# Patient Record
Sex: MALE | Race: WHITE | Employment: OTHER | URBAN - METROPOLITAN AREA
[De-identification: names, ages, dates, MRNs, and addresses within clinical notes are randomized per-mention and may not be internally consistent; named-entity substitution may affect disease eponyms.]

---

## 2018-10-15 ENCOUNTER — APPOINTMENT (OUTPATIENT)
Dept: CT IMAGING | Age: 53
End: 2018-10-15
Payer: MEDICARE

## 2018-10-15 ENCOUNTER — HOSPITAL ENCOUNTER (EMERGENCY)
Age: 53
Discharge: OTHER INSTITUTION | End: 2018-10-16
Attending: EMERGENCY MEDICINE
Payer: MEDICARE

## 2018-10-15 DIAGNOSIS — I74.5 ILIAC ARTERY OCCLUSION, LEFT (HCC): ICD-10-CM

## 2018-10-15 DIAGNOSIS — I73.9 PERIPHERAL VASCULAR DISEASE (HCC): ICD-10-CM

## 2018-10-15 DIAGNOSIS — M79.605 LEFT LEG PAIN: Primary | ICD-10-CM

## 2018-10-15 LAB
A/G RATIO: 1.4 (ref 1.1–2.2)
ALBUMIN SERPL-MCNC: 4.3 G/DL (ref 3.4–5)
ALP BLD-CCNC: 107 U/L (ref 40–129)
ALT SERPL-CCNC: 12 U/L (ref 10–40)
ANION GAP SERPL CALCULATED.3IONS-SCNC: 13 MMOL/L (ref 3–16)
APTT: 30.3 SEC (ref 26–36)
AST SERPL-CCNC: 13 U/L (ref 15–37)
BASOPHILS ABSOLUTE: 0.1 K/UL (ref 0–0.2)
BASOPHILS RELATIVE PERCENT: 0.7 %
BILIRUB SERPL-MCNC: 0.3 MG/DL (ref 0–1)
BILIRUBIN URINE: NEGATIVE
BLOOD, URINE: NEGATIVE
BUN BLDV-MCNC: 12 MG/DL (ref 7–20)
CALCIUM SERPL-MCNC: 9.6 MG/DL (ref 8.3–10.6)
CHLORIDE BLD-SCNC: 104 MMOL/L (ref 99–110)
CLARITY: CLEAR
CO2: 26 MMOL/L (ref 21–32)
COLOR: NORMAL
CREAT SERPL-MCNC: 0.8 MG/DL (ref 0.9–1.3)
EOSINOPHILS ABSOLUTE: 0.2 K/UL (ref 0–0.6)
EOSINOPHILS RELATIVE PERCENT: 1.7 %
GFR AFRICAN AMERICAN: >60
GFR NON-AFRICAN AMERICAN: >60
GLOBULIN: 3.1 G/DL
GLUCOSE BLD-MCNC: 85 MG/DL (ref 70–99)
GLUCOSE URINE: NEGATIVE MG/DL
HCT VFR BLD CALC: 42.7 % (ref 40.5–52.5)
HEMOGLOBIN: 14.7 G/DL (ref 13.5–17.5)
INR BLD: 1.05 (ref 0.86–1.14)
KETONES, URINE: NEGATIVE MG/DL
LACTIC ACID: 1.1 MMOL/L (ref 0.4–2)
LEUKOCYTE ESTERASE, URINE: NEGATIVE
LYMPHOCYTES ABSOLUTE: 2.4 K/UL (ref 1–5.1)
LYMPHOCYTES RELATIVE PERCENT: 23.5 %
MCH RBC QN AUTO: 33.1 PG (ref 26–34)
MCHC RBC AUTO-ENTMCNC: 34.4 G/DL (ref 31–36)
MCV RBC AUTO: 96.3 FL (ref 80–100)
MICROSCOPIC EXAMINATION: NORMAL
MONOCYTES ABSOLUTE: 0.6 K/UL (ref 0–1.3)
MONOCYTES RELATIVE PERCENT: 5.8 %
NEUTROPHILS ABSOLUTE: 7.1 K/UL (ref 1.7–7.7)
NEUTROPHILS RELATIVE PERCENT: 68.3 %
NITRITE, URINE: NEGATIVE
PDW BLD-RTO: 13.7 % (ref 12.4–15.4)
PH UA: 6
PLATELET # BLD: 276 K/UL (ref 135–450)
PMV BLD AUTO: 6.4 FL (ref 5–10.5)
POTASSIUM SERPL-SCNC: 3.8 MMOL/L (ref 3.5–5.1)
PROTEIN UA: NEGATIVE MG/DL
PROTHROMBIN TIME: 12 SEC (ref 9.8–13)
RBC # BLD: 4.43 M/UL (ref 4.2–5.9)
SODIUM BLD-SCNC: 143 MMOL/L (ref 136–145)
SPECIFIC GRAVITY UA: 1.01
SPECIMEN STATUS: NORMAL
TOTAL CK: 82 U/L (ref 39–308)
TOTAL PROTEIN: 7.4 G/DL (ref 6.4–8.2)
URINE TYPE: NORMAL
UROBILINOGEN, URINE: 0.2 E.U./DL
WBC # BLD: 10.4 K/UL (ref 4–11)

## 2018-10-15 PROCEDURE — 36415 COLL VENOUS BLD VENIPUNCTURE: CPT

## 2018-10-15 PROCEDURE — 82550 ASSAY OF CK (CPK): CPT

## 2018-10-15 PROCEDURE — 6360000004 HC RX CONTRAST MEDICATION: Performed by: PHYSICIAN ASSISTANT

## 2018-10-15 PROCEDURE — 83605 ASSAY OF LACTIC ACID: CPT

## 2018-10-15 PROCEDURE — 75635 CT ANGIO ABDOMINAL ARTERIES: CPT

## 2018-10-15 PROCEDURE — 85730 THROMBOPLASTIN TIME PARTIAL: CPT

## 2018-10-15 PROCEDURE — 85610 PROTHROMBIN TIME: CPT

## 2018-10-15 PROCEDURE — 6370000000 HC RX 637 (ALT 250 FOR IP): Performed by: PHYSICIAN ASSISTANT

## 2018-10-15 PROCEDURE — 6360000002 HC RX W HCPCS: Performed by: PHYSICIAN ASSISTANT

## 2018-10-15 PROCEDURE — 99284 EMERGENCY DEPT VISIT MOD MDM: CPT

## 2018-10-15 PROCEDURE — 80053 COMPREHEN METABOLIC PANEL: CPT

## 2018-10-15 PROCEDURE — 81003 URINALYSIS AUTO W/O SCOPE: CPT

## 2018-10-15 PROCEDURE — 85025 COMPLETE CBC W/AUTO DIFF WBC: CPT

## 2018-10-15 RX ORDER — ONDANSETRON 4 MG/1
4 TABLET, FILM COATED ORAL EVERY 8 HOURS PRN
Qty: 20 TABLET | Refills: 0 | Status: SHIPPED | OUTPATIENT
Start: 2018-10-15 | End: 2020-06-22

## 2018-10-15 RX ORDER — CLOPIDOGREL BISULFATE 75 MG/1
75 TABLET ORAL DAILY
COMMUNITY
Start: 2018-07-16 | End: 2021-09-23 | Stop reason: SDUPTHER

## 2018-10-15 RX ORDER — GABAPENTIN 300 MG/1
300 CAPSULE ORAL 2 TIMES DAILY
COMMUNITY
Start: 2018-07-16 | End: 2021-06-23

## 2018-10-15 RX ORDER — ONDANSETRON 4 MG/1
4 TABLET, ORALLY DISINTEGRATING ORAL ONCE
Status: COMPLETED | OUTPATIENT
Start: 2018-10-15 | End: 2018-10-15

## 2018-10-15 RX ORDER — ASPIRIN 325 MG
325 TABLET ORAL DAILY
Status: ON HOLD | COMMUNITY
Start: 2018-09-11 | End: 2021-03-13 | Stop reason: HOSPADM

## 2018-10-15 RX ORDER — ATORVASTATIN CALCIUM 80 MG/1
80 TABLET, FILM COATED ORAL DAILY
Status: ON HOLD | COMMUNITY
Start: 2018-07-16 | End: 2021-03-13 | Stop reason: SDUPTHER

## 2018-10-15 RX ORDER — LANOLIN ALCOHOL/MO/W.PET/CERES
3 CREAM (GRAM) TOPICAL NIGHTLY PRN
COMMUNITY
Start: 2018-07-16 | End: 2020-06-22 | Stop reason: ALTCHOICE

## 2018-10-15 RX ORDER — OXYCODONE HYDROCHLORIDE AND ACETAMINOPHEN 5; 325 MG/1; MG/1
1 TABLET ORAL ONCE
Status: COMPLETED | OUTPATIENT
Start: 2018-10-15 | End: 2018-10-15

## 2018-10-15 RX ORDER — LISINOPRIL 10 MG/1
10 TABLET ORAL DAILY
Status: ON HOLD | COMMUNITY
Start: 2018-05-29 | End: 2021-03-13 | Stop reason: HOSPADM

## 2018-10-15 RX ORDER — OXYCODONE HYDROCHLORIDE AND ACETAMINOPHEN 5; 325 MG/1; MG/1
1-2 TABLET ORAL EVERY 6 HOURS PRN
Qty: 10 TABLET | Refills: 0 | Status: SHIPPED | OUTPATIENT
Start: 2018-10-15 | End: 2018-10-22

## 2018-10-15 RX ORDER — TAMSULOSIN HYDROCHLORIDE 0.4 MG/1
0.4 CAPSULE ORAL DAILY
COMMUNITY
Start: 2018-07-16 | End: 2020-06-22 | Stop reason: ALTCHOICE

## 2018-10-15 RX ADMIN — IOPAMIDOL 110 ML: 755 INJECTION, SOLUTION INTRAVENOUS at 21:31

## 2018-10-15 RX ADMIN — OXYCODONE AND ACETAMINOPHEN 1 TABLET: 5; 325 TABLET ORAL at 20:27

## 2018-10-15 RX ADMIN — ONDANSETRON 4 MG: 4 TABLET, ORALLY DISINTEGRATING ORAL at 20:27

## 2018-10-15 ASSESSMENT — PAIN SCALES - GENERAL
PAINLEVEL_OUTOF10: 6
PAINLEVEL_OUTOF10: 5

## 2018-10-15 ASSESSMENT — PAIN DESCRIPTION - PAIN TYPE: TYPE: ACUTE PAIN

## 2018-10-15 NOTE — ED PROVIDER NOTES
[address],  [place], [place]   Phone [phone]   SAMPLE POSSIBLE BLOOD BANK TESTING    Narrative:     Performed at:  [facility]) - [facility]  [address],  [place], [place] [employer]   Phone [phone]   URINALYSIS    Narrative:     Performed at:  [facility]) - [facility]  [address],  [place], [place]   Phone [phone]       All other labs were within normal range or not returned as of this dictation. EKG: All EKG's are interpreted by the Emergency Department Physician who either signs orCo-signs this chart in the absence of a cardiologist.  Please see their note for interpretation of EKG. RADIOLOGY:   Non-plain film images such as CT, Ultrasound and MRI are read by the radiologist. Plain radiographic images are visualized andpreliminarily interpreted by the  ED Provider with the below findings:        Interpretation perthe Radiologist below, if available at the time of this note:    CTA ABDOMINAL AORTA W BILAT RUNOFF W CONTRAST   Final Result   1. Patent right common iliac artery stent. 2. Patent fem-fem bypass graft. 3. Patent stent versus graft in the right lower extremity corresponding to   the superficial femoral artery. 4. Diminutive caliber of right anterior tibial and peroneal arteries   indicating peripheral vascular disease. 5. Occlusion of the left common iliac artery and of the bilateral internal   iliac arteries proximally. Internal iliac arteries are reconstituted from   collateral flow. 6. Ventral hernia containing peritoneal fat only. VL Extremity Venous Left    (Results Pending)     No results found.       PROCEDURES   Unless otherwise noted below, none     Procedures    CRITICAL CARE TIME   N/A    CONSULTS:  None      EMERGENCY DEPARTMENT COURSE and DIFFERENTIALDIAGNOSIS/MDM:   Vitals:    Vitals:    10/15/18 2033 10/15/18 2103 10/15/18 2136 10/16/18 0030   BP: (!) 166/82

## 2018-10-15 NOTE — ED PROVIDER NOTES
I independently performed a history and physical on Renetta Hurley. All diagnostic, treatment, and disposition decisions were made by myself in conjunction with the advanced practice provider. For further details of Caty Galeas's emergency department encounter, please see advanced practice provider's documentation    57-year-old male presenting with 2 day history of left leg pain    Physical examination: Left posterior calf tenderness to palpation. Cta Abdominal Aorta W Bilat Runoff W Contrast    Addendum Date: 10/16/2018    ADDENDUM: 3D reconstructed images were performed on a separate workstation and provided for review. These were provided after original dictation. Review of imaging shows no change to the regional report. Result Date: 10/16/2018  EXAMINATION: CTA OF THE AORTA WITH LOWER EXTREMITY RUNOFF 10/15/2018 9:34 pm TECHNIQUE: CTA of the pelvis and bilateral lower extremities was performed after the administration of intravenous contrast.   Multiplanar reformatted images are provided for review. MIP images are provided for review. Dose modulation, iterative reconstruction, and/or weight based adjustment of the mA/kV was utilized to reduce the radiation dose to as low as reasonably achievable. COMPARISON: None. HISTORY: ORDERING SYSTEM PROVIDED HISTORY: Vasculopath and chronic smoker now with abdominal pain and left lower extremity pain TECHNOLOGIST PROVIDED HISTORY: Ordering Physician Provided Reason for Exam: Lower leg pain with abdominal pain; Abdominal distension; History of vasular repair in right leg per patient Acuity: Acute Type of Exam: Initial FINDINGS: Nonvascular Lower Chest:  Mild dependent atelectasis in the lung bases. The base of the heart is normal. Organs: The liver, gallbladder, biliary ducts, pancreas and spleen are normal. Kidneys and adrenal glands are normal. GI/Bowel: The stomach, duodenum and small bowel are normal.  The patient is status post right hemicolectomy. reconstituted from collateral flow. 6. Ventral hernia containing peritoneal fat only. Due to the immediate potential for life-threatening deterioration due to left leg pain requiring emergent workup including a CTA of abdomen with runoff to extremities and expedited workup with vascular surgery consultation amd transfer to [facility], I spent 40 minutes providing critical care. This time is excluding time spent performing procedures.        Anuja Mirza, DO  10/18/18 4431 [facility] [address] [facility], DO  10/18/18 3979

## 2018-10-16 VITALS
HEIGHT: 68 IN | WEIGHT: 208 LBS | BODY MASS INDEX: 31.52 KG/M2 | OXYGEN SATURATION: 95 % | SYSTOLIC BLOOD PRESSURE: 156 MMHG | TEMPERATURE: 97.7 F | DIASTOLIC BLOOD PRESSURE: 94 MMHG | HEART RATE: 57 BPM | RESPIRATION RATE: 17 BRPM

## 2018-10-16 PROCEDURE — 6360000002 HC RX W HCPCS: Performed by: PHYSICIAN ASSISTANT

## 2018-10-16 PROCEDURE — 96372 THER/PROPH/DIAG INJ SC/IM: CPT

## 2018-10-16 PROCEDURE — 6370000000 HC RX 637 (ALT 250 FOR IP): Performed by: PHYSICIAN ASSISTANT

## 2018-10-16 RX ORDER — OXYCODONE HYDROCHLORIDE AND ACETAMINOPHEN 5; 325 MG/1; MG/1
2 TABLET ORAL ONCE
Status: COMPLETED | OUTPATIENT
Start: 2018-10-16 | End: 2018-10-16

## 2018-10-16 RX ORDER — ONDANSETRON 4 MG/1
4 TABLET, ORALLY DISINTEGRATING ORAL ONCE
Status: COMPLETED | OUTPATIENT
Start: 2018-10-16 | End: 2018-10-16

## 2018-10-16 RX ADMIN — ONDANSETRON 4 MG: 4 TABLET, ORALLY DISINTEGRATING ORAL at 01:09

## 2018-10-16 RX ADMIN — ENOXAPARIN SODIUM 90 MG: 100 INJECTION SUBCUTANEOUS at 01:09

## 2018-10-16 RX ADMIN — OXYCODONE AND ACETAMINOPHEN 2 TABLET: 5; 325 TABLET ORAL at 01:09

## 2018-10-16 ASSESSMENT — PAIN SCALES - GENERAL: PAINLEVEL_OUTOF10: 9

## 2020-05-04 ENCOUNTER — APPOINTMENT (OUTPATIENT)
Dept: GENERAL RADIOLOGY | Age: 55
End: 2020-05-04
Payer: MEDICARE

## 2020-05-04 ENCOUNTER — HOSPITAL ENCOUNTER (EMERGENCY)
Age: 55
Discharge: HOME | End: 2020-05-05
Attending: EMERGENCY MEDICINE
Payer: MEDICARE

## 2020-05-04 ENCOUNTER — APPOINTMENT (OUTPATIENT)
Dept: CT IMAGING | Age: 55
End: 2020-05-04
Payer: MEDICARE

## 2020-05-04 LAB
A/G RATIO: 1.4 (ref 1.1–2.2)
ALBUMIN SERPL-MCNC: 4.6 G/DL (ref 3.4–5)
ALP BLD-CCNC: 126 U/L (ref 40–129)
ALT SERPL-CCNC: 17 U/L (ref 10–40)
ANION GAP SERPL CALCULATED.3IONS-SCNC: 13 MMOL/L (ref 3–16)
AST SERPL-CCNC: 18 U/L (ref 15–37)
BASOPHILS ABSOLUTE: 0.1 K/UL (ref 0–0.2)
BASOPHILS RELATIVE PERCENT: 1.3 %
BILIRUB SERPL-MCNC: <0.2 MG/DL (ref 0–1)
BUN BLDV-MCNC: 10 MG/DL (ref 7–20)
CALCIUM SERPL-MCNC: 10.1 MG/DL (ref 8.3–10.6)
CHLORIDE BLD-SCNC: 101 MMOL/L (ref 99–110)
CO2: 27 MMOL/L (ref 21–32)
CREAT SERPL-MCNC: 0.8 MG/DL (ref 0.9–1.3)
EOSINOPHILS ABSOLUTE: 0.2 K/UL (ref 0–0.6)
EOSINOPHILS RELATIVE PERCENT: 1.5 %
GFR AFRICAN AMERICAN: >60
GFR NON-AFRICAN AMERICAN: >60
GLOBULIN: 3.2 G/DL
GLUCOSE BLD-MCNC: 80 MG/DL (ref 70–99)
HCT VFR BLD CALC: 42.5 % (ref 40.5–52.5)
HEMOGLOBIN: 14.5 G/DL (ref 13.5–17.5)
LYMPHOCYTES ABSOLUTE: 3.1 K/UL (ref 1–5.1)
LYMPHOCYTES RELATIVE PERCENT: 27.2 %
MCH RBC QN AUTO: 33 PG (ref 26–34)
MCHC RBC AUTO-ENTMCNC: 34.1 G/DL (ref 31–36)
MCV RBC AUTO: 96.8 FL (ref 80–100)
MONOCYTES ABSOLUTE: 0.7 K/UL (ref 0–1.3)
MONOCYTES RELATIVE PERCENT: 5.8 %
NEUTROPHILS ABSOLUTE: 7.2 K/UL (ref 1.7–7.7)
NEUTROPHILS RELATIVE PERCENT: 64.2 %
PDW BLD-RTO: 13.8 % (ref 12.4–15.4)
PLATELET # BLD: 241 K/UL (ref 135–450)
PMV BLD AUTO: 6.4 FL (ref 5–10.5)
POTASSIUM REFLEX MAGNESIUM: 3.6 MMOL/L (ref 3.5–5.1)
RBC # BLD: 4.39 M/UL (ref 4.2–5.9)
SODIUM BLD-SCNC: 141 MMOL/L (ref 136–145)
TOTAL PROTEIN: 7.8 G/DL (ref 6.4–8.2)
WBC # BLD: 11.3 K/UL (ref 4–11)

## 2020-05-04 PROCEDURE — 6360000002 HC RX W HCPCS: Performed by: PHYSICIAN ASSISTANT

## 2020-05-04 PROCEDURE — 85025 COMPLETE CBC W/AUTO DIFF WBC: CPT

## 2020-05-04 PROCEDURE — 80053 COMPREHEN METABOLIC PANEL: CPT

## 2020-05-04 PROCEDURE — 6360000004 HC RX CONTRAST MEDICATION: Performed by: PHYSICIAN ASSISTANT

## 2020-05-04 PROCEDURE — 73630 X-RAY EXAM OF FOOT: CPT

## 2020-05-04 PROCEDURE — 75635 CT ANGIO ABDOMINAL ARTERIES: CPT

## 2020-05-04 PROCEDURE — 99284 EMERGENCY DEPT VISIT MOD MDM: CPT

## 2020-05-04 PROCEDURE — 96374 THER/PROPH/DIAG INJ IV PUSH: CPT

## 2020-05-04 RX ORDER — MORPHINE SULFATE 2 MG/ML
2 INJECTION, SOLUTION INTRAMUSCULAR; INTRAVENOUS ONCE
Status: COMPLETED | OUTPATIENT
Start: 2020-05-04 | End: 2020-05-04

## 2020-05-04 RX ORDER — HYDROCODONE BITARTRATE AND ACETAMINOPHEN 5; 325 MG/1; MG/1
1 TABLET ORAL EVERY 8 HOURS PRN
Qty: 9 TABLET | Refills: 0 | Status: SHIPPED | OUTPATIENT
Start: 2020-05-04 | End: 2020-05-07

## 2020-05-04 RX ADMIN — MORPHINE SULFATE 2 MG: 2 INJECTION, SOLUTION INTRAMUSCULAR; INTRAVENOUS at 22:35

## 2020-05-04 RX ADMIN — IOPAMIDOL 100 ML: 755 INJECTION, SOLUTION INTRAVENOUS at 22:20

## 2020-05-04 ASSESSMENT — PAIN SCALES - GENERAL
PAINLEVEL_OUTOF10: 5
PAINLEVEL_OUTOF10: 5

## 2020-05-05 VITALS
TEMPERATURE: 98.1 F | RESPIRATION RATE: 16 BRPM | HEART RATE: 78 BPM | DIASTOLIC BLOOD PRESSURE: 69 MMHG | SYSTOLIC BLOOD PRESSURE: 141 MMHG | OXYGEN SATURATION: 95 %

## 2020-05-05 NOTE — ED NOTES
Bed: 09  Expected date:   Expected time:   Means of arrival:   Comments:  EMS     Nirmal Momin RN  05/04/20 2013

## 2020-05-05 NOTE — ED PROVIDER NOTES
[address] ED  EMERGENCY DEPARTMENT ENCOUNTER        Pt Name: Soraida Cruz  MRN: [id number]  [place] 1965  Date of evaluation: 5/4/2020  Provider: LV Farmer  PCP: Bryant Gonzalez    This patient was seen and evaluated by the attending physician Kelin Michele MD.      [address] [place] [address]       Chief Complaint   Patient presents with    Toe Pain     atraumatic left # 1 toe pain, pt states has been hurting for last several days, has not taken medications for pain. HISTORY OF PRESENT ILLNESS   (Location/Symptom, Timing/Onset, Context/Setting, Quality, Duration, Modifying Factors, Severity)  Note limiting factors. Sroaida Cruz is a 62 y.o. male who presents via private vehicle for evaluation of atraumatic right toe pain. Patient has significant past medical history of peripheral arterial disease, he is also tobacco abuser. He notes that approximately 4 weeks ago he developed pain to his right foot along the pinky toes. Over the last several weeks the pain has progressed and is now to all 5 toes. He notes that the foot looks slightly more red than it typically does although at baseline it is red appearing. He notes nothing makes the pain better or worse, it is not worsened by weightbearing or position changes. He denies any numbness tingling or weakness. He denies any pain to the calf shin knee or thigh. He denies any chest pain cough shortness of breath nausea vomiting or abdominal pain. Nursing Notes were all reviewed and agreed with or any disagreements were addressed  in the HPI. REVIEW OF SYSTEMS    (2-9 systems for level 4, 10 or more for level 5)     Review of Systems    Positives and Pertinent negatives as per HPI. Except as noted abovein the ROS, all other systems were reviewed and negative.        PAST MEDICAL HISTORY     Past Medical History:   Diagnosis Date    Hyperlipidemia     Hypertension     PAD (peripheral artery disease) [facility])          SURGICAL HISTORY   History reviewed. No pertinent surgical history. [address]       Discharge Medication List as of 5/5/2020 12:09 AM      CONTINUE these medications which have NOT CHANGED    Details   aspirin 325 MG tablet Take 325 mg by mouthHistorical Med      clopidogrel (PLAVIX) 75 MG tablet Take 75 mg by mouthHistorical Med      atorvastatin (LIPITOR) 80 MG tablet Take 80 mg by mouthHistorical Med      gabapentin (NEURONTIN) 300 MG capsule Take 300 mg by mouth. .Historical Med      lisinopril (PRINIVIL;ZESTRIL) 10 MG tablet Take 10 mg by mouthHistorical Med      melatonin 3 MG TABS tablet Take 3 mg by mouthHistorical Med      tamsulosin (FLOMAX) 0.4 MG capsule Take 0.4 mg by mouthHistorical Med      ondansetron (ZOFRAN) 4 MG tablet Take 1 tablet by mouth every 8 hours as needed for Nausea, Disp-20 tablet, R-0Print               ALLERGIES     Patient has no known allergies. FAMILYHISTORY     History reviewed. No pertinent family history.        SOCIAL HISTORY       Social History     Socioeconomic History    Marital status:      Spouse name: None    Number of children: None    Years of education: None    Highest education level: None   Occupational History    None   Social Needs    Financial resource strain: None    Food insecurity     Worry: None     Inability: None    Transportation needs     Medical: None     Non-medical: None   Tobacco Use    Smoking status: Current Every Day Smoker     Packs/day: 2.00    Smokeless tobacco: Never Used   Substance and Sexual Activity    Alcohol use: None    Drug use: No    Sexual activity: None   Lifestyle    Physical activity     Days per week: None     Minutes per session: None    Stress: None   Relationships    Social connections     Talks on phone: None     Gets together: None     Attends Voodoo service: None     Active member of club or organization: None     Attends meetings of clubs or organizations: None ankle.  The foot is warm, no temperature deficits compared to contralateral side. SKIN: Warm and dry. No acute rashes. NEUROLOGICAL: Alert and oriented No gross facial drooping. Power intact upper and lower extremity sensation intact x4 pSYCHIATRIC: Normal mood and affect. DIAGNOSTIC RESULTS   LABS:    Labs Reviewed   CBC WITH AUTO DIFFERENTIAL - Abnormal; Notable for the following components:       Result Value    WBC 11.3 (*)     All other components within normal limits    Narrative:     Performed at:  [facility],  [place], [facility]   Phone [phone]   COMPREHENSIVE METABOLIC PANEL W/ REFLEX TO MG FOR LOW K - Abnormal; Notable for the following components:    CREATININE 0.8 (*)     All other components within normal limits    Narrative:     Performed at:  [facility]) [facility] [address],  [place], [facility]   Phone [phone]       All other labs were within normal range or not returned as of this dictation. EKG: All EKG's are interpreted by the Emergency Department Physician who either signs orCo-signs this chart in the absence of a cardiologist.  Please see their note for interpretation of EKG. RADIOLOGY:   Non-plain film images such as CT, Ultrasound and MRI are read by the radiologist. Plain radiographic images are visualized andpreliminarily interpreted by the  ED Provider with the below findings:        Interpretation [facility] Radiologist below, if available at the time of this note:    CTA ABDOMINAL AORTA W BILAT RUNOFF W CONTRAST   Preliminary Result   Patent right aortoiliac stent and bi-femoral bypass. The stented native right femoral artery and right femoral-popliteal bypass   are occluded. There is distal reconstitution (via collateralization) at the   tibioperoneal trunk with trifurcation and single-vessel runoff to the foot   via the peroneal artery.       Patent left lower extremity pressure is mildly elevated in triage this normalizes throughout his stay. On exam he has evidence of chronic peripheral arterial disease, however his foot today is without significant temperature change compared to contralateral side, sensation is intact, he has excellent cap refill. I personally had a difficult time obtaining DP and PT pulses however it was obtained with Doppler, intact. I reviewed patient's past medical history he is a patient of vascular surgery at , he has been in contact with their office, it was noted that a repeat lower extremity CT angios with runoff was needed. This test was performed today, there is evidence of patent right arterial iliac stent and bifemoral bypass. The stented right femoral artery and right femoral popliteal bypass are occluded however there is distal reconstitution at the tibioperoneal trunk with trifurcation and single-vessel runoff to the root via the peroneal artery. Given the fact the patient does have adequate perfusion but worsening disease I do recommend that he follow-up with his vascular surgeon. Smoking cessation discussed and encouraged. Patient given medication for pain with improvement. He will be given prescription for pain medication. At this time I believe patient's reasonable candidate for discharge home with close specialist follow-up and strict return precautions advised. Based on patient's clinical history and clinical findings I currently estimate there is low probability for: compartment syndrome, DVT, septic arthritis, dislocation, surgically emergent tendon or NV injury. We have discussed the symptoms which are most concerning (e.g., changing or worsening pain, numbness, weakness) that necessitate immediate return. Pt is in agreement with the current plan and all questions were addressed.  I discussed my PE findings, diagnostic results, assessment and plan for home discharge with my supervisor who agrees with the above stated

## 2020-05-30 ENCOUNTER — APPOINTMENT (OUTPATIENT)
Dept: CT IMAGING | Age: 55
End: 2020-05-30
Payer: MEDICARE

## 2020-05-30 ENCOUNTER — HOSPITAL ENCOUNTER (INPATIENT)
Age: 55
LOS: 4 days | Discharge: HOME | DRG: 035 | End: 2020-06-03
Attending: INTERNAL MEDICINE | Admitting: INTERNAL MEDICINE
Payer: MEDICARE

## 2020-05-30 ENCOUNTER — HOSPITAL ENCOUNTER (EMERGENCY)
Age: 55
Discharge: ANOTHER HOSPITAL | End: 2020-05-30
Attending: EMERGENCY MEDICINE
Payer: MEDICARE

## 2020-05-30 VITALS
OXYGEN SATURATION: 98 % | SYSTOLIC BLOOD PRESSURE: 129 MMHG | BODY MASS INDEX: 30.92 KG/M2 | HEIGHT: 67 IN | DIASTOLIC BLOOD PRESSURE: 73 MMHG | TEMPERATURE: 97.6 F | HEART RATE: 65 BPM | RESPIRATION RATE: 17 BRPM | WEIGHT: 197 LBS

## 2020-05-30 PROBLEM — I63.9 ACUTE CVA (CEREBROVASCULAR ACCIDENT) (HCC): Status: ACTIVE | Noted: 2020-05-30

## 2020-05-30 LAB
ANION GAP SERPL CALCULATED.3IONS-SCNC: 12 MMOL/L (ref 3–16)
APTT: 29.5 SEC (ref 24.2–36.2)
BUN BLDV-MCNC: 14 MG/DL (ref 7–20)
CALCIUM SERPL-MCNC: 9.3 MG/DL (ref 8.3–10.6)
CHLORIDE BLD-SCNC: 103 MMOL/L (ref 99–110)
CO2: 23 MMOL/L (ref 21–32)
CREAT SERPL-MCNC: 0.9 MG/DL (ref 0.9–1.3)
GFR AFRICAN AMERICAN: >60
GFR NON-AFRICAN AMERICAN: >60
GLUCOSE BLD-MCNC: 102 MG/DL (ref 70–99)
GLUCOSE BLD-MCNC: 109 MG/DL (ref 70–99)
HCT VFR BLD CALC: 40.5 % (ref 40.5–52.5)
HEMOGLOBIN: 13.8 G/DL (ref 13.5–17.5)
INR BLD: 1.08 (ref 0.86–1.14)
MCH RBC QN AUTO: 33.2 PG (ref 26–34)
MCHC RBC AUTO-ENTMCNC: 34.1 G/DL (ref 31–36)
MCV RBC AUTO: 97.5 FL (ref 80–100)
PDW BLD-RTO: 14.1 % (ref 12.4–15.4)
PERFORMED ON: ABNORMAL
PLATELET # BLD: 250 K/UL (ref 135–450)
PMV BLD AUTO: 6.5 FL (ref 5–10.5)
POTASSIUM SERPL-SCNC: 4.3 MMOL/L (ref 3.5–5.1)
PROTHROMBIN TIME: 12.5 SEC (ref 10–13.2)
RBC # BLD: 4.16 M/UL (ref 4.2–5.9)
SODIUM BLD-SCNC: 138 MMOL/L (ref 136–145)
TROPONIN: <0.01 NG/ML
WBC # BLD: 10.1 K/UL (ref 4–11)

## 2020-05-30 PROCEDURE — 99285 EMERGENCY DEPT VISIT HI MDM: CPT

## 2020-05-30 PROCEDURE — 36415 COLL VENOUS BLD VENIPUNCTURE: CPT

## 2020-05-30 PROCEDURE — 85730 THROMBOPLASTIN TIME PARTIAL: CPT

## 2020-05-30 PROCEDURE — 85610 PROTHROMBIN TIME: CPT

## 2020-05-30 PROCEDURE — 6370000000 HC RX 637 (ALT 250 FOR IP): Performed by: EMERGENCY MEDICINE

## 2020-05-30 PROCEDURE — 2580000003 HC RX 258: Performed by: INTERNAL MEDICINE

## 2020-05-30 PROCEDURE — 70498 CT ANGIOGRAPHY NECK: CPT

## 2020-05-30 PROCEDURE — 84484 ASSAY OF TROPONIN QUANT: CPT

## 2020-05-30 PROCEDURE — 80048 BASIC METABOLIC PNL TOTAL CA: CPT

## 2020-05-30 PROCEDURE — 93005 ELECTROCARDIOGRAM TRACING: CPT | Performed by: EMERGENCY MEDICINE

## 2020-05-30 PROCEDURE — 85027 COMPLETE CBC AUTOMATED: CPT

## 2020-05-30 PROCEDURE — 1200000000 HC SEMI PRIVATE

## 2020-05-30 PROCEDURE — 6360000004 HC RX CONTRAST MEDICATION: Performed by: EMERGENCY MEDICINE

## 2020-05-30 PROCEDURE — 2580000003 HC RX 258: Performed by: EMERGENCY MEDICINE

## 2020-05-30 PROCEDURE — 6370000000 HC RX 637 (ALT 250 FOR IP): Performed by: INTERNAL MEDICINE

## 2020-05-30 PROCEDURE — 70450 CT HEAD/BRAIN W/O DYE: CPT

## 2020-05-30 RX ORDER — POLYETHYLENE GLYCOL 3350 17 G/17G
17 POWDER, FOR SOLUTION ORAL DAILY PRN
Status: DISCONTINUED | OUTPATIENT
Start: 2020-05-30 | End: 2020-06-03 | Stop reason: HOSPADM

## 2020-05-30 RX ORDER — OXYCODONE HYDROCHLORIDE AND ACETAMINOPHEN 5; 325 MG/1; MG/1
1 TABLET ORAL ONCE
Status: COMPLETED | OUTPATIENT
Start: 2020-05-30 | End: 2020-05-30

## 2020-05-30 RX ORDER — CLOPIDOGREL BISULFATE 75 MG/1
75 TABLET ORAL DAILY
Status: DISCONTINUED | OUTPATIENT
Start: 2020-05-31 | End: 2020-06-03 | Stop reason: HOSPADM

## 2020-05-30 RX ORDER — TRAMADOL HYDROCHLORIDE 50 MG/1
50 TABLET ORAL EVERY 6 HOURS PRN
Status: DISCONTINUED | OUTPATIENT
Start: 2020-05-30 | End: 2020-06-03 | Stop reason: HOSPADM

## 2020-05-30 RX ORDER — LISINOPRIL 10 MG/1
10 TABLET ORAL DAILY
Status: DISCONTINUED | OUTPATIENT
Start: 2020-05-31 | End: 2020-05-31

## 2020-05-30 RX ORDER — ONDANSETRON 2 MG/ML
4 INJECTION INTRAMUSCULAR; INTRAVENOUS EVERY 6 HOURS PRN
Status: DISCONTINUED | OUTPATIENT
Start: 2020-05-30 | End: 2020-06-03 | Stop reason: HOSPADM

## 2020-05-30 RX ORDER — ATORVASTATIN CALCIUM 80 MG/1
80 TABLET, FILM COATED ORAL DAILY
Status: DISCONTINUED | OUTPATIENT
Start: 2020-05-31 | End: 2020-06-03 | Stop reason: HOSPADM

## 2020-05-30 RX ORDER — ASPIRIN 325 MG
325 TABLET ORAL DAILY
Status: DISCONTINUED | OUTPATIENT
Start: 2020-05-31 | End: 2020-05-31

## 2020-05-30 RX ORDER — 0.9 % SODIUM CHLORIDE 0.9 %
1000 INTRAVENOUS SOLUTION INTRAVENOUS ONCE
Status: COMPLETED | OUTPATIENT
Start: 2020-05-30 | End: 2020-05-30

## 2020-05-30 RX ORDER — SODIUM CHLORIDE 0.9 % (FLUSH) 0.9 %
10 SYRINGE (ML) INJECTION PRN
Status: DISCONTINUED | OUTPATIENT
Start: 2020-05-30 | End: 2020-06-03 | Stop reason: HOSPADM

## 2020-05-30 RX ORDER — SODIUM CHLORIDE 0.9 % (FLUSH) 0.9 %
10 SYRINGE (ML) INJECTION EVERY 12 HOURS SCHEDULED
Status: DISCONTINUED | OUTPATIENT
Start: 2020-05-30 | End: 2020-06-03 | Stop reason: HOSPADM

## 2020-05-30 RX ORDER — TAMSULOSIN HYDROCHLORIDE 0.4 MG/1
0.4 CAPSULE ORAL DAILY
Status: DISCONTINUED | OUTPATIENT
Start: 2020-05-31 | End: 2020-06-03 | Stop reason: HOSPADM

## 2020-05-30 RX ORDER — PROMETHAZINE HYDROCHLORIDE 25 MG/1
12.5 TABLET ORAL EVERY 6 HOURS PRN
Status: DISCONTINUED | OUTPATIENT
Start: 2020-05-30 | End: 2020-06-03 | Stop reason: HOSPADM

## 2020-05-30 RX ORDER — GABAPENTIN 300 MG/1
300 CAPSULE ORAL 2 TIMES DAILY
Status: DISCONTINUED | OUTPATIENT
Start: 2020-05-30 | End: 2020-05-31

## 2020-05-30 RX ADMIN — GABAPENTIN 300 MG: 300 CAPSULE ORAL at 23:20

## 2020-05-30 RX ADMIN — SODIUM CHLORIDE 1000 ML: 9 INJECTION, SOLUTION INTRAVENOUS at 15:56

## 2020-05-30 RX ADMIN — OXYCODONE HYDROCHLORIDE AND ACETAMINOPHEN 1 TABLET: 5; 325 TABLET ORAL at 16:33

## 2020-05-30 RX ADMIN — Medication 10 ML: at 23:38

## 2020-05-30 RX ADMIN — IOPAMIDOL 75 ML: 755 INJECTION, SOLUTION INTRAVENOUS at 15:25

## 2020-05-30 ASSESSMENT — ENCOUNTER SYMPTOMS
TROUBLE SWALLOWING: 0
RHINORRHEA: 0
ABDOMINAL PAIN: 0
BACK PAIN: 0
VOMITING: 0
CONSTIPATION: 0
SHORTNESS OF BREATH: 0
COUGH: 0
DIARRHEA: 0
SORE THROAT: 0
NAUSEA: 0
CHEST TIGHTNESS: 0

## 2020-05-30 ASSESSMENT — PAIN DESCRIPTION - PAIN TYPE: TYPE: ACUTE PAIN

## 2020-05-30 ASSESSMENT — PAIN SCALES - GENERAL
PAINLEVEL_OUTOF10: 9
PAINLEVEL_OUTOF10: 8

## 2020-05-30 ASSESSMENT — PAIN DESCRIPTION - LOCATION: LOCATION: FOOT;LEG

## 2020-05-30 ASSESSMENT — PAIN DESCRIPTION - ORIENTATION: ORIENTATION: RIGHT

## 2020-05-30 NOTE — ED PROVIDER NOTES
[address] ED  EMERGENCYDEPARTMENT ENCOUNTER      Pt Name: Kemar Campbell  MRN: [id number]  [place] 1965  Date of evaluation: 5/30/2020  Melissa Puri MD    CHIEF COMPLAINT       Chief Complaint   Patient presents with    Numbness     Pt noticed left arm tingling approx. 2hrs ago. Hx of stroke x 2, four years ago. HISTORY OF PRESENT ILLNESS   (Location/Symptom, Timing/Onset,Context/Setting, Quality, Duration, Modifying Factors, Severity)  Note limiting factors. Kemar Campbell is a 62 y.o. male with history of prior CVA, PAD who presents to the emergency department for left upper extremity numbness. Patient states he was sitting on his porch when he started to feel left upper extremity numbness. This event occurred at 1 PM.  Denies blurry vision, numbness or other extremities, weakness, dysphasia or dysarthria. HPI    Nursing Notes were reviewed. REVIEW OF SYSTEMS    (2-9 systems for level 4, 10 or more for level 5)     Review of Systems   Constitutional: Negative for activity change, appetite change, chills, diaphoresis and fever. HENT: Negative for congestion, rhinorrhea, sneezing, sore throat and trouble swallowing. Respiratory: Negative for cough, chest tightness and shortness of breath. Cardiovascular: Negative for chest pain and palpitations. Gastrointestinal: Negative for abdominal pain, constipation, diarrhea, nausea and vomiting. Genitourinary: Negative for dysuria, flank pain and hematuria. Musculoskeletal: Negative for back pain, gait problem and myalgias. Skin: Negative for rash and wound. Neurological: Positive for numbness. Negative for dizziness, speech difficulty and weakness. Except as noted above the remainder of the review of systems was reviewedand negative.        PAST MEDICAL HISTORY     Past Medical History:   Diagnosis Date    Hyperlipidemia     Hypertension     [facility]per[facility] ([facility] [address]     [facility] sexual activity: None   Other Topics Concern    None   Social History Narrative    None       SCREENINGS   NIH Stroke Scale  NIH Stroke Scale Assessed: Yes  Interval: Baseline  Level of Consciousness (1a. ): Alert  LOC Questions (1b. ): Answers both correctly  LOC Commands (1c. ): Performs both tasks correctly  Best Gaze (2. ): Normal  Visual (3. ): No visual loss  Facial Palsy (4. ): Normal symmetrical movement  Motor Arm, Left (5a. ): No drift  Motor Arm, Right (5b. ): No drift  Motor Leg, Left (6a. ): No drift  Motor Leg, Right (6b. ): No drift  Limb Ataxia (7. ): Absent  Sensory (8. ): (!) Mild to Moderate  Best Language (9. ): No aphasia  Dysarthria (10. ): Normal  Extinction and Inattention (11): No abnormality  Total: 1Glasgow Coma Scale  Eye Opening: Spontaneous  Best Verbal Response: Oriented  Best Motor Response: Obeys commands  Gregory Coma Scale Score: 15        PHYSICAL EXAM    (up to 7 for level 4, 8 ormore for level 5)     ED Triage Vitals [05/30/20 1501]   BP Temp Temp Source Pulse Resp SpO2 Height Weight   123/76 97.6 °F (36.4 °C) Oral 79 19 95 % 5' 7\" (1.702 m) 197 lb (89.4 kg)       Physical Exam  Constitutional:       General: He is not in acute distress. Appearance: Normal appearance. He is well-developed. He is not ill-appearing or toxic-appearing. Comments: Sitting in bed comfortably, speaking in full sentences, following verbal commands appropriately. Not in acute distress     HENT:      Head: Normocephalic and atraumatic. Eyes:      Conjunctiva/sclera: Conjunctivae normal.      Pupils: Pupils are equal, round, and reactive to light. Neck:      Musculoskeletal: Normal range of motion and neck supple. Cardiovascular:      Rate and Rhythm: Normal rate and regular rhythm. Heart sounds: Normal heart sounds. No murmur. No friction rub. No gallop. Pulmonary:      Effort: Pulmonary effort is normal. No respiratory distress. Breath sounds: Normal breath sounds.  No 05/30/20 1648 05/30/20 1705 05/30/20 1719   BP: 135/86 (!) 146/87 (!) 150/83 (!) 143/83   Pulse: 61 62 67 63   Resp:  18 18 13   Temp:       TempSrc:       SpO2: 98% 97% 98% 97%   Weight:       Height:             [facility]    ED COURSE/MDM    -Derek Zelaya is a 62 y.o. male with a history of PAD, CVA presents to ED for left arm numbness. Event occurred at 1 PM at home. No other focal deficits. Patient states he is left-hand dominant. Patient was able to hold a pen and write his name, though appeared messy patient does confirm that it is his baseline handwriting.  -Code stroke was called, spoke with Madie Mckeon regarding patient's history and presentation. Agreed that given that symptoms are not debilitating, recommend that we defer TPA treatment at this time. She looked at his scans, does have significant arterial disease. Discussed discussion with the stroke team with the patient. He does agree and to defer TPA at this time.  -Stroke team called again stating that she was able to look at his chart as he has saved neuro care at [facility], has a history of significant arterial disease including bypass multiple stents placed. Has not seen neurosurgeon recently however was evaluated by vascular surgery yesterday. States that as patient is a complicated case if we would like to admit patient to  she would be more than happy to accept him. Formed her that once the scans come back I will speak to patient about his preference by getting admitted to [facility] versus .  -No acute findings on lab work-up. -CT head shows no acute intracranial abnormality. Multifocal left-sided encephalomalacia. -CT of the head and neck: Completely occluded left cervical ICA and critical stenosis of the proximal to mid right cervical ICA. Severe stenosis at the origin of the left vertebral artery and moderate stenosis at the origin of the right.   Reconstitution of flow within the left infraclinoid ICA with patent patent but attenuated flow within the left MCA and branch vessels.  -Discussed results of the CTA of the head and neck with stroke team, states that the completely occluded left cervical ICA has a bypass in place else placed several years ago, no intervention was done for the proximal cervical ICA though this is likely chronic and does not warrant an acute intervention at this time. After discussion, patient states that he would prefer to be transferred to [facility] rather than the . Stroke team states that that is probably appropriate though if there are any acute changes he will likely need to be transferred to  at that point.  -Discussed results with patient, on reevaluation he states that his numbness is improving and he starting to get sensation back to his hand and midforearm.  -Complained about right toe pain which is chronic given his arterial disease, was given Percocet 5.  -Plan for admission for further workup and treatment discussed with patient and family. Patient and family in agreement with plan and have nofurther questions/concerns  -Spoke with hospitalist at who agreed to accept patient. REASSESSMENT      Well appearing, non toxic, alert, oriented speaking in full sentences and hemodynamically stable upon discharge      CRITICAL CARE TIME   Total Critical Care time was 20 minutes, excluding separately reportableprocedures. There was a high probability of clinicallysignificant/life threatening deterioration in the patient's condition which required my urgent intervention. CONSULTS:  IP CONSULT TO HOSPITALIST    PROCEDURES:  Unless otherwise noted below, none     Procedures    FINAL IMPRESSION      1. Left arm numbness          DISPOSITION/PLAN   DISPOSITION        PATIENT REFERREDTO:  No follow-up provider specified.     DISCHARGE MEDICATIONS:  New Prescriptions    No medications on file          (Please note that portions of this note were completed with a voice recognition program.  Efforts

## 2020-05-30 NOTE — ED NOTES
Pt requesting something \"strong\" for his right toe pain. Pt able to void into urinal without difficulty.       Woodrow Alvarado RN  05/30/20 4027

## 2020-05-31 ENCOUNTER — APPOINTMENT (OUTPATIENT)
Dept: MRI IMAGING | Age: 55
DRG: 035 | End: 2020-05-31
Attending: INTERNAL MEDICINE
Payer: MEDICARE

## 2020-05-31 LAB
CHOLESTEROL, TOTAL: 114 MG/DL (ref 0–199)
EKG ATRIAL RATE: 72 BPM
EKG DIAGNOSIS: NORMAL
EKG P AXIS: 43 DEGREES
EKG P-R INTERVAL: 148 MS
EKG Q-T INTERVAL: 406 MS
EKG QRS DURATION: 96 MS
EKG QTC CALCULATION (BAZETT): 444 MS
EKG R AXIS: 51 DEGREES
EKG T AXIS: 39 DEGREES
EKG VENTRICULAR RATE: 72 BPM
HCT VFR BLD CALC: 41.5 % (ref 40.5–52.5)
HDLC SERPL-MCNC: 39 MG/DL (ref 40–60)
HEMOGLOBIN: 14 G/DL (ref 13.5–17.5)
LDL CHOLESTEROL CALCULATED: 46 MG/DL
MCH RBC QN AUTO: 32.8 PG (ref 26–34)
MCHC RBC AUTO-ENTMCNC: 33.8 G/DL (ref 31–36)
MCV RBC AUTO: 97.2 FL (ref 80–100)
PDW BLD-RTO: 14.5 % (ref 12.4–15.4)
PLATELET # BLD: 261 K/UL (ref 135–450)
PMV BLD AUTO: 6.5 FL (ref 5–10.5)
RBC # BLD: 4.27 M/UL (ref 4.2–5.9)
TRIGL SERPL-MCNC: 146 MG/DL (ref 0–150)
VLDLC SERPL CALC-MCNC: 29 MG/DL
WBC # BLD: 7.8 K/UL (ref 4–11)

## 2020-05-31 PROCEDURE — 6370000000 HC RX 637 (ALT 250 FOR IP): Performed by: NURSE PRACTITIONER

## 2020-05-31 PROCEDURE — 99222 1ST HOSP IP/OBS MODERATE 55: CPT | Performed by: SURGERY

## 2020-05-31 PROCEDURE — 83036 HEMOGLOBIN GLYCOSYLATED A1C: CPT

## 2020-05-31 PROCEDURE — 36415 COLL VENOUS BLD VENIPUNCTURE: CPT

## 2020-05-31 PROCEDURE — 6370000000 HC RX 637 (ALT 250 FOR IP): Performed by: INTERNAL MEDICINE

## 2020-05-31 PROCEDURE — 97165 OT EVAL LOW COMPLEX 30 MIN: CPT

## 2020-05-31 PROCEDURE — 2580000003 HC RX 258: Performed by: INTERNAL MEDICINE

## 2020-05-31 PROCEDURE — 70551 MRI BRAIN STEM W/O DYE: CPT

## 2020-05-31 PROCEDURE — 1200000000 HC SEMI PRIVATE

## 2020-05-31 PROCEDURE — 97162 PT EVAL MOD COMPLEX 30 MIN: CPT

## 2020-05-31 PROCEDURE — 80061 LIPID PANEL: CPT

## 2020-05-31 PROCEDURE — 6360000002 HC RX W HCPCS: Performed by: INTERNAL MEDICINE

## 2020-05-31 PROCEDURE — 97116 GAIT TRAINING THERAPY: CPT

## 2020-05-31 PROCEDURE — 85027 COMPLETE CBC AUTOMATED: CPT

## 2020-05-31 PROCEDURE — 93010 ELECTROCARDIOGRAM REPORT: CPT | Performed by: INTERNAL MEDICINE

## 2020-05-31 PROCEDURE — 97535 SELF CARE MNGMENT TRAINING: CPT

## 2020-05-31 RX ORDER — ASPIRIN 81 MG/1
81 TABLET, CHEWABLE ORAL DAILY
Status: DISCONTINUED | OUTPATIENT
Start: 2020-05-31 | End: 2020-06-03 | Stop reason: HOSPADM

## 2020-05-31 RX ORDER — GABAPENTIN 300 MG/1
300 CAPSULE ORAL 3 TIMES DAILY
Status: DISCONTINUED | OUTPATIENT
Start: 2020-05-31 | End: 2020-06-03 | Stop reason: HOSPADM

## 2020-05-31 RX ORDER — ACETAMINOPHEN 325 MG/1
650 TABLET ORAL EVERY 4 HOURS PRN
Status: DISCONTINUED | OUTPATIENT
Start: 2020-05-31 | End: 2020-06-03 | Stop reason: HOSPADM

## 2020-05-31 RX ORDER — LISINOPRIL 20 MG/1
20 TABLET ORAL DAILY
Status: DISCONTINUED | OUTPATIENT
Start: 2020-05-31 | End: 2020-06-03 | Stop reason: HOSPADM

## 2020-05-31 RX ADMIN — TRAMADOL HYDROCHLORIDE 50 MG: 50 TABLET, FILM COATED ORAL at 00:05

## 2020-05-31 RX ADMIN — ASPIRIN 325 MG ORAL TABLET 325 MG: 325 PILL ORAL at 08:03

## 2020-05-31 RX ADMIN — TRAMADOL HYDROCHLORIDE 50 MG: 50 TABLET, FILM COATED ORAL at 13:36

## 2020-05-31 RX ADMIN — Medication 10 ML: at 13:36

## 2020-05-31 RX ADMIN — ATORVASTATIN CALCIUM 80 MG: 80 TABLET, FILM COATED ORAL at 08:03

## 2020-05-31 RX ADMIN — ACETAMINOPHEN 650 MG: 325 TABLET ORAL at 10:13

## 2020-05-31 RX ADMIN — GABAPENTIN 300 MG: 300 CAPSULE ORAL at 08:02

## 2020-05-31 RX ADMIN — TRAMADOL HYDROCHLORIDE 50 MG: 50 TABLET, FILM COATED ORAL at 23:03

## 2020-05-31 RX ADMIN — LISINOPRIL 20 MG: 20 TABLET ORAL at 08:02

## 2020-05-31 RX ADMIN — ASPIRIN 81 MG 81 MG: 81 TABLET ORAL at 13:43

## 2020-05-31 RX ADMIN — ENOXAPARIN SODIUM 90 MG: 100 INJECTION SUBCUTANEOUS at 23:02

## 2020-05-31 RX ADMIN — GABAPENTIN 300 MG: 300 CAPSULE ORAL at 23:02

## 2020-05-31 RX ADMIN — ENOXAPARIN SODIUM 40 MG: 40 INJECTION SUBCUTANEOUS at 08:02

## 2020-05-31 RX ADMIN — TAMSULOSIN HYDROCHLORIDE 0.4 MG: 0.4 CAPSULE ORAL at 08:02

## 2020-05-31 RX ADMIN — GABAPENTIN 300 MG: 300 CAPSULE ORAL at 13:43

## 2020-05-31 RX ADMIN — CLOPIDOGREL BISULFATE 75 MG: 75 TABLET ORAL at 08:03

## 2020-05-31 RX ADMIN — TRAMADOL HYDROCHLORIDE 50 MG: 50 TABLET, FILM COATED ORAL at 06:41

## 2020-05-31 RX ADMIN — Medication 10 ML: at 23:03

## 2020-05-31 ASSESSMENT — PAIN DESCRIPTION - ORIENTATION: ORIENTATION: RIGHT

## 2020-05-31 ASSESSMENT — PAIN SCALES - GENERAL
PAINLEVEL_OUTOF10: 7
PAINLEVEL_OUTOF10: 8
PAINLEVEL_OUTOF10: 3

## 2020-05-31 ASSESSMENT — PAIN DESCRIPTION - LOCATION: LOCATION: FOOT;LEG

## 2020-05-31 ASSESSMENT — PAIN DESCRIPTION - PAIN TYPE: TYPE: ACUTE PAIN

## 2020-05-31 NOTE — PROGRESS NOTES
Physical Therapy    Facility/Department: [facility] C5 - MED SURG/ORTHO  Initial Assessment/Treatment    NAME: Deepak Jhaveri  : 1965  MRN: [id number]    Date of Service: 2020    Discharge Recommendations:  Home with assist PRN   PT Equipment Recommendations  Equipment Needed: No    Assessment   Body structures, Functions, Activity limitations: Decreased functional mobility ; Decreased endurance;Decreased balance; Increased pain  Assessment: Pt is a 62 y.o. male admitted to [address] [place] [address] secondary to LUE numbness, MRI positive for acute infarct, pt reports numbness has resolved at time of eval. Pt lives with family and was previously I with all functional mobility without an AD. Pt is currently functioning slightly below his baseline completing bed mobility with MI, t/f with S and ambulating community distances without AD SBA/S. Pt also completes stair activities with SBA. Pt will benefit from continued skilled PT in acute care setting to address above deficits. Recommend pt d/c home with assist PRN. Treatment Diagnosis: Impaired balance and gait  Specific instructions for Next Treatment: Progress mobility as tolerated  Prognosis: Good  Decision Making: Medium Complexity  PT Education: Goals; Injury Prevention;PT Role;General Safety;Plan of Care;Gait Training;Disease Specific Education;Precautions; Functional Mobility Training;Transfer Training  Patient Education: Pt verbalized understanding  Barriers to Learning: None  REQUIRES PT FOLLOW UP: Yes  Activity Tolerance  Activity Tolerance: Patient Tolerated treatment well       Patient Diagnosis(es): There were no encounter diagnoses. has a past medical history of Hyperlipidemia, Hypertension, PAD (peripheral artery disease) ([facility] [address] and Stroke ([facility] [place] [address] has a past surgical history that includes Leg Surgery.     Restrictions  Restrictions/Precautions  Restrictions/Precautions: General Precautions, Up as Tolerated  Position Activity Restriction  Other position/activity restrictions: Medium fall risk per nursing assessment, Up as tolerated; telemetry  Vision/Hearing  Vision: Impaired  Vision Exceptions: Wears glasses for reading(Glasses are broken)  Hearing: Within functional limits(L hearing aides, uses sometimes)     Subjective  General  Chart Reviewed: Yes  Patient assessed for rehabilitation services?: Yes  Additional Pertinent Hx: Hx CVA, MRI demos B frontal lobe, R parietal and R occipital acute infarct  Response To Previous Treatment: Not applicable  Family / Caregiver Present: No  Referring Practitioner: Janusz Fall MD  Referral Date : 05/31/20  Diagnosis: CVA  Follows Commands: Within Functional Limits  General Comment  Comments: RN cleared pt for session  Subjective  Subjective: Pt resting in bed agreeable to PT evaluation  Pain Screening  Patient Currently in Pain: Denies  Vital Signs  Patient Currently in Pain: Denies       Orientation  Orientation  Overall Orientation Status: Within Normal Limits  Social/Functional History  Social/Functional History  Lives With: Family(wife, daughter, son-in-law & 5y.o twin grandkids)  Type of Home: House  Home Layout: One level, Laundry in basement(13 steps down to basement/laundry area with melly. handrails)  Home Access: Stairs to enter without rails  Entrance Stairs - Number of Steps: 1  Bathroom Shower/Tub: Tub/Shower unit, Shower chair with back  Bathroom Toilet: Standard  Bathroom Equipment: Grab bars in [address]: Cane  ADL Assistance: Independent  Homemaking Responsibilities: Yes  Meal Prep Responsibility: Secondary  Laundry Responsibility: Primary  Dependent Care Responsibility: Secondary  Ambulation Assistance: Independent(without AD)  Transfer Assistance: Independent  Active : Yes  Occupation: Retired  Type of occupation: /delivery  Leisure & Hobbies: fishing  Cognition   Cognition  Overall Cognitive Status: WFL    Objective     Observation/Palpation  Posture: Fair    AROM RLE (degrees)  RLE AROM: WFL  AROM LLE (degrees)  LLE AROM : WFL     Strength RLE  Strength RLE: WFL  Strength LLE  Strength LLE: WFL     Tone RLE  RLE Tone: Normotonic  Tone LLE  LLE Tone: Normotonic  Motor Control  Gross Motor?: WFL     Sensation  Overall Sensation Status: WFL(Pt reports numbness to LUE resolved at this time)     Bed mobility  Supine to Sit: Modified independent(To L, HOB elevated)  Sit to Supine: Modified independent(With HOB elevated)     Transfers  Sit to Stand: Supervision  Stand to sit: Supervision  Comment: Without AD     Ambulation  Ambulation?: Yes  Ambulation 1  Surface: level tile  Device: No Device  Assistance: Stand by assistance;Supervision  Quality of Gait: Decreaed juan, wide ROSALIA, B decreased step length/height, increased lateral sway B. Pt mildly unsteady with no overt LOB. Gait Deviations: Slow Juan;Deviated path; Increased ROSALIA; Decreased step length;Decreased step height;Decreased arm swing;Decreased head and trunk rotation  Distance: 15' + 200'  Stairs/Curb  Stairs?: Yes  Stairs  # Steps : 6  Stairs Height: 6\"  Rails: Bilateral(Variable use of BHR)  Assistance: Stand by assistance  Comment: Pt ascends/descends 6 steps with varibal HR use, utilizes reciprocal pattern. Pt steady with no overt LOB. Balance  Posture: Fair  Sitting - Static: Good  Sitting - Dynamic: Good  Standing - Static: Fair;+  Standing - Dynamic: Fair  Comments: SBA without AD        Plan   Plan  Times per week: 3-5x/wk  Times per day: Daily  Specific instructions for Next Treatment: Progress mobility as tolerated  Current Treatment Recommendations: Strengthening, Gait Training, Patient/Caregiver Education & Training, Stair training, Balance Training, Neuromuscular Re-education, Endurance Training, Functional Mobility Training, Home Exercise Program, Transfer Training, Safety Education & Training  Safety Devices  Type of devices:  All fall risk precautions in place, Call light within reach, Nurse notified, Gait belt, Left

## 2020-05-31 NOTE — PROGRESS NOTES
neurologic deficits. Assessment and Plan:   1. Bilateral acute CVA with carotid stenosis and MRI findings of acute strokes suggestive of embolic source:   Aspirin and statin. Decrease aspirin from 325 to 81 mg daily. Also continues on clopidogrel 75 mg daily. Neurology evaluation pending. MRI head suggestive of embolic source. Echocardiogram pending. Vascular surgery evaluation. History of carotid endarterectomy (11/09/2011) and (02/22/2012). Carotid duplex (Jan 2015 at [facility]) found a patent right CEA with significant re-stenosis and patent left CEA with high grade significant re-stenosis. Bilateral subclavian stenosis. Normal bilateral antegrade vertebral artery flow. 2. Peripheral vascular disease with history of multiple left femoral-femoral artery bypass, angioplasty, and revisions (most recently Mar 2018 and July 2015 at [facility]) and right femoral popliteal bypass graft thrombectomy after tPA (Aug 2013). 3. Neuropathy:  He is prescribed gabapentin 300 mg TID (takes 2 in the morning and 1 in the evening) which has been ordered BID, increase to TID. He had previously been on 800 mg gabapentin. 4. Hypertension:  Permissive hypertension for 48 hours post stroke. Continues on lisinopril 20 mg daily. 5. Dyslipidemia:  Continues on high intensity atorvastatin 80 mg nightly. 6. Pre-Diabetes:  He has been prescribed metformin, held on admission. Normal glucose level. Check hemoglobin A1c.   7. Sleep apnea:  He has been recommended for CPAP but unable to afford equipment. Doesn't know name of sleep specialist.   8. Bilateral sensorineural hearing loss. 9. Smoker:  Continues to smoke 1 ppd x 40 yrs. Thinks nicotine patch could cause a stroke. Advance Directive: Full Code  DVT prophylaxis with enoxaparin 40 mg sub-Q daily. Discharge planning: Pending vascular surgery. Carotid duplex tomorrow. PT/OT evaluation.         Julián Snell MD  [facility]

## 2020-05-31 NOTE — PROGRESS NOTES
Perfect serve Dr. Shin Segura: positive MRI results   1. Acute infarcts are noted in bilateral frontal lobes, right parietal, and  right occipital lobes. Given the multivessel distribution, this is likely  from a proximal embolic source (aorta, heart). 2. Chronic infarcts are noted in the left middle cerebral artery watershed  distribution, left frontal lobe, left parietal lobe, and left globus pallidus. 3. Moderate chronic microvascular white matter ischemic disease noted both  supra and infratentorially.

## 2020-05-31 NOTE — H&P
Hospital Medicine History & Physical      PCP: Shakira Vincenzolila    Date of Admission: 5/30/2020    Date of Service: Pt seen/examined on 5/31/2020 and Admitted to Inpatient       Chief Complaint:  Left arm numbness       History Of Present Illness: The patient is a 62 y.o. male who presents to [facility] with acute onset and progressive course of left arm numbness started yesterday no aggravating or relieving factors no fever chills cp sob cough or other neurological symptoms hx of cva few years ago symptoms resolved while in [place]     Past Medical History:        Diagnosis Date    Hyperlipidemia     Hypertension     [facility] [address]     Stroke ([facility] [address] 2016       Past Surgical History:        Procedure Laterality Date    LEG SURGERY         Medications Prior to Admission:    Prior to Admission medications    Medication Sig Start Date End Date Taking? Authorizing Provider   aspirin 325 MG tablet Take 325 mg by mouth 9/11/18   Historical Provider, MD   clopidogrel (PLAVIX) 75 MG tablet Take 75 mg by mouth 7/16/18   Historical Provider, MD   atorvastatin (LIPITOR) 80 MG tablet Take 80 mg by mouth 7/16/18   Historical Provider, MD   gabapentin (NEURONTIN) 300 MG capsule Take 300 mg by mouth. . 7/16/18   Historical Provider, MD   lisinopril (PRINIVIL;ZESTRIL) 10 MG tablet Take 10 mg by mouth 5/29/18   Historical Provider, MD   melatonin 3 MG TABS tablet Take 3 mg by mouth 7/16/18   Historical Provider, MD   tamsulosin (FLOMAX) 0.4 MG capsule Take 0.4 mg by mouth 7/16/18   Historical Provider, MD   ondansetron (ZOFRAN) 4 MG tablet Take 1 tablet by mouth every 8 hours as needed for Nausea 10/15/18   E[facility] Lay       Allergies:  Patient has no known allergies.     Social History:  The patient currently lives home    TOBACCO:   reports that he has

## 2020-05-31 NOTE — CONSULTS
Surgery Consult Note     Moreno Garcia MD  Pt Name: Johnathan Thomas  MRN: [id number]  YOB: 1965  Date of evaluation: 5/31/2020  Primary Care Physician: [address]  Referring Physician. Emmanuel  Reason for Consultation: TIA possible stroke  Chief Complaint: Left arm numbness now resolved  Significant right foot pain  IMPRESSIONS:   1. Positive MRI acute infarcts bilateral frontal lobes right parietal lobe and right occipital lobe  2. Chronic infarcts left middle cerebral watershed area left frontal lobe left parietal lobe left globus pallidus  3. Status post carotid endarterectomies 2011 2012 at [facility]. Possible left EC IC bypass in 2015  4. CTA says left internal carotid is now occluded and right side string sign  5. Significant obliterative arterial disease and ischemia of the right foot with rest pain was supposed to have \"surgery\" at [facility] on the 15th after cardiac testing on 4 June  6. does not have any pertinent problems on file. PLANS:   1. Recommend a carotid duplex scan to define the right side better and confirm the left side occlusion  2. Ischemic right foot probably have to wait for now Dr. Erin Amado at [facility]'s note from 5/29/2020: Show his plans to be the following  I have reviewed prior CTA which had a patent sfa and pop stent on the right. Based and exam and CAROL the stent is now occluded. Based on the history this has been occluded for months. I will get repeat CTA to look at his runoff. He will need a stress test and vein mapping. I have encouraged him to quit smoking. If he continues to smoke as much as he does he is at high risk for limb loss. Patient will need a redo femoral to tibial bypass with left gsv. Patient can continue his aspirin and plavix. He understands the risks of the surgery including bleeding, infection, limb loss, MI and death. 3.  Consider transfer back to [facility]?   SUBJECTIVE:   History of Chief Complaint: enoxaparin  1 mg/kg Subcutaneous BID    atorvastatin  80 mg Oral Daily    clopidogrel  75 mg Oral Daily    tamsulosin  0.4 mg Oral Daily    sodium chloride flush  10 mL Intravenous 2 times per day     Continuous Infusions:  PRN Meds:.acetaminophen, sodium chloride flush, polyethylene glycol, promethazine **OR** ondansetron, perflutren lipid microspheres, traMADol  Allergies  has No Known Allergies. Family History  Reviewedfamily history is not on file. Social History   reports that he has been smoking. He has a 40.00 pack-year smoking history. He has never used smokeless tobacco. He reports previous alcohol use. He reports that he does not use drugs. EDUCATION  Patient educated about their illness/diagnosis, stated above, and all questions answered. We discussed the importance of nutrition, medications they are taking, and healthy lifestyle. Review of Systems:  General Denies any fever or chills  HEENT Denies any diplopia, tinnitus or vertigo  Resp Denies any shortness of breath, cough or wheezing  Cardiac Denies any chest pain, palpitations, claudication or edema  GI Denies any melena, hematochezia, hematemesis or pyrosis   Denies any frequency, urgency, hesitancy or incontinence  Heme Denies bruising or bleeding easily  Neuro Denies any focal motor or sensory deficits  OBJECTIVE:   VITALS:  oral temperature is 97.3 °F (36.3 °C). His blood pressure is 106/67 and his pulse is 53. His respiration is 16 and oxygen saturation is 96%. CONSTITUTIONAL: Alert and oriented times 3, no acute distress and cooperative to examination with proper mood and affect. Not a good historian  SKIN: Skin color, texture, turgor normal. No rashes or lesions. LYMPH: no cervical nodes, no inguinal nodes  HEENT: Head is normocephalic, atraumatic. EOMI, PERRLA.   NECK: Supple, symmetrical, trachea midline, no adenopathy, thyroid symmetric, not enlarged and no tenderness, skin normal.  CHEST/LUNGS: chest symmetric with normal A/P diameter, normal respiratory rate and rhythm, lungs clear to auscultation without wheezes, rales or rhonchi. No accessory muscle use. Scars None   CARDIOVASCULAR: Heart sounds are normal.  Regular rate and rhythm without murmur, gallop or rub. Normal S1 and S2. . Carotid and femoral pulses 2+/4 bilateral carotid endarterectomy scars bruit on the right, there is a scar in the left temporal area possibly from an EC IC bypass could also have been temporal arteritis biopsy pulses right femoral left femoral +2. Right side no pulses palpable below the femoral Doppler signal in the popliteal none appreciated in the foot [facility] gave an CAROL of 0.5 just 3 days ago. Multiple scars on the right leg consistent with a tibial bypass and taking of the saphenous vein left leg Doppler signal in the cross femoral graft palpable left femoral and left posterior tibial +1. Doppler signal present dorsalis monophasic posterior tibial biphasic  ABDOMEN: obese and Normal shape. umbilical, umbilical hernia repair now has recurrence, No and Laparoscopic scar(s) present. Bruits present: Right groin. soft, nontender, nondistended, no masses or organomegaly. incisional hernia. Percussion: Normal without hepatosplenomegally. Tenderness: absent. RECTAL: deferred, not clinically indicated  NEUROLOGIC: There are no focalizing motor or sensory deficits. CN II-XII are grossly intact. Sydna Raymore EXTREMITIES: no clubbing, no edema and right foot pale and cold no ulcerations. LABS:     Recent Labs     05/30/20  1500 05/31/20  0655   WBC 10.1 7.8   HGB 13.8 14.0   HCT 40.5 41.5    261     --    K 4.3  --      --    CO2 23  --    BUN 14  --    CREATININE 0.9  --    CALCIUM 9.3  --    INR 1.08  --      No results for input(s): ALKPHOS, ALT, AST, BILITOT, BILIDIR, LABALBU, AMYLASE, LIPASE in the last 72 hours.   CBC with Differential:    Lab Results   Component Value Date    WBC 7.8 05/31/2020    RBC 4.27 (05/09/2012); Colon surgery (9/14/12); Femoral artery - popliteal artery bypass graft (3/30/2013); Thrombectomy (3/30/2013); Nose surgery; Hernia repair; ligation av fistula (7/16/2013); Femoral artery - popliteal artery bypass graft (8/29/2013); Aortogram (diagnostic) w/access via the right to left fem-fem bypass (4/3/2014); Femoral artery - popliteal artery bypass graft (Right, 1/16/2015); craniotomy bypass sta to mca (Left, 3/19/2015); and Vascular ring repair (Bilateral, 3/8/2018).

## 2020-06-01 ENCOUNTER — APPOINTMENT (OUTPATIENT)
Dept: VASCULAR LAB | Age: 55
DRG: 035 | End: 2020-06-01
Attending: INTERNAL MEDICINE
Payer: MEDICARE

## 2020-06-01 PROBLEM — Z01.810 PRE-OPERATIVE CARDIOVASCULAR EXAMINATION: Status: ACTIVE | Noted: 2020-06-01

## 2020-06-01 LAB
ANION GAP SERPL CALCULATED.3IONS-SCNC: 6 MMOL/L (ref 3–16)
BASOPHILS ABSOLUTE: 0.1 K/UL (ref 0–0.2)
BASOPHILS RELATIVE PERCENT: 1 %
BUN BLDV-MCNC: 10 MG/DL (ref 7–20)
CALCIUM SERPL-MCNC: 8.9 MG/DL (ref 8.3–10.6)
CHLORIDE BLD-SCNC: 104 MMOL/L (ref 99–110)
CO2: 26 MMOL/L (ref 21–32)
CREAT SERPL-MCNC: 0.9 MG/DL (ref 0.9–1.3)
EOSINOPHILS ABSOLUTE: 0.1 K/UL (ref 0–0.6)
EOSINOPHILS RELATIVE PERCENT: 1.5 %
ESTIMATED AVERAGE GLUCOSE: 137 MG/DL
GFR AFRICAN AMERICAN: >60
GFR NON-AFRICAN AMERICAN: >60
GLUCOSE BLD-MCNC: 113 MG/DL (ref 70–99)
HBA1C MFR BLD: 6.4 %
HCT VFR BLD CALC: 39.1 % (ref 40.5–52.5)
HEMOGLOBIN: 13.3 G/DL (ref 13.5–17.5)
LYMPHOCYTES ABSOLUTE: 2.1 K/UL (ref 1–5.1)
LYMPHOCYTES RELATIVE PERCENT: 26.9 %
MCH RBC QN AUTO: 32.7 PG (ref 26–34)
MCHC RBC AUTO-ENTMCNC: 33.9 G/DL (ref 31–36)
MCV RBC AUTO: 96.3 FL (ref 80–100)
MONOCYTES ABSOLUTE: 0.5 K/UL (ref 0–1.3)
MONOCYTES RELATIVE PERCENT: 5.7 %
NEUTROPHILS ABSOLUTE: 5.2 K/UL (ref 1.7–7.7)
NEUTROPHILS RELATIVE PERCENT: 64.9 %
PDW BLD-RTO: 13.8 % (ref 12.4–15.4)
PLATELET # BLD: 215 K/UL (ref 135–450)
PMV BLD AUTO: 6.4 FL (ref 5–10.5)
POTASSIUM REFLEX MAGNESIUM: 4.2 MMOL/L (ref 3.5–5.1)
RBC # BLD: 4.06 M/UL (ref 4.2–5.9)
SODIUM BLD-SCNC: 136 MMOL/L (ref 136–145)
WBC # BLD: 7.9 K/UL (ref 4–11)

## 2020-06-01 PROCEDURE — 6370000000 HC RX 637 (ALT 250 FOR IP): Performed by: NURSE PRACTITIONER

## 2020-06-01 PROCEDURE — 93880 EXTRACRANIAL BILAT STUDY: CPT

## 2020-06-01 PROCEDURE — 36415 COLL VENOUS BLD VENIPUNCTURE: CPT

## 2020-06-01 PROCEDURE — 6370000000 HC RX 637 (ALT 250 FOR IP): Performed by: INTERNAL MEDICINE

## 2020-06-01 PROCEDURE — 80048 BASIC METABOLIC PNL TOTAL CA: CPT

## 2020-06-01 PROCEDURE — 2580000003 HC RX 258: Performed by: INTERNAL MEDICINE

## 2020-06-01 PROCEDURE — 92610 EVALUATE SWALLOWING FUNCTION: CPT

## 2020-06-01 PROCEDURE — 1200000000 HC SEMI PRIVATE

## 2020-06-01 PROCEDURE — 85025 COMPLETE CBC W/AUTO DIFF WBC: CPT

## 2020-06-01 PROCEDURE — 97116 GAIT TRAINING THERAPY: CPT

## 2020-06-01 PROCEDURE — 99222 1ST HOSP IP/OBS MODERATE 55: CPT | Performed by: INTERNAL MEDICINE

## 2020-06-01 PROCEDURE — 99223 1ST HOSP IP/OBS HIGH 75: CPT | Performed by: PSYCHIATRY & NEUROLOGY

## 2020-06-01 PROCEDURE — 6360000002 HC RX W HCPCS: Performed by: INTERNAL MEDICINE

## 2020-06-01 PROCEDURE — 97530 THERAPEUTIC ACTIVITIES: CPT

## 2020-06-01 PROCEDURE — 92526 ORAL FUNCTION THERAPY: CPT

## 2020-06-01 RX ORDER — SODIUM CHLORIDE 9 MG/ML
INJECTION, SOLUTION INTRAVENOUS CONTINUOUS
Status: DISCONTINUED | OUTPATIENT
Start: 2020-06-02 | End: 2020-06-03 | Stop reason: HOSPADM

## 2020-06-01 RX ADMIN — ENOXAPARIN SODIUM 90 MG: 100 INJECTION SUBCUTANEOUS at 09:35

## 2020-06-01 RX ADMIN — Medication 10 ML: at 09:37

## 2020-06-01 RX ADMIN — ASPIRIN 81 MG 81 MG: 81 TABLET ORAL at 09:36

## 2020-06-01 RX ADMIN — ATORVASTATIN CALCIUM 80 MG: 80 TABLET, FILM COATED ORAL at 09:36

## 2020-06-01 RX ADMIN — LISINOPRIL 20 MG: 20 TABLET ORAL at 09:37

## 2020-06-01 RX ADMIN — ENOXAPARIN SODIUM 90 MG: 100 INJECTION SUBCUTANEOUS at 20:27

## 2020-06-01 RX ADMIN — GABAPENTIN 300 MG: 300 CAPSULE ORAL at 20:26

## 2020-06-01 RX ADMIN — TAMSULOSIN HYDROCHLORIDE 0.4 MG: 0.4 CAPSULE ORAL at 09:36

## 2020-06-01 RX ADMIN — CLOPIDOGREL BISULFATE 75 MG: 75 TABLET ORAL at 09:36

## 2020-06-01 RX ADMIN — Medication 10 ML: at 20:26

## 2020-06-01 RX ADMIN — GABAPENTIN 300 MG: 300 CAPSULE ORAL at 14:13

## 2020-06-01 RX ADMIN — GABAPENTIN 300 MG: 300 CAPSULE ORAL at 09:36

## 2020-06-01 RX ADMIN — TRAMADOL HYDROCHLORIDE 50 MG: 50 TABLET, FILM COATED ORAL at 20:32

## 2020-06-01 ASSESSMENT — PAIN DESCRIPTION - ORIENTATION
ORIENTATION: RIGHT
ORIENTATION: RIGHT

## 2020-06-01 ASSESSMENT — PAIN SCALES - GENERAL
PAINLEVEL_OUTOF10: 9
PAINLEVEL_OUTOF10: 6
PAINLEVEL_OUTOF10: 0
PAINLEVEL_OUTOF10: 8

## 2020-06-01 ASSESSMENT — PAIN DESCRIPTION - PAIN TYPE
TYPE: ACUTE PAIN
TYPE: ACUTE PAIN;CHRONIC PAIN

## 2020-06-01 ASSESSMENT — PAIN DESCRIPTION - LOCATION
LOCATION: FOOT;LEG
LOCATION: FOOT

## 2020-06-01 NOTE — PLAN OF CARE
Problem: Nutrition  Intervention: Swallowing evaluation  Note: Bedside swallow evaluation completed this date. Please see EMR for more details. Intervention: Aspiration precautions  Note: Bedside swallow evaluation completed this date. Please see EMR for more details.      Jhonny Justin  #[id number]  Speech Language Pathologist

## 2020-06-01 NOTE — CONSULTS
his baseline. No residual deficit. He denies any worsening of his right foot pain or new symptoms. He is on aspirin and Plavix. Other review of system was unremarkable.       Family history: Noncontributory    Past surgical history:   Carotid endarterectomy x2  Vascular bypass surgeRY       Past Medical History:   Diagnosis Date    Hyperlipidemia     Hypertension     [facility] [address]     Stroke ([facility] 2016     Social History     Tobacco Use    Smoking status: Current Every Day Smoker     Packs/day: 1.00     Years: 40.00     Pack years: 40.00    Smokeless tobacco: Never Used   Substance Use Topics    Alcohol use: Not Currently    Drug use: No     No Known Allergies  Current Facility-Administered Medications   Medication Dose Route Frequency Provider Last Rate Last Dose    lisinopril (PRINIVIL;ZESTRIL) tablet 20 mg  20 mg Oral Daily MAT Emerson - CNP   20 mg at 06/01/20 4695    acetaminophen (TYLENOL) tablet 650 mg  650 mg Oral Q4H PRN Noemi Wright MD   650 mg at 05/31/20 1013    gabapentin (NEURONTIN) capsule 300 mg  300 mg Oral TID Noemi Wright MD   300 mg at 06/01/20 5168    aspirin chewable tablet 81 mg  81 mg Oral Daily Noemi Wright MD   81 mg at 06/01/20 0936    enoxaparin (LOVENOX) injection 90 mg  1 mg/kg Subcutaneous BID Noemi Wright MD   90 mg at 06/01/20 0935    atorvastatin (LIPITOR) tablet 80 mg  80 mg Oral Daily Edmundo Damon MD   80 mg at 06/01/20 0936    clopidogrel (PLAVIX) tablet 75 mg  75 mg Oral Daily Edmundo Damon MD   75 mg at 06/01/20 0936    tamsulosin (FLOMAX) capsule 0.4 mg  0.4 mg Oral Daily Becky Browne MD   0.4 mg at 06/01/20 0936    sodium chloride flush 0.9 % injection 10 mL  10 mL Intravenous 2 times per day Becky Browne MD   10 mL at 06/01/20 8175    sodium chloride flush 0.9 % injection 10 mL  10 mL Intravenous PRN Edmundo Damon MD        polyethylene glycol (GLYCOLAX) packet 17 g  17 g Oral Daily symmetric  XI: Shoulder shrug is intact  XII: Tongue movements are normal  Musculoskeletal: 5/5 in all 4 extremities. Tone: Normal tone. Reflexes: Bilateral biceps 2/4, triceps 2/4, brachial radialis 2/4, knee 2/4 and ankle 1/4. Planters: flexor bilaterally. Coordination: no pronator drift, no dysmetria with FNF in upper extremities. Normal REM. Sensation: normal to all modalities in both arms and legs. Gait/Posture: steady gait and normal posturing and station. Data:  LABS:   Lab Results   Component Value Date     06/01/2020    K 4.2 06/01/2020     06/01/2020    CO2 26 06/01/2020    BUN 10 06/01/2020    CREATININE 0.9 06/01/2020    GFRAA >60 06/01/2020    GFRAA >60 07/11/2012    LABGLOM >60 06/01/2020    GLUCOSE 113 06/01/2020    CALCIUM 8.9 06/01/2020     Lab Results   Component Value Date    WBC 7.9 06/01/2020    RBC 4.06 06/01/2020    HGB 13.3 06/01/2020    HCT 39.1 06/01/2020    MCV 96.3 06/01/2020    RDW 13.8 06/01/2020     06/01/2020     Lab Results   Component Value Date    INR 1.08 05/30/2020    PROTIME 12.5 05/30/2020       Neuroimaging were independently reviewed by myself and discussed results with the patient and his wife. Reviewed notes from different physicians  Reviewed lab and blood testing  Reviewed outside records from . Impression:  Acute ischemic strokes affecting different vessel distributionS more right than left with significant bilateral ICA diseases and significant history of PVD. Likely thromboembolic from carotid artery stenosis. Complicated case with multiple risk factors for cerebrovascular diseases and severe vasculopathy. PVD  History of remote strokes  History of endarterectomies x2  Hypertension, not controlled  Hyperlipidemia      Recommendation:  The patient has been seen by Dr. Ellie Rojas for vascular consultation. Patient agreed to proceed with further vascular work-up for his right foot and possible carotid stenting.   Long discussion with

## 2020-06-01 NOTE — CONSULTS
[address] [facility]   Cardiovascular Evaluation    PATIENT: Nirmal Cedeno  DATE: 2020  MRN: [id number]  CSN: [id number]  : 1965    Primary Care Doctor/Referring provider: Sumaya Rosales [facility]    Reason for evaluation/Chief complaint:   Pre-operative risk assessment    Subjective:    History of present illness on initial date of evaluation:   Nirmal Cedeno is a 62 y.o. patient who presents for acute onset of left arm numbness and weakness. He was diagnosed with a CVA and critical carotid artery diease. In the ER, the patient underwent a head and neck CTA showing critical disease within the right ICA and complete occlusion of the left ICA. He is being considered for carotid artery revascularization. We have been asked to provide cardiac risk assessment. The patient denies any chest pain or SOB. He has moderate functional capacity and can perform ADLs without limitation. He neurological symptoms have improved. The patient has complains of right foot pain. He has a history of prior intervention of the right SFA/politeal artery that now appears occluded chronically. He was seen at [facility] recently and planned to undergo revascularization. Unfortunately, he was admitted this weekend with the above acute complaints. He continues to smoke despite prior recommendations. Patient Active Problem List   Diagnosis    Acute CVA (cerebrovascular accident) ([address]    PAD (peripheral artery disease) ([address]    Pre-operative cardiovascular examination         Cardiac Testing: I have reviewed the findings below. EKG:  ECHO:   STRESS TEST:  CATH:  BYPASS:  VASCULAR:    Past Medical History:   has a past medical history of Hyperlipidemia, Hypertension, PAD (peripheral artery disease) ([address] and Stroke ([address] Surgical History:   has a past surgical history that includes Leg Surgery. Social History:   reports that he has been smoking. He has a 40.00 pack-year smoking history.  He has never active all four quadrants,  no masses, no organomegaly           Extremities: Extremities normal, atraumatic, no cyanosis. Trace edema   Pulses: Reduced bilaterally in the LE   Skin: Skin color, texture, turgor normal, no rashes or lesions   Pysch: Normal mood and affect   Neurologic: Normal gross motor and sensory exam.         Labs  Recent Labs     05/31/20  0655 06/01/20  0707   WBC 7.8 7.9   HGB 14.0 13.3*   HCT 41.5 39.1*   MCV 97.2 96.3    215     Recent Labs     05/30/20  1500 06/01/20  0707   CREATININE 0.9 0.9   BUN 14 10    136   K 4.3 4.2    104   CO2 23 26     Recent Labs     05/30/20  1500   INR 1.08   PROTIME 12.5     Recent Labs     05/30/20  1500   TROPONINI <0.01     Invalid input(s): PRO-BNP  Recent Labs     05/31/20  0654   CHOL 114   HDL 39*         Imaging:  I have reviewed the below testing personally and my interpretation is below. EKG:  Normal sinus rhythmNormal ECGWhen compared with ECG of 11-JUL-2012 09:41,No significant change was foundConfirmed by Ronald Drain ([id number]) on 5/31/2020 12:38:00 PM    CXR:      Assessment:  62 y.o. patient with:  Problem List Items Addressed This Visit     None      Principal Problem:    Acute CVA (cerebrovascular accident) ([address]  Active Problems:    PAD (peripheral artery disease) ([address]    Pre-operative cardiovascular examination  Resolved Problems:    * No resolved hospital problems. *      Plan:  1. The patient does not have active high risk clinical cardiac predictors to delay surgery . This would including unstable coronary syndrome, decompensated congestive heart failure, severe obstructive valve disease, or malignant/significant ventricular arrhythmia. 2. There are no cardiac contraindications for the patient's scheduled surgery. The patient's clinical risk is moderate, and they may proceed with surgery. 3. Cardiology will follow peripherally      All questions and concerns were addressed to the patient/family.  Alternatives to

## 2020-06-01 NOTE — PROGRESS NOTES
Vascular f/u    CTA and MRI reviewed. High grade recurrent NICOLE stenosis, LICA occlusion. Bilateral CVA likely due to bilateral circulation supplied by NICOLE. Would recommend Carotid angio and stenting. Currently undergoing w/u at [facility] for RLE ischemia. Patient to decide if wishes treatment here or transfer to .

## 2020-06-01 NOTE — PROGRESS NOTES
Hospitalist Progress Note      PCP: Di Valladares    Date of Admission: 5/30/2020    Chief Complaint: Left arm numbness    Hospital Course:  62year old male, who presents to [facility] ([facility]) with acute onset of left arm numbness. Subjective: Feels better. Contemplating proceeding with vascular interventions here vs [facility] (where he follows as outpatient). Medications:  Reviewed    Infusion Medications   Scheduled Medications    lisinopril  20 mg Oral Daily    gabapentin  300 mg Oral TID    aspirin  81 mg Oral Daily    enoxaparin  1 mg/kg Subcutaneous BID    atorvastatin  80 mg Oral Daily    clopidogrel  75 mg Oral Daily    tamsulosin  0.4 mg Oral Daily    sodium chloride flush  10 mL Intravenous 2 times per day     PRN Meds: acetaminophen, sodium chloride flush, polyethylene glycol, promethazine **OR** ondansetron, perflutren lipid microspheres, traMADol      Intake/Output Summary (Last 24 hours) at 6/1/2020 1548  Last data filed at 6/1/2020 1228  Gross per 24 hour   Intake 1460 ml   Output --   Net 1460 ml       Physical Exam Performed:    /63   Pulse 70   Temp 97.9 °F (36.6 °C) (Oral)   Resp 15   Wt 198 lb 4.8 oz (89.9 kg)   SpO2 95%   BMI 31.06 kg/m²     General appearance: No apparent distress, appears stated age and cooperative. HEENT: Pupils equal, round, and reactive to light. Conjunctivae/corneas clear. Neck: Supple, with full range of motion. No jugular venous distention. Trachea midline. Respiratory:  Normal respiratory effort. Clear to auscultation, bilaterally without Rales/Wheezes/Rhonchi. Cardiovascular: Regular rate and rhythm with normal S1/S2 without murmurs, rubs or gallops. Doppler pulses. Abdomen: Soft, non-tender, non-distended with normal bowel sounds. Musculoskeletal: No clubbing, cyanosis or edema bilaterally. Full range of motion without deformity. Skin: Skin color, texture, turgor normal.  No rashes or lesions.   Neurologic:  Neurovascularly stenosis. - MRI brain - acute infarcts bilateral frontal lobes, right parietal lobes, and right occipital lobe. - continue DAPT, statin. - Vascular surgery consulted and following. Planning for carotid angio and stenting if patient agreeable.  - PT/OT/SLP    Right lower extremity arterial disease.  - work-up in progress at [address] [place] [id number].  - vascular surgery planning for RLE angio 6/2. Will likely need bypass. HTN - controlled. Continue lisinopril.       DVT Prophylaxis: Lovenox  Diet: Diet NPO, After Midnight  DIET GENERAL; Dysphagia Soft and Bite-Sized  Code Status: Full Code    PT/OT Eval Status: ordered    Dispo - likely inpatient 1-3 more days    MAT Culp - CNP

## 2020-06-01 NOTE — PROGRESS NOTES
Recommendations:  Solids: IDSSI 6 (Soft and Bite Sized)  Liquids: Thin  Meds: whole with water (as tolerated)      Speech Language Pathology  Facility/Department: [place] [id number] - [facility] SURG[facility]   CLINICAL BEDSIDE SWALLOW EVALUATION    NAME: Cami Gleason  : 1965  MRN: [id number]    ADMISSION DATE: 2020  ADMITTING DIAGNOSIS: has Acute CVA (cerebrovascular accident) ([facility] and PAD (peripheral artery disease) ([facility] on their problem list.  ONSET DATE: 2020    Recent Chest Xray/CT of Chest:  No current xray/ct    Most recent 2011  FINDINGS- Two views of the chest show a normal cardiomediastinal   silhouette and normal pulmonary vascularity. The lungs are clear   without infiltrates, nodules or pleural effusions. The osseous   structures are unremarkable. Date of Eval: 2020  Evaluating Therapist: Ventura Duke    Current Diet level:  Current Diet : Regular  Current Liquid Diet : Thin      Primary Complaint  Patient Complaint: Pt reports difficulty chewing steak/chicken. Pain:  Pain Assessment  Pain Assessment: 0-10  Pain Level: 0  Patient's Stated Pain Goal: No pain  Pain Type: Acute pain  Pain Location: Foot, Leg  Pain Orientation: Right  Non-Pharmaceutical Pain Intervention(s): Repositioned, Rest  Response to Pain Intervention: Patient Satisfied    Reason for Referral  Cami Gleason was referred for a bedside swallow evaluation to assess the efficiency of his swallow function, identify signs and symptoms of aspiration and make recommendations regarding safe dietary consistencies, effective compensatory strategies, and safe eating environment. Impression  Dysphagia Diagnosis: Mild oral stage dysphagia;Mild pharyngeal stage dysphagia  Dysphagia Impression : Mild oral and pharyngeal phase dysphagia  Dysphagia Outcome Severity Scale: Level 5: Mild dysphagia- Distant supervision.  May need one diet consistency restricted     Pt is a 62 y.o. male who presented to [facility] with

## 2020-06-02 ENCOUNTER — APPOINTMENT (OUTPATIENT)
Dept: CARDIAC CATH/INVASIVE PROCEDURES | Age: 55
DRG: 035 | End: 2020-06-02
Attending: INTERNAL MEDICINE
Payer: MEDICARE

## 2020-06-02 LAB
LV EF: 55 %
LVEF MODALITY: NORMAL
POC ACT LR: 169 SEC

## 2020-06-02 PROCEDURE — 2580000003 HC RX 258

## 2020-06-02 PROCEDURE — B3101ZZ FLUOROSCOPY OF THORACIC AORTA USING LOW OSMOLAR CONTRAST: ICD-10-PCS | Performed by: SURGERY

## 2020-06-02 PROCEDURE — 6370000000 HC RX 637 (ALT 250 FOR IP): Performed by: NURSE PRACTITIONER

## 2020-06-02 PROCEDURE — 6360000004 HC RX CONTRAST MEDICATION

## 2020-06-02 PROCEDURE — 76937 US GUIDE VASCULAR ACCESS: CPT | Performed by: SURGERY

## 2020-06-02 PROCEDURE — 99152 MOD SED SAME PHYS/QHP 5/>YRS: CPT

## 2020-06-02 PROCEDURE — 99152 MOD SED SAME PHYS/QHP 5/>YRS: CPT | Performed by: SURGERY

## 2020-06-02 PROCEDURE — 2000000000 HC ICU R&B

## 2020-06-02 PROCEDURE — 2580000003 HC RX 258: Performed by: NURSE PRACTITIONER

## 2020-06-02 PROCEDURE — 037K3EZ DILATION OF RIGHT INTERNAL CAROTID ARTERY WITH TWO INTRALUMINAL DEVICES, PERCUTANEOUS APPROACH: ICD-10-PCS | Performed by: SURGERY

## 2020-06-02 PROCEDURE — C1884 EMBOLIZATION PROTECT SYST: HCPCS

## 2020-06-02 PROCEDURE — 75710 ARTERY X-RAYS ARM/LEG: CPT | Performed by: SURGERY

## 2020-06-02 PROCEDURE — 6360000002 HC RX W HCPCS: Performed by: SURGERY

## 2020-06-02 PROCEDURE — 6360000002 HC RX W HCPCS

## 2020-06-02 PROCEDURE — B3161ZZ FLUOROSCOPY OF RIGHT INTERNAL CAROTID ARTERY USING LOW OSMOLAR CONTRAST: ICD-10-PCS | Performed by: SURGERY

## 2020-06-02 PROCEDURE — B4101ZZ FLUOROSCOPY OF ABDOMINAL AORTA USING LOW OSMOLAR CONTRAST: ICD-10-PCS | Performed by: SURGERY

## 2020-06-02 PROCEDURE — 99153 MOD SED SAME PHYS/QHP EA: CPT

## 2020-06-02 PROCEDURE — 2709999900 HC NON-CHARGEABLE SUPPLY

## 2020-06-02 PROCEDURE — 6370000000 HC RX 637 (ALT 250 FOR IP): Performed by: INTERNAL MEDICINE

## 2020-06-02 PROCEDURE — C1887 CATHETER, GUIDING: HCPCS

## 2020-06-02 PROCEDURE — 6360000002 HC RX W HCPCS: Performed by: INTERNAL MEDICINE

## 2020-06-02 PROCEDURE — 37215 TRANSCATH STENT CCA W/EPS: CPT | Performed by: SURGERY

## 2020-06-02 PROCEDURE — 2500000003 HC RX 250 WO HCPCS

## 2020-06-02 PROCEDURE — 37215 TRANSCATH STENT CCA W/EPS: CPT

## 2020-06-02 PROCEDURE — 99233 SBSQ HOSP IP/OBS HIGH 50: CPT | Performed by: PSYCHIATRY & NEUROLOGY

## 2020-06-02 PROCEDURE — C1769 GUIDE WIRE: HCPCS

## 2020-06-02 PROCEDURE — 75710 ARTERY X-RAYS ARM/LEG: CPT

## 2020-06-02 PROCEDURE — 85347 COAGULATION TIME ACTIVATED: CPT

## 2020-06-02 PROCEDURE — 36221 PLACE CATH THORACIC AORTA: CPT

## 2020-06-02 PROCEDURE — 6370000000 HC RX 637 (ALT 250 FOR IP): Performed by: SURGERY

## 2020-06-02 PROCEDURE — 93306 TTE W/DOPPLER COMPLETE: CPT

## 2020-06-02 PROCEDURE — C1876 STENT, NON-COA/NON-COV W/DEL: HCPCS

## 2020-06-02 PROCEDURE — B41F1ZZ FLUOROSCOPY OF RIGHT LOWER EXTREMITY ARTERIES USING LOW OSMOLAR CONTRAST: ICD-10-PCS | Performed by: SURGERY

## 2020-06-02 PROCEDURE — C1894 INTRO/SHEATH, NON-LASER: HCPCS

## 2020-06-02 PROCEDURE — C1725 CATH, TRANSLUMIN NON-LASER: HCPCS

## 2020-06-02 RX ORDER — HEPARIN SODIUM 1000 [USP'U]/ML
INJECTION, SOLUTION INTRAVENOUS; SUBCUTANEOUS
Status: COMPLETED | OUTPATIENT
Start: 2020-06-02 | End: 2020-06-02

## 2020-06-02 RX ORDER — ACETAMINOPHEN 325 MG/1
650 TABLET ORAL EVERY 4 HOURS PRN
Status: DISCONTINUED | OUTPATIENT
Start: 2020-06-02 | End: 2020-06-02 | Stop reason: SDUPTHER

## 2020-06-02 RX ORDER — FENTANYL CITRATE 50 UG/ML
INJECTION, SOLUTION INTRAMUSCULAR; INTRAVENOUS
Status: COMPLETED | OUTPATIENT
Start: 2020-06-02 | End: 2020-06-02

## 2020-06-02 RX ORDER — SODIUM CHLORIDE 0.9 % (FLUSH) 0.9 %
10 SYRINGE (ML) INJECTION PRN
Status: DISCONTINUED | OUTPATIENT
Start: 2020-06-02 | End: 2020-06-02 | Stop reason: SDUPTHER

## 2020-06-02 RX ORDER — MIDAZOLAM HYDROCHLORIDE 5 MG/ML
INJECTION INTRAMUSCULAR; INTRAVENOUS
Status: COMPLETED | OUTPATIENT
Start: 2020-06-02 | End: 2020-06-02

## 2020-06-02 RX ORDER — NICOTINE 21 MG/24HR
1 PATCH, TRANSDERMAL 24 HOURS TRANSDERMAL DAILY
Status: DISCONTINUED | OUTPATIENT
Start: 2020-06-02 | End: 2020-06-03 | Stop reason: HOSPADM

## 2020-06-02 RX ORDER — ONDANSETRON 2 MG/ML
4 INJECTION INTRAMUSCULAR; INTRAVENOUS EVERY 6 HOURS PRN
Status: DISCONTINUED | OUTPATIENT
Start: 2020-06-02 | End: 2020-06-02 | Stop reason: SDUPTHER

## 2020-06-02 RX ORDER — HYDRALAZINE HYDROCHLORIDE 20 MG/ML
10 INJECTION INTRAMUSCULAR; INTRAVENOUS EVERY 10 MIN PRN
Status: DISCONTINUED | OUTPATIENT
Start: 2020-06-02 | End: 2020-06-03 | Stop reason: HOSPADM

## 2020-06-02 RX ORDER — SODIUM CHLORIDE 0.9 % (FLUSH) 0.9 %
10 SYRINGE (ML) INJECTION EVERY 12 HOURS SCHEDULED
Status: DISCONTINUED | OUTPATIENT
Start: 2020-06-02 | End: 2020-06-02 | Stop reason: SDUPTHER

## 2020-06-02 RX ADMIN — ONDANSETRON 4 MG: 2 INJECTION INTRAMUSCULAR; INTRAVENOUS at 17:54

## 2020-06-02 RX ADMIN — ATORVASTATIN CALCIUM 80 MG: 80 TABLET, FILM COATED ORAL at 07:57

## 2020-06-02 RX ADMIN — SODIUM CHLORIDE: 9 INJECTION, SOLUTION INTRAVENOUS at 19:24

## 2020-06-02 RX ADMIN — CLOPIDOGREL BISULFATE 75 MG: 75 TABLET ORAL at 07:57

## 2020-06-02 RX ADMIN — HYDRALAZINE HYDROCHLORIDE 10 MG: 20 INJECTION INTRAMUSCULAR; INTRAVENOUS at 20:10

## 2020-06-02 RX ADMIN — MIDAZOLAM HYDROCHLORIDE 1 MG: 5 INJECTION INTRAMUSCULAR; INTRAVENOUS at 11:16

## 2020-06-02 RX ADMIN — FENTANYL CITRATE 50 MCG: 50 INJECTION, SOLUTION INTRAMUSCULAR; INTRAVENOUS at 11:21

## 2020-06-02 RX ADMIN — TAMSULOSIN HYDROCHLORIDE 0.4 MG: 0.4 CAPSULE ORAL at 07:57

## 2020-06-02 RX ADMIN — FENTANYL CITRATE 50 MCG: 50 INJECTION, SOLUTION INTRAMUSCULAR; INTRAVENOUS at 10:43

## 2020-06-02 RX ADMIN — SODIUM CHLORIDE: 9 INJECTION, SOLUTION INTRAVENOUS at 00:02

## 2020-06-02 RX ADMIN — FENTANYL CITRATE 50 MCG: 50 INJECTION, SOLUTION INTRAMUSCULAR; INTRAVENOUS at 10:47

## 2020-06-02 RX ADMIN — MIDAZOLAM HYDROCHLORIDE 1 MG: 5 INJECTION INTRAMUSCULAR; INTRAVENOUS at 10:43

## 2020-06-02 RX ADMIN — HEPARIN SODIUM 2000 UNITS: 1000 INJECTION, SOLUTION INTRAVENOUS; SUBCUTANEOUS at 11:20

## 2020-06-02 RX ADMIN — LISINOPRIL 20 MG: 20 TABLET ORAL at 07:57

## 2020-06-02 RX ADMIN — FENTANYL CITRATE 50 MCG: 50 INJECTION, SOLUTION INTRAMUSCULAR; INTRAVENOUS at 11:15

## 2020-06-02 RX ADMIN — GABAPENTIN 300 MG: 300 CAPSULE ORAL at 17:45

## 2020-06-02 RX ADMIN — ASPIRIN 81 MG 81 MG: 81 TABLET ORAL at 07:57

## 2020-06-02 RX ADMIN — MIDAZOLAM HYDROCHLORIDE 1 MG: 5 INJECTION INTRAMUSCULAR; INTRAVENOUS at 10:47

## 2020-06-02 RX ADMIN — MIDAZOLAM HYDROCHLORIDE 1 MG: 5 INJECTION INTRAMUSCULAR; INTRAVENOUS at 10:55

## 2020-06-02 RX ADMIN — FENTANYL CITRATE 50 MCG: 50 INJECTION, SOLUTION INTRAMUSCULAR; INTRAVENOUS at 10:55

## 2020-06-02 RX ADMIN — GABAPENTIN 300 MG: 300 CAPSULE ORAL at 07:57

## 2020-06-02 RX ADMIN — HEPARIN SODIUM 3000 UNITS: 1000 INJECTION, SOLUTION INTRAVENOUS; SUBCUTANEOUS at 11:25

## 2020-06-02 ASSESSMENT — PAIN DESCRIPTION - ORIENTATION: ORIENTATION: RIGHT

## 2020-06-02 ASSESSMENT — PAIN SCALES - GENERAL
PAINLEVEL_OUTOF10: 0
PAINLEVEL_OUTOF10: 5
PAINLEVEL_OUTOF10: 6

## 2020-06-02 ASSESSMENT — PAIN DESCRIPTION - LOCATION
LOCATION: HEAD
LOCATION: FOOT

## 2020-06-02 ASSESSMENT — PAIN DESCRIPTION - PAIN TYPE
TYPE: ACUTE PAIN;CHRONIC PAIN
TYPE: ACUTE PAIN

## 2020-06-02 NOTE — PRE SEDATION
History and Physical / Pre-Sedation Assessment    Patient:  Cruz Briggs  :   1965    Intended Procedure: Carotid angio with possible stenting, RLE angio      HPI: Recurrent R Carotid stenosis with CVA, RLE ischemic rest pain  Nurses notes reviewed and agreed. Medications reviewed  Allergies:   Patient has no known allergies. Physical Exam:   CURRENT VITALS:  /67   Pulse 56   Temp 97.6 °F (36.4 °C) (Oral)   Resp 16   Wt 198 lb 4.8 oz (89.9 kg)   SpO2 95%   BMI 31.06 kg/m²   Airway:Normal  Cardiac:Normal  Pulmonary:Normal  Abdomen:Normal        Pre-Procedure Assessment/Plan:  ASA 2 - Patient with mild systemic disease with no functional limitations    Mallampati Airway Assessment:  Mallampati Class II - (soft palate, fauces & uvula are visible)    Level of Sedation Plan: Moderate sedation    Post Procedure plan: Return to same level of care    I assessed the patient and find that the patient is in satisfactory condition to proceed with the planned procedure and sedation plan. I have explained the risk, benefits, and alternatives to the procedure. The patient understands and agrees to proceed.   Yes    Alondra Rosado

## 2020-06-02 NOTE — PROGRESS NOTES
prophylaxis  Echo  Stroke prevention was discussed today with the patient  Diet control and watch his blood sugar level. Follow  A1c with PCP. Recent A1c was 6.4. Sesar Durham MD   [phone]      This dictation was generated by voice recognition computer software. Although all attempts are made to edit the dictation for accuracy, there may be errors in the transcription that are not intended.

## 2020-06-02 NOTE — PROGRESS NOTES
Orders per Yajaira:    Notify physician for pulse less than 50 or greater than 120, respiratory rate less than 12 or greater than 25, temperature greater than 101.3 F (38.5 C), urinary output less than 120 mL in four hours, systolic BP less than 90 or greater than 717, diastolic BP less than 50 or greater than 100 and neurological status, bleeding, or if hemoglobin drops more than 3 grams from baseline hemoglobin.     Electronically signed by Kayla Rosas RN on 6/2/2020 at 6:05 PM

## 2020-06-02 NOTE — PROGRESS NOTES
Vascular    Carotid and angio and stenting performed. RLE angio performed. Note to be dictated. Will need R Fem- peroneal bypass.

## 2020-06-02 NOTE — PROGRESS NOTES
Dress puncture site with clear dressing or bandage.  2)  Do not use sandbag or apply pressure dressing.  3)  Remove dressing 24 hours after procedure or at discharge.            Order per Dr. Ellie Rojas    Electronically signed by Josefina Kaur RN on 6/2/2020 at 6:48 PM

## 2020-06-03 VITALS
BODY MASS INDEX: 31.06 KG/M2 | OXYGEN SATURATION: 94 % | RESPIRATION RATE: 22 BRPM | SYSTOLIC BLOOD PRESSURE: 128 MMHG | TEMPERATURE: 96.8 F | HEART RATE: 87 BPM | DIASTOLIC BLOOD PRESSURE: 68 MMHG | WEIGHT: 198.3 LBS

## 2020-06-03 LAB
ANION GAP SERPL CALCULATED.3IONS-SCNC: 11 MMOL/L (ref 3–16)
BASOPHILS ABSOLUTE: 0.1 K/UL (ref 0–0.2)
BASOPHILS RELATIVE PERCENT: 0.5 %
BUN BLDV-MCNC: 9 MG/DL (ref 7–20)
CALCIUM SERPL-MCNC: 9.1 MG/DL (ref 8.3–10.6)
CHLORIDE BLD-SCNC: 107 MMOL/L (ref 99–110)
CO2: 23 MMOL/L (ref 21–32)
CREAT SERPL-MCNC: 0.8 MG/DL (ref 0.9–1.3)
EOSINOPHILS ABSOLUTE: 0.1 K/UL (ref 0–0.6)
EOSINOPHILS RELATIVE PERCENT: 0.5 %
GFR AFRICAN AMERICAN: >60
GFR NON-AFRICAN AMERICAN: >60
GLUCOSE BLD-MCNC: 183 MG/DL (ref 70–99)
HCT VFR BLD CALC: 38.7 % (ref 40.5–52.5)
HEMOGLOBIN: 13.4 G/DL (ref 13.5–17.5)
LYMPHOCYTES ABSOLUTE: 1.2 K/UL (ref 1–5.1)
LYMPHOCYTES RELATIVE PERCENT: 12 %
MCH RBC QN AUTO: 33.4 PG (ref 26–34)
MCHC RBC AUTO-ENTMCNC: 34.7 G/DL (ref 31–36)
MCV RBC AUTO: 96.2 FL (ref 80–100)
MONOCYTES ABSOLUTE: 0.5 K/UL (ref 0–1.3)
MONOCYTES RELATIVE PERCENT: 5.1 %
NEUTROPHILS ABSOLUTE: 8.3 K/UL (ref 1.7–7.7)
NEUTROPHILS RELATIVE PERCENT: 81.9 %
PDW BLD-RTO: 13.9 % (ref 12.4–15.4)
PLATELET # BLD: 222 K/UL (ref 135–450)
PMV BLD AUTO: 6.5 FL (ref 5–10.5)
POTASSIUM SERPL-SCNC: 3.6 MMOL/L (ref 3.5–5.1)
RBC # BLD: 4.03 M/UL (ref 4.2–5.9)
SODIUM BLD-SCNC: 141 MMOL/L (ref 136–145)
WBC # BLD: 10.1 K/UL (ref 4–11)

## 2020-06-03 PROCEDURE — 2580000003 HC RX 258: Performed by: INTERNAL MEDICINE

## 2020-06-03 PROCEDURE — 6370000000 HC RX 637 (ALT 250 FOR IP): Performed by: NURSE PRACTITIONER

## 2020-06-03 PROCEDURE — 94761 N-INVAS EAR/PLS OXIMETRY MLT: CPT

## 2020-06-03 PROCEDURE — 99232 SBSQ HOSP IP/OBS MODERATE 35: CPT | Performed by: PSYCHIATRY & NEUROLOGY

## 2020-06-03 PROCEDURE — 85025 COMPLETE CBC W/AUTO DIFF WBC: CPT

## 2020-06-03 PROCEDURE — 2700000000 HC OXYGEN THERAPY PER DAY

## 2020-06-03 PROCEDURE — 6370000000 HC RX 637 (ALT 250 FOR IP): Performed by: INTERNAL MEDICINE

## 2020-06-03 PROCEDURE — 6360000002 HC RX W HCPCS: Performed by: NURSE PRACTITIONER

## 2020-06-03 PROCEDURE — 93882 EXTRACRANIAL UNI/LTD STUDY: CPT

## 2020-06-03 PROCEDURE — 80048 BASIC METABOLIC PNL TOTAL CA: CPT

## 2020-06-03 PROCEDURE — 36415 COLL VENOUS BLD VENIPUNCTURE: CPT

## 2020-06-03 RX ADMIN — GABAPENTIN 300 MG: 300 CAPSULE ORAL at 09:05

## 2020-06-03 RX ADMIN — ATORVASTATIN CALCIUM 80 MG: 80 TABLET, FILM COATED ORAL at 09:05

## 2020-06-03 RX ADMIN — MUPIROCIN: 20 OINTMENT TOPICAL at 09:06

## 2020-06-03 RX ADMIN — ASPIRIN 81 MG 81 MG: 81 TABLET ORAL at 09:05

## 2020-06-03 RX ADMIN — Medication 10 ML: at 09:05

## 2020-06-03 RX ADMIN — ENOXAPARIN SODIUM 40 MG: 40 INJECTION SUBCUTANEOUS at 09:05

## 2020-06-03 RX ADMIN — LISINOPRIL 20 MG: 20 TABLET ORAL at 09:05

## 2020-06-03 RX ADMIN — ACETAMINOPHEN 650 MG: 325 TABLET ORAL at 03:51

## 2020-06-03 RX ADMIN — CLOPIDOGREL BISULFATE 75 MG: 75 TABLET ORAL at 09:05

## 2020-06-03 RX ADMIN — TAMSULOSIN HYDROCHLORIDE 0.4 MG: 0.4 CAPSULE ORAL at 09:05

## 2020-06-03 ASSESSMENT — PAIN SCALES - GENERAL
PAINLEVEL_OUTOF10: 0
PAINLEVEL_OUTOF10: 5

## 2020-06-03 ASSESSMENT — PAIN DESCRIPTION - LOCATION: LOCATION: HAND

## 2020-06-03 ASSESSMENT — PAIN DESCRIPTION - PAIN TYPE: TYPE: ACUTE PAIN

## 2020-06-03 NOTE — OP NOTE
[facility]                                OPERATIVE REPORT    PATIENT NAME: Esa Youngblood                    :        1965  MED REC NO:   [id number]                          ROOM:       6106  ACCOUNT NO:   [de-identified]                           ADMIT DATE: 2020  PROVIDER:     Galina Arroyo MD    DATE OF PROCEDURE:  2020    PREOPERATIVE DIAGNOSES:  1. High-grade right carotid stenosis with cerebral infarction. 2.  Ischemic rest pain of the right lower extremity. POSTOPERATIVE DIAGNOSES:  1. High-grade right carotid stenosis with cerebral infarction. 2.  Ischemic rest pain of the right lower extremity. PROCEDURES PERFORMED:  1. Ultrasound-guided right femoral artery access. 2.  Insertion of catheter into the abdominal aorta. 3.  Right iliofemoral and right lower extremity angiograms. 4.  Insertion of catheter into the aortic arch with arch aortogram and  selective right extracranial and intracranial carotid angiograms. 5.  Right carotid angioplasty and stenting using distal embolic  protection. SURGEON:  Galina Arroyo MD    ANESTHESIA:  Local with moderate monitored sedation. INDICATIONS:  The patient is a 58-year-old male with a history of  peripheral vascular disease status post bilateral carotid  endarterectomies status post multiple bypasses of the lower extremities  and endovascular interventions. The patient now has ischemic rest pain  of the right lower extremity and was undergoing a workup for this. He  presented however at this time with bilateral acute cerebral infarction. The left carotid artery was noted to be completely occluded and there  was a high-grade recurrent stenosis in the cervical right carotid  artery.   The patient is brought to the angio suite at this time to  undergo lower extremity angiography or bypass planning, also carotid  angiography with possible was  advanced into the subclavian artery. The catheter was then advanced  over the wire. An Amplatz wire was then advanced through the catheter  and the catheter was subsequently removed. The 5-Paraguayan sheath was then  removed from the right femoral artery and the tract was dilated with a  6-Paraguayan dilator and then a 7-Paraguayan dilator. Then a 6-Paraguayan 90 cm  long Penumbra sheath was advanced over the wire into the innominate  artery. The wire and dilator and the sheath were then removed and a  6-Paraguayan 125 cm Neuron catheter was advanced through the sheath into the  distal innominate artery. This was used to select the right common  carotid artery. The wire and catheter were advanced to the mid common  carotid artery level and the sheath was advanced over the catheter and  the wire. The catheter and wire were then removed. Next, a 7.2 mm  NAV6/Emboshield filter wire was advanced across the carotid stenosis  into the distal internal carotid artery. Two stents were deployed in  the carotid artery to cover the stenosis and the prior endarterectomized  area at the carotid bifurcation. The more distal stent was a straight  7-mm x 30-mm Xact carotid stent. The more proximal stent was a tapered  7-mm to 9-mm x 40 mm Xact stent. Post stent delivery, there was  residual stenosis. This was dilated using a 4-mm x 20-mm Juan Francisco  balloon. The filter wire was then retrieved using the retrieval  catheter. Repeat extracranial, intracranial carotid angiograms were  then performed. The sheath was then removed over an Amplatz wire and  replaced with a short 6-Paraguayan sheath. This will be removed in the  recovery area as the patient's ACT normalizes. At the end of procedure,  the patient was transferred to the recovery area in stable condition. Estimated blood loss less than 50 mL. ANGIOGRAPHIC FINDINGS:  The right common iliac, external iliac artery  are patent.   There is a patent [id number] bypass from

## 2020-06-03 NOTE — PROGRESS NOTES
Hospitalist Progress Note      PCP: Tasha Lynn    Date of Admission: 5/30/2020    Chief Complaint: Left arm numbness    Hospital Course:  62year old male, who presents to [facility] ([facility]) with acute onset of left arm numbness. Subjective: Seen in the cath lab. Drowsy. No complaints. Medications:  Reviewed    Infusion Medications    sodium chloride 50 mL/hr at 06/02/20 1924     Scheduled Medications    lisinopril  20 mg Oral Daily    gabapentin  300 mg Oral TID    aspirin  81 mg Oral Daily    enoxaparin  1 mg/kg Subcutaneous BID    atorvastatin  80 mg Oral Daily    clopidogrel  75 mg Oral Daily    tamsulosin  0.4 mg Oral Daily    sodium chloride flush  10 mL Intravenous 2 times per day     PRN Meds: magnesium hydroxide, hydrALAZINE, acetaminophen, sodium chloride flush, polyethylene glycol, promethazine **OR** ondansetron, perflutren lipid microspheres, traMADol      Intake/Output Summary (Last 24 hours) at 6/2/2020 2005  Last data filed at 6/2/2020 1826  Gross per 24 hour   Intake --   Output 175 ml   Net -175 ml       Physical Exam Performed:    BP (!) 162/87   Pulse 86   Temp 97.6 °F (36.4 °C) (Oral)   Resp (!) 37   Wt 198 lb 4.8 oz (89.9 kg)   SpO2 95%   BMI 31.06 kg/m²     General appearance: No apparent distress, appears stated age and cooperative. HEENT: Pupils equal, round, and reactive to light. Conjunctivae/corneas clear. Neck: Supple, with full range of motion. No jugular venous distention. Trachea midline. Respiratory:  Normal respiratory effort. Clear to auscultation, bilaterally without Rales/Wheezes/Rhonchi. Cardiovascular: Regular rate and rhythm with normal S1/S2 without murmurs, rubs or gallops. Doppler pulses. Right groin dsg dry and intact. No evidence of hematoma. Abdomen: Soft, non-tender, non-distended with normal bowel sounds. Musculoskeletal: No clubbing, cyanosis or edema bilaterally. Full range of motion without deformity.   Skin: Skin color, texture, turgor normal.  No rashes or lesions. Neurologic:  Neurovascularly intact without any focal sensory/motor deficits. Cranial nerves: II-XII intact, grossly non-focal.  Psychiatric: Alert and oriented, thought content appropriate, normal insight  Capillary Refill: Brisk,< 3 seconds   Peripheral Pulses: +2 palpable, equal bilaterally       Labs:   Recent Labs     05/31/20  0655 06/01/20  0707   WBC 7.8 7.9   HGB 14.0 13.3*   HCT 41.5 39.1*    215     Recent Labs     06/01/20  0707      K 4.2      CO2 26   BUN 10   CREATININE 0.9   CALCIUM 8.9     No results for input(s): AST, ALT, BILIDIR, BILITOT, ALKPHOS in the last 72 hours. No results for input(s): INR in the last 72 hours. No results for input(s): Evi Jesus in the last 72 hours. Urinalysis:      Lab Results   Component Value Date    NITRU Negative 10/15/2018    WBCUA 251 06/06/2016    BACTERIA 3+ 06/06/2016    RBCUA 243 06/06/2016    BLOODU Negative 10/15/2018    SPECGRAV 1.010 10/15/2018    GLUCOSEU Negative 10/15/2018       Radiology:  VL DUP CAROTID BILATERAL   Final Result      MRI brain without contrast   Final Result   1. Acute infarcts are noted in bilateral frontal lobes, right parietal, and   right occipital lobes. Given the multivessel distribution, this is likely   from a proximal embolic source (aorta, heart). 2. Chronic infarcts are noted in the left middle cerebral artery watershed   distribution, left frontal lobe, left parietal lobe, and left globus pallidus. 3. Moderate chronic microvascular white matter ischemic disease noted both   supra and infratentorially.                  Assessment/Plan:    Active Hospital Problems    Diagnosis    Pre-operative cardiovascular examination [Z01.810]    Carotid artery stenosis with cerebral infarction ([facility] [I63.239]    Dyslipidemia [E78.5]    HTN (hypertension), benign [I10]    PAD (peripheral artery disease) ([facility] [I73.9]    Acute CVA (cerebrovascular

## 2020-06-03 NOTE — PROGRESS NOTES
Pt discharged home in stable condition. Pt v/u of discharge instructions, follow up appointments, and medications. Denies home needs.

## 2020-06-03 NOTE — FLOWSHEET NOTE
Shift assessments completed and charted. Neuro/Vascular check unchanged. Fem site clean, dry, and intact. No signs of bleeding or hematoma. BP elevated at 176/99. PRN IV Hydralazine given. Subsequent /75 and 138/88. Patient complains of headache and needing to have BM. However, patient found to be asleep on bedpan. Patient educated on the risk of damaging skin by sitting on bedpan for an extended period of time. Bedpan removed and RN informed patient he could try to have BM shortly however he needs to take a break and he certainly isn't going to have a BM while sleeping. Patient reports no further needs at this time.

## 2020-06-03 NOTE — DISCHARGE SUMMARY
Hospital Medicine Discharge Summary    Patient: Caleb Vides     Age: 62 y.o. Gender: male  : 1965   MRN: [id number]  Code Status: Full     Admit Date: 2020   Discharge Date: 6/3/2020    Disposition:  Home     Condition at Discharge: Stable    Primary Care Provider: Dhruv Walker    Admitting Physician: Melvin Sneed MD  Discharge Physician: Rhea Francis MD       Discharge Diagnoses: Active Hospital Problems    Diagnosis    Carotid artery stenosis with cerebral infarction (HCC) [I63.239]    Dyslipidemia [E78.5]    HTN (hypertension), benign [I10]    PAD (peripheral artery disease) ([facility] [address] [I73.9]    Acute CVA (cerebrovascular accident) [facility]) [I63.9]       Hospital Course:   62year old male, who presents to [facility] ([facility]) with acute onset of left arm numbness. Assessment/Plan:    Acute CVA - as evidenced by left sided weakness.  - CTH negative. - CTA head/neck - LICA now occluded. High grade NICOLE stenosis. - MRI brain - acute infarcts bilateral frontal lobes, right parietal lobes, and right occipital lobe. - continue DAPT, statin. - Vascular surgery consulted and following.    - s/p carotid angio and stenting.  - PT/OT/SLP    Right lower extremity arterial disease.  - work-up in progress at [address] [place] [address]. - vs/p RLE angio . Will need RLE bypass     HTN - controlled. Continue lisinopril. Tobacco dependence - cessation encouraged. Nicotine replacement ordered. Exam:   /68   Pulse 87   Temp 96.8 °F (36 °C) (Oral)   Resp 22   Wt 198 lb 4.8 oz (89.9 kg)   SpO2 94%   BMI 31.06 kg/m²   General appearance: No apparent distress, appears stated age and cooperative. HEENT: Pupils equal, round, and reactive to light. Conjunctivae/corneas clear. Neck: Supple, with full range of motion. No jugular venous distention. Trachea midline. Respiratory:  Normal respiratory effort. Clear to auscultation, bilaterally without Rales/Wheezes/Rhonchi.   Cardiovascular: stenosis. The very lower most aspect of the aorta is stented, and that stent extends into the right common iliac artery. Right iliac system: The stent within the right common iliac artery is patent. Distal to that, there is multifocal atherosclerotic plaque which leads to at most mild stenosis of at most 20-30%, not substantially changed when compared to the previous exam Left iliac system: The left iliac system is chronically occluded and diminutive. Right femoral system: There is again noted to be common femoral aneurysmal dilation at the anastomosis of the femoral-femoral jump graft. That appears similar when compared to the previous exam.  The right profunda femoris is patent. The stented native superficial femoral artery is occluded. The SFA graft is also occluded. Left femoral system: A femoral-femoral jump graft is present, and is patent. The left common femoral artery is patent. There is moderate calcified plaque within the distal left SFA leading to moderate stenosis of approximately 50% maximally, similar when compared to the previous exam.  The left profunda femoris is widely patent. Right popliteal system: The right popliteal artery stent is occluded. There is reconstitution of the trifurcation after that stent, via deep muscular and geniculate branches. Left popliteal system: Patent. Right 3 vessel runoff: There is poor opacification of the distal anterior tibialis and posterior tibialis, which is likely secondary to poor inflow. The peroneal artery is enhancing to at least the level of the ankle and into the foot. Overall, this has a very similar appearance when compared to the previous exam. Left 3 vessel runoff: A 3 vessel runoff on the left is present extending into the foot. Overall, the runoff has a very similar appearance when compared to the previous CT runoff from 05/04/2020. Specifically, the native, stented right SFA is occluded, and the right SFA bypass graft is occluded.   The ------------------------------------------------------------------  Electronically signed by Aminta Alfonso MD, Jackson Dewey  (Interpreting physician) on 06/19/2020 at 10:04 AM  ------------------------------------------------------------------  Patient Status:Routine. Study [facility] - Vascular Lab. Technical Quality:Adequate visualization. Risk Factors   - The patient's risk factor(s) include: dyslipidemia and arterial     hypertension.   - The patient has a current/recent (within 1 year) tobacco history. Velocities are measured in cm/s ; Diameters are measured in mm LE Vein Mapping +----------------------------------++--------+-----+----+--------+-----+ ! Superficial - Great Saphenous Vein! !Right   ! ! Left!        !     ! +----------------------------------++--------+-----+----+--------+-----+ ! Location                          ! !Diameter! Depth! !Diameter! Depth! +----------------------------------++--------+-----+----+--------+-----+ ! Sapheno Femoral Junction          !!        !     !    !7.1     !     ! +----------------------------------++--------+-----+----+--------+-----+ ! GSV High Thigh                    !!        !     !    !5.1     !     ! +----------------------------------++--------+-----+----+--------+-----+ ! GSV Mid Thigh                     !!        !     !    !4       !     ! +----------------------------------++--------+-----+----+--------+-----+ ! GSV Low Thigh                     !!        !     !    !3.3     !     ! +----------------------------------++--------+-----+----+--------+-----+ ! GSV Knee                          !!        !     !    !4       !     ! +----------------------------------++--------+-----+----+--------+-----+ ! GSV High Calf                     !!        !     !    !2.8     !     ! +----------------------------------++--------+-----+----+--------+-----+ ! GSV Mid Calf                      !!        !     !    !3.2     ! ! +----------------------------------++--------+-----+----+--------+-----+ ! GSV Low Calf                      !!        !     !    !3       !     ! +----------------------------------++--------+-----+----+--------+-----+ ! GSV Ankle                         !!        !     !    !3.3     !     ! +----------------------------------++--------+-----+----+--------+-----+        Consults:     IP CONSULT TO NEUROLOGY  IP CONSULT TO VASCULAR SURGERY    Labs: For convenience and continuity at follow-up the following most recent labs are provided:    Lab Results   Component Value Date    WBC 9.7 06/25/2020    HGB 11.0 06/25/2020    HCT 31.8 06/25/2020    MCV 97.9 06/25/2020     06/25/2020     06/25/2020    K 4.0 06/25/2020     06/25/2020    CO2 24 06/25/2020    BUN 11 06/25/2020    CREATININE 0.7 06/25/2020    CALCIUM 8.7 06/25/2020    BNP 5.9 07/11/2011    TROPONINI <0.01 05/30/2020    ALKPHOS 96 06/22/2020    ALT 16 06/22/2020    AST 14 06/22/2020    BILITOT <0.2 06/22/2020    LABALBU 4.1 06/22/2020    LDLCALC 46 05/31/2020    TRIG 146 05/31/2020    LABA1C 6.4 05/31/2020     Lab Results   Component Value Date    INR 1.08 05/30/2020    INR 1.05 10/15/2018    INR 1.07 07/11/2011         The patient was seen and examined on day of discharge and this discharge summary is in conjunction with any daily progress note from day of discharge. Time spent on discharge is more than 30 minutes in the examination, evaluation, counseling and review of medications and discharge plan. Signed:    Mark Patel MD   7/3/2020    Thank you Marco Iverson for the opportunity to be involved in this patient's care. If you have any questions or concerns please feel free to contact my office [phone].

## 2020-06-03 NOTE — PROGRESS NOTES
Vascular    S/P Carotid stenting  R Groin access without bleeding or hematoma  Neuro without gross deficit. Plan:  Carotid duplex this am  Ok to discharge after above completed. My office to call patient regarding arrangements for RLE bypass- not during this admit.

## 2020-06-03 NOTE — PROGRESS NOTES
Chaparro Hawk MD   Stopped at 06/02/20 1954    aspirin chewable tablet 81 mg  81 mg Oral Daily Shira Gay MD   81 mg at 06/02/20 0757    atorvastatin (LIPITOR) tablet 80 mg  80 mg Oral Daily Cecille Edgar MD   80 mg at 06/02/20 0757    clopidogrel (PLAVIX) tablet 75 mg  75 mg Oral Daily Cecille Edgar MD   75 mg at 06/02/20 0757    tamsulosin (FLOMAX) capsule 0.4 mg  0.4 mg Oral Daily Cecille Edgar MD   0.4 mg at 06/02/20 0757    sodium chloride flush 0.9 % injection 10 mL  10 mL Intravenous 2 times per day Cecille Edgar MD   10 mL at 06/01/20 2026    sodium chloride flush 0.9 % injection 10 mL  10 mL Intravenous PRN Edmundo Corbett MD        polyethylene glycol (GLYCOLAX) packet 17 g  17 g Oral Daily PRN Edmundo Corbett MD        promethazine (PHENERGAN) tablet 12.5 mg  12.5 mg Oral Q6H PRN Edmundo Corbett MD        Or    [facility] injection 4 mg  4 mg Intravenous Q6H PRN Cecille Edgar MD   4 mg at 06/02/20 1754    perflutren lipid microspheres (DEFINITY) injection 1.65 mg  1.5 mL Intravenous ONCE PRN Edmundo Corbett MD        traMADol Amirah Gerardo) tablet 50 mg  50 mg Oral Q6H PRN Martín Butts, APRN - CNP   50 mg at 06/01/20 2032     No Known Allergies   reports that he has been smoking. He has a 40.00 pack-year smoking history. He has never used smokeless tobacco. He reports previous alcohol use. He reports that he does not use drugs. Objective:  Exam:   Constitutional:   Vitals:    06/03/20 0200 06/03/20 0300 06/03/20 0600 06/03/20 0800   BP: 120/72  (!) 107/57 127/68   Pulse: 77  75 94   Resp:       Temp:       TempSrc:       SpO2: 92% 92% 94% 92%   Weight:         General appearance:  Normal development and appear in no acute distress. Eye: No icterus. Neck: supple  Cardiovascular:  No lower leg edema with good pulsation on the left. Mental Status:   Oriented to person, place, problem, and time. Memory: Good immediate recall.   Intact remote memory  Normal attention span and

## 2020-06-09 ENCOUNTER — TELEPHONE (OUTPATIENT)
Dept: VASCULAR SURGERY | Age: 55
End: 2020-06-09

## 2020-06-11 ENCOUNTER — PREP FOR PROCEDURE (OUTPATIENT)
Dept: VASCULAR SURGERY | Age: 55
End: 2020-06-11

## 2020-06-12 ENCOUNTER — HOSPITAL ENCOUNTER (EMERGENCY)
Age: 55
Discharge: HOME | End: 2020-06-12
Payer: MEDICARE

## 2020-06-12 VITALS
HEART RATE: 78 BPM | WEIGHT: 198 LBS | OXYGEN SATURATION: 98 % | DIASTOLIC BLOOD PRESSURE: 88 MMHG | SYSTOLIC BLOOD PRESSURE: 145 MMHG | TEMPERATURE: 98.1 F | RESPIRATION RATE: 18 BRPM | BODY MASS INDEX: 31.01 KG/M2

## 2020-06-12 PROCEDURE — 99282 EMERGENCY DEPT VISIT SF MDM: CPT

## 2020-06-12 PROCEDURE — 6370000000 HC RX 637 (ALT 250 FOR IP): Performed by: PHYSICIAN ASSISTANT

## 2020-06-12 RX ORDER — OXYCODONE HYDROCHLORIDE AND ACETAMINOPHEN 5; 325 MG/1; MG/1
1 TABLET ORAL EVERY 8 HOURS PRN
Qty: 12 TABLET | Refills: 0 | Status: SHIPPED | OUTPATIENT
Start: 2020-06-12 | End: 2020-06-16

## 2020-06-12 RX ORDER — OXYCODONE HYDROCHLORIDE AND ACETAMINOPHEN 5; 325 MG/1; MG/1
1 TABLET ORAL ONCE
Status: COMPLETED | OUTPATIENT
Start: 2020-06-12 | End: 2020-06-12

## 2020-06-12 RX ADMIN — OXYCODONE HYDROCHLORIDE AND ACETAMINOPHEN 1 TABLET: 5; 325 TABLET ORAL at 18:22

## 2020-06-12 ASSESSMENT — ENCOUNTER SYMPTOMS
SHORTNESS OF BREATH: 0
BACK PAIN: 0
EYES NEGATIVE: 1
ABDOMINAL PAIN: 0

## 2020-06-12 ASSESSMENT — PAIN DESCRIPTION - ORIENTATION: ORIENTATION: RIGHT

## 2020-06-12 ASSESSMENT — PAIN DESCRIPTION - LOCATION: LOCATION: FOOT

## 2020-06-12 ASSESSMENT — PAIN SCALES - GENERAL: PAINLEVEL_OUTOF10: 9

## 2020-06-12 ASSESSMENT — PAIN DESCRIPTION - PAIN TYPE: TYPE: ACUTE PAIN

## 2020-06-12 NOTE — ED PROVIDER NOTES
negative. Except as noted above in the ROS, all other systems were reviewed and negative. PAST MEDICAL HISTORY:     Past Medical History:   Diagnosis Date    Hyperlipidemia     Hypertension     [facility] [address]     Stroke ([facility] [address] 2016         SURGICAL HISTORY:      Past Surgical History:   Procedure Laterality Date    LEG SURGERY           CURRENT MEDICATIONS:       Discharge Medication List as of 6/12/2020  6:19 PM      CONTINUE these medications which have NOT CHANGED    Details   aspirin 325 MG tablet Take 325 mg by mouthHistorical Med      clopidogrel (PLAVIX) 75 MG tablet Take 75 mg by mouthHistorical Med      atorvastatin (LIPITOR) 80 MG tablet Take 80 mg by mouthHistorical Med      gabapentin (NEURONTIN) 300 MG capsule Take 300 mg by mouth 2 times daily. Takes 600mg in AM, 300mg HS. Historical Med      lisinopril (PRINIVIL;ZESTRIL) 10 MG tablet Take 10 mg by mouthHistorical Med      melatonin 3 MG TABS tablet Take 3 mg by mouthHistorical Med      tamsulosin (FLOMAX) 0.4 MG capsule Take 0.4 mg by mouthHistorical Med      ondansetron (ZOFRAN) 4 MG tablet Take 1 tablet by mouth every 8 hours as needed for Nausea, Disp-20 tablet, R-0Print               ALLERGIES:    Patient has no known allergies. FAMILY HISTORY:     History reviewed. No pertinent family history.        SOCIAL HISTORY:       Social History     Socioeconomic History    Marital status:      Spouse name: None    Number of children: None    Years of education: None    Highest education level: None   Occupational History    None   Social Needs    Financial resource strain: None    Food insecurity     Worry: None     Inability: None    Transportation needs     Medical: None     Non-medical: None   Tobacco Use    Smoking status: Current Every Day Smoker     Packs/day: 1.00     Years: 40.00     Pack years: 40.00    Smokeless tobacco: Never Used   Substance and Sexual Activity    Alcohol use: Not Currently    Drug use: No    Sexual activity: Yes     Partners: Female   Lifestyle    Physical activity     Days per week: None     Minutes per session: None    Stress: None   Relationships    Social connections     Talks on phone: None     Gets together: None     Attends Religion service: None     Active member of club or organization: None     Attends meetings of clubs or organizations: None     Relationship status: None    Intimate partner violence     Fear of current or ex partner: None     Emotionally abused: None     Physically abused: None     Forced sexual activity: None   Other Topics Concern    None   Social History Narrative    None       SCREENINGS:             PHYSICAL EXAM:       ED Triage Vitals   BP Temp Temp Source Pulse Resp SpO2 Height Weight   06/12/20 1732 06/12/20 1731 06/12/20 1731 06/12/20 1731 06/12/20 1731 06/12/20 1731 -- 06/12/20 1731   (!) 148/92 98 °F (36.7 °C) Oral 91 18 98 %  198 lb (89.8 kg)       Physical Exam    CONSTITUTIONAL: Awake and alert. Cooperative. Well-developed. Well-nourished. Non-toxic. No acute distress. HENT: Normocephalic. Atraumatic. External ears normal, without discharge. No nasal discharge. Oropharynx clear. Mucous membranes moist.  EYES: Conjunctiva non-injected. No scleral icterus. PERRL. EOM's grossly intact. NECK: Supple. Normal ROM. CARDIOVASCULAR: RRR. No Murmer. Intact distal pulses--bilateral posterior tibialis and pedal pulses can be found with Doppler but are not easily palpable. PULMONARY/CHEST WALL: Effort normal. No tachypnea. Lungs clear to ausculation. ABDOMEN: Soft. Nondistended. No tenderness to palpate. No guarding. /ANORECTAL: Not assessed  MUSKULOSKELETAL: Normal ROM. No acute deformities. No edema. No tenderness to palpate. SKIN: Warm and dry. No rash. Subtle erythema to the right distal foot/toes consistent with known vascular disease. No significant wounds. NEUROLOGICAL: Alert and oriented x 3.  GCS 15. CN II-XII due to calcific shadowing. The left internal carotid artery is occluded. There is a >50% stenosis involving the external carotid arteries  bilaterally. The vertebral arteries are patent with antegrade flow bilaterally. Signature   ------------------------------------------------------------------  Electronically signed by Ree Campoverde MD (Interpreting  physician) on 06/02/2020 at 04:58 PM  ------------------------------------------------------------------  Blood Pressure:Right arm 123/68 mmHg. Left arm 117/66 mmHg. Patient Status:Routine. Study [facility] [address] - Vascular Lab. Technical Quality:Adequate visualization. Risk Factors   - The patient's risk factor(s) include: dyslipidemia and arterial     hypertension.   - The patient has a current/recent (within 1 year) tobacco history. Plaque   - A plaque was found in the Left ICA. Occluded. The plaque characteristics are: severe and heterogeneous texture. There is evidence of calcified plaque. - A plaque was found in the Right ICA. The plaque characteristics are: severe and heterogeneous texture. There is evidence of calcified plaque. - A plaque was found in the Right Dist CCA. The plaque characteristics are: severe and heterogeneous texture. There is evidence of calcified plaque. - A plaque was found in the Left Dist CCA. The plaque characteristics are: moderate severity and heterogeneous texture. There is evidence of calcified plaque. Velocities are measured in cm/s ; Diameters are measured in mm Carotid Right Measurements +---------------+----+----+-----+----+ ! Location       ! PSV ! EDV ! Angle! RI  ! +---------------+----+----+-----+----+ ! Prox CCA       !54. 1! 18  !60   !0.67! +---------------+----+----+-----+----+ ! Mid CCA        !43. 1!18. 4!60   !0.57! +---------------+----+----+-----+----+ ! Dist CCA       !51. 7!30. 6! 60   !0.41! +---------------+----+----+-----+----+ ! Prox ICA       !95.8!22. 6!60   !0.76! +---------------+----+----+-----+----+ ! Mid ICA        ! 595 ! 327 !60   !0.45! +---------------+----+----+-----+----+ ! Dist ICA       !312 ! 182 ! 60   !0.42! +---------------+----+----+-----+----+ ! Prox ECA       !116 !    !61   !    ! +---------------+----+----+-----+----+ ! Vertebral      !98. 8!53. 2! 60   !0.6 ! +---------------+----+----+-----+----+ ! Prox Subclavian! 189 !    !60   !    ! +---------------+----+----+-----+----+   - There is antegrade vertebral flow noted on the right side. - Additional Measurements:ICAPSV/CCAPSV 13.81. ICAEDV/CCAEDV 18.17. Carotid Left Measurements +---------------+----+----+-----+----+ ! Location       ! PSV ! EDV ! Angle! RI  ! +---------------+----+----+-----+----+ ! Prox CCA       !43. 9!10. 7! 60   !0.76! +---------------+----+----+-----+----+ ! Mid CCA        !32.4!11. 5!60   !0.65! +---------------+----+----+-----+----+ ! Dist CCA       !22. 1!9.3 ! 60   !0.58! +---------------+----+----+-----+----+ ! Prox ICA       !0   !    !60   !    ! +---------------+----+----+-----+----+ ! Mid ICA        !0   !    !60   !    ! +---------------+----+----+-----+----+ ! Dist ICA       !0   !    !60   !    ! +---------------+----+----+-----+----+ ! Prox ECA       !388 !    !61   !    ! +---------------+----+----+-----+----+ ! Vertebral      !38. 3!18. 6! 60   !0.51! +---------------+----+----+-----+----+ ! Prox Subclavian! 106 !    !60   !    ! +---------------+----+----+-----+----+   - There is antegrade vertebral flow noted on the left side.    - Additional Measurements:ICAPSV/CCAPSV 0.    Vl Dup Carotid Right    Result Date: 6/3/2020  Carotid Duplex Study  Demographics   Patient Name       Israel Newsome   Date of Study      06/03/2020         Gender              Male   Patient Number     [id number]         Date of Birth       1965   Visit Number       [id number]          Age                 62 year(s)   Accession Number   [id number]          Room Number         3274   Corporate ID       [id number] Sonographer         Roxy Phillip RVT   Ordering Physician Andra Lawrence        Interpreting        Jacquelene Paget, MD        Physician           Readers                                                            Norma Wilde MD  Procedure Type of Study:   Cerebral:Carotid, VL CAROTID DUPLEX RIGHT. Vascular Sonographer Report  Indications for Study:F/U angioplasty and/or stent. Additional Indications:Patient is asymptomatic at this time and status post right carotid artery stent placement on 6/2/2020. Carotid Artery Duplex Scan: Transverse and longitudinal grayscale and color imaging of the extracranial carotid system including Doppler spectral waveform analysis. Impressions Right Impression Technically difficult and limited study due to stent shadowing and patient movement. There is a patent carotid stent seen within the distal portion of the common carotid artery and the internal carotid artery. The right internal carotid artery reveals a <50% diameter reducing stenosis by stent velocity criteria. The right vertebral artery demonstrates antegrade flow with elevated velocities. The right subclavian artery is visualized and demonstrates multiphasic, turbulent flow. Previous study performed on 6/1/2020, prior to stent placement, showed a >70% stenosis in the internal carotid artery. Conclusions   Summary   Widely patent R carotid stent without evidence of associated stenosis. The right vertebral artery has antegrade flow. Signature   ------------------------------------------------------------------  Electronically signed by Norma Wilde MD (Interpreting  physician) on 06/03/2020 at 05:45 PM  ------------------------------------------------------------------  Patient Status:Routine. Study [facility] [address] [facility] - Vascular Lab.  Technical tingling approx. 2hrs ago. Hx of stroke x 2, four years ago. ) FINDINGS: CTA NECK: AORTIC ARCH/ARCH VESSELS: Calcified and noncalcified plaques are present. There is a bulge along the inferior surface of the aortic arch measuring up to 2.3 cm in diameter, consistent with a sessile aneurysm. There is 50% stenosis of the left subclavian artery. CAROTID ARTERIES: There is 25% stenosis of the left common carotid artery. The left internal carotid artery is completely occluded. There is a critical stenosis of the proximal to mid right cervical ICA with just a trickle of flow seen on image number 139 of series. The remainder of the vessel is normal in course and caliber. VERTEBRAL ARTERIES: There is a severe stenosis at the origin of the right vertebral artery. There is also a moderate stenosis at the origin of the right vertebral artery. SOFT TISSUES: There are emphysematous changes within the lung apices. BONES: No acute osseous abnormality. CTA HEAD: ANTERIOR CIRCULATION: There is reconstitution of flow within the left infraclinoid ICA, likely filling across the anterior communicating artery with patent but attenuated flow within the left MCA branch vessels. The anterior cerebral arteries and right middle cerebral artery demonstrate normal arborization patterns. POSTERIOR CIRCULATION: There is 50% stenosis of the distal left vertebral artery. The basilar artery is normal in course and caliber. The posterior cerebral arteries are normal in course and caliber to the extent visualized. OTHER: No dural venous sinus thrombosis on this non-dedicated study. BRAIN: See separately dictated noncontrast head CT report. Completely occluded left cervical ICA and critical stenosis of the proximal to mid right cervical ICA. Severe stenosis at the origin of the left vertebral artery and moderate stenosis at the origin of the right.  Reconstitution of flow within the left infraclinoid ICA with patent but attenuated flow within CELLULITIS, ACUTE FRACTURE, COMPARTMENT SYNDROME, ACUTE ISCHEMIC LIMB, DEEP VENOUS THROMBOSIS, SEPTIC ARTHRITIS,  thus I consider the discharge disposition reasonable. Gianna Brice and I have discussed the diagnosis and risks, and we agree with discharging home to follow-up with their primary doctor or the referral orthopedist. We also discussed returning to the Emergency Department immediately if new or worsening symptoms occur. We have discussed the symptoms which are most concerning (e.g., changing or worsening pain, numbness, weakness) that necessitate immediate return. FINAL IMPRESSION:      1. PAD (peripheral artery disease) ([address]    2. Right foot pain    3. Tobacco abuse          DISPOSITION/PLAN:   DISPOSITION Decision To Discharge      PATIENT REFERRED TO:  Loren Sicard  [address] [id number]  [address] [place] [address]  ED  [address]  Go to   As needed      DISCHARGE MEDICATIONS:  Discharge Medication List as of 6/12/2020  6:19 PM      START taking these medications    Details   oxyCODONE-acetaminophen (PERCOCET) 5-325 MG per tablet Take 1 tablet by mouth every 8 hours as needed for Pain for up to 4 days. Intended supply: 3 days.  Take lowest dose possible to manage pain, Disp-12 tablet, R-0Print                        (Please note thatportions of this note were completed with a voice recognition program.  Efforts were made to edit the dictations, but occasionally words are mis-transcribed.)    Julio Dover PA-C (electronicallysigned)              Melissa Rater, [address]  06/12/20 5236

## 2020-06-18 ENCOUNTER — TELEPHONE (OUTPATIENT)
Dept: VASCULAR SURGERY | Age: 55
End: 2020-06-18

## 2020-06-19 ENCOUNTER — HOSPITAL ENCOUNTER (OUTPATIENT)
Dept: VASCULAR LAB | Age: 55
Discharge: HOME | End: 2020-06-19
Payer: MEDICARE

## 2020-06-19 ENCOUNTER — HOSPITAL ENCOUNTER (OUTPATIENT)
Age: 55
Discharge: HOME | End: 2020-06-19
Payer: MEDICARE

## 2020-06-19 LAB
ANION GAP SERPL CALCULATED.3IONS-SCNC: 14 MMOL/L (ref 3–16)
BASOPHILS ABSOLUTE: 0 K/UL (ref 0–0.2)
BASOPHILS RELATIVE PERCENT: 0.4 %
BUN BLDV-MCNC: 14 MG/DL (ref 7–20)
CALCIUM SERPL-MCNC: 9.3 MG/DL (ref 8.3–10.6)
CHLORIDE BLD-SCNC: 104 MMOL/L (ref 99–110)
CO2: 23 MMOL/L (ref 21–32)
CREAT SERPL-MCNC: 0.9 MG/DL (ref 0.9–1.3)
EOSINOPHILS ABSOLUTE: 0.2 K/UL (ref 0–0.6)
EOSINOPHILS RELATIVE PERCENT: 1.8 %
GFR AFRICAN AMERICAN: >60
GFR NON-AFRICAN AMERICAN: >60
GLUCOSE BLD-MCNC: 100 MG/DL (ref 70–99)
HCT VFR BLD CALC: 41.8 % (ref 40.5–52.5)
HEMOGLOBIN: 14.1 G/DL (ref 13.5–17.5)
LYMPHOCYTES ABSOLUTE: 1.9 K/UL (ref 1–5.1)
LYMPHOCYTES RELATIVE PERCENT: 19.4 %
MCH RBC QN AUTO: 33.5 PG (ref 26–34)
MCHC RBC AUTO-ENTMCNC: 33.7 G/DL (ref 31–36)
MCV RBC AUTO: 99.4 FL (ref 80–100)
MONOCYTES ABSOLUTE: 0.7 K/UL (ref 0–1.3)
MONOCYTES RELATIVE PERCENT: 7.3 %
NEUTROPHILS ABSOLUTE: 6.9 K/UL (ref 1.7–7.7)
NEUTROPHILS RELATIVE PERCENT: 71.1 %
PDW BLD-RTO: 14.8 % (ref 12.4–15.4)
PLATELET # BLD: 261 K/UL (ref 135–450)
PMV BLD AUTO: 7.1 FL (ref 5–10.5)
POTASSIUM SERPL-SCNC: 4.4 MMOL/L (ref 3.5–5.1)
RBC # BLD: 4.21 M/UL (ref 4.2–5.9)
SODIUM BLD-SCNC: 141 MMOL/L (ref 136–145)
WBC # BLD: 9.7 K/UL (ref 4–11)

## 2020-06-19 PROCEDURE — 85025 COMPLETE CBC W/AUTO DIFF WBC: CPT

## 2020-06-19 PROCEDURE — 36415 COLL VENOUS BLD VENIPUNCTURE: CPT

## 2020-06-19 PROCEDURE — 80048 BASIC METABOLIC PNL TOTAL CA: CPT

## 2020-06-19 PROCEDURE — U0003 INFECTIOUS AGENT DETECTION BY NUCLEIC ACID (DNA OR RNA); SEVERE ACUTE RESPIRATORY SYNDROME CORONAVIRUS 2 (SARS-COV-2) (CORONAVIRUS DISEASE [COVID-19]), AMPLIFIED PROBE TECHNIQUE, MAKING USE OF HIGH THROUGHPUT TECHNOLOGIES AS DESCRIBED BY CMS-2020-01-R: HCPCS

## 2020-06-19 PROCEDURE — 93971 EXTREMITY STUDY: CPT

## 2020-06-21 LAB
SARS-COV-2: NOT DETECTED
SOURCE: NORMAL

## 2020-06-22 ENCOUNTER — HOSPITAL ENCOUNTER (INPATIENT)
Age: 55
LOS: 5 days | Discharge: HOME | DRG: 254 | End: 2020-06-27
Attending: INTERNAL MEDICINE | Admitting: INTERNAL MEDICINE
Payer: MEDICARE

## 2020-06-22 ENCOUNTER — APPOINTMENT (OUTPATIENT)
Dept: CT IMAGING | Age: 55
DRG: 254 | End: 2020-06-22
Payer: MEDICARE

## 2020-06-22 ENCOUNTER — APPOINTMENT (OUTPATIENT)
Dept: GENERAL RADIOLOGY | Age: 55
DRG: 254 | End: 2020-06-22
Payer: MEDICARE

## 2020-06-22 PROBLEM — I99.8 ISCHEMIC FOOT: Status: ACTIVE | Noted: 2020-06-22

## 2020-06-22 LAB
A/G RATIO: 1.5 (ref 1.1–2.2)
ALBUMIN SERPL-MCNC: 4.1 G/DL (ref 3.4–5)
ALP BLD-CCNC: 96 U/L (ref 40–129)
ALT SERPL-CCNC: 16 U/L (ref 10–40)
ANION GAP SERPL CALCULATED.3IONS-SCNC: 12 MMOL/L (ref 3–16)
APTT: 28.5 SEC (ref 24.2–36.2)
AST SERPL-CCNC: 14 U/L (ref 15–37)
BASOPHILS ABSOLUTE: 0.1 K/UL (ref 0–0.2)
BASOPHILS RELATIVE PERCENT: 1.1 %
BILIRUB SERPL-MCNC: <0.2 MG/DL (ref 0–1)
BUN BLDV-MCNC: 14 MG/DL (ref 7–20)
CALCIUM SERPL-MCNC: 9 MG/DL (ref 8.3–10.6)
CHLORIDE BLD-SCNC: 106 MMOL/L (ref 99–110)
CO2: 22 MMOL/L (ref 21–32)
CREAT SERPL-MCNC: 0.9 MG/DL (ref 0.9–1.3)
EOSINOPHILS ABSOLUTE: 0.2 K/UL (ref 0–0.6)
EOSINOPHILS RELATIVE PERCENT: 2.3 %
GFR AFRICAN AMERICAN: >60
GFR NON-AFRICAN AMERICAN: >60
GLOBULIN: 2.8 G/DL
GLUCOSE BLD-MCNC: 188 MG/DL (ref 70–99)
HCT VFR BLD CALC: 37 % (ref 40.5–52.5)
HEMOGLOBIN: 12.6 G/DL (ref 13.5–17.5)
LYMPHOCYTES ABSOLUTE: 1.8 K/UL (ref 1–5.1)
LYMPHOCYTES RELATIVE PERCENT: 21.9 %
MCH RBC QN AUTO: 33 PG (ref 26–34)
MCHC RBC AUTO-ENTMCNC: 34 G/DL (ref 31–36)
MCV RBC AUTO: 97 FL (ref 80–100)
MONOCYTES ABSOLUTE: 0.5 K/UL (ref 0–1.3)
MONOCYTES RELATIVE PERCENT: 6.2 %
NEUTROPHILS ABSOLUTE: 5.7 K/UL (ref 1.7–7.7)
NEUTROPHILS RELATIVE PERCENT: 68.5 %
PDW BLD-RTO: 14.4 % (ref 12.4–15.4)
PLATELET # BLD: 242 K/UL (ref 135–450)
PMV BLD AUTO: 6.7 FL (ref 5–10.5)
POTASSIUM REFLEX MAGNESIUM: 4 MMOL/L (ref 3.5–5.1)
RBC # BLD: 3.81 M/UL (ref 4.2–5.9)
SEDIMENTATION RATE, ERYTHROCYTE: 34 MM/HR (ref 0–20)
SODIUM BLD-SCNC: 140 MMOL/L (ref 136–145)
TOTAL PROTEIN: 6.9 G/DL (ref 6.4–8.2)
WBC # BLD: 8.2 K/UL (ref 4–11)

## 2020-06-22 PROCEDURE — 6360000002 HC RX W HCPCS: Performed by: NURSE PRACTITIONER

## 2020-06-22 PROCEDURE — 75635 CT ANGIO ABDOMINAL ARTERIES: CPT

## 2020-06-22 PROCEDURE — 6360000002 HC RX W HCPCS: Performed by: INTERNAL MEDICINE

## 2020-06-22 PROCEDURE — 6370000000 HC RX 637 (ALT 250 FOR IP): Performed by: INTERNAL MEDICINE

## 2020-06-22 PROCEDURE — 73630 X-RAY EXAM OF FOOT: CPT

## 2020-06-22 PROCEDURE — 2060000000 HC ICU INTERMEDIATE R&B

## 2020-06-22 PROCEDURE — 80053 COMPREHEN METABOLIC PANEL: CPT

## 2020-06-22 PROCEDURE — 85652 RBC SED RATE AUTOMATED: CPT

## 2020-06-22 PROCEDURE — 041K0JM BYPASS RIGHT FEMORAL ARTERY TO PERONEAL ARTERY WITH SYNTHETIC SUBSTITUTE, OPEN APPROACH: ICD-10-PCS | Performed by: SURGERY

## 2020-06-22 PROCEDURE — 99291 CRITICAL CARE FIRST HOUR: CPT

## 2020-06-22 PROCEDURE — 85730 THROMBOPLASTIN TIME PARTIAL: CPT

## 2020-06-22 PROCEDURE — 36415 COLL VENOUS BLD VENIPUNCTURE: CPT

## 2020-06-22 PROCEDURE — 6370000000 HC RX 637 (ALT 250 FOR IP): Performed by: NURSE PRACTITIONER

## 2020-06-22 PROCEDURE — 96365 THER/PROPH/DIAG IV INF INIT: CPT

## 2020-06-22 PROCEDURE — 85025 COMPLETE CBC W/AUTO DIFF WBC: CPT

## 2020-06-22 PROCEDURE — 6360000004 HC RX CONTRAST MEDICATION: Performed by: NURSE PRACTITIONER

## 2020-06-22 RX ORDER — LISINOPRIL 10 MG/1
10 TABLET ORAL DAILY
Status: DISCONTINUED | OUTPATIENT
Start: 2020-06-23 | End: 2020-06-27 | Stop reason: HOSPADM

## 2020-06-22 RX ORDER — PROMETHAZINE HYDROCHLORIDE 25 MG/1
12.5 TABLET ORAL EVERY 6 HOURS PRN
Status: DISCONTINUED | OUTPATIENT
Start: 2020-06-22 | End: 2020-06-24

## 2020-06-22 RX ORDER — HYDRALAZINE HYDROCHLORIDE 20 MG/ML
5 INJECTION INTRAMUSCULAR; INTRAVENOUS EVERY 6 HOURS PRN
Status: DISCONTINUED | OUTPATIENT
Start: 2020-06-22 | End: 2020-06-24

## 2020-06-22 RX ORDER — SODIUM CHLORIDE 0.9 % (FLUSH) 0.9 %
10 SYRINGE (ML) INJECTION EVERY 12 HOURS SCHEDULED
Status: DISCONTINUED | OUTPATIENT
Start: 2020-06-22 | End: 2020-06-24

## 2020-06-22 RX ORDER — ATORVASTATIN CALCIUM 80 MG/1
80 TABLET, FILM COATED ORAL DAILY
Status: DISCONTINUED | OUTPATIENT
Start: 2020-06-22 | End: 2020-06-22 | Stop reason: SDUPTHER

## 2020-06-22 RX ORDER — ATORVASTATIN CALCIUM 80 MG/1
80 TABLET, FILM COATED ORAL NIGHTLY
Status: DISCONTINUED | OUTPATIENT
Start: 2020-06-22 | End: 2020-06-27 | Stop reason: HOSPADM

## 2020-06-22 RX ORDER — OXYCODONE HYDROCHLORIDE AND ACETAMINOPHEN 5; 325 MG/1; MG/1
1 TABLET ORAL EVERY 8 HOURS PRN
Status: DISCONTINUED | OUTPATIENT
Start: 2020-06-22 | End: 2020-06-24

## 2020-06-22 RX ORDER — GABAPENTIN 300 MG/1
600 CAPSULE ORAL EVERY MORNING
Status: DISCONTINUED | OUTPATIENT
Start: 2020-06-23 | End: 2020-06-27 | Stop reason: HOSPADM

## 2020-06-22 RX ORDER — SODIUM CHLORIDE 0.9 % (FLUSH) 0.9 %
10 SYRINGE (ML) INJECTION PRN
Status: DISCONTINUED | OUTPATIENT
Start: 2020-06-22 | End: 2020-06-24

## 2020-06-22 RX ORDER — ASPIRIN 325 MG
325 TABLET ORAL DAILY
Status: DISCONTINUED | OUTPATIENT
Start: 2020-06-23 | End: 2020-06-24

## 2020-06-22 RX ORDER — NICOTINE 21 MG/24HR
1 PATCH, TRANSDERMAL 24 HOURS TRANSDERMAL DAILY
Status: DISCONTINUED | OUTPATIENT
Start: 2020-06-22 | End: 2020-06-27 | Stop reason: HOSPADM

## 2020-06-22 RX ORDER — HEPARIN SODIUM 1000 [USP'U]/ML
3100 INJECTION, SOLUTION INTRAVENOUS; SUBCUTANEOUS PRN
Status: DISCONTINUED | OUTPATIENT
Start: 2020-06-22 | End: 2020-06-24

## 2020-06-22 RX ORDER — HEPARIN SODIUM 1000 [USP'U]/ML
6200 INJECTION, SOLUTION INTRAVENOUS; SUBCUTANEOUS ONCE
Status: COMPLETED | OUTPATIENT
Start: 2020-06-22 | End: 2020-06-22

## 2020-06-22 RX ORDER — HEPARIN SODIUM 1000 [USP'U]/ML
6200 INJECTION, SOLUTION INTRAVENOUS; SUBCUTANEOUS PRN
Status: DISCONTINUED | OUTPATIENT
Start: 2020-06-22 | End: 2020-06-24

## 2020-06-22 RX ORDER — HEPARIN SODIUM 10000 [USP'U]/100ML
14 INJECTION, SOLUTION INTRAVENOUS CONTINUOUS
Status: DISCONTINUED | OUTPATIENT
Start: 2020-06-22 | End: 2020-06-24

## 2020-06-22 RX ORDER — GABAPENTIN 300 MG/1
300 CAPSULE ORAL NIGHTLY
Status: DISCONTINUED | OUTPATIENT
Start: 2020-06-22 | End: 2020-06-27 | Stop reason: HOSPADM

## 2020-06-22 RX ORDER — OXYCODONE HYDROCHLORIDE AND ACETAMINOPHEN 5; 325 MG/1; MG/1
1 TABLET ORAL ONCE
Status: COMPLETED | OUTPATIENT
Start: 2020-06-22 | End: 2020-06-22

## 2020-06-22 RX ORDER — POLYETHYLENE GLYCOL 3350 17 G/17G
17 POWDER, FOR SOLUTION ORAL DAILY PRN
Status: DISCONTINUED | OUTPATIENT
Start: 2020-06-22 | End: 2020-06-27 | Stop reason: HOSPADM

## 2020-06-22 RX ORDER — ONDANSETRON 2 MG/ML
4 INJECTION INTRAMUSCULAR; INTRAVENOUS EVERY 6 HOURS PRN
Status: DISCONTINUED | OUTPATIENT
Start: 2020-06-22 | End: 2020-06-24

## 2020-06-22 RX ORDER — ACETAMINOPHEN 650 MG/1
650 SUPPOSITORY RECTAL EVERY 6 HOURS PRN
Status: DISCONTINUED | OUTPATIENT
Start: 2020-06-22 | End: 2020-06-24

## 2020-06-22 RX ORDER — CLOPIDOGREL BISULFATE 75 MG/1
75 TABLET ORAL DAILY
Status: DISCONTINUED | OUTPATIENT
Start: 2020-06-23 | End: 2020-06-27 | Stop reason: HOSPADM

## 2020-06-22 RX ORDER — ACETAMINOPHEN 325 MG/1
650 TABLET ORAL EVERY 6 HOURS PRN
Status: DISCONTINUED | OUTPATIENT
Start: 2020-06-22 | End: 2020-06-24

## 2020-06-22 RX ORDER — MORPHINE SULFATE 2 MG/ML
2 INJECTION, SOLUTION INTRAMUSCULAR; INTRAVENOUS EVERY 4 HOURS PRN
Status: DISCONTINUED | OUTPATIENT
Start: 2020-06-22 | End: 2020-06-24

## 2020-06-22 RX ADMIN — OXYCODONE HYDROCHLORIDE AND ACETAMINOPHEN 1 TABLET: 5; 325 TABLET ORAL at 18:10

## 2020-06-22 RX ADMIN — IOPAMIDOL 110 ML: 755 INJECTION, SOLUTION INTRAVENOUS at 19:53

## 2020-06-22 RX ADMIN — HEPARIN SODIUM AND DEXTROSE 14 ML/HR: 10000; 5 INJECTION INTRAVENOUS at 18:32

## 2020-06-22 RX ADMIN — MORPHINE SULFATE 2 MG: 2 INJECTION, SOLUTION INTRAMUSCULAR; INTRAVENOUS at 20:56

## 2020-06-22 RX ADMIN — HEPARIN SODIUM 6200 UNITS: 1000 INJECTION INTRAVENOUS; SUBCUTANEOUS at 18:31

## 2020-06-22 RX ADMIN — GABAPENTIN 300 MG: 300 CAPSULE ORAL at 20:53

## 2020-06-22 RX ADMIN — OXYCODONE HYDROCHLORIDE AND ACETAMINOPHEN 1 TABLET: 5; 325 TABLET ORAL at 22:54

## 2020-06-22 ASSESSMENT — PAIN SCALES - WONG BAKER: WONGBAKER_NUMERICALRESPONSE: 4

## 2020-06-22 ASSESSMENT — PAIN SCALES - GENERAL
PAINLEVEL_OUTOF10: 9
PAINLEVEL_OUTOF10: 10
PAINLEVEL_OUTOF10: 9
PAINLEVEL_OUTOF10: 10
PAINLEVEL_OUTOF10: 4
PAINLEVEL_OUTOF10: 10

## 2020-06-22 NOTE — H&P
Take 300 mg by mouth 2 times daily. Takes 600mg in AM, 300mg HS. 7/16/18  Yes Historical Provider, MD   lisinopril (PRINIVIL;ZESTRIL) 10 MG tablet Take 10 mg by mouth daily  5/29/18  Yes Historical Provider, MD       Allergies:  Patient has no known allergies. Social History:      The patient currently lives at home    TOBACCO:   reports that he has been smoking cigarettes. He has a 40.00 pack-year smoking history. He has never used smokeless tobacco.  ETOH:   reports previous alcohol use. E-Cigarettes Vaping or Juuling     Questions Responses    Vaping Use Never User    Start Date     Does device contain nicotine? Never    Quit Date     Vaping Type             Family History:       Reviewed in detail and negative for DM, CAD, Cancer, CVA. Positive as follows:        Problem Relation Age of Onset    Diabetes Mother        REVIEW OF SYSTEMS:   Pertinent positives as noted in the HPI. All other systems reviewed and negative. PHYSICAL EXAM PERFORMED:    BP (!) 198/110   Pulse 74   Temp 98.4 °F (36.9 °C) (Oral)   Resp 15   Ht 5' 9\" (1.753 m)   Wt 195 lb (88.5 kg)   SpO2 97%   BMI 28.80 kg/m²     General appearance:  No apparent distress, appears stated age and cooperative. HEENT:  Normal cephalic, atraumatic without obvious deformity. Pupils equal, round, and reactive to light. Extra ocular muscles intact. Conjunctivae/corneas clear. Neck: Supple, with full range of motion. No jugular venous distention. Trachea midline. Respiratory:  Normal respiratory effort. Clear to auscultation, bilaterally without Rales/Wheezes/Rhonchi. Cardiovascular:  Regular rate and rhythm with normal S1/S2 without murmurs, rubs or gallops. Abdomen: Soft, non-tender, non-distended with normal bowel sounds. Musculoskeletal:  No clubbing, cyanosis or edema bilaterally. Full range of motion without deformity.  Erythema noted of right lower leg/foot, ?mild bleeding noted from under toenail of 1st toe  Skin: Skin color, texture, turgor normal.  No rashes or lesions. Neurologic:  Neurovascularly intact without any focal sensory/motor deficits. Cranial nerves: II-XII intact, grossly non-focal.  Psychiatric:  Alert and oriented, thought content appropriate, normal insight  Capillary Refill: Brisk,< 3 seconds   Peripheral Pulses: +2 palpable, equal bilaterally       Labs:     Recent Labs     06/22/20  1606   WBC 8.2   HGB 12.6*   HCT 37.0*        Recent Labs     06/22/20  1606      K 4.0      CO2 22   BUN 14   CREATININE 0.9   CALCIUM 9.0     Recent Labs     06/22/20  1606   AST 14*   ALT 16   BILITOT <0.2   ALKPHOS 96     No results for input(s): INR in the last 72 hours. No results for input(s): Negin Nolen in the last 72 hours. Urinalysis:      Lab Results   Component Value Date    NITRU Negative 10/15/2018    WBCUA 251 06/06/2016    BACTERIA 3+ 06/06/2016    RBCUA 243 06/06/2016    BLOODU Negative 10/15/2018    SPECGRAV 1.010 10/15/2018    GLUCOSEU Negative 10/15/2018       Radiology:     CXR: I have reviewed the CXR with the following interpretation: na  EKG:  I have reviewed the EKG with the following interpretation: na    CTA ABDOMINAL AORTA W BILAT RUNOFF W CONTRAST   Final Result   Overall, the runoff has a very similar appearance when compared to the   previous CT runoff from 05/04/2020. Specifically, the native, stented right SFA is occluded, and the right SFA   bypass graft is occluded. The right popliteal artery is likewise occluded. There is reconstitution of the three-vessel trifurcation on the right, but   there is poor opacification of the anterior and posterior tibial arteries,   either from occlusion or secondary to poor inflow. The peroneal artery on   the right is patent, and seen into the foot.          XR FOOT RIGHT (MIN 3 VIEWS)   Final Result   No acute osseous abnormality             ASSESSMENT:    Active Hospital Problems    Diagnosis Date Noted    Ischemic foot [I99.8]

## 2020-06-22 NOTE — ED NOTES
Called vascular surgery consult @[phone]  Re: scheduled surgery in 2 days with Yajaira Known peripheral vascular disease unable to obtain pulse in right foot per [email or web address] [address] [place] [address]  06/22/20 [id number]

## 2020-06-22 NOTE — ED PROVIDER NOTES
[facility] Emergency Department    CHIEF COMPLAINT  Foot Pain (red great toe foot lower leg redness x2 months )      HISTORY OF PRESENT ILLNESS  Alex Bryant is a 62 y.o. male who presents to the ED complaining of right foot pain. Patient has a history of known peripheral vascular disease and has a scheduled surgical procedure with vascular in 2 days. Patient reports increased redness, swelling, and pain gradually over the past 2 months. Patient reports redness from his right great toe to the top of his foot now going up his right shin. Patient denies fever, chills, body aches, extremity weakness, new injury or trauma. No other complaints, modifying factors or associated symptoms. Nursing notes reviewed.    Past Medical History:   Diagnosis Date    Bowel incontinence     Hyperlipidemia     Hypertension     [facility]     Poor vision     left eye    Pre-diabetes     Sleep apnea     NO CPAP    Stroke ([facility] [address] 2016    x3 - memory deficit    Wears partial dentures     upper & lower     Past Surgical History:   Procedure Laterality Date    ABDOMEN SURGERY      colon polyps    LEG SURGERY Right     VASCULAR SURGERY       Family History   Problem Relation Age of Onset    Diabetes Mother      Social History     Socioeconomic History    Marital status:      Spouse name: Not on file    Number of children: Not on file    Years of education: Not on file    Highest education level: Not on file   Occupational History    Not on file   Social Needs    Financial resource strain: Not on file    Food insecurity     Worry: Not on file     Inability: Not on file    Transportation needs     Medical: Not on file     Non-medical: Not on file   Tobacco Use    Smoking status: Current Every Day Smoker     Packs/day: 1.00     Years: 40.00     Pack years: 40.00     Types: Cigarettes    Smokeless tobacco: Never Used   Substance and Sexual Activity    unremarkable. There is no fracture or dislocation. No focal destructive osseous lesion. No radiopaque foreign body is identified. There are degenerative changes involving the 1st MTP joint. There is a small plantar calcaneal spur. No acute osseous abnormality           Cta Abdominal Aorta W Bilat Runoff W Contrast    Result Date: 6/22/2020  EXAMINATION: CTA OF THE AORTA WITH LOWER EXTREMITY RUNOFF 6/22/2020 4:37 pm TECHNIQUE: CTA of the pelvis and bilateral lower extremities was performed after the administration of intravenous contrast.   Multiplanar reformatted images are provided for review. MIP images are provided for review. Dose modulation, iterative reconstruction, and/or weight based adjustment of the mA/kV was utilized to reduce the radiation dose to as low as reasonably achievable. COMPARISON: 05/04/2020 HISTORY: ORDERING SYSTEM PROVIDED HISTORY: painful pulseless right foot TECHNOLOGIST PROVIDED HISTORY: Reason for exam:->painful pulseless right foot Reason for Exam: painful pulseless right foot Acuity: Acute Type of Exam: Initial FINDINGS: Nonvascular Organs: Only the lower most aspect of the liver is included on field of view. Lower most aspects of both kidneys included on field of view. No visceral abnormalities are identified. GI/Bowel: The large bowel is only partially imaged. There appears to be at least mild diverticulosis, but no visualized CT evidence of diverticulitis is found. No abnormalities are seen within the visualized small bowel. Pelvis: Urinary bladder unremarkable. Prostate unremarkable. No free pelvic fluid. Peritoneum/Retroperitoneum: No lymphadenopathy is seen within the visualized retroperitoneum. Bones/Soft Tissues: There is a paraumbilical hernia containing fat only, similar when compared to the previous exam. VASCULAR Aorta: Only the lower most portion of the aorta is included on the field of view.  There is moderate to severe atherosclerotic plaque, not substantially changed when compared to the previous exam, leading to moderate stenosis. The very lower most aspect of the aorta is stented, and that stent extends into the right common iliac artery. Right iliac system: The stent within the right common iliac artery is patent. Distal to that, there is multifocal atherosclerotic plaque which leads to at most mild stenosis of at most 20-30%, not substantially changed when compared to the previous exam Left iliac system: The left iliac system is chronically occluded and diminutive. Right femoral system: There is again noted to be common femoral aneurysmal dilation at the anastomosis of the femoral-femoral jump graft. That appears similar when compared to the previous exam.  The right profunda femoris is patent. The stented native superficial femoral artery is occluded. The SFA graft is also occluded. Left femoral system: A femoral-femoral jump graft is present, and is patent. The left common femoral artery is patent. There is moderate calcified plaque within the distal left SFA leading to moderate stenosis of approximately 50% maximally, similar when compared to the previous exam.  The left profunda femoris is widely patent. Right popliteal system: The right popliteal artery stent is occluded. There is reconstitution of the trifurcation after that stent, via deep muscular and geniculate branches. Left popliteal system: Patent. Right 3 vessel runoff: There is poor opacification of the distal anterior tibialis and posterior tibialis, which is likely secondary to poor inflow. The peroneal artery is enhancing to at least the level of the ankle and into the foot. Overall, this has a very similar appearance when compared to the previous exam. Left 3 vessel runoff: A 3 vessel runoff on the left is present extending into the foot. Overall, the runoff has a very similar appearance when compared to the previous CT runoff from 05/04/2020.  Specifically, the native, department is unable to obtain right pedal or posterior tibial pulse which is a new finding compared to emergency department visit 10 days ago. X-ray of the right foot shows no acute osseous abnormality. No fracture dislocation. CBC and CMP unremarkable no leukocytosis, bandemia, anemia, acute kidney injury. ESR 34. While in ED patient received   Medications   oxyCODONE-acetaminophen (PERCOCET) 5-325 MG per tablet 1 tablet (has no administration in time range)   heparin (porcine) injection 7,080 Units (has no administration in time range)   heparin (porcine) injection 7,080 Units (has no administration in time range)   heparin (porcine) injection 3,540 Units (has no administration in time range)   heparin 25,000 units in dextrose 5% 250 mL infusion (has no administration in time range)       A discussion was had with the patient and/or their surrogate regarding diagnosis, diagnostic testing results, treatment/ plan of care. There was shared decision-making between myself as well as the patient and/or their surrogate and we are all in agreement with hospital admission. There was an opportunity for questions and all questions were answered to the best of my ability and to the satisfaction of the patient and/or patient family.      Results for orders placed or performed during the hospital encounter of 06/22/20   Comprehensive Metabolic Panel w/ Reflex to MG   Result Value Ref Range    Sodium 140 136 - 145 mmol/L    Potassium reflex Magnesium 4.0 3.5 - 5.1 mmol/L    Chloride 106 99 - 110 mmol/L    CO2 22 21 - 32 mmol/L    Anion Gap 12 3 - 16    Glucose 188 (H) 70 - 99 mg/dL    BUN 14 7 - 20 mg/dL    CREATININE 0.9 0.9 - 1.3 mg/dL    GFR Non-African American >60 >60    GFR African American >60 >60    Calcium 9.0 8.3 - 10.6 mg/dL    Total Protein 6.9 6.4 - 8.2 g/dL    Alb 4.1 3.4 - 5.0 g/dL    Albumin/Globulin Ratio 1.5 1.1 - 2.2    Total Bilirubin <0.2 0.0 - 1.0 mg/dL    Alkaline Phosphatase 96 40 - 129 U/L

## 2020-06-23 ENCOUNTER — ANESTHESIA EVENT (OUTPATIENT)
Dept: OPERATING ROOM | Age: 55
DRG: 254 | End: 2020-06-23
Payer: MEDICARE

## 2020-06-23 PROBLEM — I73.9 PERIPHERAL VASCULAR DISEASE, UNSPECIFIED (HCC): Status: ACTIVE | Noted: 2020-06-23

## 2020-06-23 LAB
ANION GAP SERPL CALCULATED.3IONS-SCNC: 8 MMOL/L (ref 3–16)
APTT: 58.5 SEC (ref 24.2–36.2)
APTT: 65 SEC (ref 24.2–36.2)
BASOPHILS ABSOLUTE: 0.1 K/UL (ref 0–0.2)
BASOPHILS RELATIVE PERCENT: 0.8 %
BUN BLDV-MCNC: 13 MG/DL (ref 7–20)
CALCIUM SERPL-MCNC: 9.2 MG/DL (ref 8.3–10.6)
CHLORIDE BLD-SCNC: 108 MMOL/L (ref 99–110)
CO2: 25 MMOL/L (ref 21–32)
CREAT SERPL-MCNC: 0.9 MG/DL (ref 0.9–1.3)
EOSINOPHILS ABSOLUTE: 0.2 K/UL (ref 0–0.6)
EOSINOPHILS RELATIVE PERCENT: 2.7 %
GFR AFRICAN AMERICAN: >60
GFR NON-AFRICAN AMERICAN: >60
GLUCOSE BLD-MCNC: 123 MG/DL (ref 70–99)
HCT VFR BLD CALC: 40.2 % (ref 40.5–52.5)
HEMOGLOBIN: 13.5 G/DL (ref 13.5–17.5)
LACTIC ACID: 0.6 MMOL/L (ref 0.4–2)
LYMPHOCYTES ABSOLUTE: 1.6 K/UL (ref 1–5.1)
LYMPHOCYTES RELATIVE PERCENT: 19 %
MCH RBC QN AUTO: 32.7 PG (ref 26–34)
MCHC RBC AUTO-ENTMCNC: 33.6 G/DL (ref 31–36)
MCV RBC AUTO: 97.3 FL (ref 80–100)
MONOCYTES ABSOLUTE: 0.6 K/UL (ref 0–1.3)
MONOCYTES RELATIVE PERCENT: 6.8 %
NEUTROPHILS ABSOLUTE: 6.1 K/UL (ref 1.7–7.7)
NEUTROPHILS RELATIVE PERCENT: 70.7 %
PDW BLD-RTO: 14.6 % (ref 12.4–15.4)
PLATELET # BLD: 240 K/UL (ref 135–450)
PMV BLD AUTO: 6.7 FL (ref 5–10.5)
POTASSIUM REFLEX MAGNESIUM: 4 MMOL/L (ref 3.5–5.1)
RBC # BLD: 4.13 M/UL (ref 4.2–5.9)
SODIUM BLD-SCNC: 141 MMOL/L (ref 136–145)
WBC # BLD: 8.7 K/UL (ref 4–11)

## 2020-06-23 PROCEDURE — 80048 BASIC METABOLIC PNL TOTAL CA: CPT

## 2020-06-23 PROCEDURE — 1200000000 HC SEMI PRIVATE

## 2020-06-23 PROCEDURE — 85025 COMPLETE CBC W/AUTO DIFF WBC: CPT

## 2020-06-23 PROCEDURE — 83605 ASSAY OF LACTIC ACID: CPT

## 2020-06-23 PROCEDURE — 85730 THROMBOPLASTIN TIME PARTIAL: CPT

## 2020-06-23 PROCEDURE — 2580000003 HC RX 258: Performed by: INTERNAL MEDICINE

## 2020-06-23 PROCEDURE — 36415 COLL VENOUS BLD VENIPUNCTURE: CPT

## 2020-06-23 PROCEDURE — 6360000002 HC RX W HCPCS: Performed by: INTERNAL MEDICINE

## 2020-06-23 PROCEDURE — 6360000002 HC RX W HCPCS: Performed by: SURGERY

## 2020-06-23 PROCEDURE — 6370000000 HC RX 637 (ALT 250 FOR IP): Performed by: INTERNAL MEDICINE

## 2020-06-23 RX ADMIN — MORPHINE SULFATE 2 MG: 2 INJECTION, SOLUTION INTRAMUSCULAR; INTRAVENOUS at 21:24

## 2020-06-23 RX ADMIN — GABAPENTIN 300 MG: 300 CAPSULE ORAL at 20:31

## 2020-06-23 RX ADMIN — POLYETHYLENE GLYCOL 3350 17 G: 17 POWDER, FOR SOLUTION ORAL at 14:07

## 2020-06-23 RX ADMIN — ATORVASTATIN CALCIUM 80 MG: 80 TABLET, FILM COATED ORAL at 20:31

## 2020-06-23 RX ADMIN — OXYCODONE HYDROCHLORIDE AND ACETAMINOPHEN 1 TABLET: 5; 325 TABLET ORAL at 08:16

## 2020-06-23 RX ADMIN — OXYCODONE HYDROCHLORIDE AND ACETAMINOPHEN 1 TABLET: 5; 325 TABLET ORAL at 16:21

## 2020-06-23 RX ADMIN — Medication 10 ML: at 20:31

## 2020-06-23 RX ADMIN — ASPIRIN 325 MG ORAL TABLET 325 MG: 325 PILL ORAL at 09:33

## 2020-06-23 RX ADMIN — Medication 10 ML: at 12:52

## 2020-06-23 RX ADMIN — LISINOPRIL 10 MG: 10 TABLET ORAL at 09:33

## 2020-06-23 RX ADMIN — MORPHINE SULFATE 2 MG: 2 INJECTION, SOLUTION INTRAMUSCULAR; INTRAVENOUS at 12:46

## 2020-06-23 RX ADMIN — HEPARIN SODIUM AND DEXTROSE 14 ML/HR: 10000; 5 INJECTION INTRAVENOUS at 12:45

## 2020-06-23 RX ADMIN — MORPHINE SULFATE 2 MG: 2 INJECTION, SOLUTION INTRAMUSCULAR; INTRAVENOUS at 05:52

## 2020-06-23 RX ADMIN — GABAPENTIN 600 MG: 300 CAPSULE ORAL at 09:32

## 2020-06-23 RX ADMIN — MORPHINE SULFATE 2 MG: 2 INJECTION, SOLUTION INTRAMUSCULAR; INTRAVENOUS at 01:29

## 2020-06-23 RX ADMIN — CLOPIDOGREL BISULFATE 75 MG: 75 TABLET ORAL at 09:32

## 2020-06-23 RX ADMIN — Medication 10 ML: at 09:37

## 2020-06-23 ASSESSMENT — PAIN SCALES - GENERAL
PAINLEVEL_OUTOF10: 10
PAINLEVEL_OUTOF10: 9
PAINLEVEL_OUTOF10: 9
PAINLEVEL_OUTOF10: 7
PAINLEVEL_OUTOF10: 9
PAINLEVEL_OUTOF10: 8

## 2020-06-23 ASSESSMENT — PAIN DESCRIPTION - PAIN TYPE: TYPE: ACUTE PAIN

## 2020-06-23 ASSESSMENT — PAIN DESCRIPTION - LOCATION: LOCATION: FOOT

## 2020-06-23 NOTE — CARE COORDINATION
CASE MANAGEMENT INITIAL ASSESSMENT      Reviewed chart and completed assessment with: patient   Explained Case Management role/services. Primary contact information: Cheikhjacquelyn Edgar    Admit date/status: 6/22/20 Inpatient   Diagnosis: ischemic foot   Is this a Readmission?:  no      Insurance: [employer] required for SNF - Y        3 night stay required -  N    Living arrangements, Adls, care needs, prior to admission: lives in a house with his wife, 2 grandchildren, daughter and daughter's boyfriend     Transportation: patient     Durable Medical Equipment at home: none    Services in the home and/or outpatient, prior to admission: none    PT/OT recs: none    Hospital Exemption Notification (HEN): not initiated     Barriers to discharge: none    Plan/comments: Patient is independent at home. He denies any DME or services at home. Plan is for surgery tomorrow with Dr. Tamara Martinez. Will follow up with patient after surgery to assess if any needs. Informed patient of this and he verbalized understanding.

## 2020-06-24 ENCOUNTER — ANESTHESIA (OUTPATIENT)
Dept: OPERATING ROOM | Age: 55
DRG: 254 | End: 2020-06-24
Payer: MEDICARE

## 2020-06-24 VITALS
SYSTOLIC BLOOD PRESSURE: 140 MMHG | DIASTOLIC BLOOD PRESSURE: 94 MMHG | OXYGEN SATURATION: 97 % | RESPIRATION RATE: 5 BRPM

## 2020-06-24 LAB
ABO/RH: NORMAL
ANTIBODY SCREEN: NORMAL
APTT: 61.6 SEC (ref 24.2–36.2)
GLUCOSE BLD-MCNC: 171 MG/DL (ref 70–99)
PERFORMED ON: ABNORMAL

## 2020-06-24 PROCEDURE — 2500000003 HC RX 250 WO HCPCS: Performed by: NURSE ANESTHETIST, CERTIFIED REGISTERED

## 2020-06-24 PROCEDURE — 6370000000 HC RX 637 (ALT 250 FOR IP): Performed by: INTERNAL MEDICINE

## 2020-06-24 PROCEDURE — 35566 ART BYP FEM-ANT-POST TIB/PRL: CPT | Performed by: SURGERY

## 2020-06-24 PROCEDURE — 2709999900 HC NON-CHARGEABLE SUPPLY: Performed by: SURGERY

## 2020-06-24 PROCEDURE — 6370000000 HC RX 637 (ALT 250 FOR IP): Performed by: SURGERY

## 2020-06-24 PROCEDURE — 6360000002 HC RX W HCPCS: Performed by: SURGERY

## 2020-06-24 PROCEDURE — 7100000001 HC PACU RECOVERY - ADDTL 15 MIN: Performed by: SURGERY

## 2020-06-24 PROCEDURE — 2060000000 HC ICU INTERMEDIATE R&B

## 2020-06-24 PROCEDURE — 3600000005 HC SURGERY LEVEL 5 BASE: Performed by: SURGERY

## 2020-06-24 PROCEDURE — 6360000002 HC RX W HCPCS: Performed by: NURSE ANESTHETIST, CERTIFIED REGISTERED

## 2020-06-24 PROCEDURE — 3700000000 HC ANESTHESIA ATTENDED CARE: Performed by: SURGERY

## 2020-06-24 PROCEDURE — 6360000004 HC RX CONTRAST MEDICATION: Performed by: SURGERY

## 2020-06-24 PROCEDURE — 3600000015 HC SURGERY LEVEL 5 ADDTL 15MIN: Performed by: SURGERY

## 2020-06-24 PROCEDURE — 86900 BLOOD TYPING SEROLOGIC ABO: CPT

## 2020-06-24 PROCEDURE — 2580000003 HC RX 258: Performed by: ANESTHESIOLOGY

## 2020-06-24 PROCEDURE — 35700 REOPERATION BYPASS GRAFT: CPT | Performed by: SURGERY

## 2020-06-24 PROCEDURE — 3700000001 HC ADD 15 MINUTES (ANESTHESIA): Performed by: SURGERY

## 2020-06-24 PROCEDURE — 86901 BLOOD TYPING SEROLOGIC RH(D): CPT

## 2020-06-24 PROCEDURE — 86850 RBC ANTIBODY SCREEN: CPT

## 2020-06-24 PROCEDURE — 2580000003 HC RX 258: Performed by: SURGERY

## 2020-06-24 PROCEDURE — 85730 THROMBOPLASTIN TIME PARTIAL: CPT

## 2020-06-24 PROCEDURE — C1757 CATH, THROMBECTOMY/EMBOLECT: HCPCS | Performed by: SURGERY

## 2020-06-24 PROCEDURE — C1894 INTRO/SHEATH, NON-LASER: HCPCS | Performed by: SURGERY

## 2020-06-24 PROCEDURE — 7100000000 HC PACU RECOVERY - FIRST 15 MIN: Performed by: SURGERY

## 2020-06-24 RX ORDER — FENTANYL CITRATE 50 UG/ML
INJECTION, SOLUTION INTRAMUSCULAR; INTRAVENOUS PRN
Status: DISCONTINUED | OUTPATIENT
Start: 2020-06-24 | End: 2020-06-24 | Stop reason: SDUPTHER

## 2020-06-24 RX ORDER — SODIUM CHLORIDE 0.9 % (FLUSH) 0.9 %
10 SYRINGE (ML) INJECTION EVERY 12 HOURS SCHEDULED
Status: DISCONTINUED | OUTPATIENT
Start: 2020-06-24 | End: 2020-06-24 | Stop reason: HOSPADM

## 2020-06-24 RX ORDER — ROCURONIUM BROMIDE 10 MG/ML
INJECTION, SOLUTION INTRAVENOUS PRN
Status: DISCONTINUED | OUTPATIENT
Start: 2020-06-24 | End: 2020-06-24 | Stop reason: SDUPTHER

## 2020-06-24 RX ORDER — HYDROCODONE BITARTRATE AND ACETAMINOPHEN 5; 325 MG/1; MG/1
2 TABLET ORAL EVERY 4 HOURS PRN
Status: DISCONTINUED | OUTPATIENT
Start: 2020-06-24 | End: 2020-06-27 | Stop reason: HOSPADM

## 2020-06-24 RX ORDER — SODIUM CHLORIDE, SODIUM LACTATE, POTASSIUM CHLORIDE, CALCIUM CHLORIDE 600; 310; 30; 20 MG/100ML; MG/100ML; MG/100ML; MG/100ML
INJECTION, SOLUTION INTRAVENOUS CONTINUOUS
Status: DISCONTINUED | OUTPATIENT
Start: 2020-06-24 | End: 2020-06-24

## 2020-06-24 RX ORDER — ACETAMINOPHEN 325 MG/1
650 TABLET ORAL EVERY 4 HOURS PRN
Status: DISCONTINUED | OUTPATIENT
Start: 2020-06-24 | End: 2020-06-27 | Stop reason: HOSPADM

## 2020-06-24 RX ORDER — SODIUM CHLORIDE 9 MG/ML
INJECTION, SOLUTION INTRAVENOUS CONTINUOUS
Status: DISCONTINUED | OUTPATIENT
Start: 2020-06-24 | End: 2020-06-25

## 2020-06-24 RX ORDER — MORPHINE SULFATE 4 MG/ML
4 INJECTION, SOLUTION INTRAMUSCULAR; INTRAVENOUS
Status: DISCONTINUED | OUTPATIENT
Start: 2020-06-24 | End: 2020-06-27 | Stop reason: HOSPADM

## 2020-06-24 RX ORDER — HEPARIN SODIUM 1000 [USP'U]/ML
INJECTION, SOLUTION INTRAVENOUS; SUBCUTANEOUS PRN
Status: DISCONTINUED | OUTPATIENT
Start: 2020-06-24 | End: 2020-06-24 | Stop reason: SDUPTHER

## 2020-06-24 RX ORDER — LIDOCAINE HYDROCHLORIDE 10 MG/ML
0.3 INJECTION, SOLUTION EPIDURAL; INFILTRATION; INTRACAUDAL; PERINEURAL
Status: DISCONTINUED | OUTPATIENT
Start: 2020-06-24 | End: 2020-06-24 | Stop reason: HOSPADM

## 2020-06-24 RX ORDER — ONDANSETRON 2 MG/ML
4 INJECTION INTRAMUSCULAR; INTRAVENOUS EVERY 6 HOURS PRN
Status: DISCONTINUED | OUTPATIENT
Start: 2020-06-24 | End: 2020-06-27 | Stop reason: HOSPADM

## 2020-06-24 RX ORDER — SODIUM CHLORIDE 0.9 % (FLUSH) 0.9 %
10 SYRINGE (ML) INJECTION PRN
Status: DISCONTINUED | OUTPATIENT
Start: 2020-06-24 | End: 2020-06-24 | Stop reason: HOSPADM

## 2020-06-24 RX ORDER — PHENYLEPHRINE HCL IN 0.9% NACL 1 MG/10 ML
SYRINGE (ML) INTRAVENOUS PRN
Status: DISCONTINUED | OUTPATIENT
Start: 2020-06-24 | End: 2020-06-24 | Stop reason: SDUPTHER

## 2020-06-24 RX ORDER — HYDRALAZINE HYDROCHLORIDE 20 MG/ML
10 INJECTION INTRAMUSCULAR; INTRAVENOUS
Status: DISCONTINUED | OUTPATIENT
Start: 2020-06-24 | End: 2020-06-27 | Stop reason: HOSPADM

## 2020-06-24 RX ORDER — DEXAMETHASONE SODIUM PHOSPHATE 4 MG/ML
INJECTION, SOLUTION INTRA-ARTICULAR; INTRALESIONAL; INTRAMUSCULAR; INTRAVENOUS; SOFT TISSUE PRN
Status: DISCONTINUED | OUTPATIENT
Start: 2020-06-24 | End: 2020-06-24 | Stop reason: SDUPTHER

## 2020-06-24 RX ORDER — SODIUM CHLORIDE 0.9 % (FLUSH) 0.9 %
10 SYRINGE (ML) INJECTION EVERY 12 HOURS SCHEDULED
Status: DISCONTINUED | OUTPATIENT
Start: 2020-06-24 | End: 2020-06-27 | Stop reason: HOSPADM

## 2020-06-24 RX ORDER — SODIUM CHLORIDE 0.9 % (FLUSH) 0.9 %
10 SYRINGE (ML) INJECTION PRN
Status: DISCONTINUED | OUTPATIENT
Start: 2020-06-24 | End: 2020-06-27 | Stop reason: HOSPADM

## 2020-06-24 RX ORDER — HYDROCODONE BITARTRATE AND ACETAMINOPHEN 5; 325 MG/1; MG/1
1 TABLET ORAL EVERY 4 HOURS PRN
Status: DISCONTINUED | OUTPATIENT
Start: 2020-06-24 | End: 2020-06-27 | Stop reason: HOSPADM

## 2020-06-24 RX ORDER — MORPHINE SULFATE 2 MG/ML
2 INJECTION, SOLUTION INTRAMUSCULAR; INTRAVENOUS
Status: DISCONTINUED | OUTPATIENT
Start: 2020-06-24 | End: 2020-06-27 | Stop reason: HOSPADM

## 2020-06-24 RX ORDER — PROMETHAZINE HYDROCHLORIDE 25 MG/1
12.5 TABLET ORAL EVERY 6 HOURS PRN
Status: DISCONTINUED | OUTPATIENT
Start: 2020-06-24 | End: 2020-06-27 | Stop reason: HOSPADM

## 2020-06-24 RX ORDER — MIDAZOLAM HYDROCHLORIDE 1 MG/ML
INJECTION INTRAMUSCULAR; INTRAVENOUS PRN
Status: DISCONTINUED | OUTPATIENT
Start: 2020-06-24 | End: 2020-06-24 | Stop reason: SDUPTHER

## 2020-06-24 RX ORDER — ASPIRIN 81 MG/1
81 TABLET ORAL DAILY
Status: DISCONTINUED | OUTPATIENT
Start: 2020-06-24 | End: 2020-06-27 | Stop reason: HOSPADM

## 2020-06-24 RX ORDER — POTASSIUM CHLORIDE 7.45 MG/ML
10 INJECTION INTRAVENOUS PRN
Status: DISCONTINUED | OUTPATIENT
Start: 2020-06-24 | End: 2020-06-27 | Stop reason: HOSPADM

## 2020-06-24 RX ORDER — LIDOCAINE HYDROCHLORIDE 20 MG/ML
INJECTION, SOLUTION INFILTRATION; PERINEURAL PRN
Status: DISCONTINUED | OUTPATIENT
Start: 2020-06-24 | End: 2020-06-24 | Stop reason: SDUPTHER

## 2020-06-24 RX ORDER — EPHEDRINE SULFATE 50 MG/ML
INJECTION INTRAVENOUS PRN
Status: DISCONTINUED | OUTPATIENT
Start: 2020-06-24 | End: 2020-06-24 | Stop reason: SDUPTHER

## 2020-06-24 RX ORDER — CLONIDINE HYDROCHLORIDE 0.1 MG/1
0.1 TABLET ORAL
Status: DISCONTINUED | OUTPATIENT
Start: 2020-06-24 | End: 2020-06-27 | Stop reason: HOSPADM

## 2020-06-24 RX ORDER — PROPOFOL 10 MG/ML
INJECTION, EMULSION INTRAVENOUS PRN
Status: DISCONTINUED | OUTPATIENT
Start: 2020-06-24 | End: 2020-06-24 | Stop reason: SDUPTHER

## 2020-06-24 RX ORDER — ONDANSETRON 2 MG/ML
INJECTION INTRAMUSCULAR; INTRAVENOUS PRN
Status: DISCONTINUED | OUTPATIENT
Start: 2020-06-24 | End: 2020-06-24 | Stop reason: SDUPTHER

## 2020-06-24 RX ADMIN — HEPARIN SODIUM 5000 UNITS: 1000 INJECTION, SOLUTION INTRAVENOUS; SUBCUTANEOUS at 10:10

## 2020-06-24 RX ADMIN — Medication 50 MCG: at 10:55

## 2020-06-24 RX ADMIN — SODIUM CHLORIDE, POTASSIUM CHLORIDE, SODIUM LACTATE AND CALCIUM CHLORIDE: 600; 310; 30; 20 INJECTION, SOLUTION INTRAVENOUS at 09:18

## 2020-06-24 RX ADMIN — SODIUM CHLORIDE, POTASSIUM CHLORIDE, SODIUM LACTATE AND CALCIUM CHLORIDE: 600; 310; 30; 20 INJECTION, SOLUTION INTRAVENOUS at 11:06

## 2020-06-24 RX ADMIN — Medication 25 MCG: at 10:08

## 2020-06-24 RX ADMIN — GABAPENTIN 300 MG: 300 CAPSULE ORAL at 20:03

## 2020-06-24 RX ADMIN — MIDAZOLAM HYDROCHLORIDE 1 MG: 2 INJECTION, SOLUTION INTRAMUSCULAR; INTRAVENOUS at 07:36

## 2020-06-24 RX ADMIN — ROCURONIUM BROMIDE 10 MG: 10 SOLUTION INTRAVENOUS at 09:19

## 2020-06-24 RX ADMIN — CEFAZOLIN SODIUM 1 G: 10 INJECTION, POWDER, FOR SOLUTION INTRAVENOUS at 11:43

## 2020-06-24 RX ADMIN — ATORVASTATIN CALCIUM 80 MG: 80 TABLET, FILM COATED ORAL at 20:04

## 2020-06-24 RX ADMIN — ONDANSETRON 4 MG: 2 INJECTION INTRAMUSCULAR; INTRAVENOUS at 10:51

## 2020-06-24 RX ADMIN — LISINOPRIL 10 MG: 10 TABLET ORAL at 14:14

## 2020-06-24 RX ADMIN — Medication 10 ML: at 20:04

## 2020-06-24 RX ADMIN — LIDOCAINE HYDROCHLORIDE 60 MG: 20 INJECTION, SOLUTION INFILTRATION; PERINEURAL at 07:44

## 2020-06-24 RX ADMIN — ROCURONIUM BROMIDE 20 MG: 10 SOLUTION INTRAVENOUS at 08:37

## 2020-06-24 RX ADMIN — Medication 50 MCG: at 08:53

## 2020-06-24 RX ADMIN — SODIUM CHLORIDE: 9 INJECTION, SOLUTION INTRAVENOUS at 14:07

## 2020-06-24 RX ADMIN — FENTANYL CITRATE 50 MCG: 50 INJECTION, SOLUTION INTRAMUSCULAR; INTRAVENOUS at 09:13

## 2020-06-24 RX ADMIN — CLOPIDOGREL BISULFATE 75 MG: 75 TABLET ORAL at 14:13

## 2020-06-24 RX ADMIN — ROCURONIUM BROMIDE 50 MG: 10 SOLUTION INTRAVENOUS at 07:44

## 2020-06-24 RX ADMIN — FENTANYL CITRATE 25 MCG: 50 INJECTION, SOLUTION INTRAMUSCULAR; INTRAVENOUS at 08:38

## 2020-06-24 RX ADMIN — EPHEDRINE SULFATE 5 MG: 50 INJECTION INTRAVENOUS at 09:33

## 2020-06-24 RX ADMIN — Medication 50 MCG: at 08:06

## 2020-06-24 RX ADMIN — Medication 50 MCG: at 10:49

## 2020-06-24 RX ADMIN — Medication 100 MCG: at 10:30

## 2020-06-24 RX ADMIN — MORPHINE SULFATE 4 MG: 4 INJECTION, SOLUTION INTRAMUSCULAR; INTRAVENOUS at 16:20

## 2020-06-24 RX ADMIN — Medication 50 MCG: at 10:59

## 2020-06-24 RX ADMIN — FENTANYL CITRATE 25 MCG: 50 INJECTION, SOLUTION INTRAMUSCULAR; INTRAVENOUS at 10:40

## 2020-06-24 RX ADMIN — Medication 25 MCG: at 09:11

## 2020-06-24 RX ADMIN — FENTANYL CITRATE 50 MCG: 50 INJECTION, SOLUTION INTRAMUSCULAR; INTRAVENOUS at 09:27

## 2020-06-24 RX ADMIN — ROCURONIUM BROMIDE 10 MG: 10 SOLUTION INTRAVENOUS at 08:53

## 2020-06-24 RX ADMIN — ONDANSETRON 4 MG: 2 INJECTION INTRAMUSCULAR; INTRAVENOUS at 07:53

## 2020-06-24 RX ADMIN — DEXAMETHASONE SODIUM PHOSPHATE 8 MG: 4 INJECTION, SOLUTION INTRAMUSCULAR; INTRAVENOUS at 07:53

## 2020-06-24 RX ADMIN — EPHEDRINE SULFATE 5 MG: 50 INJECTION INTRAVENOUS at 08:51

## 2020-06-24 RX ADMIN — EPHEDRINE SULFATE 10 MG: 50 INJECTION INTRAVENOUS at 08:19

## 2020-06-24 RX ADMIN — Medication 50 MCG: at 08:25

## 2020-06-24 RX ADMIN — FENTANYL CITRATE 50 MCG: 50 INJECTION, SOLUTION INTRAMUSCULAR; INTRAVENOUS at 10:15

## 2020-06-24 RX ADMIN — PROPOFOL 150 MG: 10 INJECTION, EMULSION INTRAVENOUS at 07:44

## 2020-06-24 RX ADMIN — HYDROCODONE BITARTRATE AND ACETAMINOPHEN 2 TABLET: 5; 325 TABLET ORAL at 14:06

## 2020-06-24 RX ADMIN — ASPIRIN 81 MG: 81 TABLET, COATED ORAL at 14:06

## 2020-06-24 RX ADMIN — HYDROCODONE BITARTRATE AND ACETAMINOPHEN 2 TABLET: 5; 325 TABLET ORAL at 17:54

## 2020-06-24 RX ADMIN — GABAPENTIN 600 MG: 300 CAPSULE ORAL at 14:14

## 2020-06-24 RX ADMIN — Medication 10 ML: at 14:09

## 2020-06-24 RX ADMIN — MIDAZOLAM HYDROCHLORIDE 1 MG: 2 INJECTION, SOLUTION INTRAMUSCULAR; INTRAVENOUS at 07:30

## 2020-06-24 RX ADMIN — Medication 100 MCG: at 11:08

## 2020-06-24 RX ADMIN — Medication 50 MCG: at 10:24

## 2020-06-24 RX ADMIN — CEFAZOLIN SODIUM 2 G: 10 INJECTION, POWDER, FOR SOLUTION INTRAVENOUS at 07:48

## 2020-06-24 RX ADMIN — Medication 50 MCG: at 08:51

## 2020-06-24 RX ADMIN — Medication 50 MCG: at 08:01

## 2020-06-24 RX ADMIN — FENTANYL CITRATE 25 MCG: 50 INJECTION, SOLUTION INTRAMUSCULAR; INTRAVENOUS at 11:52

## 2020-06-24 RX ADMIN — ROCURONIUM BROMIDE 15 MG: 10 SOLUTION INTRAVENOUS at 11:22

## 2020-06-24 RX ADMIN — ROCURONIUM BROMIDE 10 MG: 10 SOLUTION INTRAVENOUS at 08:48

## 2020-06-24 RX ADMIN — EPHEDRINE SULFATE 10 MG: 50 INJECTION INTRAVENOUS at 08:25

## 2020-06-24 RX ADMIN — OXYCODONE HYDROCHLORIDE AND ACETAMINOPHEN 1 TABLET: 5; 325 TABLET ORAL at 00:36

## 2020-06-24 RX ADMIN — FENTANYL CITRATE 100 MCG: 50 INJECTION, SOLUTION INTRAMUSCULAR; INTRAVENOUS at 07:44

## 2020-06-24 RX ADMIN — FENTANYL CITRATE 75 MCG: 50 INJECTION, SOLUTION INTRAMUSCULAR; INTRAVENOUS at 08:13

## 2020-06-24 RX ADMIN — Medication 10 ML: at 16:20

## 2020-06-24 RX ADMIN — HYDROCODONE BITARTRATE AND ACETAMINOPHEN 2 TABLET: 5; 325 TABLET ORAL at 21:55

## 2020-06-24 RX ADMIN — ROCURONIUM BROMIDE 20 MG: 10 SOLUTION INTRAVENOUS at 10:15

## 2020-06-24 RX ADMIN — FENTANYL CITRATE 25 MCG: 50 INJECTION, SOLUTION INTRAMUSCULAR; INTRAVENOUS at 09:48

## 2020-06-24 RX ADMIN — PROPOFOL 50 MG: 10 INJECTION, EMULSION INTRAVENOUS at 08:07

## 2020-06-24 RX ADMIN — ROCURONIUM BROMIDE 20 MG: 10 SOLUTION INTRAVENOUS at 09:42

## 2020-06-24 RX ADMIN — SODIUM CHLORIDE, POTASSIUM CHLORIDE, SODIUM LACTATE AND CALCIUM CHLORIDE: 600; 310; 30; 20 INJECTION, SOLUTION INTRAVENOUS at 04:05

## 2020-06-24 RX ADMIN — FENTANYL CITRATE 25 MCG: 50 INJECTION, SOLUTION INTRAMUSCULAR; INTRAVENOUS at 09:50

## 2020-06-24 ASSESSMENT — PULMONARY FUNCTION TESTS
PIF_VALUE: 19
PIF_VALUE: 27
PIF_VALUE: 18
PIF_VALUE: 21
PIF_VALUE: 19
PIF_VALUE: 18
PIF_VALUE: 21
PIF_VALUE: 20
PIF_VALUE: 21
PIF_VALUE: 23
PIF_VALUE: 12
PIF_VALUE: 21
PIF_VALUE: 21
PIF_VALUE: 24
PIF_VALUE: 22
PIF_VALUE: 23
PIF_VALUE: 23
PIF_VALUE: 6
PIF_VALUE: 21
PIF_VALUE: 22
PIF_VALUE: 20
PIF_VALUE: 23
PIF_VALUE: 22
PIF_VALUE: 20
PIF_VALUE: 20
PIF_VALUE: 21
PIF_VALUE: 20
PIF_VALUE: 22
PIF_VALUE: 22
PIF_VALUE: 0
PIF_VALUE: 21
PIF_VALUE: 23
PIF_VALUE: 21
PIF_VALUE: 22
PIF_VALUE: 21
PIF_VALUE: 22
PIF_VALUE: 20
PIF_VALUE: 23
PIF_VALUE: 21
PIF_VALUE: 23
PIF_VALUE: 19
PIF_VALUE: 20
PIF_VALUE: 20
PIF_VALUE: 18
PIF_VALUE: 3
PIF_VALUE: 21
PIF_VALUE: 19
PIF_VALUE: 20
PIF_VALUE: 21
PIF_VALUE: 18
PIF_VALUE: 21
PIF_VALUE: 20
PIF_VALUE: 21
PIF_VALUE: 0
PIF_VALUE: 0
PIF_VALUE: 21
PIF_VALUE: 21
PIF_VALUE: 28
PIF_VALUE: 11
PIF_VALUE: 24
PIF_VALUE: 21
PIF_VALUE: 18
PIF_VALUE: 21
PIF_VALUE: 22
PIF_VALUE: 20
PIF_VALUE: 21
PIF_VALUE: 22
PIF_VALUE: 23
PIF_VALUE: 21
PIF_VALUE: 22
PIF_VALUE: 21
PIF_VALUE: 20
PIF_VALUE: 21
PIF_VALUE: 19
PIF_VALUE: 19
PIF_VALUE: 21
PIF_VALUE: 20
PIF_VALUE: 13
PIF_VALUE: 11
PIF_VALUE: 22
PIF_VALUE: 23
PIF_VALUE: 24
PIF_VALUE: 21
PIF_VALUE: 0
PIF_VALUE: 21
PIF_VALUE: 0
PIF_VALUE: 27
PIF_VALUE: 22
PIF_VALUE: 42
PIF_VALUE: 21
PIF_VALUE: 21
PIF_VALUE: 23
PIF_VALUE: 21
PIF_VALUE: 18
PIF_VALUE: 23
PIF_VALUE: 21
PIF_VALUE: 23
PIF_VALUE: 20
PIF_VALUE: 21
PIF_VALUE: 22
PIF_VALUE: 0
PIF_VALUE: 18
PIF_VALUE: 21
PIF_VALUE: 23
PIF_VALUE: 22
PIF_VALUE: 23
PIF_VALUE: 20
PIF_VALUE: 22
PIF_VALUE: 21
PIF_VALUE: 23
PIF_VALUE: 18
PIF_VALUE: 22
PIF_VALUE: 21
PIF_VALUE: 18
PIF_VALUE: 25
PIF_VALUE: 18
PIF_VALUE: 22
PIF_VALUE: 26
PIF_VALUE: 6
PIF_VALUE: 21
PIF_VALUE: 23
PIF_VALUE: 4
PIF_VALUE: 20
PIF_VALUE: 18
PIF_VALUE: 19
PIF_VALUE: 23
PIF_VALUE: 29
PIF_VALUE: 24
PIF_VALUE: 22
PIF_VALUE: 21
PIF_VALUE: 22
PIF_VALUE: 22
PIF_VALUE: 0
PIF_VALUE: 18
PIF_VALUE: 23
PIF_VALUE: 21
PIF_VALUE: 0
PIF_VALUE: 22
PIF_VALUE: 21
PIF_VALUE: 22
PIF_VALUE: 21
PIF_VALUE: 21
PIF_VALUE: 22
PIF_VALUE: 21
PIF_VALUE: 18
PIF_VALUE: 22
PIF_VALUE: 28
PIF_VALUE: 20
PIF_VALUE: 22
PIF_VALUE: 21
PIF_VALUE: 18
PIF_VALUE: 24
PIF_VALUE: 23
PIF_VALUE: 21
PIF_VALUE: 22
PIF_VALUE: 21
PIF_VALUE: 23
PIF_VALUE: 21
PIF_VALUE: 23
PIF_VALUE: 34
PIF_VALUE: 21
PIF_VALUE: 27
PIF_VALUE: 22
PIF_VALUE: 24
PIF_VALUE: 21
PIF_VALUE: 21
PIF_VALUE: 18
PIF_VALUE: 18
PIF_VALUE: 21
PIF_VALUE: 22
PIF_VALUE: 21
PIF_VALUE: 6
PIF_VALUE: 19
PIF_VALUE: 18
PIF_VALUE: 22
PIF_VALUE: 22
PIF_VALUE: 24
PIF_VALUE: 23
PIF_VALUE: 24
PIF_VALUE: 21
PIF_VALUE: 21
PIF_VALUE: 19
PIF_VALUE: 11
PIF_VALUE: 22
PIF_VALUE: 20
PIF_VALUE: 21
PIF_VALUE: 21
PIF_VALUE: 18
PIF_VALUE: 0
PIF_VALUE: 16
PIF_VALUE: 22
PIF_VALUE: 23
PIF_VALUE: 21
PIF_VALUE: 23
PIF_VALUE: 23
PIF_VALUE: 20
PIF_VALUE: 21
PIF_VALUE: 23
PIF_VALUE: 21
PIF_VALUE: 6
PIF_VALUE: 18
PIF_VALUE: 0
PIF_VALUE: 20
PIF_VALUE: 19
PIF_VALUE: 21
PIF_VALUE: 18
PIF_VALUE: 21
PIF_VALUE: 21
PIF_VALUE: 19
PIF_VALUE: 13
PIF_VALUE: 21
PIF_VALUE: 0
PIF_VALUE: 21
PIF_VALUE: 18
PIF_VALUE: 21
PIF_VALUE: 12
PIF_VALUE: 22
PIF_VALUE: 21
PIF_VALUE: 21
PIF_VALUE: 20
PIF_VALUE: 21
PIF_VALUE: 23
PIF_VALUE: 21
PIF_VALUE: 21
PIF_VALUE: 18
PIF_VALUE: 20
PIF_VALUE: 21
PIF_VALUE: 18
PIF_VALUE: 22
PIF_VALUE: 18
PIF_VALUE: 20
PIF_VALUE: 0
PIF_VALUE: 22
PIF_VALUE: 19
PIF_VALUE: 21
PIF_VALUE: 24
PIF_VALUE: 19
PIF_VALUE: 21
PIF_VALUE: 0
PIF_VALUE: 20
PIF_VALUE: 21
PIF_VALUE: 19
PIF_VALUE: 18
PIF_VALUE: 21
PIF_VALUE: 20
PIF_VALUE: 21
PIF_VALUE: 21
PIF_VALUE: 18
PIF_VALUE: 23
PIF_VALUE: 21
PIF_VALUE: 23
PIF_VALUE: 18
PIF_VALUE: 21
PIF_VALUE: 20
PIF_VALUE: 18
PIF_VALUE: 21
PIF_VALUE: 23
PIF_VALUE: 22
PIF_VALUE: 1
PIF_VALUE: 26
PIF_VALUE: 20
PIF_VALUE: 21
PIF_VALUE: 21
PIF_VALUE: 23
PIF_VALUE: 1
PIF_VALUE: 19
PIF_VALUE: 41
PIF_VALUE: 20
PIF_VALUE: 22

## 2020-06-24 ASSESSMENT — PAIN SCALES - GENERAL
PAINLEVEL_OUTOF10: 9
PAINLEVEL_OUTOF10: 0
PAINLEVEL_OUTOF10: 9
PAINLEVEL_OUTOF10: 8
PAINLEVEL_OUTOF10: 9
PAINLEVEL_OUTOF10: 10
PAINLEVEL_OUTOF10: 9
PAINLEVEL_OUTOF10: 8

## 2020-06-24 ASSESSMENT — PAIN DESCRIPTION - PAIN TYPE
TYPE: SURGICAL PAIN

## 2020-06-24 ASSESSMENT — PAIN DESCRIPTION - ORIENTATION
ORIENTATION: LEFT
ORIENTATION: RIGHT;LEFT

## 2020-06-24 ASSESSMENT — PAIN DESCRIPTION - LOCATION
LOCATION: LEG

## 2020-06-24 NOTE — BRIEF OP NOTE
Brief Postoperative Note      Patient: Temo Willoughby  YOB: 1965  MRN: [id number]    Date of Procedure: 6/24/2020    Pre-Op Diagnosis: PERIPHERAL VASCULAR DISEASE    Post-Op Diagnosis: Same       Procedure(s):  RIGHT FEMORAL TO PERONEAL  BYPASS GRAFT    Surgeon(s):  Ace Grossman MD    Assistant:  Surgical Assistant: Param Lane    Anesthesia: General    Estimated Blood Loss (mL): less than 182     Complications: None    Specimens:   * No specimens in log *    Implants:  * No implants in log *      Drains:   Urethral Catheter Non-latex 16 fr (Active)         Electronically signed by Ace Grossman MD on 6/24/2020 at 11:43 AM

## 2020-06-25 LAB
ANION GAP SERPL CALCULATED.3IONS-SCNC: 9 MMOL/L (ref 3–16)
BUN BLDV-MCNC: 11 MG/DL (ref 7–20)
CALCIUM SERPL-MCNC: 8.7 MG/DL (ref 8.3–10.6)
CHLORIDE BLD-SCNC: 103 MMOL/L (ref 99–110)
CO2: 24 MMOL/L (ref 21–32)
CREAT SERPL-MCNC: 0.7 MG/DL (ref 0.9–1.3)
GFR AFRICAN AMERICAN: >60
GFR NON-AFRICAN AMERICAN: >60
GLUCOSE BLD-MCNC: 116 MG/DL (ref 70–99)
HCT VFR BLD CALC: 31.8 % (ref 40.5–52.5)
HEMOGLOBIN: 11 G/DL (ref 13.5–17.5)
MCH RBC QN AUTO: 34 PG (ref 26–34)
MCHC RBC AUTO-ENTMCNC: 34.7 G/DL (ref 31–36)
MCV RBC AUTO: 97.9 FL (ref 80–100)
PDW BLD-RTO: 14.2 % (ref 12.4–15.4)
PLATELET # BLD: 220 K/UL (ref 135–450)
PMV BLD AUTO: 6.5 FL (ref 5–10.5)
POTASSIUM REFLEX MAGNESIUM: 4 MMOL/L (ref 3.5–5.1)
RBC # BLD: 3.25 M/UL (ref 4.2–5.9)
SODIUM BLD-SCNC: 136 MMOL/L (ref 136–145)
WBC # BLD: 9.7 K/UL (ref 4–11)

## 2020-06-25 PROCEDURE — 2060000000 HC ICU INTERMEDIATE R&B

## 2020-06-25 PROCEDURE — 85027 COMPLETE CBC AUTOMATED: CPT

## 2020-06-25 PROCEDURE — 6370000000 HC RX 637 (ALT 250 FOR IP): Performed by: SURGERY

## 2020-06-25 PROCEDURE — 36415 COLL VENOUS BLD VENIPUNCTURE: CPT

## 2020-06-25 PROCEDURE — 6370000000 HC RX 637 (ALT 250 FOR IP): Performed by: NURSE PRACTITIONER

## 2020-06-25 PROCEDURE — 80048 BASIC METABOLIC PNL TOTAL CA: CPT

## 2020-06-25 PROCEDURE — 2580000003 HC RX 258: Performed by: SURGERY

## 2020-06-25 PROCEDURE — 97110 THERAPEUTIC EXERCISES: CPT

## 2020-06-25 PROCEDURE — 97116 GAIT TRAINING THERAPY: CPT

## 2020-06-25 PROCEDURE — 97162 PT EVAL MOD COMPLEX 30 MIN: CPT

## 2020-06-25 RX ADMIN — ASPIRIN 81 MG: 81 TABLET, COATED ORAL at 09:44

## 2020-06-25 RX ADMIN — ATORVASTATIN CALCIUM 80 MG: 80 TABLET, FILM COATED ORAL at 20:54

## 2020-06-25 RX ADMIN — HYDROCODONE BITARTRATE AND ACETAMINOPHEN 2 TABLET: 5; 325 TABLET ORAL at 02:58

## 2020-06-25 RX ADMIN — HYDROCODONE BITARTRATE AND ACETAMINOPHEN 2 TABLET: 5; 325 TABLET ORAL at 17:49

## 2020-06-25 RX ADMIN — BISACODYL 5 MG: 5 TABLET, COATED ORAL at 13:51

## 2020-06-25 RX ADMIN — Medication 10 ML: at 09:44

## 2020-06-25 RX ADMIN — HYDROCODONE BITARTRATE AND ACETAMINOPHEN 2 TABLET: 5; 325 TABLET ORAL at 23:14

## 2020-06-25 RX ADMIN — GABAPENTIN 300 MG: 300 CAPSULE ORAL at 20:55

## 2020-06-25 RX ADMIN — SODIUM CHLORIDE: 9 INJECTION, SOLUTION INTRAVENOUS at 03:03

## 2020-06-25 RX ADMIN — LISINOPRIL 10 MG: 10 TABLET ORAL at 09:43

## 2020-06-25 RX ADMIN — HYDROCODONE BITARTRATE AND ACETAMINOPHEN 2 TABLET: 5; 325 TABLET ORAL at 09:44

## 2020-06-25 RX ADMIN — Medication 10 ML: at 20:55

## 2020-06-25 RX ADMIN — HYDROCODONE BITARTRATE AND ACETAMINOPHEN 2 TABLET: 5; 325 TABLET ORAL at 13:50

## 2020-06-25 RX ADMIN — CLOPIDOGREL BISULFATE 75 MG: 75 TABLET ORAL at 09:43

## 2020-06-25 RX ADMIN — GABAPENTIN 600 MG: 300 CAPSULE ORAL at 09:43

## 2020-06-25 ASSESSMENT — PAIN SCALES - GENERAL
PAINLEVEL_OUTOF10: 8
PAINLEVEL_OUTOF10: 7
PAINLEVEL_OUTOF10: 8
PAINLEVEL_OUTOF10: 9
PAINLEVEL_OUTOF10: 0
PAINLEVEL_OUTOF10: 9
PAINLEVEL_OUTOF10: 8
PAINLEVEL_OUTOF10: 0
PAINLEVEL_OUTOF10: 0
PAINLEVEL_OUTOF10: 8
PAINLEVEL_OUTOF10: 0
PAINLEVEL_OUTOF10: 8
PAINLEVEL_OUTOF10: 7

## 2020-06-25 ASSESSMENT — PAIN DESCRIPTION - PAIN TYPE
TYPE: SURGICAL PAIN

## 2020-06-25 ASSESSMENT — PAIN - FUNCTIONAL ASSESSMENT: PAIN_FUNCTIONAL_ASSESSMENT: PREVENTS OR INTERFERES SOME ACTIVE ACTIVITIES AND ADLS

## 2020-06-25 ASSESSMENT — PAIN DESCRIPTION - ORIENTATION
ORIENTATION: LEFT
ORIENTATION: LEFT;RIGHT
ORIENTATION: LEFT

## 2020-06-25 ASSESSMENT — PAIN DESCRIPTION - LOCATION
LOCATION: LEG

## 2020-06-25 NOTE — OP NOTE
longitudinal arteriotomy was made in the peroneal vessel  where it had been punctured. The vein was placed in a reverse fashion,  spatulated posteriorly and the distal anastomosis created in an  end-to-side fashion between the vein graft and the peroneal artery using  running 6-0 Prolene sutures. After completing the anastomosis, the  tourniquet was released. The graft was flushed with heparinized saline  solution, which flushed quite easily. It was then pulled through the  Bernice tunneler making sure not to twist or kink the vein graft. The  profunda vessel was occluded clamping it proximally and distally. The  vein graft was cut to the appropriate length again spatulated  posteriorly. A longitudinal arteriotomy was made in the deep femoral  artery and the proximal anastomosis created again using running 6-0  Prolene sutures. After completing the anastomosis, the clamps were  removed. There was excellent flow into the vein graft and excellent  Doppler signal at the distal anastomosis. The vein graft was punctured  with a 20-gauge angiocatheter. Angiogram was performed showing widely  patent vein graft without any extravasation, intimal defects or stenosis  at the distal anastomosis. There was excellent flow via the peroneal  artery down to the top of the ankle where then collaterals filled the  plantar and tarsal vessels in the foot. The angiocatheter was removed. The puncture site was closed using a 6-0 Prolene simple suture achieving  hemostasis. All surgical sites were irrigated with antibiotic saline  solution. The two incisions on the right leg were closed using several  layers of running 2-0 Vicryl suture and 3-0 Vicryl suture, then running  4-0 Monocryl subcuticular suture. Left leg incisions were also closed  using combination of 2-0 and 3-0 Vicryl sutures and 4-0 Monocryl  subcuticular sutures for the skin edges. All incisions were dressed  with Dermabond and Prineo.   Bilateral legs

## 2020-06-25 NOTE — PROGRESS NOTES
Patient called out with complaint of pain in right foot. Stated it was \"hurting like it did before\". On assessment, foot is slightly more swollen than earlier. This morning, pulses could be palpated and this writer was unable to palpate pulses. Pulses auscultated with doppler. Patient states pain level 8/10. Right great toe painful and royce pink. Will notify surgery. Per Dr. Jj Eden, call vascular to do right lower extremity arterial duplex. Will place order. After speaking with Dr. Ma Meckel, Dr. Vanessa Smith asked for arterial duplex to be discontinued.

## 2020-06-25 NOTE — PROGRESS NOTES
interferes some active activities and ADLs  Non-Pharmaceutical Pain Intervention(s): Repositioned;Rest;Ambulation/Increased Activity; Therapeutic presence  Vital Signs  Patient Currently in Pain: Yes  Pre Treatment Pain Screening  Intervention List: Patient able to continue with treatment    Orientation  Orientation  Overall Orientation Status: Within Normal Limits  Social/Functional History  Social/Functional History  Lives With: Spouse, Daughter, Son(can provide 24/7 assist if needed)  Type of Home: House  Home Layout: Two level, Able to Live on Main level with bedroom/bathroom  Home Access: Level entry  Bathroom Toilet: Standard  Bathroom Equipment: Shower chair, Grab bars in shower  Home Equipment: (No DME)  ADL Assistance: Independent  Homemaking Assistance: Independent  Homemaking Responsibilities: Yes  Ambulation Assistance: Independent  Transfer Assistance: Independent  Active : Yes  Occupation: Retired, On disability  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    Cognition   Cognition  Overall Cognitive Status: WFL    Objective     Observation/Palpation  Posture: Fair  Observation: minimal WB through RLE, flexed posture    PROM RLE (degrees)  RLE PROM: Exceptions  RLE General PROM: slightly decreased right ankle dorsiflexion/plantarflexion due to pain  PROM LLE (degrees)  LLE PROM: WFL  AROM LLE (degrees)  LLE AROM : WFL  Strength RLE  Strength RLE: Exception  Comment: 3-/5 right ankle dorsiflexion/plantarflexion otherwise WFL  Strength LLE  Strength LLE: WFL  Motor Control  Gross Motor?: WFL(except decreased right ankle ROM/strength due to pain)  Sensation  Overall Sensation Status: WNL  Bed mobility  Rolling to Left: Unable to assess  Rolling to Right: Unable to assess  Supine to Sit: Modified independent  Sit to Supine: Modified independent  Comment: head of bed elevated  Transfers  Sit to Stand: Supervision  Stand to sit: Supervision  Bed to Chair: Contact guard assistance  Comment: with rolling walker. cueing for safe hand placement  Ambulation  Ambulation?: Yes  More Ambulation?: No  Ambulation 1  Surface: level tile  Device: Rolling Walker(patient declined to walk without a device due to pain. )  Assistance: Contact guard assistance  Quality of Gait: antalgic non-reciprocal step pattern. patient initially placed minimal weight through RLE. then patient transitioned to NWB RLE hoppin gait pattern due to right foot pain. decreased gait velocity. significantly increased weight bearing through RLE. Gait Deviations: Slow Katey;Decreased step height;Decreased arm swing  Distance: 15 feet x 2  Comments: No complaints of shortness of breath, chest pain or dizziness. No loss of balance. Stairs/Curb  Stairs?: No(patient said that he will be living on the first floor of his home and that he has no steps to enter)     Balance  Posture: Good  Sitting - Static: Good  Sitting - Dynamic: Good  Standing - Static: Good  Standing - Dynamic: Fair  Comments: with rolling walker  Exercises  Straight Leg Raise: 1 x 10  Quad Sets: 1 x 10  Heelslides: 1 x 10  Hip Flexion: 1 x 10  Hip Abduction: 1 x 10  Knee Long Arc Quad: 1 x 10  Ankle Pumps: 1 x 10  Comments: cueing for sequencing and technique. rest breaks due to fatigue. Other exercises  Other exercises?: No     Plan   Plan  Times per week: 3-5/week  Plan weeks: 1 week 7/2/20  Specific instructions for Next Treatment: progress mobility as tolerated  Current Treatment Recommendations: Strengthening, ADL/Self-care Training, Gait Training, Patient/Caregiver Education & Training, Equipment Evaluation, Education, & procurement, Balance Training, Pain Management, Functional Mobility Training, Endurance Training, Home Exercise Program, Transfer Training, Safety Education & Training  Safety Devices  Type of devices:  All fall risk precautions in place, Bed alarm in place, Nurse notified, Call light within reach, Gait belt, Patient at risk for

## 2020-06-25 NOTE — PROGRESS NOTES
Patient has a past medical history of Bowel incontinence, Hyperlipidemia, Hypertension, PAD (peripheral artery disease) ([address] Poor vision, Pre-diabetes, Sleep apnea, Stroke ([address] and Wears partial dentures. Comorbidities and risk factors for stroke reviewed and education provided as appropriate. Patient and/or family's stated goal of care: Improve mobility     Reviewed the Following Education with Patient and/or Family:   Signs and Symptoms of Stroke-Reviewed FAST   Facial Drooping   Arm Weakness   Speech Difficulty   Time to Call 911 and how to activate EMS (911)   Importance of Follow Up Appointment at Discharge   Importance of Compliance with Medications Prescribed at Discharge     Pt verbalized understanding.      Family Present during Education: no     Stroke Education Booklet at Bedside: yes     Total Education Time: 10 Minutes

## 2020-06-26 PROCEDURE — 6370000000 HC RX 637 (ALT 250 FOR IP): Performed by: SURGERY

## 2020-06-26 PROCEDURE — 97110 THERAPEUTIC EXERCISES: CPT

## 2020-06-26 PROCEDURE — 2060000000 HC ICU INTERMEDIATE R&B

## 2020-06-26 PROCEDURE — 97116 GAIT TRAINING THERAPY: CPT

## 2020-06-26 PROCEDURE — 97535 SELF CARE MNGMENT TRAINING: CPT

## 2020-06-26 PROCEDURE — 97166 OT EVAL MOD COMPLEX 45 MIN: CPT

## 2020-06-26 PROCEDURE — 2580000003 HC RX 258: Performed by: SURGERY

## 2020-06-26 RX ADMIN — HYDROCODONE BITARTRATE AND ACETAMINOPHEN 2 TABLET: 5; 325 TABLET ORAL at 04:06

## 2020-06-26 RX ADMIN — HYDROCODONE BITARTRATE AND ACETAMINOPHEN 2 TABLET: 5; 325 TABLET ORAL at 10:36

## 2020-06-26 RX ADMIN — HYDROCODONE BITARTRATE AND ACETAMINOPHEN 2 TABLET: 5; 325 TABLET ORAL at 23:09

## 2020-06-26 RX ADMIN — LISINOPRIL 10 MG: 10 TABLET ORAL at 08:36

## 2020-06-26 RX ADMIN — ATORVASTATIN CALCIUM 80 MG: 80 TABLET, FILM COATED ORAL at 21:40

## 2020-06-26 RX ADMIN — GABAPENTIN 600 MG: 300 CAPSULE ORAL at 08:37

## 2020-06-26 RX ADMIN — Medication 10 ML: at 21:00

## 2020-06-26 RX ADMIN — ASPIRIN 81 MG: 81 TABLET, COATED ORAL at 08:36

## 2020-06-26 RX ADMIN — HYDROCODONE BITARTRATE AND ACETAMINOPHEN 1 TABLET: 5; 325 TABLET ORAL at 17:01

## 2020-06-26 RX ADMIN — GABAPENTIN 300 MG: 300 CAPSULE ORAL at 21:40

## 2020-06-26 RX ADMIN — Medication 10 ML: at 09:00

## 2020-06-26 RX ADMIN — CLOPIDOGREL BISULFATE 75 MG: 75 TABLET ORAL at 08:37

## 2020-06-26 ASSESSMENT — PAIN SCALES - GENERAL
PAINLEVEL_OUTOF10: 5
PAINLEVEL_OUTOF10: 6
PAINLEVEL_OUTOF10: 6
PAINLEVEL_OUTOF10: 9
PAINLEVEL_OUTOF10: 7
PAINLEVEL_OUTOF10: 7
PAINLEVEL_OUTOF10: 4
PAINLEVEL_OUTOF10: 3
PAINLEVEL_OUTOF10: 6
PAINLEVEL_OUTOF10: 7
PAINLEVEL_OUTOF10: 9
PAINLEVEL_OUTOF10: 8

## 2020-06-26 ASSESSMENT — PAIN DESCRIPTION - ORIENTATION
ORIENTATION: RIGHT
ORIENTATION: RIGHT
ORIENTATION: RIGHT;LEFT
ORIENTATION: RIGHT

## 2020-06-26 ASSESSMENT — PAIN DESCRIPTION - PAIN TYPE
TYPE: SURGICAL PAIN

## 2020-06-26 ASSESSMENT — PAIN DESCRIPTION - LOCATION
LOCATION: LEG

## 2020-06-26 NOTE — PROGRESS NOTES
Delivered walker to [facility] hospital room.   Thanks for the referral.  Yann Waterford  6/26/2020

## 2020-06-26 NOTE — PROGRESS NOTES
Bowel incontinence, Hyperlipidemia, Hypertension, PAD (peripheral artery disease) ([address] Poor vision, Pre-diabetes, Sleep apnea, Stroke ([address] and Wears partial dentures. has a past surgical history that includes Leg Surgery (Right); vascular surgery; Abdomen surgery; and Femoral-tibial Bypass Graft (Right, 6/24/2020). Restrictions  Restrictions/Precautions  Restrictions/Precautions: Up as Tolerated, Fall Risk, General Precautions  Position Activity Restriction  Other position/activity restrictions: Activity as tolerated. Elevate operated extremity (RLE)  Subjective   General  Chart Reviewed: Yes  Additional Pertinent Hx: 6/24 RIGHT FEMORAL TO PERONEAL  BYPASS GRAFT. Vein harvested from LLE with Dr. Arrington Loud To Previous Treatment: Patient with no complaints from previous session. Family / Caregiver Present: No  Referring Practitioner: Lupe Saleem MD  Subjective  Subjective: Pt seated in chair on arrival, pleasant and agreeable to PT tx, denies pain at rest  General Comment  Comments: RN cleared pt for session  Pain Screening  Patient Currently in Pain: Yes  Pain Assessment  Pain Assessment: 0-10  Pain Level: 3(With ambulation)  Pain Type: Surgical pain  Pain Location: Leg  Pain Orientation: Right;Left(R>L)  Non-Pharmaceutical Pain Intervention(s): Ambulation/Increased Activity;Repositioned; Therapeutic presence  Vital Signs  Patient Currently in Pain: Yes       Orientation  Orientation  Overall Orientation Status: Within Normal Limits  Cognition   Cognition  Overall Cognitive Status: WFL  Objective   Bed mobility  Supine to Sit: Unable to assess  Sit to Supine: Unable to assess  Comment: Pt seated in chair at start and end of session  Transfers  Sit to Stand: Supervision  Stand to sit: Supervision  Comment: With RW, cues for hand placement  Ambulation  Ambulation?: Yes  Ambulation 1  Surface: level tile  Device: Rolling Walker  Assistance: Stand by assistance  Quality of Gait: antalgic non-reciprocal

## 2020-06-26 NOTE — PROGRESS NOTES
Occupational Therapy   Occupational Therapy Initial Assessment/Treatment Note  Date: 2020   Patient Name: Mattie Arroyo  MRN: [id number]     : 1965    Date of Service: 2020    Discharge Recommendations:  24 hour supervision or assist       Assessment   Performance deficits / Impairments: Decreased functional mobility ; Decreased balance;Decreased ADL status; Decreased endurance;Decreased high-level IADLs;Decreased strength  Assessment: After evaluation and diagnosis, ischemic foot; s/p R femoral to peroneal bypass graft 20, pt found to be presenting with the above mentioned occupational performance deficits which are affecting participation in daily living skills. Pt reports PLOF as independent with all I/ADLs, transfers, and functional mobility. Pt completed bed mobility with supervision, toileting and LB dressing with SBA, and functional mobility with RW and toilet transfer needing CGA. Pt stood at bathroom sink for ~2 minutes while washing hands at bathroom sink with CGA. While seated in bedside chair at end of treatment session, pt completed BUE AROM exercises to increase UE strength for performance in ADLs. Pt would benefit from continued skilled occupational therapy to address ADLs, functional mobility, and safety while in acute care. Prognosis: Good  Decision Making: Medium Complexity  OT Education: OT Role;Plan of Care;Precautions;Transfer Training;Equipment  REQUIRES OT FOLLOW UP: Yes  Activity Tolerance  Activity Tolerance: Patient Tolerated treatment well  Safety Devices  Safety Devices in place: Yes  Type of devices: Gait belt;Call light within reach; Chair alarm in place; Left in chair;Patient at risk for falls         Patient Diagnosis(es): The encounter diagnosis was Vascular occlusion.      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.)  Hearing: Within functional limits    Orientation  Overall Orientation Status: Within Functional Limits  Observation/Palpation  Posture: Fair  Balance  Sitting Balance: Supervision  Standing Balance: Stand by assistance  Functional Mobility  Functional - Mobility Device: Rolling Walker  Activity: To/from bathroom  Assist Level: Contact guard assistance  Toilet Transfers  Toilet - Technique: Ambulating  Equipment Used: Standard toilet(L grab bar)  Toilet Transfer: Contact guard assistance  Toilet Transfers Comments: RW  ADL  LE Dressing: Stand by assistance(chase socks)  Toileting: Stand by assistance  Tone RUE  RUE Tone: Normotonic  Tone LUE  LUE Tone: Normotonic  Coordination  Movements Are Fluid And Coordinated: Yes     Bed mobility  Supine to Sit: Supervision(to the R with HOB flat)  Sit to Supine: Unable to assess  Scooting: Supervision  Comment: Pt sitting in bedside chair at end of evaluation  Transfers  Sit to stand: Contact guard assistance  Stand to sit: Stand by assistance     Cognition  Overall Cognitive Status: WFL        Sensation  Overall Sensation Status: WFL  Type of ROM/Therapeutic Exercise  Type of ROM/Therapeutic Exercise: AROM  Comment: seated in bedside chair  Exercises  Shoulder Elevation: x10  Shoulder Flexion: x10  Elbow Flexion: x10  Elbow Extension: x10  Supination: x10  Pronation: x10  Wrist Flexion: x10  Wrist Extension: x10  Finger Flexion: x10  Finger Extension: x10     LUE AROM (degrees)  LUE AROM : WFL  RUE AROM (degrees)  RUE AROM : WFL  LUE Strength  Gross LUE Strength: WFL  RUE Strength  Gross RUE Strength: WFL     Plan   Plan  Times per week: 3-5x a week while in acute care  Current Treatment Recommendations: Strengthening, Endurance Training, Self-Care / ADL, Functional Mobility Training    AM-PAC Score        AM-PAC Inpatient Daily Activity Raw Score: 24 (06/26/20 1148)  AM-PAC Inpatient ADL T-Scale Score : 57.54 (06/26/20 1148)  ADL Inpatient CMS 0-100% Score: 0 (06/26/20 1148)  ADL Inpatient CMS G-Code Modifier : [address] (06/26/20 1148)    Goals  Short term goals  Time Frame for Short term goals: 1 week unless otherwise specified 7/3/20  Short term goal 1: Pt will complete functional mobility in/out of bathroom with supervision  Short term goal 2: Pt will complete toilet transfer with supervision   Short term goal 3: Pt will complete 15-20 reps of BUE AROM ex to increase UE strength by 6/29/20  Patient Goals   Patient goals : \"get back to fishing\"       Therapy Time   Individual Concurrent Group Co-treatment   Time In 0927         Time Out 1003         Minutes 36         Timed Code Treatment Minutes: 26 minutes (10 minutes for eval)       Mary Dennis OTR/L   If pt is unable to be seen after this session, please let this note serve as discharge summary. Please see case management note for discharge disposition. Thank you.

## 2020-06-27 VITALS
RESPIRATION RATE: 18 BRPM | SYSTOLIC BLOOD PRESSURE: 133 MMHG | BODY MASS INDEX: 29.62 KG/M2 | DIASTOLIC BLOOD PRESSURE: 85 MMHG | HEART RATE: 100 BPM | HEIGHT: 69 IN | TEMPERATURE: 97.4 F | OXYGEN SATURATION: 96 % | WEIGHT: 200 LBS

## 2020-06-27 PROCEDURE — 6370000000 HC RX 637 (ALT 250 FOR IP): Performed by: SURGERY

## 2020-06-27 PROCEDURE — 2580000003 HC RX 258: Performed by: SURGERY

## 2020-06-27 RX ORDER — POLYETHYLENE GLYCOL 3350 17 G/17G
17 POWDER, FOR SOLUTION ORAL DAILY PRN
Qty: 527 G | Refills: 1 | COMMUNITY
Start: 2020-06-27 | End: 2020-07-27

## 2020-06-27 RX ORDER — HYDROCODONE BITARTRATE AND ACETAMINOPHEN 5; 325 MG/1; MG/1
1 TABLET ORAL EVERY 4 HOURS PRN
Qty: 15 TABLET | Refills: 0 | Status: SHIPPED | OUTPATIENT
Start: 2020-06-27 | End: 2020-06-30

## 2020-06-27 RX ADMIN — ASPIRIN 81 MG: 81 TABLET, COATED ORAL at 09:01

## 2020-06-27 RX ADMIN — GABAPENTIN 600 MG: 300 CAPSULE ORAL at 09:01

## 2020-06-27 RX ADMIN — LISINOPRIL 10 MG: 10 TABLET ORAL at 09:01

## 2020-06-27 RX ADMIN — Medication 10 ML: at 09:02

## 2020-06-27 RX ADMIN — CLOPIDOGREL BISULFATE 75 MG: 75 TABLET ORAL at 09:01

## 2020-06-27 RX ADMIN — HYDROCODONE BITARTRATE AND ACETAMINOPHEN 2 TABLET: 5; 325 TABLET ORAL at 09:06

## 2020-06-27 ASSESSMENT — PAIN DESCRIPTION - ORIENTATION: ORIENTATION: RIGHT;LEFT

## 2020-06-27 ASSESSMENT — PAIN DESCRIPTION - LOCATION: LOCATION: FOOT

## 2020-06-27 ASSESSMENT — PAIN SCALES - GENERAL
PAINLEVEL_OUTOF10: 8
PAINLEVEL_OUTOF10: 7

## 2020-06-27 ASSESSMENT — PAIN DESCRIPTION - PAIN TYPE: TYPE: ACUTE PAIN;SURGICAL PAIN

## 2020-06-27 NOTE — FLOWSHEET NOTE
06/25/20 1424   Encounter Summary   Services provided to: Patient   Referral/Consult From: [facility] Family members   Continue Visiting   (6/25/Phone support. Positive response.  Prayed together.)   Complexity of Encounter Moderate   Length of Encounter 15 minutes   Spiritual Assessment Completed Yes   Spiritual/Anabaptism   Type Spiritual support   Assessment Approachable   Intervention Active listening;Explored feelings, thoughts, concerns;Nurtured hope;Prayer;Scripture   Outcome Expressed gratitude;Engaged in conversation
Psychosocial   Psychosocial (WDL) WDL

## 2020-06-27 NOTE — PROGRESS NOTES
Page to Andrew Saab NP: Leland Toure! I saw the discharge order but I haven't seen Dr. Jesús Castellon this morning or a note, so I wanted to see if pt was good to go whenever I had everything together or if we were still waiting on Dr. Jesús Castellon or someone else. Thanks!

## 2020-06-27 NOTE — DISCHARGE INSTR - COC
Infection Status     Infection Onset Added Last Indicated Last Indicated By Review Planned Expiration Resolved Resolved By    None active    Resolved    COVID-19 Rule Out 06/19/20 06/19/20 06/19/20 Covid-19 Ambulatory (Ordered)   06/21/20 Rule-Out Test Resulted          Nurse Assessment:  Last Vital Signs: /85   Pulse 100   Temp 97.4 °F (36.3 °C) (Oral)   Resp 18   Ht 5' 9\" (1.753 m)   Wt 200 lb (90.7 kg)   SpO2 96%   BMI 29.53 kg/m²     Last documented pain score (0-10 scale): Pain Level: 7  Last Weight:   Wt Readings from Last 1 Encounters:   06/27/20 200 lb (90.7 kg)     Mental Status:  {IP PT MENTAL STATUS:20030}    IV Access:  - None    Nursing Mobility/ADLs:  Walking   Assisted  Transfer  Assisted  Bathing  Assisted  Dressing  Assisted  Toileting  Assisted  Feeding  Independent  Med Admin  Independent  Med Delivery   whole    Wound Care Documentation and Therapy:        Elimination:  Continence:   · Bowel: Yes  · Bladder: Yes  Urinary Catheter: None   Colostomy/Ileostomy/Ileal Conduit: No       Date of Last BM: Unknown    Intake/Output Summary (Last 24 hours) at 6/27/2020 1019  Last data filed at 6/27/2020 0909  Gross per 24 hour   Intake 480 ml   Output 2375 ml   Net -1895 ml     I/O last 3 completed shifts: In: 600 [P.O.:600]  Out: 2375 [Urine:2375]    Safety Concerns: At Risk for Falls    Impairments/Disabilities:      Limited mobility    Nutrition Therapy:  Current Nutrition Therapy:   - Oral Diet:  General    Routes of Feeding: Oral  Liquids: No Restrictions  Daily Fluid Restriction: no  Last Modified Barium Swallow with Video (Video Swallowing Test): not done    Treatments at the Time of Hospital Discharge:   Respiratory Treatments: N/A  Oxygen Therapy:  is not on home oxygen therapy.   Ventilator:    - No ventilator support    Rehab Therapies: Physical Therapy and Occupational Therapy  Weight Bearing Status/Restrictions: No weight bearing restirctions  Other Medical Equipment (for information only, NOT a DME order):  walker  Other Treatments: N/A    Patient's personal belongings (please select all that are sent with patient):  None    RN SIGNATURE:  Electronically signed by An Persaud RN on 6/27/20 at 12:45 PM EDT    CASE MANAGEMENT/SOCIAL WORK SECTION    Inpatient Status Date: ***    Readmission Risk Assessment Score:  Readmission Risk              Risk of Unplanned Readmission:        14           Discharging to Facility/ Agency   · Name:   · Address:  · Phone:  · Fax:    Dialysis Facility (if applicable)   · Name:  · Address:  · Dialysis Schedule:  · Phone:  · Fax:    / signature: {Esignature:[id number]}    PHYSICIAN SECTION    Prognosis: Good    Condition at Discharge: Stable    Rehab Potential (if transferring to Rehab): {Prognosis:[id number]}    Recommended Labs or Other Treatments After Discharge:   Recommended Follow-up, Labs or Other Treatments After Discharge:    Home care                Physician Certification: I certify the above information and transfer of Caleb Vides  is necessary for the continuing treatment of the diagnosis listed and that he requires [address] for less 30 days.      Update Admission H&P: No change in H&P    PHYSICIAN SIGNATURE:  Electronically signed by MAT Gong CNP on 6/27/20 at 10:21 AM EDT

## 2020-06-27 NOTE — CARE COORDINATION
CM spoke with patient who continues to refuse home care. Patient has walker in room for discharge and has family coming to take him home. Denies needs.

## 2020-06-29 NOTE — DISCHARGE SUMMARY
Hospital Medicine Discharge Summary    Patient ID: Kemar Campbell      Patient's PCP: Pamela De Oliveira    Admit Date: 6/22/2020     Discharge Date: 6/27/2020      Admitting Physician: Tiffanie Pack MD     Discharge Physician: MAT Bar CNP     Discharge Diagnoses: Active Hospital Problems    Diagnosis    Vascular occlusion [I99.8]    Peripheral vascular disease, unspecified ([address] [I73.9]    Ischemic foot [I99.8]       The patient was seen and examined on day of discharge and this discharge summary is in conjunction with any daily progress note from day of discharge. Hospital Course:     62 y. o. male who presented to [facility] with right foot pain. Pt has hx of PVD and has ongoing pvd issues for the past 3-4 months, with assoc tingling/paresthesias in his toes.  He came to the ER on 6/12/20 due to worsening pain. Workup noted pulses via Doppler at the time and he was sent home with pain meds(pt already had plans for surgery with Dr. Cori Savage). -pt states she is getting surgery on Wed.     Today he noted ongoing worsening pain and redness of right foot and came to ER again. Workup noted lack of dopplers at PT, DP pulses.  -vasc surg consulted and rec heparin ggt     Right Foot Pain in the setting of PAD and ischemic foot   - VS consulted and following  - s/p redo RLE bypass   - Mgt per VS      Hx of CVA and PVD/PAD  -home medicine therapy of asa, plavix nd statin      HLD - cont statin     Tobacco use disorder: cessation education, nicotine           Physical Exam Performed:     /85   Pulse 100   Temp 97.4 °F (36.3 °C) (Oral)   Resp 18   Ht 5' 9\" (1.753 m)   Wt 200 lb (90.7 kg)   SpO2 96%   BMI 29.53 kg/m²       General appearance:  No apparent distress, appears stated age and cooperative. HEENT:  Normal cephalic, atraumatic without obvious deformity. Pupils equal, round, and reactive to light. Extra ocular muscles intact. Conjunctivae/corneas clear.   Neck: RIGHT (MIN 3 VIEWS)   Final Result   No acute osseous abnormality                Consults:     IP CONSULT TO VASCULAR SURGERY  IP CONSULT TO HOSPITALIST  IP CONSULT TO HOME CARE NEEDS  IP CONSULT TO HOME CARE NEEDS    Disposition:  Home [facility]    Condition at Discharge: Stable    Discharge Instructions/Follow-up:  Vascular    Code Status:  Prior     Activity: activity as tolerated    Diet: cardiac diet      Discharge Medications:     Discharge Medication List as of 6/27/2020 12:49 PM           Details   HYDROcodone-acetaminophen (NORCO) 5-325 MG per tablet Take 1 tablet by mouth every 4 hours as needed for Pain for up to 3 days. , Disp-15 tablet, R-0Print      bisacodyl (DULCOLAX) 5 MG EC tablet Take 1 tablet by mouth daily as needed for ConstipationOTC      polyethylene glycol (GLYCOLAX) 17 g packet Take 17 g by mouth daily as needed for Constipation, Disp-527 g, R-1OTC              Details   aspirin 325 MG tablet Take 325 mg by mouth daily 6/22/20 - has not taken for over one month (\"ran out\")Historical Med      clopidogrel (PLAVIX) 75 MG tablet Take 75 mg by mouth daily Historical Med      atorvastatin (LIPITOR) 80 MG tablet Take 80 mg by mouth daily Historical Med      gabapentin (NEURONTIN) 300 MG capsule Take 300 mg by mouth 2 times daily. Takes 600mg in AM, 300mg HS. Historical Med      lisinopril (PRINIVIL;ZESTRIL) 10 MG tablet Take 10 mg by mouth daily Historical Med             Time Spent on discharge is more than 30 minutes in the examination, evaluation, counseling and review of medications and discharge plan. Signed:    MAT Martin - CNP   6/29/2020      Thank you Kristy Wang for the opportunity to be involved in this patient's care. If you have any questions or concerns please feel free to contact me at [phone].

## 2020-07-01 PROBLEM — Z01.810 PRE-OPERATIVE CARDIOVASCULAR EXAMINATION: Status: RESOLVED | Noted: 2020-06-01 | Resolved: 2020-07-01

## 2020-07-10 ENCOUNTER — OFFICE VISIT (OUTPATIENT)
Dept: VASCULAR SURGERY | Age: 55
End: 2020-07-10

## 2020-07-10 VITALS
WEIGHT: 195 LBS | SYSTOLIC BLOOD PRESSURE: 140 MMHG | BODY MASS INDEX: 28.88 KG/M2 | HEIGHT: 69 IN | DIASTOLIC BLOOD PRESSURE: 80 MMHG

## 2020-07-10 PROCEDURE — 99024 POSTOP FOLLOW-UP VISIT: CPT | Performed by: SURGERY

## 2020-08-17 ENCOUNTER — APPOINTMENT (OUTPATIENT)
Dept: CT IMAGING | Age: 55
End: 2020-08-17
Payer: MEDICARE

## 2020-08-17 ENCOUNTER — HOSPITAL ENCOUNTER (EMERGENCY)
Age: 55
Discharge: HOME | End: 2020-08-17
Payer: MEDICARE

## 2020-08-17 VITALS
RESPIRATION RATE: 20 BRPM | WEIGHT: 192 LBS | OXYGEN SATURATION: 97 % | DIASTOLIC BLOOD PRESSURE: 87 MMHG | TEMPERATURE: 98.7 F | HEART RATE: 77 BPM | BODY MASS INDEX: 28.44 KG/M2 | HEIGHT: 69 IN | SYSTOLIC BLOOD PRESSURE: 139 MMHG

## 2020-08-17 LAB
A/G RATIO: 1.6 (ref 1.1–2.2)
ALBUMIN SERPL-MCNC: 4.9 G/DL (ref 3.4–5)
ALP BLD-CCNC: 136 U/L (ref 40–129)
ALT SERPL-CCNC: 15 U/L (ref 10–40)
ANION GAP SERPL CALCULATED.3IONS-SCNC: 9 MMOL/L (ref 3–16)
AST SERPL-CCNC: 17 U/L (ref 15–37)
BASOPHILS ABSOLUTE: 0.1 K/UL (ref 0–0.2)
BASOPHILS RELATIVE PERCENT: 0.9 %
BILIRUB SERPL-MCNC: 0.3 MG/DL (ref 0–1)
BILIRUBIN URINE: NEGATIVE
BLOOD, URINE: NEGATIVE
BUN BLDV-MCNC: 12 MG/DL (ref 7–20)
CALCIUM SERPL-MCNC: 10.1 MG/DL (ref 8.3–10.6)
CHLORIDE BLD-SCNC: 102 MMOL/L (ref 99–110)
CLARITY: CLEAR
CO2: 26 MMOL/L (ref 21–32)
COLOR: YELLOW
CREAT SERPL-MCNC: 1 MG/DL (ref 0.9–1.3)
EOSINOPHILS ABSOLUTE: 0.1 K/UL (ref 0–0.6)
EOSINOPHILS RELATIVE PERCENT: 1.8 %
GFR AFRICAN AMERICAN: >60
GFR NON-AFRICAN AMERICAN: >60
GLOBULIN: 3.1 G/DL
GLUCOSE BLD-MCNC: 106 MG/DL (ref 70–99)
GLUCOSE URINE: NEGATIVE MG/DL
HCT VFR BLD CALC: 42.2 % (ref 40.5–52.5)
HEMOGLOBIN: 14.3 G/DL (ref 13.5–17.5)
KETONES, URINE: NEGATIVE MG/DL
LEUKOCYTE ESTERASE, URINE: NEGATIVE
LYMPHOCYTES ABSOLUTE: 2.1 K/UL (ref 1–5.1)
LYMPHOCYTES RELATIVE PERCENT: 26.5 %
MCH RBC QN AUTO: 33.1 PG (ref 26–34)
MCHC RBC AUTO-ENTMCNC: 33.8 G/DL (ref 31–36)
MCV RBC AUTO: 98 FL (ref 80–100)
MICROSCOPIC EXAMINATION: NORMAL
MONOCYTES ABSOLUTE: 0.6 K/UL (ref 0–1.3)
MONOCYTES RELATIVE PERCENT: 7.2 %
NEUTROPHILS ABSOLUTE: 5.1 K/UL (ref 1.7–7.7)
NEUTROPHILS RELATIVE PERCENT: 63.6 %
NITRITE, URINE: NEGATIVE
PDW BLD-RTO: 14.6 % (ref 12.4–15.4)
PH UA: 6 (ref 5–8)
PLATELET # BLD: 286 K/UL (ref 135–450)
PMV BLD AUTO: 6.5 FL (ref 5–10.5)
POTASSIUM REFLEX MAGNESIUM: 4.2 MMOL/L (ref 3.5–5.1)
PROTEIN UA: NEGATIVE MG/DL
RBC # BLD: 4.31 M/UL (ref 4.2–5.9)
SODIUM BLD-SCNC: 137 MMOL/L (ref 136–145)
SPECIFIC GRAVITY UA: 1.01 (ref 1–1.03)
TOTAL PROTEIN: 8 G/DL (ref 6.4–8.2)
URINE REFLEX TO CULTURE: NORMAL
URINE TYPE: NORMAL
UROBILINOGEN, URINE: 0.2 E.U./DL
WBC # BLD: 8.1 K/UL (ref 4–11)

## 2020-08-17 PROCEDURE — 99284 EMERGENCY DEPT VISIT MOD MDM: CPT

## 2020-08-17 PROCEDURE — 81003 URINALYSIS AUTO W/O SCOPE: CPT

## 2020-08-17 PROCEDURE — 80053 COMPREHEN METABOLIC PANEL: CPT

## 2020-08-17 PROCEDURE — 6370000000 HC RX 637 (ALT 250 FOR IP): Performed by: NURSE PRACTITIONER

## 2020-08-17 PROCEDURE — 96374 THER/PROPH/DIAG INJ IV PUSH: CPT

## 2020-08-17 PROCEDURE — 6360000004 HC RX CONTRAST MEDICATION: Performed by: NURSE PRACTITIONER

## 2020-08-17 PROCEDURE — 96375 TX/PRO/DX INJ NEW DRUG ADDON: CPT

## 2020-08-17 PROCEDURE — 85025 COMPLETE CBC W/AUTO DIFF WBC: CPT

## 2020-08-17 PROCEDURE — 93005 ELECTROCARDIOGRAM TRACING: CPT | Performed by: NURSE PRACTITIONER

## 2020-08-17 PROCEDURE — 6360000002 HC RX W HCPCS: Performed by: NURSE PRACTITIONER

## 2020-08-17 PROCEDURE — 75635 CT ANGIO ABDOMINAL ARTERIES: CPT

## 2020-08-17 RX ORDER — METHOCARBAMOL 500 MG/1
500 TABLET, FILM COATED ORAL 3 TIMES DAILY
Qty: 30 TABLET | Refills: 0 | Status: SHIPPED | OUTPATIENT
Start: 2020-08-17 | End: 2020-08-27

## 2020-08-17 RX ORDER — TRAMADOL HYDROCHLORIDE 50 MG/1
50 TABLET ORAL EVERY 6 HOURS PRN
Qty: 20 TABLET | Refills: 0 | Status: SHIPPED | OUTPATIENT
Start: 2020-08-17 | End: 2020-08-20

## 2020-08-17 RX ORDER — MORPHINE SULFATE 4 MG/ML
4 INJECTION, SOLUTION INTRAMUSCULAR; INTRAVENOUS ONCE
Status: COMPLETED | OUTPATIENT
Start: 2020-08-17 | End: 2020-08-17

## 2020-08-17 RX ORDER — HYDROCODONE BITARTRATE AND ACETAMINOPHEN 5; 325 MG/1; MG/1
1 TABLET ORAL ONCE
Status: COMPLETED | OUTPATIENT
Start: 2020-08-17 | End: 2020-08-17

## 2020-08-17 RX ORDER — ONDANSETRON 2 MG/ML
4 INJECTION INTRAMUSCULAR; INTRAVENOUS ONCE
Status: COMPLETED | OUTPATIENT
Start: 2020-08-17 | End: 2020-08-17

## 2020-08-17 RX ADMIN — MORPHINE SULFATE 4 MG: 4 INJECTION, SOLUTION INTRAMUSCULAR; INTRAVENOUS at 18:11

## 2020-08-17 RX ADMIN — HYDROCODONE BITARTRATE AND ACETAMINOPHEN 1 TABLET: 5; 325 TABLET ORAL at 21:21

## 2020-08-17 RX ADMIN — IOPAMIDOL 110 ML: 755 INJECTION, SOLUTION INTRAVENOUS at 19:18

## 2020-08-17 RX ADMIN — ONDANSETRON 4 MG: 2 INJECTION INTRAMUSCULAR; INTRAVENOUS at 18:11

## 2020-08-17 ASSESSMENT — ENCOUNTER SYMPTOMS
NAUSEA: 0
BACK PAIN: 0
SHORTNESS OF BREATH: 0
DIARRHEA: 0
COLOR CHANGE: 0
COUGH: 0
ABDOMINAL PAIN: 0
VOMITING: 0

## 2020-08-17 ASSESSMENT — PAIN DESCRIPTION - ORIENTATION: ORIENTATION: LEFT

## 2020-08-17 ASSESSMENT — PAIN SCALES - GENERAL
PAINLEVEL_OUTOF10: 9
PAINLEVEL_OUTOF10: 8
PAINLEVEL_OUTOF10: 7

## 2020-08-17 ASSESSMENT — PAIN DESCRIPTION - LOCATION: LOCATION: LEG

## 2020-08-17 ASSESSMENT — PAIN DESCRIPTION - PAIN TYPE: TYPE: ACUTE PAIN

## 2020-08-17 NOTE — ED NOTES
Audible doppler Left pedal, left posterior tibial, right pedal, and right posterior tibial pulses.       Abril Summers RN  08/17/20 0493

## 2020-08-17 NOTE — ED PROVIDER NOTES
**[facility]  [facility]  EMERGENCY DEPARTMENT ENCOUNTER      Pt Name: Nichol Bellamy  OYU:[id number]  [place] 1965  Date of evaluation: 8/17/2020  Provider: MAT Vernon CNP      Chief Complaint:    Chief Complaint   Patient presents with    Leg Pain     in the back side of left leg since the winter last year on and off       Nursing Notes, Past Medical Hx, Past Surgical Hx, Social Hx, Allergies, and Family Hx were all reviewed and agreed with or any disagreements were addressed in the HPI.    HPI:  (Location, Duration, Timing, Severity, Quality, Assoc Sx, Context, Modifying factors)  This is a  62 y.o. male who presents today with pain behind his left thigh, states he has been dealing with the pain off and on the past year, states that he had bypass to his right leg recently and used his vessels from his left leg for the bypass of his right. He was seen and admitted to the hospital in June of this year for occlusion in he right lower extremity occlusion and he had vascular surgery by Dr. Sylvester Jaramillo. Rates his left leg discomfort a 9 out of 10, denies taking medicine at home for the pain as he only has Neurontin and states the medicine is not helping. He denies any chest pain pleuritic chest pain or shortness of breath. No cough, congestion, fever or chills. No nausea vomiting or diarrhea. No additional complaints, no additional aggravating relieving factors. Patient presents awake, alert and in no acute distress or toxic appearance.     PastMedical/Surgical History:      Diagnosis Date    Bowel incontinence     Hyperlipidemia     Hypertension     PAD (peripheral artery disease) (HCC)     Poor vision     left eye    Pre-diabetes     Sleep apnea     NO CPAP    Stroke ([facility] [address] 2016    x3 - memory deficit    Wears partial dentures     upper & lower         Procedure Laterality Date    ABDOMEN SURGERY      colon polyps    FEMORAL-TIBIAL BYPASS GRAFT Right 6/24/2020    RIGHT FEMORAL TO PERONEAL  BYPASS GRAFT performed by Lissa Stearns MD at [place] [address] Right     VASCULAR SURGERY         Medications:  Previous Medications    ASPIRIN 325 MG TABLET    Take 325 mg by mouth daily 6/22/20 - has not taken for over one month (\"ran out\")    ATORVASTATIN (LIPITOR) 80 MG TABLET    Take 80 mg by mouth daily     CLOPIDOGREL (PLAVIX) 75 MG TABLET    Take 75 mg by mouth daily     GABAPENTIN (NEURONTIN) 300 MG CAPSULE    Take 300 mg by mouth 2 times daily. Takes 600mg in AM, 300mg HS. LISINOPRIL (PRINIVIL;ZESTRIL) 10 MG TABLET    Take 10 mg by mouth daily          Review of Systems:  Review of Systems   Constitutional: Negative for chills and fever. HENT: Negative for congestion. Respiratory: Negative for cough and shortness of breath. Cardiovascular: Negative for chest pain. Gastrointestinal: Negative for abdominal pain, diarrhea, nausea and vomiting. Genitourinary: Negative for difficulty urinating and dysuria. Musculoskeletal: Negative for back pain. Patient complains of discomfort behind his left thigh and calf, states that he has been dealing with the pain on and off for the past year but has been continuous all day today. He does not have pain medicine at home, he does report having a right lower extremity femoral bypass in June of this year, he is concerned that he has another occlusion in the left leg. Skin: Negative for color change. Neurological: Negative for weakness, numbness and headaches. Positives and Pertinent negatives as per HPI. Except as noted above in the ROS, problem specific ROS was completed and is negative. Physical Exam:  Physical Exam  Vitals signs and nursing note reviewed. Constitutional:       Appearance: He is well-developed. He is not diaphoretic. HENT:      Head: Normocephalic.       Right Ear: External ear normal.      Left Ear: External ear normal.   Eyes:      General: No scleral icterus. Right eye: No discharge. Left eye: No discharge. Neck:      Musculoskeletal: Normal range of motion and neck supple. Cardiovascular:      Rate and Rhythm: Normal rate. Comments: Normal S1 and 2, peripheral pulses are 1+ in the popliteal and bilateral posterior tibialis, bilateral lower extremities are warm and dry, he does have scars on both of his lower extremities related to vascular bypass, the incisions appear to be well approximated with no erythema or wound dehiscence. Although he is complaining that he has a history of neuropathy, he has no numbness tingling or paresthesias of his lower extremities during my exam  Pulmonary:      Effort: Pulmonary effort is normal. No respiratory distress. Comments: Lungs are clear anteriorly, diminished posteriorly in the bases, patient is not tachypneic or dyspneic, saturations 95% on room air during my bedside exam.  Abdominal:      Palpations: Abdomen is soft. Tenderness: There is no abdominal tenderness. Musculoskeletal: Normal range of motion. Skin:     General: Skin is warm. Capillary Refill: Capillary refill takes less than 2 seconds. Coloration: Skin is not pale. Neurological:      General: No focal deficit present. Mental Status: He is alert and oriented to person, place, and time. GCS: GCS eye subscore is 4. GCS verbal subscore is 5. GCS motor subscore is 6.    Psychiatric:         Behavior: Behavior normal.         MEDICAL DECISION MAKING    Vitals:    Vitals:    08/17/20 1859 08/17/20 1929 08/17/20 2029 08/17/20 2059   BP: 134/80 117/76 137/77 139/87   Pulse: 73 67 71 77   Resp: 19 18 19 20   Temp:       TempSrc:       SpO2: 97% 94% 94% 97%   Weight:       Height:           LABS:  Labs Reviewed   COMPREHENSIVE METABOLIC PANEL W/ REFLEX TO MG FOR LOW K - Abnormal; Notable for the following components:       Result Value    Glucose 106 (*)     Alkaline Phosphatase 136 (*)     All other components within normal limits    Narrative:     Performed at:  [facility]  [address],  [place], [place] [employer] [place]   Phone [phone]   CBC WITH AUTO DIFFERENTIAL    Narrative:     Performed at:  [facility]  [address],  [place], [place] [employer]   Phone [phone]   URINE RT REFLEX TO CULTURE    Narrative:     Performed at:  [address],  [place], [place] [employer] [place]   Phone  of labs reviewed and werenegative at this time or not returned at the time of this note. RADIOLOGY:   Non-plain film images such as CT, Ultrasound and MRI are read by the radiologist. Rosa Maria RODRIGUEZ APRN - CNP have directly visualized the radiologic plain film image(s) with the below findings:        Interpretation per the Radiologist below, if available at the time of this note:    CTA ABDOMINAL AORTA W BILAT RUNOFF W CONTRAST   Final Result   No evidence of acute arterial occlusion in the lower extremities. Right side: New profunda femoris to peroneal artery bypass graft is widely   patent. Peroneal artery is the dominant supply into the foot via collaterals. Left side: Patent right to left fem-fem bypass graft. Stable moderate to   severe narrowing in the distal SFA secondary to calcific plaque. 3 vessel   runoff with posterior tibial artery as the dominant supply to the left foot.               MEDICAL DECISION MAKING / ED COURSE:      PROCEDURES:   Procedures    None    Patient was given:  Medications   HYDROcodone-acetaminophen (NORCO) 5-325 MG per tablet 1 tablet (has no administration in time range)   morphine (PF) injection 4 mg (4 mg Intravenous Given 8/17/20 1811)   ondansetron (ZOFRAN) injection 4 mg (4 mg Intravenous Given 8/17/20 1811)   iopamidol (ISOVUE-370) 76 % injection 75 mL (110 mLs Intravenous Given 8/17/20 1918)       Patient complains of discomfort behind his left thigh and calf, states that he has been dealing with the pain on and off for the past year but has been continuous all day today. He does not have pain medicine at home, he does report having a right lower extremity femoral bypass in June of this year, he is concerned that he has another occlusion in the left leg. After evaluation and examination the patient I ordered IV access, blood work, pain medicine, nausea medicine and CTA runoff bilateral lower extremities. EKG shows sinus rhythm rate of 74 bpm, no acute ST elevation, please see Dr. Tracy Calix EKG interpretation note. CBC shows no acute sepsis or anemia. Metabolic panel shows no acute electrolyte disturbances or renal insufficiency. Liver functions are normal.  Urinalysis shows no acute infection. CT of abdominal aorta with bilateral runoff shows no evidence of acute arterial occlusion in the lower extremities. Right side new profunda femoris to peroneal artery bypass graft is widely patent, peroneal artery is the dominant supply into the foot via collaterals. Left side patent right to the left femoral femoral bypass graft. Stable moderate to severe narrowing in the distal aspect a secondary to calcific plaque. Three-vessel runoff with posterior tibial artery is a dominant supplying the left foot. No obvious new occlusions, left common femoral artery is patent, popliteal artery is patent, anterior tibial artery and peroneal artery are patent to the foot. He does have the calcific plaque causing some narrowing. Upon reevaluation he does report that the morphine has helped with his pain some, however he said ever he was still having some discomfort, I then ordered a repeat dose of pain medicine by giving him Norco.  However, at this time no concerns for acute occlusion. I do believe he be managed on outpatient basis.     Therefore, shared medical decision was made between the patient and myself only agreed he would follow-up with his PCP and vascular surgeon by making appointment tomorrow. He was discharged home with prescription for tramadol and Robaxin. Educated take medicine as prescribed. Educated about elevating his feet. The patient tolerated their visit well. I evaluated the patient. The physician was available for consultation as needed. The patient and / or the family were informed of the results of any tests, a time was given to answer questions, a plan was proposed and they agreed with plan. Patient verbalized understanding of discharge instructions and was discharged from the department in stable condition. CLINICAL IMPRESSION:  1. Left leg pain    2. Status post femorofemoral bypass surgery    3. History of peripheral arterial disease        DISPOSITION Decision To Discharge 08/17/2020 08:56:28 PM      PATIENT REFERRED TO:  Al Guzman MD  [address] [place] [address]  [phone]    Schedule an appointment as soon as possible for a visit in 1 day  Call your vascular surgeon in the morning and make an appointment to be seen in the next 2 to 3 days    [facility] [address] [phone]  Go to   If symptoms worsen      DISCHARGE MEDICATIONS:  New Prescriptions    METHOCARBAMOL (ROBAXIN) 500 MG TABLET    Take 1 tablet by mouth 3 times daily for 10 days    TRAMADOL (ULTRAM) 50 MG TABLET    Take 1 tablet by mouth every 6 hours as needed for Pain for up to 3 days.        DISCONTINUED MEDICATIONS:  Discontinued Medications    BISACODYL (DULCOLAX) 5 MG EC TABLET    Take 1 tablet by mouth daily as needed for Constipation              (Please note the MDM and HPI sections of this note were completed with a voice recognition program.  Efforts were made to edit the dictations but occasionally words are mis-transcribed.)    Electronically signed, MAT Nelson CNP,           MAT Nelson CNP  08/17/20 2110       MAT Nelson CNP  08/17/20

## 2020-08-18 LAB
EKG ATRIAL RATE: 74 BPM
EKG DIAGNOSIS: NORMAL
EKG P AXIS: 55 DEGREES
EKG P-R INTERVAL: 152 MS
EKG Q-T INTERVAL: 388 MS
EKG QRS DURATION: 100 MS
EKG QTC CALCULATION (BAZETT): 430 MS
EKG R AXIS: 56 DEGREES
EKG T AXIS: 39 DEGREES
EKG VENTRICULAR RATE: 74 BPM

## 2020-09-10 ENCOUNTER — TELEPHONE (OUTPATIENT)
Dept: VASCULAR SURGERY | Age: 55
End: 2020-09-10

## 2020-10-26 ENCOUNTER — TELEPHONE (OUTPATIENT)
Dept: VASCULAR SURGERY | Age: 55
End: 2020-10-26

## 2020-10-26 NOTE — TELEPHONE ENCOUNTER
Called patient regarding his carotid duplex as well as his arterial duplex. Scheduled November 3 at 9:30am arrival at [facility]

## 2020-11-03 PROBLEM — I73.9 PAD (PERIPHERAL ARTERY DISEASE) (HCC): Status: RESOLVED | Noted: 2020-11-03 | Resolved: 2020-11-03

## 2021-03-09 ENCOUNTER — HOSPITAL ENCOUNTER (OUTPATIENT)
Dept: VASCULAR LAB | Age: 56
Discharge: HOME | DRG: 247 | End: 2021-03-09
Payer: MEDICARE

## 2021-03-09 DIAGNOSIS — I73.9 PVD (PERIPHERAL VASCULAR DISEASE) (HCC): ICD-10-CM

## 2021-03-09 DIAGNOSIS — Z48.812 POSTOP CAROTID ENDARTERECTOMY SURVEILLANCE, ENCOUNTER FOR: ICD-10-CM

## 2021-03-09 DIAGNOSIS — Z95.828 H/O EXTREMITY BYPASS GRAFT: ICD-10-CM

## 2021-03-09 PROCEDURE — 93926 LOWER EXTREMITY STUDY: CPT

## 2021-03-09 PROCEDURE — 93880 EXTRACRANIAL BILAT STUDY: CPT

## 2021-03-11 ENCOUNTER — HOSPITAL ENCOUNTER (INPATIENT)
Age: 56
LOS: 2 days | Discharge: HOME | DRG: 247 | End: 2021-03-13
Attending: EMERGENCY MEDICINE | Admitting: INTERNAL MEDICINE
Payer: MEDICARE

## 2021-03-11 ENCOUNTER — APPOINTMENT (OUTPATIENT)
Dept: GENERAL RADIOLOGY | Age: 56
DRG: 247 | End: 2021-03-11
Payer: MEDICARE

## 2021-03-11 ENCOUNTER — TELEPHONE (OUTPATIENT)
Dept: VASCULAR SURGERY | Age: 56
End: 2021-03-11

## 2021-03-11 DIAGNOSIS — R07.9 CHEST PAIN, UNSPECIFIED TYPE: Primary | ICD-10-CM

## 2021-03-11 LAB
A/G RATIO: 1.8 (ref 1.1–2.2)
ALBUMIN SERPL-MCNC: 4.1 G/DL (ref 3.4–5)
ALP BLD-CCNC: 111 U/L (ref 40–129)
ALT SERPL-CCNC: 10 U/L (ref 10–40)
ANION GAP SERPL CALCULATED.3IONS-SCNC: 9 MMOL/L (ref 3–16)
AST SERPL-CCNC: 12 U/L (ref 15–37)
BASOPHILS ABSOLUTE: 0.1 K/UL (ref 0–0.2)
BASOPHILS RELATIVE PERCENT: 1.3 %
BILIRUB SERPL-MCNC: <0.2 MG/DL (ref 0–1)
BUN BLDV-MCNC: 14 MG/DL (ref 7–20)
CALCIUM SERPL-MCNC: 9 MG/DL (ref 8.3–10.6)
CHLORIDE BLD-SCNC: 108 MMOL/L (ref 99–110)
CO2: 26 MMOL/L (ref 21–32)
CREAT SERPL-MCNC: 0.9 MG/DL (ref 0.9–1.3)
EOSINOPHILS ABSOLUTE: 0.3 K/UL (ref 0–0.6)
EOSINOPHILS RELATIVE PERCENT: 3.9 %
GFR AFRICAN AMERICAN: >60
GFR NON-AFRICAN AMERICAN: >60
GLOBULIN: 2.3 G/DL
GLUCOSE BLD-MCNC: 112 MG/DL (ref 70–99)
HCT VFR BLD CALC: 38.7 % (ref 40.5–52.5)
HEMOGLOBIN: 13.8 G/DL (ref 13.5–17.5)
LYMPHOCYTES ABSOLUTE: 2.1 K/UL (ref 1–5.1)
LYMPHOCYTES RELATIVE PERCENT: 27.7 %
MCH RBC QN AUTO: 34.6 PG (ref 26–34)
MCHC RBC AUTO-ENTMCNC: 35.6 G/DL (ref 31–36)
MCV RBC AUTO: 97.3 FL (ref 80–100)
MONOCYTES ABSOLUTE: 0.4 K/UL (ref 0–1.3)
MONOCYTES RELATIVE PERCENT: 5.5 %
NEUTROPHILS ABSOLUTE: 4.8 K/UL (ref 1.7–7.7)
NEUTROPHILS RELATIVE PERCENT: 61.6 %
PDW BLD-RTO: 14.1 % (ref 12.4–15.4)
PLATELET # BLD: 241 K/UL (ref 135–450)
PMV BLD AUTO: 6.2 FL (ref 5–10.5)
POTASSIUM SERPL-SCNC: 3.9 MMOL/L (ref 3.5–5.1)
RBC # BLD: 3.98 M/UL (ref 4.2–5.9)
SODIUM BLD-SCNC: 143 MMOL/L (ref 136–145)
SPECIMEN STATUS: NORMAL
TOTAL PROTEIN: 6.4 G/DL (ref 6.4–8.2)
TROPONIN: <0.01 NG/ML
TROPONIN: <0.01 NG/ML
WBC # BLD: 7.7 K/UL (ref 4–11)

## 2021-03-11 PROCEDURE — 6360000002 HC RX W HCPCS: Performed by: NURSE PRACTITIONER

## 2021-03-11 PROCEDURE — 85025 COMPLETE CBC W/AUTO DIFF WBC: CPT

## 2021-03-11 PROCEDURE — 84484 ASSAY OF TROPONIN QUANT: CPT

## 2021-03-11 PROCEDURE — 93005 ELECTROCARDIOGRAM TRACING: CPT | Performed by: EMERGENCY MEDICINE

## 2021-03-11 PROCEDURE — 96372 THER/PROPH/DIAG INJ SC/IM: CPT

## 2021-03-11 PROCEDURE — 71046 X-RAY EXAM CHEST 2 VIEWS: CPT

## 2021-03-11 PROCEDURE — 2580000003 HC RX 258: Performed by: NURSE PRACTITIONER

## 2021-03-11 PROCEDURE — 80053 COMPREHEN METABOLIC PANEL: CPT

## 2021-03-11 PROCEDURE — G0378 HOSPITAL OBSERVATION PER HR: HCPCS

## 2021-03-11 PROCEDURE — 99285 EMERGENCY DEPT VISIT HI MDM: CPT

## 2021-03-11 PROCEDURE — 6370000000 HC RX 637 (ALT 250 FOR IP): Performed by: NURSE PRACTITIONER

## 2021-03-11 PROCEDURE — 1200000000 HC SEMI PRIVATE

## 2021-03-11 PROCEDURE — 36415 COLL VENOUS BLD VENIPUNCTURE: CPT

## 2021-03-11 RX ORDER — GABAPENTIN 300 MG/1
300 CAPSULE ORAL 2 TIMES DAILY
Status: DISCONTINUED | OUTPATIENT
Start: 2021-03-11 | End: 2021-03-13 | Stop reason: HOSPADM

## 2021-03-11 RX ORDER — SODIUM CHLORIDE 0.9 % (FLUSH) 0.9 %
10 SYRINGE (ML) INJECTION EVERY 12 HOURS SCHEDULED
Status: DISCONTINUED | OUTPATIENT
Start: 2021-03-11 | End: 2021-03-13 | Stop reason: HOSPADM

## 2021-03-11 RX ORDER — PROMETHAZINE HYDROCHLORIDE 25 MG/1
12.5 TABLET ORAL EVERY 6 HOURS PRN
Status: DISCONTINUED | OUTPATIENT
Start: 2021-03-11 | End: 2021-03-13 | Stop reason: HOSPADM

## 2021-03-11 RX ORDER — LISINOPRIL 10 MG/1
10 TABLET ORAL DAILY
Status: DISCONTINUED | OUTPATIENT
Start: 2021-03-11 | End: 2021-03-12

## 2021-03-11 RX ORDER — SODIUM CHLORIDE 0.9 % (FLUSH) 0.9 %
10 SYRINGE (ML) INJECTION PRN
Status: DISCONTINUED | OUTPATIENT
Start: 2021-03-11 | End: 2021-03-13 | Stop reason: HOSPADM

## 2021-03-11 RX ORDER — CLOPIDOGREL BISULFATE 75 MG/1
75 TABLET ORAL DAILY
Status: DISCONTINUED | OUTPATIENT
Start: 2021-03-11 | End: 2021-03-13 | Stop reason: HOSPADM

## 2021-03-11 RX ORDER — POLYETHYLENE GLYCOL 3350 17 G/17G
17 POWDER, FOR SOLUTION ORAL DAILY PRN
Status: DISCONTINUED | OUTPATIENT
Start: 2021-03-11 | End: 2021-03-13 | Stop reason: HOSPADM

## 2021-03-11 RX ORDER — ACETAMINOPHEN 650 MG/1
650 SUPPOSITORY RECTAL EVERY 6 HOURS PRN
Status: DISCONTINUED | OUTPATIENT
Start: 2021-03-11 | End: 2021-03-13 | Stop reason: HOSPADM

## 2021-03-11 RX ORDER — NITROGLYCERIN 0.4 MG/1
0.4 TABLET SUBLINGUAL EVERY 5 MIN PRN
Status: DISCONTINUED | OUTPATIENT
Start: 2021-03-11 | End: 2021-03-13 | Stop reason: HOSPADM

## 2021-03-11 RX ORDER — ASPIRIN 81 MG/1
81 TABLET, CHEWABLE ORAL DAILY
Status: DISCONTINUED | OUTPATIENT
Start: 2021-03-12 | End: 2021-03-13 | Stop reason: HOSPADM

## 2021-03-11 RX ORDER — ACETAMINOPHEN 325 MG/1
650 TABLET ORAL EVERY 6 HOURS PRN
Status: DISCONTINUED | OUTPATIENT
Start: 2021-03-11 | End: 2021-03-13 | Stop reason: HOSPADM

## 2021-03-11 RX ORDER — ATORVASTATIN CALCIUM 80 MG/1
80 TABLET, FILM COATED ORAL DAILY
Status: DISCONTINUED | OUTPATIENT
Start: 2021-03-12 | End: 2021-03-13 | Stop reason: HOSPADM

## 2021-03-11 RX ORDER — ONDANSETRON 2 MG/ML
4 INJECTION INTRAMUSCULAR; INTRAVENOUS EVERY 6 HOURS PRN
Status: DISCONTINUED | OUTPATIENT
Start: 2021-03-11 | End: 2021-03-13 | Stop reason: HOSPADM

## 2021-03-11 RX ADMIN — LISINOPRIL 10 MG: 10 TABLET ORAL at 20:51

## 2021-03-11 RX ADMIN — GABAPENTIN 300 MG: 300 CAPSULE ORAL at 20:52

## 2021-03-11 RX ADMIN — ACETAMINOPHEN 650 MG: 325 TABLET ORAL at 20:51

## 2021-03-11 RX ADMIN — NITROGLYCERIN 0.5 INCH: 20 OINTMENT TOPICAL at 23:26

## 2021-03-11 RX ADMIN — ENOXAPARIN SODIUM 40 MG: 40 INJECTION SUBCUTANEOUS at 20:51

## 2021-03-11 RX ADMIN — CLOPIDOGREL BISULFATE 75 MG: 75 TABLET ORAL at 20:51

## 2021-03-11 RX ADMIN — SODIUM CHLORIDE, PRESERVATIVE FREE 10 ML: 5 INJECTION INTRAVENOUS at 20:52

## 2021-03-11 ASSESSMENT — PAIN SCALES - GENERAL: PAINLEVEL_OUTOF10: 6

## 2021-03-11 ASSESSMENT — PAIN DESCRIPTION - PAIN TYPE: TYPE: ACUTE PAIN

## 2021-03-11 NOTE — TELEPHONE ENCOUNTER
Called patient regarding carotid duplex scan results per Dr Pryia Vee. Scan looks okay and repeat in 6 months.  pamlr

## 2021-03-11 NOTE — ED NOTES
Bed: 31  Expected date:   Expected time:   Means of arrival:   Comments:  [facility] [address] [facility]  03/11/21 7767

## 2021-03-12 ENCOUNTER — APPOINTMENT (OUTPATIENT)
Dept: NUCLEAR MEDICINE | Age: 56
DRG: 247 | End: 2021-03-12
Payer: MEDICARE

## 2021-03-12 LAB
ANION GAP SERPL CALCULATED.3IONS-SCNC: 6 MMOL/L (ref 3–16)
BUN BLDV-MCNC: 15 MG/DL (ref 7–20)
CALCIUM SERPL-MCNC: 8.9 MG/DL (ref 8.3–10.6)
CHLORIDE BLD-SCNC: 112 MMOL/L (ref 99–110)
CHOLESTEROL, TOTAL: 141 MG/DL (ref 0–199)
CO2: 26 MMOL/L (ref 21–32)
CREAT SERPL-MCNC: 0.9 MG/DL (ref 0.9–1.3)
EKG ATRIAL RATE: 70 BPM
EKG DIAGNOSIS: NORMAL
EKG P AXIS: 48 DEGREES
EKG P-R INTERVAL: 140 MS
EKG Q-T INTERVAL: 394 MS
EKG QRS DURATION: 90 MS
EKG QTC CALCULATION (BAZETT): 425 MS
EKG R AXIS: 64 DEGREES
EKG T AXIS: 48 DEGREES
EKG VENTRICULAR RATE: 70 BPM
ESTIMATED AVERAGE GLUCOSE: 119.8 MG/DL
GFR AFRICAN AMERICAN: >60
GFR NON-AFRICAN AMERICAN: >60
GLUCOSE BLD-MCNC: 114 MG/DL (ref 70–99)
HBA1C MFR BLD: 5.8 %
HCT VFR BLD CALC: 39 % (ref 40.5–52.5)
HDLC SERPL-MCNC: 45 MG/DL (ref 40–60)
HEMOGLOBIN: 13.3 G/DL (ref 13.5–17.5)
LDL CHOLESTEROL CALCULATED: 76 MG/DL
LV EF: 52 %
LVEF MODALITY: NORMAL
MCH RBC QN AUTO: 33.4 PG (ref 26–34)
MCHC RBC AUTO-ENTMCNC: 34.1 G/DL (ref 31–36)
MCV RBC AUTO: 97.9 FL (ref 80–100)
PDW BLD-RTO: 14.3 % (ref 12.4–15.4)
PLATELET # BLD: 229 K/UL (ref 135–450)
PMV BLD AUTO: 6.3 FL (ref 5–10.5)
POC ACT LR: 258 SEC
POC ACT LR: 265 SEC
POC ACT LR: 320 SEC
POTASSIUM REFLEX MAGNESIUM: 4.3 MMOL/L (ref 3.5–5.1)
RBC # BLD: 3.99 M/UL (ref 4.2–5.9)
SODIUM BLD-SCNC: 144 MMOL/L (ref 136–145)
TRIGL SERPL-MCNC: 100 MG/DL (ref 0–150)
TROPONIN: <0.01 NG/ML
VLDLC SERPL CALC-MCNC: 20 MG/DL
WBC # BLD: 7.1 K/UL (ref 4–11)

## 2021-03-12 PROCEDURE — 85027 COMPLETE CBC AUTOMATED: CPT

## 2021-03-12 PROCEDURE — 99152 MOD SED SAME PHYS/QHP 5/>YRS: CPT | Performed by: INTERNAL MEDICINE

## 2021-03-12 PROCEDURE — A9502 TC99M TETROFOSMIN: HCPCS | Performed by: NURSE PRACTITIONER

## 2021-03-12 PROCEDURE — 6370000000 HC RX 637 (ALT 250 FOR IP): Performed by: INTERNAL MEDICINE

## 2021-03-12 PROCEDURE — 80048 BASIC METABOLIC PNL TOTAL CA: CPT

## 2021-03-12 PROCEDURE — 2580000003 HC RX 258: Performed by: INTERNAL MEDICINE

## 2021-03-12 PROCEDURE — C1725 CATH, TRANSLUMIN NON-LASER: HCPCS

## 2021-03-12 PROCEDURE — 6360000002 HC RX W HCPCS

## 2021-03-12 PROCEDURE — C1874 STENT, COATED/COV W/DEL SYS: HCPCS

## 2021-03-12 PROCEDURE — 6360000002 HC RX W HCPCS: Performed by: NURSE PRACTITIONER

## 2021-03-12 PROCEDURE — 83036 HEMOGLOBIN GLYCOSYLATED A1C: CPT

## 2021-03-12 PROCEDURE — 2500000003 HC RX 250 WO HCPCS

## 2021-03-12 PROCEDURE — 6370000000 HC RX 637 (ALT 250 FOR IP): Performed by: NURSE PRACTITIONER

## 2021-03-12 PROCEDURE — 1200000000 HC SEMI PRIVATE

## 2021-03-12 PROCEDURE — 99223 1ST HOSP IP/OBS HIGH 75: CPT | Performed by: INTERNAL MEDICINE

## 2021-03-12 PROCEDURE — 6360000004 HC RX CONTRAST MEDICATION

## 2021-03-12 PROCEDURE — 6370000000 HC RX 637 (ALT 250 FOR IP)

## 2021-03-12 PROCEDURE — 93010 ELECTROCARDIOGRAM REPORT: CPT | Performed by: INTERNAL MEDICINE

## 2021-03-12 PROCEDURE — 93017 CV STRESS TEST TRACING ONLY: CPT

## 2021-03-12 PROCEDURE — G0378 HOSPITAL OBSERVATION PER HR: HCPCS

## 2021-03-12 PROCEDURE — C1887 CATHETER, GUIDING: HCPCS

## 2021-03-12 PROCEDURE — 93458 L HRT ARTERY/VENTRICLE ANGIO: CPT

## 2021-03-12 PROCEDURE — 80061 LIPID PANEL: CPT

## 2021-03-12 PROCEDURE — 85347 COAGULATION TIME ACTIVATED: CPT

## 2021-03-12 PROCEDURE — 92928 PRQ TCAT PLMT NTRAC ST 1 LES: CPT | Performed by: INTERNAL MEDICINE

## 2021-03-12 PROCEDURE — C1769 GUIDE WIRE: HCPCS

## 2021-03-12 PROCEDURE — 78452 HT MUSCLE IMAGE SPECT MULT: CPT

## 2021-03-12 PROCEDURE — 93458 L HRT ARTERY/VENTRICLE ANGIO: CPT | Performed by: INTERNAL MEDICINE

## 2021-03-12 PROCEDURE — C1894 INTRO/SHEATH, NON-LASER: HCPCS

## 2021-03-12 PROCEDURE — 92928 PRQ TCAT PLMT NTRAC ST 1 LES: CPT

## 2021-03-12 PROCEDURE — 3430000000 HC RX DIAGNOSTIC RADIOPHARMACEUTICAL: Performed by: NURSE PRACTITIONER

## 2021-03-12 PROCEDURE — 36415 COLL VENOUS BLD VENIPUNCTURE: CPT

## 2021-03-12 PROCEDURE — 2580000003 HC RX 258: Performed by: NURSE PRACTITIONER

## 2021-03-12 PROCEDURE — 2709999900 HC NON-CHARGEABLE SUPPLY

## 2021-03-12 RX ORDER — HEPARIN SODIUM 1000 [USP'U]/ML
INJECTION, SOLUTION INTRAVENOUS; SUBCUTANEOUS
Status: COMPLETED | OUTPATIENT
Start: 2021-03-12 | End: 2021-03-12

## 2021-03-12 RX ORDER — SODIUM CHLORIDE 0.9 % (FLUSH) 0.9 %
10 SYRINGE (ML) INJECTION PRN
Status: DISCONTINUED | OUTPATIENT
Start: 2021-03-12 | End: 2021-03-13 | Stop reason: HOSPADM

## 2021-03-12 RX ORDER — ACETAMINOPHEN 325 MG/1
650 TABLET ORAL EVERY 4 HOURS PRN
Status: DISCONTINUED | OUTPATIENT
Start: 2021-03-12 | End: 2021-03-13 | Stop reason: HOSPADM

## 2021-03-12 RX ORDER — CARVEDILOL 6.25 MG/1
6.25 TABLET ORAL 2 TIMES DAILY WITH MEALS
Status: DISCONTINUED | OUTPATIENT
Start: 2021-03-12 | End: 2021-03-13 | Stop reason: HOSPADM

## 2021-03-12 RX ORDER — SODIUM CHLORIDE 0.9 % (FLUSH) 0.9 %
10 SYRINGE (ML) INJECTION EVERY 12 HOURS SCHEDULED
Status: DISCONTINUED | OUTPATIENT
Start: 2021-03-12 | End: 2021-03-13 | Stop reason: HOSPADM

## 2021-03-12 RX ORDER — MIDAZOLAM HYDROCHLORIDE 5 MG/ML
INJECTION INTRAMUSCULAR; INTRAVENOUS
Status: COMPLETED | OUTPATIENT
Start: 2021-03-12 | End: 2021-03-12

## 2021-03-12 RX ORDER — FENTANYL CITRATE 50 UG/ML
INJECTION, SOLUTION INTRAMUSCULAR; INTRAVENOUS
Status: COMPLETED | OUTPATIENT
Start: 2021-03-12 | End: 2021-03-12

## 2021-03-12 RX ORDER — LISINOPRIL 10 MG/1
20 TABLET ORAL DAILY
Status: DISCONTINUED | OUTPATIENT
Start: 2021-03-13 | End: 2021-03-13 | Stop reason: HOSPADM

## 2021-03-12 RX ORDER — SODIUM CHLORIDE 9 MG/ML
INJECTION, SOLUTION INTRAVENOUS CONTINUOUS
Status: ACTIVE | OUTPATIENT
Start: 2021-03-12 | End: 2021-03-12

## 2021-03-12 RX ORDER — CLOPIDOGREL 300 MG/1
TABLET, FILM COATED ORAL
Status: COMPLETED | OUTPATIENT
Start: 2021-03-12 | End: 2021-03-12

## 2021-03-12 RX ORDER — LABETALOL HYDROCHLORIDE 5 MG/ML
INJECTION, SOLUTION INTRAVENOUS
Status: COMPLETED | OUTPATIENT
Start: 2021-03-12 | End: 2021-03-12

## 2021-03-12 RX ORDER — SODIUM CHLORIDE 9 MG/ML
INJECTION, SOLUTION INTRAVENOUS CONTINUOUS
Status: DISCONTINUED | OUTPATIENT
Start: 2021-03-12 | End: 2021-03-12

## 2021-03-12 RX ADMIN — LABETALOL HYDROCHLORIDE 10 MG: 5 INJECTION, SOLUTION INTRAVENOUS at 17:18

## 2021-03-12 RX ADMIN — SODIUM CHLORIDE, PRESERVATIVE FREE 10 ML: 5 INJECTION INTRAVENOUS at 11:11

## 2021-03-12 RX ADMIN — TETROFOSMIN 10.9 MILLICURIE: 1.38 INJECTION, POWDER, LYOPHILIZED, FOR SOLUTION INTRAVENOUS at 08:00

## 2021-03-12 RX ADMIN — Medication 10 ML: at 19:47

## 2021-03-12 RX ADMIN — CLOPIDOGREL BISULFATE 75 MG: 75 TABLET ORAL at 11:07

## 2021-03-12 RX ADMIN — GABAPENTIN 300 MG: 300 CAPSULE ORAL at 11:06

## 2021-03-12 RX ADMIN — ASPIRIN 81 MG: 81 TABLET, CHEWABLE ORAL at 09:00

## 2021-03-12 RX ADMIN — REGADENOSON 0.4 MG: 0.08 INJECTION, SOLUTION INTRAVENOUS at 09:10

## 2021-03-12 RX ADMIN — ATORVASTATIN CALCIUM 80 MG: 80 TABLET, FILM COATED ORAL at 11:07

## 2021-03-12 RX ADMIN — LABETALOL HYDROCHLORIDE 5 MG: 5 INJECTION, SOLUTION INTRAVENOUS at 16:41

## 2021-03-12 RX ADMIN — FENTANYL CITRATE 50 MCG: 50 INJECTION, SOLUTION INTRAMUSCULAR; INTRAVENOUS at 16:40

## 2021-03-12 RX ADMIN — MIDAZOLAM HYDROCHLORIDE 2 MG: 5 INJECTION INTRAMUSCULAR; INTRAVENOUS at 16:35

## 2021-03-12 RX ADMIN — FENTANYL CITRATE 50 MCG: 50 INJECTION, SOLUTION INTRAMUSCULAR; INTRAVENOUS at 16:35

## 2021-03-12 RX ADMIN — CLOPIDOGREL 300 MG: 300 TABLET, FILM COATED ORAL at 17:26

## 2021-03-12 RX ADMIN — HEPARIN SODIUM 3000 UNITS: 1000 INJECTION, SOLUTION INTRAVENOUS; SUBCUTANEOUS at 17:10

## 2021-03-12 RX ADMIN — GABAPENTIN 300 MG: 300 CAPSULE ORAL at 19:47

## 2021-03-12 RX ADMIN — CARVEDILOL 6.25 MG: 6.25 TABLET, FILM COATED ORAL at 19:46

## 2021-03-12 RX ADMIN — HEPARIN SODIUM 5000 UNITS: 1000 INJECTION, SOLUTION INTRAVENOUS; SUBCUTANEOUS at 16:41

## 2021-03-12 RX ADMIN — LISINOPRIL 10 MG: 10 TABLET ORAL at 11:06

## 2021-03-12 RX ADMIN — SODIUM CHLORIDE: 9 INJECTION, SOLUTION INTRAVENOUS at 16:30

## 2021-03-12 RX ADMIN — TETROFOSMIN 29.9 MILLICURIE: 1.38 INJECTION, POWDER, LYOPHILIZED, FOR SOLUTION INTRAVENOUS at 09:10

## 2021-03-12 RX ADMIN — HEPARIN SODIUM 3000 UNITS: 1000 INJECTION, SOLUTION INTRAVENOUS; SUBCUTANEOUS at 16:47

## 2021-03-12 RX ADMIN — SODIUM CHLORIDE: 9 INJECTION, SOLUTION INTRAVENOUS at 18:36

## 2021-03-12 NOTE — ED PROVIDER NOTES
I independently performed a history and physical on Marilyn Lockhart. All diagnostic, treatment, and disposition decisions were made by myself in conjunction with the advanced practice provider.     -Marilyn Lockhart is a 60 y.o. male with history of PVD presents to ED for chest pain. Patient reports chest pain that started this morning has been intermittent throughout the day. He was given aspirin as well as nitro in route.  -lab workup significant for: Unremarkable  -Cxr: No acute cardiopulmonary findings  -The Ekg interpreted by me shows  normal sinus rhythm with a rate of 70  Axis is   Normal  QTc is  425  Intervals and Durations are unremarkable. ST Segments: no acute change  No significant change from prior EKG dated 8/17/20   -Given patient's extensive PVD, is a vasculopath and therefore feel that he may be at risk for further atherosclerotic disease. Plan for admission for further workup and treatment to rule out ACS discussed with patient who is in agreement with plan and have no further questions/concerns    For further details of Dennis Umesh Galeas's emergency department encounter, please see NP Romy Lee's documentation.         Katherine Madden MD  03/12/21 2222

## 2021-03-12 NOTE — CONSULTS
CARDIOLOGY CONSULTATION        Patient Name: Raya Evans  Date of admission: 3/11/2021  5:19 PM  Admission Dx: Chest pain [R07.9]  Requesting Physician: Carlos Olivares MD  Primary Care physician: Sofy Teague    Reason for Consultation/Chief Complaint: Chest pain     History of Present Illness:     Raya Evans is a 60 y.o. patient with prior medical history notable for hyperlipidemia, hypertension, carotid disease status post prior stent, peripheral arterial disease status post multiple intervention including right to left femoral-femoral graft 2015 and prior angioplasties, prediabetes, sleep apnea, and stroke who presented to the hospital 3/11/2021 with complaints of chest pain. Presenting vitals notable for afebrile, heart rate 74, /78, sats 96%. Laboratories notable for normal renal function, serial cardiac enzymes negative. EKG normal sinus rhythm with no ischemic changes. Patient underwent stress testing today showing apical lateral partially reversible defect. The patient states he began having chest tightness fanning out across his chest with exertion in the past week. He walks to and from the store each day. Roughly a 2 mile walk. Noted he had onset of chest discomfort earlier in the week on his way to the store as well as on the way back. Associated dyspnea. No nausea, no diaphoresis. His pain resolved. Pain recurred later in the week as he was just returning from his walk into the home. States same substernal chest tightness. No radiation. States this time lasted for couple hours with waxing/waning severity. Due to recurrence he presented to the emergency department for evaluation. Denies palpitations, dizziness, near-syncope or chana syncope. Denies paroxysmal nocturnal dyspnea, orthopnea, bendopnea, increasing lower extremity edema or weight gain. Patient states he is actually lost weight. Denies claudication on his walks.   Denies any tissue breakdown/ulceration of the lower extremities. Endorses continued smoking half pack per day. He is trying to quit he states. He has been smoking since teenage years. States compliance with medications including aspirin and Plavix. Reports he was taken off cholesterol medication previously although it appears Lipitor is on his home list.    Past Medical History:   has a past medical history of Bowel incontinence, Hyperlipidemia, Hypertension, PAD (peripheral artery disease) ([address] Poor vision, Pre-diabetes, Sleep apnea, Stroke ([address] and Wears partial dentures. Surgical History:   has a past surgical history that includes Leg Surgery (Right); vascular surgery; Abdomen surgery; and Femoral-tibial Bypass Graft (Right, 6/24/2020). Social History:   reports that he has been smoking cigarettes. He has a 40.00 pack-year smoking history. He has never used smokeless tobacco. He reports previous alcohol use. He reports that he does not use drugs. Family History:  family history includes Diabetes in his mother. +for cardiac disease. Home Medications:  Were reviewed and are listed in nursing record and/or below  Prior to Admission medications    Medication Sig Start Date End Date Taking? Authorizing Provider   aspirin 325 MG tablet Take 325 mg by mouth daily 6/22/20 - has not taken for over one month (\"ran out\") 9/11/18  Yes Historical Provider, MD   clopidogrel (PLAVIX) 75 MG tablet Take 75 mg by mouth daily  7/16/18  Yes Historical Provider, MD   atorvastatin (LIPITOR) 80 MG tablet Take 80 mg by mouth daily  7/16/18  Yes Historical Provider, MD   gabapentin (NEURONTIN) 300 MG capsule Take 300 mg by mouth 2 times daily.  Takes 600mg in AM, 300mg HS. 7/16/18  Yes Historical Provider, MD   lisinopril (PRINIVIL;ZESTRIL) 10 MG tablet Take 10 mg by mouth daily  5/29/18  Yes Historical Provider, MD        CURRENT Medications:  atorvastatin (LIPITOR) tablet 80 mg, Daily  clopidogrel (PLAVIX) tablet 75 mg, pressure. Pt had negative bubble study at [facility] on 3/17/2015 reported. Consider ROSALIND if clinically indicated. Stress Test: 3/12/21  Conclusions        Summary    Small sized apical-laterlal partial reversibility defect consistent with    ischemia and infarction in the territory of the distal LAD. LV function is    normal with and ejection fraction of 52%. Lower risk abnormal study.         7/2011      IMPRESSION-    1. Normal stress Myoview study. Reversible ischemia is not   apparent. 2. Calculated left ventricular ejection fraction post stress of   58%. Correlate with EKG findings.                Cardiac catheterization:    Additional studies:   Chest x-ray personally reviewed, no notable findings. US carotids  Conclusions        Summary        Widely patent R carotid stent without evidence of associated stenosis.    The right vertebral artery has antegrade flow. Impression and Plan:      Jeffery Schmidt is a 60 y.o. patient with prior medical history notable for cerebrovascular accident, hyperlipidemia, hypertension, carotid disease status post prior stent, peripheral arterial disease status post multiple intervention including right to left femoral-femoral graft 2015 and prior angioplasties, prediabetes, sleep apnea, and stroke who presented to the hospital 3/11/2021 with complaints of chest pain. Patient presents with typical angina, new in onset. Known atherosclerosis with history of carotid disease and peripheral arterial disease requiring multiple interventions include femoral-femoral bypass 2015. Abnormal stress test with LAD territory ischemia. Patient Active Problem List   Diagnosis    Acute CVA (cerebrovascular accident) ([address]    Carotid artery stenosis with cerebral infarction ([address]    Dyslipidemia    HTN (hypertension), benign    Ischemic foot    Peripheral vascular disease, unspecified ([address]    Vascular occlusion    Chest pain       PLAN:  1.   Proceed with cardiac catheterization today with Dr. Onofre Mathews  2. Continue aspirin and Plavix  3. Continue high intensity statin  4. Lisinopril presently 10 mg daily-titrate to goal<130/80 as needed pending start of any needed antianginal therapy. 5.  Keep NPO    He is presently on no antianginal therapy-we will follow-up results of catheterization today and initiate as appropriate. I will address the patient's cardiac risk factors and adjusted pharmacologic treatment as needed. In addition, I have reinforced the need for patient directed risk factor modification. All questions and concerns were addressed to the patient/family. Alternatives to my treatment were discussed. Thank you for allowing us to participate in the care of Francisco Dominguez. Please call me with any questions [phone].     Morteza Nassar MD, [facility]  Cardiovascular Disease  [facility] [place]  [phone] [facility]  [phone] [address] [place] [address]  3/12/2021 2:39 PM

## 2021-03-12 NOTE — PRE SEDATION
Brief Pre-Op Note/Sedation Assessment      Jj Corona  1965  Cath Pool Rm/NONE      [id number]  4:20 PM    Planned Procedure: Cardiac Catheterization Procedure    Post Procedure Plan: Return to same level of care    Consent: I have discussed with the patient and/or the patient representative the indication, alternatives, and the possible risks and/or complications of the planned procedure and the anesthesia methods. The patient and/or patient representative appear to understand and agree to proceed.     Chief Complaint: Chest Pain/Pressure, abnormal nuclear SPECT stress test      Indications for Cath Procedure:  New Onset Angina <= 2 months and Suspected CAD  Anginal Classification within 2 weeks:  CCS III - Symptoms with everyday living activities, i.e., moderate limitation  NYHA Heart Failure Class within 2 weeks: No symptoms  Is Cath Lab Visit Valve-related?: No  Surgical Risk: Intermediate  Functional Type: < 4 METS    Anti- Anginal Meds within 2 weeks:   Yes: Aspirin and Statin (Any)    Stress or Imaging Studies Performed (within 6 months):  Stress Test with SPECT Result: Positive:  apical Risk/Extent of Ischemia:  Intermediate     Vital Signs:  BP (!) 166/97   Pulse 61   Temp 97.8 °F (36.6 °C) (Oral)   Resp 16   Ht 5' 8\" (1.727 m)   Wt 179 lb 9.6 oz (81.5 kg)   SpO2 97%   BMI 27.31 kg/m²     Allergies:  No Known Allergies    Past Medical History:  Past Medical History:   Diagnosis Date    Bowel incontinence     Hyperlipidemia     Hypertension     [facility]     Poor vision     left eye    Pre-diabetes     Sleep apnea     NO CPAP    [facility] 2016    x3 - memory deficit    Wears partial dentures     upper & lower         Surgical History:  Past Surgical History:   Procedure Laterality Date    ABDOMEN SURGERY      colon polyps    FEMORAL-TIBIAL BYPASS GRAFT Right 6/24/2020    RIGHT FEMORAL TO PERONEAL  BYPASS GRAFT performed by Lucas Jones MD at [facility]    LEG SURGERY Right     VASCULAR SURGERY           Medications:  Current Facility-Administered Medications   Medication Dose Route Frequency Provider Last Rate Last Admin    0.9 % sodium chloride infusion   Intravenous Continuous Patsy Mon MD        sodium chloride flush 0.9 % injection 10 mL  10 mL Intravenous 2 times per day Patsy Mon MD        sodium chloride flush 0.9 % injection 10 mL  10 mL Intravenous PRN Patsy Mon MD        atorvastatin (LIPITOR) tablet 80 mg  80 mg Oral Daily Alayne Officer, APRN - CNP   80 mg at 03/12/21 1107    clopidogrel (PLAVIX) tablet 75 mg  75 mg Oral Daily Alayne Officer, APRN - CNP   75 mg at 03/12/21 1107    gabapentin (NEURONTIN) capsule 300 mg  300 mg Oral BID Alayne Officer, APRN - CNP   300 mg at 03/12/21 1106    lisinopril (PRINIVIL;ZESTRIL) tablet 10 mg  10 mg Oral Daily Alayne Officer, APRN - CNP   10 mg at 03/12/21 1106    sodium chloride flush 0.9 % injection 10 mL  10 mL Intravenous 2 times per day Alayne Officer, APRN - CNP   10 mL at 03/12/21 1111    sodium chloride flush 0.9 % injection 10 mL  10 mL Intravenous PRN Alayne Officer, APRN - CNP        promethazine (PHENERGAN) tablet 12.5 mg  12.5 mg Oral Q6H PRN Alayne Officer, APRN - CNP        Or    ondansetron (ZOFRAN) injection 4 mg  4 mg Intravenous Q6H PRN Alayne Officer, APRN - CNP        acetaminophen (TYLENOL) tablet 650 mg  650 mg Oral Q6H PRN Alayne Officer, APRN - CNP   650 mg at 03/11/21 2051    Or    acetaminophen (TYLENOL) suppository 650 mg  650 mg Rectal Q6H PRN Alayne Officer, APRN - CNP        polyethylene glycol (GLYCOLAX) packet 17 g  17 g Oral Daily PRN Alayne Officer, APRN - CNP        aspirin chewable tablet 81 mg  81 mg Oral Daily Alayne Officer, APRN - CNP   81 mg at 03/12/21 0900    enoxaparin (LOVENOX) injection 40 mg  40 mg Subcutaneous Daily Alayne Officer, APRN - CNP   Stopped at 03/12/21 0900    nitroGLYCERIN (NITROSTAT) SL tablet 0.4 mg  0.4 mg Sublingual Q5 Min PRN Speedytahir Medina, APRN - CNP        nitroglycerin (NITRO-BID) 2 % ointment 0.5 inch  0.5 inch Topical 4 times per day Speedytahir CarolMAT - CNP   0.5 inch at 03/11/21 0849           Pre-Sedation:    Pre-Sedation Documentation and Exam:  I have personally completed a history, physical exam & review of systems for this patient (see notes). Prior History of Anesthesia Complications:   none    Modified Mallampati:  II (soft palate, uvula, fauces visible)    ASA Classification:  Class 3 - A patient with severe systemic disease that limits activity but is not incapacitating      Santo Scale: Activity:  2 - Able to move 4 extremities voluntarily on command  Respiration:  2 - Able to breathe deeply and cough freely  Circulation:  2 - BP+/- 20mmHg of normal  Consciousness:  2 - Fully awake  Oxygen Saturation (color):  2 - Able to maintain oxygen saturation >92% on room air    Sedation/Anesthesia Plan:  Guard the patient's safety and welfare. Minimize physical discomfort and pain. Minimize negative psychological responses to treatment by providing sedation and analgesia and maximize the potential amnesia. Patient to meet pre-procedure discharge plan.     Medication Planned:  midazolam intravenously and fentanyl intravenously    Patient is an appropriate candidate for plan of sedation: yes      Electronically signed by Archie Tracey DO on 3/12/2021 at 4:20 PM

## 2021-03-12 NOTE — CARE COORDINATION
Chart reviewed for possible needs at DC. Pt placed in OBS 3/11/21 for Chest pain being followed by IM with plans for Stress test today. Per chart, pt from home and and IPTA with no indication for PT/ OT eval at this time. Pt has PCP and current insurance listed. No immediate DC needs identified at this time. Please consult CM should needs arise.      Mahesh Suarez RN

## 2021-03-12 NOTE — PROCEDURES
CARDIAC CATHETERIZATION REPORT    Date of Procedure: 3/12/2021  : Sharron De La O DO  Primary Indication: Chest pain, abnormal nuclear SPECT stress test    Procedures Performed:  1. Coronary angiography  2. Left heart catheterization  3. PCI of mid-RCA with MIKE  4. Moderate conscious sedation    Procedural Details:  1. Access: Local anesthetic was given and access was obtained in the right radial artery using a micropuncture technique and a 6F Terumo Slender Sheath was placed without difficulty. 2. Diagnostic: A 5F TIG catheter was used to perform selective right and left coronary angiography. The 5F TIG catheter was also used to perform the left heart catheterization. No significant gradient was observed on pull-back of the catheter across the aortic valve. 3. Intervention (PCI of mid-RCA): Therapeutic ACT was achieved with the administration of IV heparin. Using a 6F JR4 guide catheter, a 0.014 Runthrough wire was advanced across the mid-RCA lesion and into the RPDA. The lesion was pre-dilated with a non-compliant 2.0 x 12 mm balloon and then stented with a Xience Shelbie 2.5 x 15 mm MIKE. The stent was post-dilated with a non-compliant 2.5 x 12 mm balloon at high pressure and final angiography showed 0% residual stenosis with CHARU 3 flow and no evidence of dissection. 4. Hemostasis: At the end of the procedure, the radial sheath was removed and a hemoband was placed over the arteriotomy site and filled with air to maintain hemostasis. Findings:  1. Hemodynamics:     A. Opening arterial pressure: 203/96 (139) mmHg      B. LVEDP: 14 mmHg    2. Coronary anatomy:  A. Left main artery: The left main artery bifurcates into the left anterior descending artery and left circumflex artery. The left main artery has minor luminal irregularities. B. Left anterior descending artery: Transapical vessel which gives rise to 2 small diagonal arteries.   The LAD has a 30-40% proximal stenosis and 40% mid-vessel stenosis. C. Left circumflex artery: Co-dominant vessel that gives rise to 3 obtuse marginal arteries. The LCx has a 20% proximal stenosis. The OM1 is a very small vessel with a 95% ostial stenosis. The OM2 is a small vessel with mild disease. The OM3 is a large branching vessel with a 20% proximal stenosis. D. Right coronary artery: Medium caliber co-dominant vessel. The RCA has a 30-40% proximal stenosis and focal 80% mid-vessel stenosis. There is a short ectatic segment immediately distal to the mid-vessel stenosis. The posterior descending artery is a small caliber vessel and has minor luminal irregularities. Results of Intervention (PCI of mid-RCA):  Pre - 80% stenosis, CHARU 3 flow  Post - 0% stenosis, CHARU 3 flow    Technical Factors:  Complications: None. Estimated blood loss: Minimal.  Radiation: Air kerma 424 mGy and 7.9 minutes of fluoroscopy  Sedation: Moderate conscious sedation was administered by qaulified nursing personnel under continuous hemodynamic monitoring, starting at 4:37 PM and ending at 5:19 PM.  Medications: 2.5 mg IA verapamil, 2 mg IV Versed, 100 mcg IV Fentanyl, 20 mg IV labetalol, 400 mcg IC nitroglycerin, 14,000 units IV heparin, 300 mg PO Plavix  Contrast: 120 cc of Optiray    Impression:  1. Sever single-vessel coronary artery disease. 2. Successful PCI of mid-RCA with Xience Shelbie 2.5 15 mm MIKE. 3. Normal LVEDP. 4. Uncontrolled hypertension. Plan:  1. Monitor overnight. 2. Continue dual antiplatelet therapy with aspirin 81 mg daily and Plavix 75 mg daily. 3. Continue atorvastatin 80 mg daily. 4. Start coreg 6.25 mg BID.  5. Increase lisinopril to 20 mg daily. 6. Refer for cardiac rehab.  7. Follow-up with [facility] in 2 weeks.       Ofelia Kaplan, [address]

## 2021-03-12 NOTE — PROGRESS NOTES
A Ananya stress test was completed on this patient as ordered. The patient tolerated the procedure well. Awaiting stress imaging at this time.

## 2021-03-12 NOTE — H&P
Hospital Medicine History & Physical      PCP: No primary care provider on file. Date of Admission: 3/11/2021    Date of Service: Pt seen/examined on 3/11/2021 and Placed in Observation. Chief Complaint:  Chest pain      History Of Present Illness:      60 y.o. male with PMHx of HLD, HTN and PVD  presented to [facility] with chest pain. Patient reports pain began this morning when he woke up. He was lying in bed at the time when he noticed his pain. Pain is across the entire chest and radiates down the left arm. The pain has been constant and gradually worsening throughout the day. He describes it as a sharp band across to his chest.  No associated nausea, diaphoresis or dizziness. He does have shortness of breath associated with this. He has had no alleviating factors. No prior history of chest pain. Patient denies cough or congestion. No fever or chills. Past Medical History:          Diagnosis Date    Bowel incontinence     Hyperlipidemia     Hypertension     PAD (peripheral artery disease) (HCC)     Poor vision     left eye    Pre-diabetes     Sleep apnea     NO CPAP    Stroke ([facility] 2016    x3 - memory deficit    Wears partial dentures     upper & lower       Past Surgical History:          Procedure Laterality Date    ABDOMEN SURGERY      colon polyps    FEMORAL-TIBIAL BYPASS GRAFT Right 6/24/2020    RIGHT FEMORAL TO PERONEAL  BYPASS GRAFT performed by Lucas Jones MD at [place] [address] Right     VASCULAR SURGERY         Medications Prior to Admission:      Prior to Admission medications    Medication Sig Start Date End Date Taking?  Authorizing Provider   aspirin 325 MG tablet Take 325 mg by mouth daily 6/22/20 - has not taken for over one month (\"ran out\") 9/11/18  Yes Historical Provider, MD   clopidogrel (PLAVIX) 75 MG tablet Take 75 mg by mouth daily  7/16/18  Yes Historical Provider, MD   atorvastatin (LIPITOR) 80 MG tablet Take 80 mg by mouth daily 7/16/18  Yes Historical Provider, MD   gabapentin (NEURONTIN) 300 MG capsule Take 300 mg by mouth 2 times daily. Takes 600mg in AM, 300mg HS. 7/16/18  Yes Historical Provider, MD   lisinopril (PRINIVIL;ZESTRIL) 10 MG tablet Take 10 mg by mouth daily  5/29/18  Yes Historical Provider, MD       Allergies:  Patient has no known allergies. Social History:      The patient currently lives homoe    TOBACCO:   reports that he has been smoking cigarettes. He has a 40.00 pack-year smoking history. He has never used smokeless tobacco.  ETOH:   reports previous alcohol use. Family History:      Reviewed in detail positive as follows:        Problem Relation Age of Onset    Diabetes Mother        REVIEW OF SYSTEMS:   Pertinent positives as noted in the HPI. All other systems reviewed and negative. PHYSICAL EXAM PERFORMED:    BP (!) 153/80   Pulse 66   Temp 98 °F (36.7 °C) (Oral)   Resp 16   Ht 5' 8\" (1.727 m)   Wt 182 lb 6.4 oz (82.7 kg)   SpO2 98%   BMI 27.73 kg/m²     General appearance: Well-developed, well-nourished  male sitting upright on ED cart in no apparent distress, appears stated age and cooperative. HEENT:  Normal cephalic, atraumatic without obvious deformity. Pupils equal, round, and reactive to light. Extra ocular muscles intact. Conjunctivae/corneas clear. Neck: Supple, with full range of motion. No jugular venous distention. Trachea midline. Respiratory:  Normal respiratory effort. Clear to auscultation, bilaterally without Rales/Wheezes/Rhonchi. Cardiovascular:  Regular rate and rhythm without murmurs, rubs or gallops. No lower extremity edema. Abdomen: Soft, non-tender, non-distended, without rebound or guarding. Normal bowel sounds. Musculoskeletal:  No clubbing, cyanosis or edema bilaterally. Full range of motion without deformity. Skin: Skin color, texture, turgor normal.  No rashes or lesions.   Neurologic:  Neurovascularly intact without any focal sensory/motor Observation       [address], APRN - CNP    Thank you No primary care provider on file. for the opportunity to be involved in this patient's care. If you have any questions or concerns please feel free to contact me at [phone].

## 2021-03-12 NOTE — PROGRESS NOTES
Patient admitted to room 216 from ED. Patient oriented to room, call light, bed rails, phone, lights and bathroom. Patient instructed about the schedule of the day including: vital sign frequency, lab draws, possible tests, frequency of MD and staff rounds, including RN/MD rounding together at bedside, daily weights, and I &O's. Patient instructed about prescribed diet, how to use 8MENU, and television. Telemetry box 110 in place, patient aware of placement and reason. Bed locked, in lowest position, side rails up 2/4, call light within reach. Will continue to monitor.

## 2021-03-12 NOTE — PROGRESS NOTES
Hospitalist Progress Note      PCP: Baljinder Johnson    Date of Admission: 3/11/2021    Chief Complaint: chest pain       Subjective:  He feels well in general other than being hungry. Heading out to [facility]. Medications:  Reviewed    Infusion Medications    sodium chloride       Scheduled Medications    sodium chloride flush  10 mL Intravenous 2 times per day    atorvastatin  80 mg Oral Daily    clopidogrel  75 mg Oral Daily    gabapentin  300 mg Oral BID    lisinopril  10 mg Oral Daily    sodium chloride flush  10 mL Intravenous 2 times per day    aspirin  81 mg Oral Daily    enoxaparin  40 mg Subcutaneous Daily    nitroglycerin  0.5 inch Topical 4 times per day     PRN Meds: sodium chloride flush, sodium chloride flush, promethazine **OR** ondansetron, acetaminophen **OR** acetaminophen, polyethylene glycol, nitroGLYCERIN      Intake/Output Summary (Last 24 hours) at 3/12/2021 1601  Last data filed at 3/12/2021 1549  Gross per 24 hour   Intake 240 ml   Output 775 ml   Net -535 ml       Physical Exam Performed:    BP (!) 166/97   Pulse 61   Temp 97.8 °F (36.6 °C) (Oral)   Resp 16   Ht 5' 8\" (1.727 m)   Wt 179 lb 9.6 oz (81.5 kg)   SpO2 97%   BMI 27.31 kg/m²       General appearance: No apparent distress, appears stated age and cooperative. HEENT: Pupils equal, round, and reactive to light. Conjunctivae/corneas clear. Neck: Supple, with full range of motion. No jugular venous distention. Trachea midline. Respiratory:  Normal respiratory effort. Clear to auscultation, bilaterally without Rales/Wheezes/Rhonchi. Cardiovascular: Regular rate and rhythm with normal S1/S2 without murmurs, rubs or gallops. Abdomen: Soft, non-tender, non-distended with normal bowel sounds. Musculoskeletal: No clubbing, cyanosis or edema bilaterally. Full range of motion without deformity. Skin: Skin color, texture, turgor normal.  No rashes or lesions.   Neurologic:  Neurovascularly intact without any focal sensory/motor deficits. Cranial nerves: II-XII intact, grossly non-focal.  Psychiatric: Alert and oriented, thought content appropriate, normal insight  Capillary Refill: Brisk,< 3 seconds   Peripheral Pulses: +2 palpable, equal bilaterally       Labs:   Recent Labs     03/11/21  1730 03/12/21  0748   WBC 7.7 7.1   HGB 13.8 13.3*   HCT 38.7* 39.0*    229     Recent Labs     03/11/21  1730 03/12/21  0748    144   K 3.9 4.3    112*   CO2 26 26   BUN 14 15   CREATININE 0.9 0.9   CALCIUM 9.0 8.9     Recent Labs     03/11/21  1730   AST 12*   ALT 10   BILITOT <0.2   ALKPHOS 111     No results for input(s): INR in the last 72 hours. Recent Labs     03/11/21  1730 03/11/21  2048 03/11/21  2341   TROPONINI <0.01 <0.01 <0.01       Urinalysis:      Lab Results   Component Value Date    NITRU Negative 08/17/2020    WBCUA 251 06/06/2016    BACTERIA 3+ 06/06/2016    RBCUA 243 06/06/2016    BLOODU Negative 08/17/2020    SPECGRAV 1.010 08/17/2020    GLUCOSEU Negative 08/17/2020       Radiology:  NM Cardiac Stress Test Nuclear Imaging   Final Result      XR CHEST (2 VW)   Final Result   No acute cardiopulmonary findings. Assessment/Plan:    Active Hospital Problems    Diagnosis    Chest pain [R07.9]    Peripheral vascular disease, unspecified ([facility] [I73.9]    Dyslipidemia [E78.5]    HTN (hypertension), benign [I10]       \"62 y.o. male with PMHx of HLD, HTN and PVD  presented to [facility] with chest pain. Patient reports pain began this morning when he woke up. He was lying in bed at the time when he noticed his pain. Pain is across the entire chest and radiates down the left arm. The pain has been constant and gradually worsening throughout the day. \"      Chest pain  - EKG and troponin reassuring. Then nuclear stress test showed a small apical area of mixed ischemia and infarct. [facility] 3/12.  - continue aspirin, clopidogrel, and statin. HTN. lisinopril    HLD. Statin. PAD. Previous R carotid stent and right Fem-Peroneal bypass with L GSV. Meds as above. Tobacco smoking, nicotine dependence. Encouraged to quit. Educated about risks. DVT Prophylaxis: enoxaparin  Diet: Diet NPO Time Specified Exceptions are: Sips with Meds  Code Status: Full Code    PT/OT Eval Status: not indicated    Dispo - likely 3/13, pending cardiology input. He lives at home.        Magdaleno Nielsen MD

## 2021-03-13 VITALS
WEIGHT: 178.8 LBS | SYSTOLIC BLOOD PRESSURE: 151 MMHG | TEMPERATURE: 97.6 F | HEIGHT: 68 IN | HEART RATE: 61 BPM | RESPIRATION RATE: 16 BRPM | DIASTOLIC BLOOD PRESSURE: 87 MMHG | BODY MASS INDEX: 27.1 KG/M2 | OXYGEN SATURATION: 98 %

## 2021-03-13 PROBLEM — I25.110 CORONARY ARTERY DISEASE INVOLVING NATIVE CORONARY ARTERY OF NATIVE HEART WITH UNSTABLE ANGINA PECTORIS (HCC): Status: ACTIVE | Noted: 2021-03-13

## 2021-03-13 LAB
ANION GAP SERPL CALCULATED.3IONS-SCNC: 9 MMOL/L (ref 3–16)
BUN BLDV-MCNC: 11 MG/DL (ref 7–20)
CALCIUM SERPL-MCNC: 8.9 MG/DL (ref 8.3–10.6)
CHLORIDE BLD-SCNC: 109 MMOL/L (ref 99–110)
CHOLESTEROL, TOTAL: 138 MG/DL (ref 0–199)
CO2: 24 MMOL/L (ref 21–32)
CREAT SERPL-MCNC: 0.8 MG/DL (ref 0.9–1.3)
GFR AFRICAN AMERICAN: >60
GFR NON-AFRICAN AMERICAN: >60
GLUCOSE BLD-MCNC: 107 MG/DL (ref 70–99)
HCT VFR BLD CALC: 39 % (ref 40.5–52.5)
HDLC SERPL-MCNC: 50 MG/DL (ref 40–60)
HEMOGLOBIN: 13.4 G/DL (ref 13.5–17.5)
LDL CHOLESTEROL CALCULATED: 67 MG/DL
MCH RBC QN AUTO: 33.1 PG (ref 26–34)
MCHC RBC AUTO-ENTMCNC: 34.3 G/DL (ref 31–36)
MCV RBC AUTO: 96.6 FL (ref 80–100)
PDW BLD-RTO: 14.1 % (ref 12.4–15.4)
PLATELET # BLD: 223 K/UL (ref 135–450)
PMV BLD AUTO: 6.4 FL (ref 5–10.5)
POTASSIUM REFLEX MAGNESIUM: 3.8 MMOL/L (ref 3.5–5.1)
RBC # BLD: 4.04 M/UL (ref 4.2–5.9)
SODIUM BLD-SCNC: 142 MMOL/L (ref 136–145)
TRIGL SERPL-MCNC: 104 MG/DL (ref 0–150)
VLDLC SERPL CALC-MCNC: 21 MG/DL
WBC # BLD: 7.6 K/UL (ref 4–11)

## 2021-03-13 PROCEDURE — 99232 SBSQ HOSP IP/OBS MODERATE 35: CPT | Performed by: NURSE PRACTITIONER

## 2021-03-13 PROCEDURE — 6370000000 HC RX 637 (ALT 250 FOR IP): Performed by: INTERNAL MEDICINE

## 2021-03-13 PROCEDURE — 2580000003 HC RX 258: Performed by: INTERNAL MEDICINE

## 2021-03-13 PROCEDURE — 1200000000 HC SEMI PRIVATE

## 2021-03-13 PROCEDURE — 36415 COLL VENOUS BLD VENIPUNCTURE: CPT

## 2021-03-13 PROCEDURE — 96372 THER/PROPH/DIAG INJ SC/IM: CPT

## 2021-03-13 PROCEDURE — 85027 COMPLETE CBC AUTOMATED: CPT

## 2021-03-13 PROCEDURE — 6370000000 HC RX 637 (ALT 250 FOR IP): Performed by: NURSE PRACTITIONER

## 2021-03-13 PROCEDURE — 6360000002 HC RX W HCPCS: Performed by: NURSE PRACTITIONER

## 2021-03-13 PROCEDURE — G0378 HOSPITAL OBSERVATION PER HR: HCPCS

## 2021-03-13 PROCEDURE — 027034Z DILATION OF CORONARY ARTERY, ONE ARTERY WITH DRUG-ELUTING INTRALUMINAL DEVICE, PERCUTANEOUS APPROACH: ICD-10-PCS | Performed by: INTERNAL MEDICINE

## 2021-03-13 PROCEDURE — 80048 BASIC METABOLIC PNL TOTAL CA: CPT

## 2021-03-13 PROCEDURE — 4A023N7 MEASUREMENT OF CARDIAC SAMPLING AND PRESSURE, LEFT HEART, PERCUTANEOUS APPROACH: ICD-10-PCS | Performed by: INTERNAL MEDICINE

## 2021-03-13 PROCEDURE — 80061 LIPID PANEL: CPT

## 2021-03-13 PROCEDURE — 2580000003 HC RX 258: Performed by: NURSE PRACTITIONER

## 2021-03-13 PROCEDURE — B2111ZZ FLUOROSCOPY OF MULTIPLE CORONARY ARTERIES USING LOW OSMOLAR CONTRAST: ICD-10-PCS | Performed by: INTERNAL MEDICINE

## 2021-03-13 RX ORDER — ATORVASTATIN CALCIUM 80 MG/1
80 TABLET, FILM COATED ORAL DAILY
Qty: 30 TABLET | Refills: 3 | Status: SHIPPED | OUTPATIENT
Start: 2021-03-13 | End: 2021-09-16

## 2021-03-13 RX ORDER — LISINOPRIL 20 MG/1
20 TABLET ORAL DAILY
Qty: 30 TABLET | Refills: 3 | Status: SHIPPED | OUTPATIENT
Start: 2021-03-14 | End: 2022-02-04 | Stop reason: SDUPTHER

## 2021-03-13 RX ORDER — NITROGLYCERIN 0.4 MG/1
TABLET SUBLINGUAL
Qty: 25 TABLET | Refills: 3 | Status: SHIPPED | OUTPATIENT
Start: 2021-03-13 | End: 2022-02-04 | Stop reason: SDUPTHER

## 2021-03-13 RX ORDER — HYDRALAZINE HYDROCHLORIDE 20 MG/ML
10 INJECTION INTRAMUSCULAR; INTRAVENOUS EVERY 4 HOURS PRN
Status: DISCONTINUED | OUTPATIENT
Start: 2021-03-13 | End: 2021-03-13 | Stop reason: HOSPADM

## 2021-03-13 RX ORDER — ASPIRIN 81 MG/1
81 TABLET, CHEWABLE ORAL DAILY
Qty: 30 TABLET | Refills: 3 | Status: SHIPPED | OUTPATIENT
Start: 2021-03-14

## 2021-03-13 RX ORDER — CARVEDILOL 6.25 MG/1
6.25 TABLET ORAL 2 TIMES DAILY WITH MEALS
Qty: 60 TABLET | Refills: 3 | Status: SHIPPED | OUTPATIENT
Start: 2021-03-13 | End: 2021-08-09

## 2021-03-13 RX ADMIN — ASPIRIN 81 MG: 81 TABLET, CHEWABLE ORAL at 08:18

## 2021-03-13 RX ADMIN — SODIUM CHLORIDE, PRESERVATIVE FREE 10 ML: 5 INJECTION INTRAVENOUS at 11:46

## 2021-03-13 RX ADMIN — CLOPIDOGREL BISULFATE 75 MG: 75 TABLET ORAL at 08:18

## 2021-03-13 RX ADMIN — LISINOPRIL 20 MG: 10 TABLET ORAL at 08:18

## 2021-03-13 RX ADMIN — ENOXAPARIN SODIUM 40 MG: 40 INJECTION SUBCUTANEOUS at 08:19

## 2021-03-13 RX ADMIN — GABAPENTIN 300 MG: 300 CAPSULE ORAL at 08:18

## 2021-03-13 RX ADMIN — CARVEDILOL 6.25 MG: 6.25 TABLET, FILM COATED ORAL at 08:18

## 2021-03-13 RX ADMIN — Medication 10 ML: at 08:19

## 2021-03-13 RX ADMIN — ATORVASTATIN CALCIUM 80 MG: 80 TABLET, FILM COATED ORAL at 08:18

## 2021-03-13 ASSESSMENT — PAIN SCALES - GENERAL: PAINLEVEL_OUTOF10: 0

## 2021-03-13 NOTE — DISCHARGE SUMMARY
Hospital Medicine Discharge Summary    Patient ID: Daylin Barr      Patient's PCP: Ariel Viktoria    Admit Date: 3/11/2021     Discharge Date:   03/13/21     Admitting Physician: Emerita Vazquez MD     Discharge Physician: Nara Christensen MD     Discharge Diagnoses: Active Hospital Problems    Diagnosis    Coronary artery disease involving native coronary artery of native heart with unstable angina pectoris ([facility] [address] [I25.110]    Abnormal stress test [R94.39]    Chest pain [R07.9]    Peripheral vascular disease, unspecified ([address] [I73.9]    Dyslipidemia [E78.5]    Essential hypertension [I10]       The patient was seen and examined on day of discharge and this discharge summary is in conjunction with any daily progress note from day of discharge. Hospital Course:  \"62 y. o. male with PMHx of HLD, HTN and PVD  presented to [address] [place] [address] with chest pain.  Patient reports pain began this morning when he woke up. [facility] was lying in bed at the time when he noticed his pain.  Pain is across the entire chest and radiates down the left arm.  The pain has been constant and gradually worsening throughout the day. \"        Chest pain  - EKG and troponin reassuring. Then nuclear stress test showed a small apical area of mixed ischemia and infarct. [facility] 3/12 showed 80% stenosis of mid RCA, s/p MIKE. There was also a 40% LAD lesion and 95% OM1 (too small to stent). - continue aspirin, clopidogrel. He says he is no longer on warfarin. As far as I can tell this was short term back in 2015 related to a thrombosed LE arterial graft. - continue statin.       HTN. lisinopril increased. Added carvedilol. Probably still needs some adjustment      HLD. Statin.     PAD. Previous R carotid stent and right Fem-Peroneal bypass with L GSV. Meds as above.       Tobacco smoking, nicotine dependence. Encouraged to quit. Educated about risks. He declined nicotine patch and gum.             Physical Exam Performed:     BP (!) 151/87   Pulse 61   Temp 97.6 °F (36.4 °C) (Oral)   Resp 16   Ht 5' 8\" (1.727 m)   Wt 178 lb 12.8 oz (81.1 kg)   SpO2 98%   BMI 27.19 kg/m²       General appearance:  No apparent distress, appears stated age and cooperative. HEENT:  Normal cephalic, atraumatic without obvious deformity. Pupils equal, round, and reactive to light. Extra ocular muscles intact. Conjunctivae/corneas clear. Neck: Supple, with full range of motion. No jugular venous distention. Trachea midline. Respiratory:  Normal respiratory effort. Clear to auscultation, bilaterally without Rales/Wheezes/Rhonchi. Cardiovascular:  Regular rate and rhythm with normal S1/S2 without murmurs, rubs or gallops. Abdomen: Soft, non-tender, non-distended with normal bowel sounds. Musculoskeletal:  No clubbing, cyanosis or edema bilaterally. Full range of motion without deformity. Skin: Skin color, texture, turgor normal.  No rashes or lesions. Neurologic:  Neurovascularly intact without any focal sensory/motor deficits. Cranial nerves: II-XII intact, grossly non-focal.  Psychiatric:  Alert and oriented, thought content appropriate, normal insight  Capillary Refill: Brisk,< 3 seconds   Peripheral Pulses: +2 palpable, equal bilaterally       Labs: For convenience and continuity at follow-up the following most recent labs are provided:      CBC:    Lab Results   Component Value Date    WBC 7.6 03/13/2021    HGB 13.4 03/13/2021    HCT 39.0 03/13/2021     03/13/2021       Renal:    Lab Results   Component Value Date     03/13/2021    K 3.8 03/13/2021     03/13/2021    CO2 24 03/13/2021    BUN 11 03/13/2021    CREATININE 0.8 03/13/2021    CALCIUM 8.9 03/13/2021         Significant Diagnostic Studies    Radiology:   NM Cardiac Stress Test Nuclear Imaging   Final Result      XR CHEST (2 VW)   Final Result   No acute cardiopulmonary findings.                 Consults:     IP CONSULT TO HOSPITALIST  IP CONSULT TO CARDIOLOGY  IP CONSULT TO CARDIAC REHAB    Disposition:  home     Condition at Discharge: Stable    Discharge Instructions/Follow-up:  Get a PCP to help control your blood pressure. Follow up with PCP within 1-2 weeks and have your kidney bloodwork checked to make sure the blood pressure medications are working well for you. Follow up with cardiology per their instructions. Stop smoking! Code Status:  Full Code     Activity: activity as tolerated    Diet: cardiac diet      Discharge Medications:     Current Discharge Medication List           Details   aspirin 81 MG chewable tablet Take 1 tablet by mouth daily  Qty: 30 tablet, Refills: 3      nitroGLYCERIN (NITROSTAT) 0.4 MG SL tablet up to max of 3 total doses. If no relief after 1 dose, call 911. Qty: 25 tablet, Refills: 3      carvedilol (COREG) 6.25 MG tablet Take 1 tablet by mouth 2 times daily (with meals)  Qty: 60 tablet, Refills: 3              Details   lisinopril (PRINIVIL;ZESTRIL) 20 MG tablet Take 1 tablet by mouth daily  Qty: 30 tablet, Refills: 3      atorvastatin (LIPITOR) 80 MG tablet Take 1 tablet by mouth daily  Qty: 30 tablet, Refills: 3              Details   clopidogrel (PLAVIX) 75 MG tablet Take 75 mg by mouth daily       gabapentin (NEURONTIN) 300 MG capsule Take 300 mg by mouth 2 times daily. Takes 600mg in AM, 300mg HS. Time Spent on discharge is more than 30 minutes in the examination, evaluation, counseling and review of medications and discharge plan. Signed:    Dara Lennon MD   3/13/2021      Thank you Jani Champagne for the opportunity to be involved in this patient's care. If you have any questions or concerns please feel free to contact me at [phone].

## 2021-03-13 NOTE — PROGRESS NOTES
Patient transferred to [facility] from cath lab, report received from [facility] Underwood [facility]. Patient has TR band on right wrist with 12 mls of air. VSS.

## 2021-03-13 NOTE — PLAN OF CARE
Problem: Falls - Risk of:  Goal: Will remain free from falls  Description: Will remain free from falls  Outcome: Ongoing  Goal: Absence of physical injury  Description: Absence of physical injury  Outcome: Ongoing     Patient understands use of call light. Call light is within reach. Bed alarm in place and bed placed in the lowest position. Non-skid footwear on feet. Will continue to monitor.

## 2021-03-13 NOTE — PLAN OF CARE
Problem: Falls - Risk of:  Goal: Will remain free from falls  Description: Will remain free from falls  3/13/2021 1104 by Adriana Resendiz RN  Outcome: Ongoing  3/12/2021 2235 by Consuelo Garcia RN  Outcome: Ongoing  Goal: Absence of physical injury  Description: Absence of physical injury  3/12/2021 2235 by Consuelo Garcia RN  Outcome: Ongoing

## 2021-03-13 NOTE — PROGRESS NOTES
[facility]   Daily Progress Note    Admit Date:  3/11/2021  HPI:    Chief Complaint   Patient presents with    Chest Pain     per squad report patient patient c/o intermittent chest pain all day. squad administered 324mg aspirin and 3 nitro in route to ED       Marylene Corns presented with symptoms of chest pain fanning across his chest associated with shortness of breath. He underwent stress test which was abnormal.  He then underwent LHC which demonstrated severe disease in the RCA treated with PCI/MIKE. He has known vascular disease including carotid vascular disease status post stent, PAD status post multiple angioplasties to the legs as well as femorofemoral bypass, hypertension, hyperlipidemia, ANN, smoker and history of CVA. Subjective:  Mr. Humbreto Dhillon seen up in room, denies any further chest pain or shortness of breath. Objective:   Vitals:    03/13/21 0815   BP: (!) 178/99   Pulse: 83   Resp: 20   Temp: 96.6 °F (35.9 °C)   SpO2: 96%    Weight: 178 lb 12.8 oz (81.1 kg)      Intake/Output Summary (Last 24 hours) at 3/13/2021 1158  Last data filed at 3/13/2021 0807  Gross per 24 hour   Intake 240 ml   Output 1925 ml   Net -1685 ml     Wt Readings from Last 3 Encounters:   03/13/21 178 lb 12.8 oz (81.1 kg)   08/17/20 192 lb (87.1 kg)   07/10/20 195 lb (88.5 kg)         ASSESSMENT:   1. Chest pain: No recurrent, negative troponins for MI, abnormal stress test followed by [facility] with PCI to the RCA  2. CAD: Status post PCI to the RCA, on DAPT statin and beta-blocker  3. Hypertension: Uncontrolled, lisinopril increased from 10 mg to 20 mg daily  4. PAD: Status post femorofemoral bypass and angioplasties  5. CVD: Status post stents  6. HLD: LDL 76, started on high-dose statin  7. Smoker: encouraged cessation      PLAN:  1. Continue DAPT with aspirin and Plavix as well as statin and beta-blocker  2. Continue increased dose of lisinopril 20 mg daily.   Will reevaluate blood pressure in office  3. Okay for discharge from a cardiac perspective. Will address cardiac medications on discharge medication reconciliation form. Will have office call patient to schedule follow-up appointment in 2 weeks time. MAT Iqbal CNP, 3/13/2021, 11:58 AM  [address]   [phone]       Telemetry: SR 50-70  NYHA: III    Physical Exam:  General:  Awake, alert, NAD  Skin:  Warm and dry  Neck:  JVP 8 cm upright  Chest: Clear to auscultation posteriorly  Cardiovascular:  RRR, normal S1S2, no MRG  Abdomen:  Soft, nontender, +bowel sounds  Extremities: No BLE edema, extensive scars bilateral lower extremities, legs and feet warm  right radial site without ooze, bruise or hematoma, dressing C,D,I, 2+ pulse      Medications:    sodium chloride flush  10 mL Intravenous 2 times per day    sodium chloride flush  10 mL Intravenous 2 times per day    carvedilol  6.25 mg Oral BID WC    lisinopril  20 mg Oral Daily    atorvastatin  80 mg Oral Daily    clopidogrel  75 mg Oral Daily    gabapentin  300 mg Oral BID    sodium chloride flush  10 mL Intravenous 2 times per day    aspirin  81 mg Oral Daily    enoxaparin  40 mg Subcutaneous Daily    nitroglycerin  0.5 inch Topical 4 times per day         Lab Data: Lab results independently reviewed and analyzed by myself 3/13/21   CBC:   Recent Labs     03/11/21  1730 03/12/21  0748 03/13/21  0530   WBC 7.7 7.1 7.6   HGB 13.8 13.3* 13.4*    229 223     BMP:    Recent Labs     03/11/21  1730 03/12/21  0748 03/13/21  0530    144 142   K 3.9 4.3 3.8   CO2 26 26 24   BUN 14 15 11   CREATININE 0.9 0.9 0.8*     INR:  No results for input(s): INR in the last 72 hours. BNP:  No results for input(s): PROBNP in the last 72 hours.   Cardiac Enzymes:   Recent Labs     03/11/21  1730 03/11/21  2048 03/11/21  2341   TROPONINI <0.01 <0.01 <0.01     Lipids:   Lab Results   Component Value Date    TRIG 100 03/12/2021    TRIG 146 05/31/2020    HDL 45 03/12/2021    HDL 39 05/31/2020    HDL 46 07/12/2011    LDLCALC 76 03/12/2021    LDLCALC 46 05/31/2020       Cardiac Imaging:   CARDIAC CATH/ PCI 3/12/2021:   Procedures Performed:  1. Coronary angiography  2. Left heart catheterization  3. PCI of mid-RCA with MIKE  4. Moderate conscious sedation   Procedural Details:  1. Access: Local anesthetic was given and access was obtained in the right radial artery using a micropuncture technique and a 6F Terumo Slender Sheath was placed without difficulty. 2. Diagnostic: A 5F TIG catheter was used to perform selective right and left coronary angiography. The 5F TIG catheter was also used to perform the left heart catheterization. No significant gradient was observed on pull-back of the catheter across the aortic valve. 3. Intervention (PCI of mid-RCA): Therapeutic ACT was achieved with the administration of IV heparin. Using a 6F JR4 guide catheter, a 0.014 Runthrough wire was advanced across the mid-RCA lesion and into the RPDA. The lesion was pre-dilated with a non-compliant 2.0 x 12 mm balloon and then stented with a Xience Shelbie 2.5 x 15 mm MIKE. The stent was post-dilated with a non-compliant 2.5 x 12 mm balloon at high pressure and final angiography showed 0% residual stenosis with CHARU 3 flow and no evidence of dissection. 4. Hemostasis: At the end of the procedure, the radial sheath was removed and a hemoband was placed over the arteriotomy site and filled with air to maintain hemostasis. Findings:  1. Hemodynamics:              A. Opening arterial pressure: 203/96 (139) mmHg              B. LVEDP: 14 mmHg   2. Coronary anatomy:  A. Left main artery: The left main artery bifurcates into the left anterior descending artery and left circumflex artery. The left main artery has minor luminal irregularities. B. Left anterior descending artery: Transapical vessel which gives rise to 2 small diagonal arteries.   The LAD has a 30-40% proximal stenosis and 40%

## 2021-03-13 NOTE — PROGRESS NOTES
Page sent to Dr. Menendez Gift: \"Hello! Patient is here for chest pain and had a heart cath yesterday. His AM BP is 184/96. Just wanted to make you aware and see if you wanted any meds. Thanks! \"    Awaiting orders.

## 2021-03-15 ENCOUNTER — TELEPHONE (OUTPATIENT)
Dept: CARDIOLOGY CLINIC | Age: 56
End: 2021-03-15

## 2021-03-25 ENCOUNTER — HOSPITAL ENCOUNTER (EMERGENCY)
Age: 56
Discharge: HOME | End: 2021-03-25
Payer: MEDICARE

## 2021-03-25 ENCOUNTER — APPOINTMENT (OUTPATIENT)
Dept: CT IMAGING | Age: 56
End: 2021-03-25
Payer: MEDICARE

## 2021-03-25 VITALS
HEIGHT: 67 IN | BODY MASS INDEX: 28.88 KG/M2 | WEIGHT: 184 LBS | DIASTOLIC BLOOD PRESSURE: 62 MMHG | RESPIRATION RATE: 16 BRPM | OXYGEN SATURATION: 99 % | TEMPERATURE: 98 F | HEART RATE: 65 BPM | SYSTOLIC BLOOD PRESSURE: 123 MMHG

## 2021-03-25 DIAGNOSIS — K43.9 VENTRAL HERNIA WITHOUT OBSTRUCTION OR GANGRENE: Primary | ICD-10-CM

## 2021-03-25 DIAGNOSIS — R10.9 ABDOMINAL PAIN, UNSPECIFIED ABDOMINAL LOCATION: ICD-10-CM

## 2021-03-25 LAB
A/G RATIO: 2 (ref 1.1–2.2)
ALBUMIN SERPL-MCNC: 4.3 G/DL (ref 3.4–5)
ALP BLD-CCNC: 125 U/L (ref 40–129)
ALT SERPL-CCNC: 8 U/L (ref 10–40)
ANION GAP SERPL CALCULATED.3IONS-SCNC: 11 MMOL/L (ref 3–16)
AST SERPL-CCNC: 11 U/L (ref 15–37)
BASOPHILS ABSOLUTE: 0.1 K/UL (ref 0–0.2)
BASOPHILS RELATIVE PERCENT: 1 %
BILIRUB SERPL-MCNC: 0.3 MG/DL (ref 0–1)
BILIRUBIN URINE: NEGATIVE
BLOOD, URINE: NEGATIVE
BUN BLDV-MCNC: 16 MG/DL (ref 7–20)
CALCIUM SERPL-MCNC: 8.6 MG/DL (ref 8.3–10.6)
CHLORIDE BLD-SCNC: 107 MMOL/L (ref 99–110)
CLARITY: CLEAR
CO2: 25 MMOL/L (ref 21–32)
COLOR: YELLOW
CREAT SERPL-MCNC: 1.1 MG/DL (ref 0.9–1.3)
EOSINOPHILS ABSOLUTE: 0.3 K/UL (ref 0–0.6)
EOSINOPHILS RELATIVE PERCENT: 3.8 %
GFR AFRICAN AMERICAN: >60
GFR NON-AFRICAN AMERICAN: >60
GLOBULIN: 2.1 G/DL
GLUCOSE BLD-MCNC: 80 MG/DL (ref 70–99)
GLUCOSE URINE: NEGATIVE MG/DL
HCT VFR BLD CALC: 38.8 % (ref 40.5–52.5)
HEMOGLOBIN: 13.2 G/DL (ref 13.5–17.5)
KETONES, URINE: NEGATIVE MG/DL
LEUKOCYTE ESTERASE, URINE: NEGATIVE
LIPASE: 17 U/L (ref 13–60)
LYMPHOCYTES ABSOLUTE: 1.9 K/UL (ref 1–5.1)
LYMPHOCYTES RELATIVE PERCENT: 23.2 %
MCH RBC QN AUTO: 33.4 PG (ref 26–34)
MCHC RBC AUTO-ENTMCNC: 34.1 G/DL (ref 31–36)
MCV RBC AUTO: 97.9 FL (ref 80–100)
MICROSCOPIC EXAMINATION: NORMAL
MONOCYTES ABSOLUTE: 0.6 K/UL (ref 0–1.3)
MONOCYTES RELATIVE PERCENT: 7.3 %
NEUTROPHILS ABSOLUTE: 5.2 K/UL (ref 1.7–7.7)
NEUTROPHILS RELATIVE PERCENT: 64.7 %
NITRITE, URINE: NEGATIVE
PDW BLD-RTO: 13.9 % (ref 12.4–15.4)
PH UA: 6 (ref 5–8)
PLATELET # BLD: 242 K/UL (ref 135–450)
PMV BLD AUTO: 6.3 FL (ref 5–10.5)
POTASSIUM SERPL-SCNC: 4.1 MMOL/L (ref 3.5–5.1)
PROTEIN UA: NEGATIVE MG/DL
RBC # BLD: 3.96 M/UL (ref 4.2–5.9)
SODIUM BLD-SCNC: 143 MMOL/L (ref 136–145)
SPECIFIC GRAVITY UA: 1.01 (ref 1–1.03)
TOTAL PROTEIN: 6.4 G/DL (ref 6.4–8.2)
URINE REFLEX TO CULTURE: NORMAL
URINE TYPE: NORMAL
UROBILINOGEN, URINE: 0.2 E.U./DL
WBC # BLD: 8 K/UL (ref 4–11)

## 2021-03-25 PROCEDURE — 99285 EMERGENCY DEPT VISIT HI MDM: CPT

## 2021-03-25 PROCEDURE — 83690 ASSAY OF LIPASE: CPT

## 2021-03-25 PROCEDURE — 85025 COMPLETE CBC W/AUTO DIFF WBC: CPT

## 2021-03-25 PROCEDURE — 6360000004 HC RX CONTRAST MEDICATION: Performed by: PHYSICIAN ASSISTANT

## 2021-03-25 PROCEDURE — 96374 THER/PROPH/DIAG INJ IV PUSH: CPT

## 2021-03-25 PROCEDURE — 81003 URINALYSIS AUTO W/O SCOPE: CPT

## 2021-03-25 PROCEDURE — 6360000002 HC RX W HCPCS: Performed by: PHYSICIAN ASSISTANT

## 2021-03-25 PROCEDURE — 74177 CT ABD & PELVIS W/CONTRAST: CPT

## 2021-03-25 PROCEDURE — 80053 COMPREHEN METABOLIC PANEL: CPT

## 2021-03-25 RX ORDER — MORPHINE SULFATE 4 MG/ML
4 INJECTION, SOLUTION INTRAMUSCULAR; INTRAVENOUS ONCE
Status: COMPLETED | OUTPATIENT
Start: 2021-03-25 | End: 2021-03-25

## 2021-03-25 RX ORDER — HYDROCODONE BITARTRATE AND ACETAMINOPHEN 5; 325 MG/1; MG/1
1 TABLET ORAL EVERY 6 HOURS PRN
Qty: 10 TABLET | Refills: 0 | Status: SHIPPED | OUTPATIENT
Start: 2021-03-25 | End: 2021-03-28

## 2021-03-25 RX ADMIN — IOPAMIDOL 75 ML: 755 INJECTION, SOLUTION INTRAVENOUS at 16:07

## 2021-03-25 RX ADMIN — MORPHINE SULFATE 4 MG: 4 INJECTION, SOLUTION INTRAMUSCULAR; INTRAVENOUS at 16:36

## 2021-03-25 ASSESSMENT — PAIN DESCRIPTION - LOCATION
LOCATION: ABDOMEN

## 2021-03-25 ASSESSMENT — PAIN SCALES - GENERAL
PAINLEVEL_OUTOF10: 3
PAINLEVEL_OUTOF10: 6

## 2021-03-25 ASSESSMENT — PAIN DESCRIPTION - PAIN TYPE: TYPE: ACUTE PAIN

## 2021-03-25 ASSESSMENT — PAIN DESCRIPTION - DESCRIPTORS: DESCRIPTORS: STABBING

## 2021-03-25 NOTE — ED NOTES
Called Gen Surg @ [id number]  Re: Possible incarcerated ventral hernia; CT completed pending results  Dr Kirsty Castellon responded @ [id number] [facility]  03/25/21 6218

## 2021-03-25 NOTE — ED PROVIDER NOTES
[facility] Emergency Department    CHIEF COMPLAINT  Abdominal Pain (patient with abdominal hernia for the past 4-5 years ago, patient with colon cancer surgery 3-4 yrs ago. Patient with increased pain and swelling around hernia since yesterday.)      SHARED SERVICE VISIT  Evaluated by LAURA. My supervising physician was available for consultation. HISTORY OF PRESENT ILLNESS  Nathaniel Blunt is a 60 y.o. male who presents to the ED complaining of hernia pain swelling and redness. Patient states that he has had this abdominal hernia over the past 4 to 5 years without much difficulty. However over the past few days patient has experienced increased pain, swelling and redness around the hernia site. Patient is denying any nausea, vomiting ports he still is able to pass gas and have bowel movements. Denies any previous episodes of redness and swelling or pain in regards to his hernia. Abdominal history is remarkable for colon cancer roughly 3 to 4 years ago. Patient was recently admitted to the hospital roughly 2 weeks ago for a cardiac work-up and at that time he denies having this redness and swelling. Due to patient's difficulty with his memory his wife contacted the nursing staff which provided additional information did correlate with patient's story. No other complaints, modifying factors or associated symptoms. Nursing notes reviewed.    Past Medical History:   Diagnosis Date    Bowel incontinence     Hyperlipidemia     Hypertension     PAD (peripheral artery disease) (HCC)     Poor vision     left eye    Pre-diabetes     Sleep apnea     NO CPAP    Stroke ([facility] [address] 2016    x3 - memory deficit    Wears partial dentures     upper & lower     Past Surgical History:   Procedure Laterality Date    ABDOMEN SURGERY      colon polyps    FEMORAL-TIBIAL BYPASS GRAFT Right 6/24/2020    RIGHT FEMORAL TO PERONEAL  BYPASS GRAFT performed by Gal [facility]ley [facility] at [facility] LEG SURGERY Right     VASCULAR SURGERY       Family History   Problem Relation Age of Onset    Diabetes Mother      Social History     Socioeconomic History    Marital status:      Spouse name: Not on file    Number of children: Not on file    Years of education: Not on file    Highest education level: Not on file   Occupational History    Not on file   Social Needs    Financial resource strain: Not on file    Food insecurity     Worry: Not on file     Inability: Not on file    Transportation needs     Medical: Not on file     Non-medical: Not on file   Tobacco Use    Smoking status: Current Every Day Smoker     Packs/day: 1.00     Years: 40.00     Pack years: 40.00     Types: Cigarettes    Smokeless tobacco: Never Used   Substance and Sexual Activity    Alcohol use: Not Currently    Drug use: No    Sexual activity: Yes     Partners: Female   Lifestyle    Physical activity     Days per week: Not on file     Minutes per session: Not on file    Stress: Not on file   Relationships    Social connections     Talks on phone: Not on file     Gets together: Not on file     Attends Synagogue service: Not on file     Active member of club or organization: Not on file     Attends meetings of clubs or organizations: Not on file     Relationship status: Not on file    Intimate partner violence     Fear of current or ex partner: Not on file     Emotionally abused: Not on file     Physically abused: Not on file     Forced sexual activity: Not on file   Other Topics Concern    Not on file   Social History Narrative    Not on file     No current facility-administered medications for this encounter. Current Outpatient Medications   Medication Sig Dispense Refill    HYDROcodone-acetaminophen (NORCO) 5-325 MG per tablet Take 1 tablet by mouth every 6 hours as needed for Pain for up to 3 days. Intended supply: 3 days.  Take lowest dose possible to manage pain 10 tablet 0    aspirin 81 MG chewable tablet Take 1 tablet by mouth daily 30 tablet 3    lisinopril (PRINIVIL;ZESTRIL) 20 MG tablet Take 1 tablet by mouth daily 30 tablet 3    atorvastatin (LIPITOR) 80 MG tablet Take 1 tablet by mouth daily 30 tablet 3    carvedilol (COREG) 6.25 MG tablet Take 1 tablet by mouth 2 times daily (with meals) 60 tablet 3    clopidogrel (PLAVIX) 75 MG tablet Take 75 mg by mouth daily       gabapentin (NEURONTIN) 300 MG capsule Take 300 mg by mouth 2 times daily. Takes 600mg in AM, 300mg HS.  nitroGLYCERIN (NITROSTAT) 0.4 MG SL tablet up to max of 3 total doses. If no relief after 1 dose, call 911. 25 tablet 3     No Known Allergies    REVIEW OF SYSTEMS  10 systems reviewed, pertinent positives per HPI otherwise noted to be negative    PHYSICAL EXAM  /62   Pulse 65   Temp 98 °F (36.7 °C) (Oral)   Resp 16   Ht 5' 7\" (1.702 m)   Wt 184 lb (83.5 kg)   SpO2 99%   BMI 28.82 kg/m²   GENERAL APPEARANCE: Awake and alert. Cooperative. No distress. HEAD: Normocephalic. Atraumatic. EYES: PERRL. EOM's grossly intact. ENT: Mucous membranes are moist.   NECK: Supple. HEART: RRR. No murmurs. LUNGS: Respirations unlabored. CTAB. Good air exchange. Speaking comfortably in full sentences. ABDOMEN: There is a palpable, ventral possible spigelian hernia right of the midline. Hernia is tender and firm to palpation. Nonreducible on exam.  There is an area of surrounding erythema roughly 10 x 15 cm over top of the hernia. Remainder of the abdomen is soft. EXTREMITIES: No peripheral edema. Moves all extremities equally. All extremities neurovascularly intact. SKIN: Warm and dry. No acute rashes. NEUROLOGICAL: Alert and oriented. CN's 2-12 intact. No gross facial drooping. Strength 5/5, sensation intact. PSYCHIATRIC: Normal mood and affect.     RADIOLOGY  [unfilled]    LABS  Labs Reviewed   CBC WITH AUTO DIFFERENTIAL - Abnormal; Notable for the following components:       Result Value    RBC 3.96 (*)     Hemoglobin 13.2 (*)     Hematocrit 38.8 (*)     All other components within normal limits    Narrative:     Performed at:  [address], [facility] [employer]   Phone [phone]   COMPREHENSIVE METABOLIC PANEL - Abnormal; Notable for the following components:    ALT 8 (*)     AST 11 (*)     All other components within normal limits    Narrative:     Performed at:  [address], [facility] [employer]   Phone [phone]   LIPASE    Narrative:     Performed at:  [address], [facility] [employer]   Phone [phone]   URINE RT REFLEX TO CULTURE    Narrative:     Performed at:  [address], [facility] [employer]   Phone [phone]       PROCEDURES  Unless otherwise noted below, none  Procedures    ED COURSE  Patient received morphine for pain, with good relief. Triage vitals normal limits. A discussion was had with general surgery regarding CT scan. Risk management discussed and shared decision making had with patient and/or surrogate. All questions were answered. Patient will follow up with general surgery for further evaluation/treatment. All questions answered. Patient will return to ED for new/worsening symptoms. Patient was sent home with a prescription for [place].    [facility]  0 Minutes of critical care time spent not including separately billable procedures. MDM  MDM  78-year-old male with a history of a ventral hernia presents emergency department for evaluation new onset swelling, redness and pain. Over the past 2 to 3 days there has been increased swelling, tenderness and erythema over top of the hernia. Hernia appears possible right-sided spigelian ventral hernia. This time there is concerns for possible incarcerated hernia.   Will obtain CT abdomen pelvis to rule out strangulated bowel versus

## 2021-03-26 NOTE — PROGRESS NOTES
CARDIOLOGY CONSULTATION        Patient Name: Ortega Bar  Primary Care physician: Vince Alegre    Reason for Referral/Chief Complaint: Ortega Bar is a 60 y.o. patient who is referred to cardiology clinic today for evaluation and treatment of coronary artery disease status post recent PCI. History of Present Illness:   Ortega Bar is a 60 y.o. patient who is referred to cardiology clinic today for evaluation and treatment of coronary artery disease. He has a history notable for hyperlipidemia, hypertension, carotid disease status post prior interventions of his carotids bilaterally with known left internal carotid occlusion with the last carotid endarterectomy of his right carotid in the setting of CVA in June 2020, peripheral arterial disease status post multiple intervention including right to left femoral-femoral graft 2015 and prior angioplasties, prediabetes, sleep apnea. The patient presented to the hospital 3/11/2021 with complaints of chest pain. Presenting vitals notable for afebrile, heart rate 74, /78, sats 96%. Laboratories notable for normal renal function, serial cardiac enzymes negative. EKG normal sinus rhythm with no ischemic changes. Patient underwent stress testing 03/12/21 showing apical lateral partially reversible defect. The patient stated he began having chest tightness fanning out across his chest with exertion for a week. He walks to and from the store each day. Roughly a 2 mile walk. Noted he had onset of chest discomfort earlier in the week on his way to the store as well as on the way back. Associated dyspnea. His cardiac cath from 03/12/21 showed severe single-vessel CAD. Successful PCI of mid RCA with one MIKE. Uncontrolled HTN. Today patient notes he has been doing well in the interim. He denies any recurrence of his chest discomfort. Continues to take his cardiac medications as prescribed including his dual antiplatelet therapy.   He MAT Hernandez Do, CNP   clopidogrel (PLAVIX) 75 MG tablet Take 75 mg by mouth daily  7/16/18  Yes Historical Provider, MD   gabapentin (NEURONTIN) 300 MG capsule Take 300 mg by mouth 2 times daily. Takes 600mg in AM, 300mg HS. 7/16/18  Yes Historical Provider, MD   nitroGLYCERIN (NITROSTAT) 0.4 MG SL tablet up to max of 3 total doses. If no relief after 1 dose, call 911. Patient not taking: Reported on 3/29/2021 3/13/21   MAT Hernandez Do, CNP        CURRENT Medications:  No current facility-administered medications for this visit. Allergies:  Patient has no known allergies. Review of Systems:   A 14 point review of symptoms completed. Pertinent positives identified in the HPI, all other review of symptoms negative as below. Objective:     Vitals:    03/29/21 1429   BP: 120/80   Pulse: 73   SpO2: 99%    Weight: 179 lb (81.2 kg)       PHYSICAL EXAM:      General:  Alert, cooperative, no distress, appears stated age   Head:  Normocephalic, atraumatic   Eyes:  Conjunctiva/corneas clear, anicteric sclerae    Nose: Nares normal, no drainage or sinus tenderness   Throat: No abnormalities of the lips, oral mucosa or tongue. Neck: Trachea midline. Neck supple with no lymphadenopathy, thyroid not enlarged, symmetric, no tenderness/mass/nodules, no Jugular venous pressure elevation. Bilateral carotid endarterectomy scars. Lungs:   Ronchi in right lung fields cleared with cough, no wheezes, no rales, no respiratory distress   Chest Wall:  No deformity or tenderness to palpation   Heart:  Regular rate and rhythm, normal S1, normal S2, no murmur, no rub, no S3/S4, PMI non-displaced. Abdomen:   Soft, non-tender, with normoactive bowel sounds. No masses, no hepatosplenomegaly   Extremities: No cyanosis, clubbing or pitting edema. Bilateral lower extremity surgical scars   Vascular: 2+ radial, 2+ right lower extremity posterior tibial pulse with decreased left lower extremity pulses.     Skin: Skin color, texture, turgor are normal with no rashes or ulceration. Pysch: Euthymic mood, appropriate affect   Neurologic: Oriented to person, place and time. No slurred speech or facial asymmetry. No motor or sensory deficits on gross examination. Labs:   CBC:   Lab Results   Component Value Date    WBC 8.0 2021    RBC 3.96 2021    HGB 13.2 2021    HCT 38.8 2021    MCV 97.9 2021    RDW 13.9 2021     2021     CMP:  Lab Results   Component Value Date     2021    K 4.1 2021    K 3.8 2021     2021    CO2 25 2021    BUN 16 2021    CREATININE 1.1 2021    GFRAA >60 2021    GFRAA >60 2012    AGRATIO 2.0 2021    LABGLOM >60 2021    GLUCOSE 80 2021    PROT 6.4 2021    PROT 7.4 2011    CALCIUM 8.6 2021    BILITOT 0.3 2021    ALKPHOS 125 2021    AST 11 2021    ALT 8 2021     PT/INR:  No results found for: PTINR  HgBA1c:  Lab Results   Component Value Date    LABA1C 5.8 2021     Lab Results   Component Value Date    CKTOTAL 82 10/15/2018    CKMB <0.2 2011    TROPONINI <0.01 2021         Cardiac Data:     EK21  Personally reviewed notable for normal sinus rhythm with heart rate 70 bpm.    Echo: 20  Technically difficult examination. Left ventricular systolic function is normal with ejection fraction   estimated at 55%. No regional wall motion abnormalities. There is mild concentric left ventricular hypertrophy. Normal left   ventricular diastolic filling pressure. Pt had negative bubble study at [facility] on 3/17/2015 reported. Consider ROSALIND if clinically indicated. Stress Test: 21   Small sized apical-laterlal partial reversibility defect consistent with  ischemia and infarction in the territory of the distal LAD. LV function is  normal with and ejection fraction of 52%. Lower risk abnormal study. Cardiac catheterization: 03/12/21  Findings:  1. Hemodynamics:              A. Opening arterial pressure: 203/96 (139) mmHg              B. LVEDP: 14 mmHg     2. Coronary anatomy:  A. Left main artery: The left main artery bifurcates into the left anterior descending artery and left circumflex artery. The left main artery has minor luminal irregularities. B. Left anterior descending artery: Transapical vessel which gives rise to 2 small diagonal arteries. The LAD has a 30-40% proximal stenosis and 40% mid-vessel stenosis. C. Left circumflex artery: Co-dominant vessel that gives rise to 3 obtuse marginal arteries. The LCx has a 20% proximal stenosis. The OM1 is a very small vessel with a 95% ostial stenosis. The OM2 is a small vessel with mild disease. The OM3 is a large branching vessel with a 20% proximal stenosis. D. Right coronary artery: Medium caliber co-dominant vessel. The RCA has a 30-40% proximal stenosis and focal 80% mid-vessel stenosis. There is a short ectatic segment immediately distal to the mid-vessel stenosis. The posterior descending artery is a small caliber vessel and has minor luminal irregularities. Results of Intervention (PCI of mid-RCA):  Pre - 80% stenosis, CHARU 3 flow  Post - 0% stenosis, CHARU 3 flow     Impression:  1. Sever single-vessel coronary artery disease. 2. Successful PCI of mid-RCA with Xience Shelbie 2.5 15 mm MIKE. 3. Normal LVEDP. 4. Uncontrolled hypertension. Plan:  1. Monitor overnight. 2. Continue dual antiplatelet therapy with aspirin 81 mg daily and Plavix 75 mg daily. 3. Continue atorvastatin 80 mg daily. 4. Start coreg 6.25 mg BID.  5. Increase lisinopril to 20 mg daily. 6. Refer for cardiac rehab.  7. Follow-up with [address] in 2 weeks. Additional studies:     CTA abdominal aorta with run off: 08/17/20  No evidence of acute arterial occlusion in the lower extremities.   Right side: New profunda femoris to peroneal artery bypass graft is widely patent. Peroneal artery is the dominant supply into the foot via collaterals. Left side: Patent right to left fem-fem bypass graft. Stable moderate to severe narrowing in the distal SFA secondary to calcific plaque. 3 vessel runoff with posterior tibial artery as the dominant supply to the left foot. Carotid doppler: 03/09/21  Impressions Right Impression The right internal carotid artery reveals a <50% diameter reducing stenosis. There is a patent CCA - ICA stent that reveals a <50% diameter reducing stenosis based on stent criteria. The right vertebral artery demonstrates normal antegrade flow, with a velocity >100 cm/s. The right subclavian artery is visualized and demonstrates multiphasic flow. Left Impression The left internal carotid artery is occluded. The external carotid artery reveals a >50% diameter reducing stenosis by velocity criteria. The left vertebral artery demonstrates normal antegrade flow. The left subclavian artery is visualized and demonstrates multiphasic flow. Previous exam on 06/03/2020, a patent right CCA - ICA stent and occluded left ICA. Conclusions   Summary   Patent Right Carotid stent without stenosis. Left Internal Carotid occlusion. Impression and Plan:      Shahab Perez is a 60 y.o. patient who is referred to cardiology clinic today for evaluation and treatment of coronary artery disease. He has a history notable for hyperlipidemia, hypertension, carotid disease status post prior interventions of his carotids bilaterally with known left internal carotid occlusion with the last carotid endarterectomy of his right carotid in the setting of CVA in June 2020, peripheral arterial disease status post multiple intervention including right to left femoral-femoral graft 2015 and prior angioplasties, prediabetes, sleep apnea. Patient presents status post recent PCI to his right coronary artery in the setting of increasing angina.   States he has not had chest pain since intervention. Compliant with medications. BP is at goal.  Examination is reassuring today. No changes in medications today. No further cardiac testing at this point. Proceed as below:    1. Coronary artery disease, CCS 1  2. Hypertension, controlled  3. Hyperlipidemia, LDL < 70 by most recent fasting lipids  4. Peripheral vascular disease status post multiple interventions for claudication  5. Carotid disease with known left internal carotid occlusion, prior cerebrovascular accident, interventions bilaterally previously with the last being a right carotid endarterectomy in June 2020.  6.  Diabetes mellitus  7. Overweight  8. Tobacco abuse ongoing, open to quitting  9. Normal LV function by previous echo    Patient Active Problem List   Diagnosis    Acute CVA (cerebrovascular accident) ([address]    Carotid artery stenosis with cerebral infarction ([address]    Dyslipidemia    Essential hypertension    Ischemic foot    Peripheral vascular disease, unspecified ([address]    Vascular occlusion    Chest pain    Abnormal stress test    Coronary artery disease involving native coronary artery of native heart with unstable angina pectoris ([address]         PLAN:  1. Continue current cardiac medications without changes today. Continue dual antiplatelet therapy for life. 2.  Smoking cessation discussed (1-800-QUIT NOW). Discussed chantix and wellbutrin for smoking cessation. Start nicotine patches 21 mg daily. Discuss pharmacotherapies otherwise with his primary care provider. 3.  We will refer to cardiac Rehab at [facility]. 4.  Follow up with DR Gates  5. No further cardiac testing at this time. Follow up in the office with me in 6 months      Scribe's attestation: This note was scribed in the presence of Que Whyte MD by Matilda Botello RN    The scribes documentation has been prepared under my direction and personally reviewed by me in its entirety.   I confirm that the note above accurately reflects all work, treatment, procedures, and medical decision making performed by me. Aida Lopez MD, personally performed the services described in this documentation as scribed by Myriam Stout RN in my presence, and it is both accurate and complete to the best of our ability. I will address the patient's cardiac risk factors and adjusted pharmacologic treatment as needed. In addition, I have reinforced the need for patient directed risk factor modification. All questions and concerns were addressed to the patient/family. Alternatives to my treatment were discussed. Thank you for allowing us to participate in the care of Juanita Trejo. Please call me with any questions [phone].     Katelynn Manning MD, [facility]  Cardiovascular Disease  [address]  [phone] [facility]  [phone] [address]  3/29/2021 2:52 PM

## 2021-03-29 ENCOUNTER — OFFICE VISIT (OUTPATIENT)
Dept: CARDIOLOGY CLINIC | Age: 56
End: 2021-03-29
Payer: MEDICARE

## 2021-03-29 VITALS
OXYGEN SATURATION: 99 % | BODY MASS INDEX: 28.09 KG/M2 | SYSTOLIC BLOOD PRESSURE: 120 MMHG | WEIGHT: 179 LBS | HEART RATE: 73 BPM | HEIGHT: 67 IN | DIASTOLIC BLOOD PRESSURE: 80 MMHG

## 2021-03-29 DIAGNOSIS — I63.239 CAROTID ARTERY STENOSIS WITH CEREBRAL INFARCTION (HCC): ICD-10-CM

## 2021-03-29 DIAGNOSIS — F17.200 SMOKING: ICD-10-CM

## 2021-03-29 DIAGNOSIS — I63.9 ACUTE CVA (CEREBROVASCULAR ACCIDENT) (HCC): ICD-10-CM

## 2021-03-29 DIAGNOSIS — I25.10 PRESENCE OF STENT IN CORONARY ARTERY IN PATIENT WITH CORONARY ARTERY DISEASE: ICD-10-CM

## 2021-03-29 DIAGNOSIS — I25.110 CORONARY ARTERY DISEASE INVOLVING NATIVE CORONARY ARTERY OF NATIVE HEART WITH UNSTABLE ANGINA PECTORIS (HCC): Primary | ICD-10-CM

## 2021-03-29 DIAGNOSIS — Z95.5 PRESENCE OF STENT IN CORONARY ARTERY IN PATIENT WITH CORONARY ARTERY DISEASE: ICD-10-CM

## 2021-03-29 DIAGNOSIS — E78.5 DYSLIPIDEMIA: ICD-10-CM

## 2021-03-29 DIAGNOSIS — I73.9 PERIPHERAL VASCULAR DISEASE, UNSPECIFIED (HCC): ICD-10-CM

## 2021-03-29 DIAGNOSIS — I10 ESSENTIAL HYPERTENSION: ICD-10-CM

## 2021-03-29 PROCEDURE — 99214 OFFICE O/P EST MOD 30 MIN: CPT | Performed by: INTERNAL MEDICINE

## 2021-03-29 PROCEDURE — 4004F PT TOBACCO SCREEN RCVD TLK: CPT | Performed by: INTERNAL MEDICINE

## 2021-03-29 PROCEDURE — G8419 CALC BMI OUT NRM PARAM NOF/U: HCPCS | Performed by: INTERNAL MEDICINE

## 2021-03-29 PROCEDURE — G8484 FLU IMMUNIZE NO ADMIN: HCPCS | Performed by: INTERNAL MEDICINE

## 2021-03-29 PROCEDURE — G8427 DOCREV CUR MEDS BY ELIG CLIN: HCPCS | Performed by: INTERNAL MEDICINE

## 2021-03-29 PROCEDURE — 3017F COLORECTAL CA SCREEN DOC REV: CPT | Performed by: INTERNAL MEDICINE

## 2021-03-29 PROCEDURE — 1111F DSCHRG MED/CURRENT MED MERGE: CPT | Performed by: INTERNAL MEDICINE

## 2021-03-29 RX ORDER — NICOTINE 21 MG/24HR
1 PATCH, TRANSDERMAL 24 HOURS TRANSDERMAL DAILY
Qty: 42 PATCH | Refills: 1 | Status: ON HOLD | OUTPATIENT
Start: 2021-03-29 | End: 2021-07-21

## 2021-04-12 ENCOUNTER — INITIAL CONSULT (OUTPATIENT)
Dept: SURGERY | Age: 56
End: 2021-04-12
Payer: MEDICARE

## 2021-04-12 VITALS
HEART RATE: 62 BPM | HEIGHT: 68 IN | WEIGHT: 183 LBS | BODY MASS INDEX: 27.74 KG/M2 | DIASTOLIC BLOOD PRESSURE: 76 MMHG | SYSTOLIC BLOOD PRESSURE: 141 MMHG

## 2021-04-12 DIAGNOSIS — K43.2 INCISIONAL HERNIA, WITHOUT OBSTRUCTION OR GANGRENE: Primary | ICD-10-CM

## 2021-04-12 PROCEDURE — G8419 CALC BMI OUT NRM PARAM NOF/U: HCPCS | Performed by: SURGERY

## 2021-04-12 PROCEDURE — 99204 OFFICE O/P NEW MOD 45 MIN: CPT | Performed by: SURGERY

## 2021-04-12 PROCEDURE — G8427 DOCREV CUR MEDS BY ELIG CLIN: HCPCS | Performed by: SURGERY

## 2021-04-12 NOTE — PROGRESS NOTES
Department of General Surgery Consult    PATIENT NAME: Julieta Romano   YOB: 1965    ADMISSION DATE: No admission date for patient encounter. TODAY'S DATE: 4/12/2021    Reason for Consult:  Incisional hernia    Chief Complaint: pain    Requesting Physician:  Sherlyn Ziegler    HISTORY OF PRESENT ILLNESS:              The patient is a 60 y.o. male who presents with incisional hernia. Has noticed for several years but recently with some increase in size and pain. Unsure what operation he had in the past.  Recent hospitalization and need for cardiac stents. Denies nausea, constipation, fevers. Past Medical History:        Diagnosis Date    Bowel incontinence     Hyperlipidemia     Hypertension     PAD (peripheral artery disease) (HCC)     Poor vision     left eye    Pre-diabetes     Sleep apnea     NO CPAP    Stroke ([address] 2016    x3 - memory deficit    Wears partial dentures     upper & lower       Past Surgical History:        Procedure Laterality Date    ABDOMEN SURGERY      colon polyps    FEMORAL-TIBIAL BYPASS GRAFT Right 6/24/2020    RIGHT FEMORAL TO PERONEAL  BYPASS GRAFT performed by Jose Prajapati MD at [place] [address] Right     VASCULAR SURGERY         Current Medications:   No current facility-administered medications for this visit. Prior to Admission medications    Medication Sig Start Date End Date Taking? Authorizing Provider   nicotine (NICODERM CQ) 21 MG/24HR Place 1 patch onto the skin daily 3/29/21 5/10/21 Yes Karly Jordan MD   aspirin 81 MG chewable tablet Take 1 tablet by mouth daily 3/14/21  Yes MAT Ferrell CNP   nitroGLYCERIN (NITROSTAT) 0.4 MG SL tablet up to max of 3 total doses.  If no relief after 1 dose, call 911. 3/13/21  Yes MAT Vences CNP   lisinopril (PRINIVIL;ZESTRIL) 20 MG tablet Take 1 tablet by mouth daily 3/14/21  Yes MAT Ferrell CNP   atorvastatin (LIPITOR) 80 MG tablet Take 1 tablet by mouth daily 3/13/21  Yes MAT Fox CNP   carvedilol (COREG) 6.25 MG tablet Take 1 tablet by mouth 2 times daily (with meals) 3/13/21  Yes MAT Fox CNP   clopidogrel (PLAVIX) 75 MG tablet Take 75 mg by mouth daily  7/16/18  Yes Historical Provider, MD   gabapentin (NEURONTIN) 300 MG capsule Take 300 mg by mouth 2 times daily. Takes 600mg in AM, 300mg HS. 7/16/18  Yes Historical Provider, MD        Allergies:  Patient has no known allergies. Social History:   TOBACCO:   yes  ETOH: denies    Family History:        Problem Relation Age of Onset    Diabetes Mother        REVIEW OF SYSTEMS:  CONSTITUTIONAL:  negative  HEENT:  negative  RESPIRATORY:  negative  CARDIOVASCULAR:  negative  GASTROINTESTINAL:  negative except for abdominal pain  GENITOURINARY:  negative  HEMATOLOGIC/LYMPHATIC:  negative  NEUROLOGICAL:  Negative  * All other ROS reviewed and negative. PHYSICAL EXAM:  VITALS:  BP (!) 141/76   Pulse 62   Ht 5' 8\" (1.727 m)   Wt 183 lb (83 kg)   BMI 27.83 kg/m²   24HR INTAKE/OUTPUT:    [unfilled]  [unfilled]      CONSTITUTIONAL:  alert, no apparent distress and normal weight  EYES:  PERRL, sclera clear  ENT:  Normocephalic,atraumatic, without obvious abnormality  NECK:  supple, symmetrical, trachea midline  LUNGS: Resp effort easy and unlabored, no crackles or wheezing  CARDIOVASCULAR:  NO JVD, regular rate  ABDOMEN:  Midline scar, normal bowel sounds, soft, non-distended, minimal tender, voluntary guarding absent, no masses palpated and hernia present incisional  MUSCULOSKELETAL: No clubbing or cyanosis, 0+ pitting edema lower extremities  NEUROLOGIC:  Mental Status Exam:  Level of Alertness:   awake  PSYCHIATRIC:   person, place, time  SKIN:  No rashes    DATA:    CBC: No results for input(s): WBC, HGB, HCT, PLT in the last 72 hours. BMP:  No results for input(s): NA, K, CL, CO2, BUN, CREATININE, GLUCOSE in the last 72 hours.   Hepatic: No results for input(s): AST,

## 2021-06-10 ENCOUNTER — TELEPHONE (OUTPATIENT)
Dept: CARDIOLOGY CLINIC | Age: 56
End: 2021-06-10

## 2021-06-11 NOTE — TELEPHONE ENCOUNTER
Pls relay to the patient: Comfortable holding plavix in anticipation of surgery status post 3 months from stent placement. Continue aspirin and beta blocker throughout perioperative period. Restart plavix as soon as possible post surgery. He may be scheduled for surgery. No further workup prior is needed. Forward letter to surgery please.      Shashi Chacon MD, [address] [facility]  [phone] [facility]  [phone] [facility]

## 2021-06-17 ENCOUNTER — TELEPHONE (OUTPATIENT)
Dept: SURGERY | Age: 56
End: 2021-06-17

## 2021-06-17 ENCOUNTER — OFFICE VISIT (OUTPATIENT)
Dept: SURGERY | Age: 56
End: 2021-06-17
Payer: MEDICARE

## 2021-06-17 VITALS
DIASTOLIC BLOOD PRESSURE: 66 MMHG | BODY MASS INDEX: 28.25 KG/M2 | WEIGHT: 180 LBS | SYSTOLIC BLOOD PRESSURE: 123 MMHG | HEIGHT: 67 IN | HEART RATE: 79 BPM

## 2021-06-17 DIAGNOSIS — K43.2 INCISIONAL HERNIA, WITHOUT OBSTRUCTION OR GANGRENE: Primary | ICD-10-CM

## 2021-06-17 PROCEDURE — 3017F COLORECTAL CA SCREEN DOC REV: CPT | Performed by: SURGERY

## 2021-06-17 PROCEDURE — 99212 OFFICE O/P EST SF 10 MIN: CPT | Performed by: SURGERY

## 2021-06-17 PROCEDURE — 4004F PT TOBACCO SCREEN RCVD TLK: CPT | Performed by: SURGERY

## 2021-06-17 PROCEDURE — G8419 CALC BMI OUT NRM PARAM NOF/U: HCPCS | Performed by: SURGERY

## 2021-06-17 PROCEDURE — G8427 DOCREV CUR MEDS BY ELIG CLIN: HCPCS | Performed by: SURGERY

## 2021-06-17 NOTE — PROGRESS NOTES
Minor Kingant    Age 60 y.o.    male    1965    MRN [id number]    7/9/2021  Arrival Time_____________  OR Time____________[address]     Procedure(s):  ROBOTIC INCISIONAL HERNIA REPAIR WITH MESH, eTAPP BLOCK, POSSIBLE OPEN PROCEDURE                      General     Surgeon(s):  Emy Hannahp, MD      DAY ADMIT ___  SDS/OP ___  OUTPT IN BED ___         Phone [phone] (home)    PCP _____________________ Phone_________________ Epic ( ) Epic CE ( ) Appt ________    ADDITIONAL INFO __________________________________ Cardio/Consult _____________    NOTES _____________________________________________________________________    ____________________________________________________________________________    PAT APPT DATE:________ TIME: ________  FAXED QAD: _______  (__) H&P w/ hospitalist  ____________________________________________________________________________    COVID TEST: Date/Location______________        NURSING HISTORY COMPLETE: _______  (__) CBC       (__) W/ DIFF ___________  (__)  ECHO    __________  (__) Hgb A1C    ___________  (__) CHEST X RAY   __________  (__) LIPID PROFILE  ___________  (__) EKG   __________  (__) PT/PTT   ___________  (__) PFT's   __________  (__) BMP   ___________  (__) CAROTIDS  __________  (__) CMP   ___________  (__) VEIN MAPPING  __________  (__) U/A   ___________  (__) HISTORY & PHYSICAL __________  (__) URINE C & S  ___________  (__) CARDIAC CLEARANCE __________  (__) U/A W/ FLEX  ___________  (__) PULM.  CLEARANCE __________  (__) SERUM PREGNANCY ___________  (__) Check Epic DOS orders __________  (__) TYPE & SCREEN ________ repeat ( ) (__)  __________________ __________  (__) ALBUMIN   ___________  (__)  __________________ __________  (__) TRANSFERRIN  ___________  (__)  __________________ __________  (__) LIVER PROFILE  ___________  (__)  __________________ __________  (__) CARBOXY HGB  ___________  (__) URINE PREG DOS __________  (__) NICOTINE & MET.

## 2021-06-17 NOTE — LETTER
Surgery Scheduling Form:  DEMOGRAPHICS:                                                                                                         .  Patient Name:  Franco Aponte  Patient :  1965   Patient SS#:      Patient Phone:  [phone] (home)                         Alt. Patient Phone:                 Patient Address:  [address] [place] [address],[address] [address]  [facility]  Payor: Too Alvarez / Plan: [facility] HMO / Product Type: *No Product type* /        Insurance ID Number:    Payor/Plan Subscr  Sex Relation Sub. Ins. ID Effective Group Num   1. 1 Guillermo Arora 1965 Male Self [id number]                                   [address] [address]     Interpretor Needed:  (NO)  (TYPE)           LATEX ALLERGY:  (NO)  Allergies: No known drug allergie  Defibulator or Pacemaker:  (NO)    DIAGNOSIS & PROCEDURE:                                                                                       .  Diagnosis Code/Description:   Incisional hernia K43.2  Operation Code/Description:  Robotic incisional hernia repair with mesh possible open [id number]  Location:  [facility]              Surgeon:  Dr. Uday Mckeon:                                                                                    .  Surgeon's Scheduling Instruction:  elective  Requested Date: 21     OR Time: 1245 pm            Patient Arrival Time: 8353 am  OR Time Required:  120  Minutes  Anesthesia:  General  With eTAPP block     Equipment:  robot                                          SA Required (only for Mac and Gen): yes  Status:  Outpatient        Standard C-Arm (only for port and felix):  n/a   Mini C-Arm: No   PAT Required:   Yes                                          H&P needs to be completed: yes  Cardiac Clearance Requested:  (YES, Dr. Cara James)  Covid vaccine completion: 2021               PRE-CERTIFICATION

## 2021-06-23 ENCOUNTER — TELEPHONE (OUTPATIENT)
Dept: SURGERY | Age: 56
End: 2021-06-23

## 2021-06-23 ENCOUNTER — OFFICE VISIT (OUTPATIENT)
Dept: INTERNAL MEDICINE CLINIC | Age: 56
End: 2021-06-23
Payer: MEDICARE

## 2021-06-23 VITALS
HEART RATE: 72 BPM | HEIGHT: 68 IN | SYSTOLIC BLOOD PRESSURE: 122 MMHG | WEIGHT: 173 LBS | DIASTOLIC BLOOD PRESSURE: 80 MMHG | TEMPERATURE: 98.7 F | OXYGEN SATURATION: 97 % | BODY MASS INDEX: 26.22 KG/M2

## 2021-06-23 DIAGNOSIS — K43.2 INCISIONAL HERNIA, WITHOUT OBSTRUCTION OR GANGRENE: ICD-10-CM

## 2021-06-23 DIAGNOSIS — G89.29 OTHER CHRONIC PAIN: Primary | ICD-10-CM

## 2021-06-23 DIAGNOSIS — Z72.0 TOBACCO ABUSE: ICD-10-CM

## 2021-06-23 PROCEDURE — 99203 OFFICE O/P NEW LOW 30 MIN: CPT | Performed by: NURSE PRACTITIONER

## 2021-06-23 PROCEDURE — G8427 DOCREV CUR MEDS BY ELIG CLIN: HCPCS | Performed by: NURSE PRACTITIONER

## 2021-06-23 PROCEDURE — G8419 CALC BMI OUT NRM PARAM NOF/U: HCPCS | Performed by: NURSE PRACTITIONER

## 2021-06-23 RX ORDER — BUPROPION HYDROCHLORIDE 150 MG/1
150 TABLET, EXTENDED RELEASE ORAL 2 TIMES DAILY
Qty: 60 TABLET | Refills: 2 | Status: SHIPPED | OUTPATIENT
Start: 2021-06-23 | End: 2021-07-26 | Stop reason: CLARIF

## 2021-06-23 RX ORDER — GABAPENTIN 600 MG/1
600 TABLET ORAL 3 TIMES DAILY
Qty: 90 TABLET | Refills: 0 | Status: SHIPPED | OUTPATIENT
Start: 2021-06-23 | End: 2021-07-23

## 2021-06-23 ASSESSMENT — PATIENT HEALTH QUESTIONNAIRE - PHQ9
SUM OF ALL RESPONSES TO PHQ QUESTIONS 1-9: 0
1. LITTLE INTEREST OR PLEASURE IN DOING THINGS: 0
SUM OF ALL RESPONSES TO PHQ9 QUESTIONS 1 & 2: 0
SUM OF ALL RESPONSES TO PHQ QUESTIONS 1-9: 0
2. FEELING DOWN, DEPRESSED OR HOPELESS: 0
SUM OF ALL RESPONSES TO PHQ QUESTIONS 1-9: 0

## 2021-06-23 ASSESSMENT — ENCOUNTER SYMPTOMS
CONSTIPATION: 0
SHORTNESS OF BREATH: 0
SORE THROAT: 0
SINUS PAIN: 0
CHEST TIGHTNESS: 0
VOMITING: 0
COUGH: 0
DIARRHEA: 0
NAUSEA: 0

## 2021-06-23 NOTE — TELEPHONE ENCOUNTER
Patient's wife, Tommy Aguiar, said the information that they were given for SX on 7/9 is confusing and has questions re: timing.

## 2021-06-23 NOTE — PROGRESS NOTES
[address]  [address]  Dept: [phone]    Preoperative Consultation    Julieta Romano  YOB: 1965    Date of Service:  6/23/2021    Vitals:    06/23/21 1502   BP: 122/80   Site: Right Upper Arm   Position: Sitting   Cuff Size: Medium Adult   Pulse: 72   Temp: 98.7 °F (37.1 °C)   TempSrc: Temporal   SpO2: 97%   Weight: 173 lb (78.5 kg)   Height: 5' 8\" (1.727 m)      Wt Readings from Last 2 Encounters:   06/23/21 173 lb (78.5 kg)   06/17/21 180 lb (81.6 kg)     BP Readings from Last 3 Encounters:   06/23/21 122/80   06/17/21 123/66   04/12/21 (!) 141/76      Chief Complaint   Patient presents with    Pre-op Exam     Hernia Surgery- 7-9-2021/Shantel Savage     No Known Allergies  Outpatient Medications Marked as Taking for the 6/23/21 encounter (Office Visit) with MAT Heard CNP   Medication Sig Dispense Refill    gabapentin (NEURONTIN) 600 MG tablet Take 1 tablet by mouth 3 times daily for 30 days. 90 tablet 0    buPROPion (ZYBAN) 150 MG extended release tablet Take 1 tablet by mouth 2 times daily 150 mg once daily for 3 days; increase to 150 mg twice daily 60 tablet 2    aspirin 81 MG chewable tablet Take 1 tablet by mouth daily 30 tablet 3    nitroGLYCERIN (NITROSTAT) 0.4 MG SL tablet up to max of 3 total doses.  If no relief after 1 dose, call 911. 25 tablet 3    lisinopril (PRINIVIL;ZESTRIL) 20 MG tablet Take 1 tablet by mouth daily 30 tablet 3    atorvastatin (LIPITOR) 80 MG tablet Take 1 tablet by mouth daily 30 tablet 3    carvedilol (COREG) 6.25 MG tablet Take 1 tablet by mouth 2 times daily (with meals) 60 tablet 3    clopidogrel (PLAVIX) 75 MG tablet Take 75 mg by mouth daily        This patient presents to the office today for a preoperative consultation at the request of surgeon, Dr. Jatin Roberson, who plans on performing incisional hernia repair on July 9 at [facility].  The current problem Association, 2006).     Patient Active Problem List   Diagnosis    [facility]    Carotid artery stenosis with cerebral infarction ([facility]    Dyslipidemia    Essential hypertension    Ischemic foot    Peripheral vascular disease, unspecified ([facility]    Vascular occlusion    Chest pain    Abnormal stress test    Coronary artery disease involving native coronary artery of native heart with unstable angina pectoris [facility])     Past Medical History:   Diagnosis Date    Bowel incontinence     Hyperlipidemia     Hypertension     [facility]     Poor vision     left eye    Pre-diabetes     Sleep apnea     NO CPAP    Stroke ([facility] 2016    x3 - memory deficit    Wears partial dentures     upper & lower     Past Surgical History:   Procedure Laterality Date    ABDOMEN SURGERY      colon polyps    FEMORAL-TIBIAL BYPASS GRAFT Right 06/24/2020    RIGHT FEMORAL TO PERONEAL  BYPASS GRAFT performed by Baldomero Murray MD at [address] [place] [address]     VASCULAR SURGERY       Family History   Problem Relation Age of Onset    Diabetes Mother      Social History     Socioeconomic History    Marital status:      Spouse name: Not on file    Number of children: Not on file    Years of education: Not on file    Highest education level: Not on file   Occupational History    Not on file   Tobacco Use    Smoking status: Current Every Day Smoker     Packs/day: 1.00     Years: 40.00     Pack years: 40.00     Types: Cigarettes    Smokeless tobacco: Never Used   Vaping Use    Vaping Use: Never used   Substance and Sexual Activity    Alcohol use: Not Currently    Drug use: No    Sexual activity: Yes     Partners: Female   Other Topics Concern    Not on file   Social History Narrative    Not on file     Social Determinants of Health     Financial Resource Strain:     Difficulty of Paying Living Expenses:    Food Insecurity: systolic murmur is present. No diastolic murmur is present. No friction rub. No gallop. No S3 or S4 sounds. Pulmonary:      Effort: Pulmonary effort is normal. No accessory muscle usage or respiratory distress. Breath sounds: Normal breath sounds. No decreased breath sounds, wheezing, rhonchi or rales. Abdominal:      General: Bowel sounds are normal.      Palpations: Abdomen is soft. Musculoskeletal:      Cervical back: Normal range of motion. Skin:     General: Skin is warm and dry. Neurological:      Mental Status: He is alert. Psychiatric:         Speech: Speech normal.       EKG Interpretation:  See stress test results 3/21    Lab Review: pending     Assessment:       60 y.o. patient with planned surgery as above. Known risk factors for perioperative complications: Coronary artery disease, Hypertension  Current medications which may produce withdrawal symptoms ifwithheld perioperatively: none     Plan: 1. Preoperative workup as follows: hemoglobin, hematocrit, electrolytes, creatinine  2. Change in medication regimen before surgery: Take carvedilol on morning of surgery with sip of water, and hold all other medications until after surgery, Ok to take Tylenol prior to surgery. Continue ASA per Dr. Rudolpho Buerger. 3. Prophylaxis for cardiac events with perioperative beta-blockers: Currently taking carvedilol  ACC/AHA indications for pre-operative beta-blocker use:    · Vascular surgery with history of postitive stress test  · Intermediate or high risk surgery with history of CAD   · Intermediate or high risk surgery with multiple clinical predictors of CAD- 2 of the following: history of compensated or prior heart failure, history ofcerebrovascular disease, DM, or renal insufficiency    Routine administration of higher-dose, long-acting metoprolol in beta-blocker-naïve patients on the day of surgery, and in the absence of dose titration is associated with an overall increase in mortality. Beta-blockers should be started days to weeks prior to surgery and titrated to pulse < 70.  4. Deep vein thrombosis prophylaxis: regimen to be chosen by surgical team     5. No contraindications to planned surgery. Pending final cardiac clearance.          Andrzej Tatum, MAT - CNP

## 2021-06-23 NOTE — TELEPHONE ENCOUNTER
Left message notifying the patient's wife that surgery was moved to 1:21 pm with an arrival of 11:30 am and this is common. If she looks back on the pre-op form, I mention that surgery time can change on the needs of the OR and apologized greatly.

## 2021-07-06 NOTE — PROGRESS NOTES
Preoperative Screening for Elective Surgery/Invasive Procedures While COVID-19 present in the community     Have you had any of the following symptoms? NONE  o Fever, chills  o Cough  o Shortness of breath  o Muscle aches/pain  o Diarrhea  o Abdominal pain, nausea, vomiting  o Loss or decrease in taste and / or smell   Risk of Exposure  o Have you recently been hospitalized for COVID-19 or flu-like illness, if so when? NO  o Recently diagnosed with COVID-19, if so when? NO  o Recently tested for COVID-19, if so when? NO  o Have you been in close contact with a person or family member who currently has or recently had COVID-23? If yes, when and in what context? NO  o Do you live with anybody who in the last 14 days has had fever, chills, shortness of breath, muscle aches, flu-like illness? NO  o Do you have any close contacts or family members who are currently in the hospital for COVID-19 or flu-like illness? If yes, assess recent close contact with this person. NO    Indicate if the patient has a positive screen by answering yes to one or more of the above questions. Patients who test positive or screen positive prior to surgery or on the day of surgery should be evaluated in conjunction with the surgeon/proceduralist/anesthesiologist to determine the urgency of the procedure.      2ND COVID VACCINE 6/3/21

## 2021-07-08 ENCOUNTER — ANESTHESIA EVENT (OUTPATIENT)
Dept: OPERATING ROOM | Age: 56
DRG: 909 | End: 2021-07-08
Payer: MEDICARE

## 2021-07-09 ENCOUNTER — HOSPITAL ENCOUNTER (INPATIENT)
Age: 56
LOS: 2 days | Discharge: HOME | DRG: 909 | End: 2021-07-11
Attending: SURGERY | Admitting: SURGERY
Payer: MEDICARE

## 2021-07-09 ENCOUNTER — ANESTHESIA (OUTPATIENT)
Dept: OPERATING ROOM | Age: 56
DRG: 909 | End: 2021-07-09
Payer: MEDICARE

## 2021-07-09 VITALS
RESPIRATION RATE: 17 BRPM | TEMPERATURE: 98.6 F | DIASTOLIC BLOOD PRESSURE: 82 MMHG | SYSTOLIC BLOOD PRESSURE: 202 MMHG | OXYGEN SATURATION: 80 %

## 2021-07-09 DIAGNOSIS — K43.2 INCISIONAL HERNIA WITHOUT OBSTRUCTION OR GANGRENE: Primary | ICD-10-CM

## 2021-07-09 LAB
ANION GAP SERPL CALCULATED.3IONS-SCNC: 12 MMOL/L (ref 3–16)
ANION GAP SERPL CALCULATED.3IONS-SCNC: 13 MMOL/L (ref 3–16)
BUN BLDV-MCNC: 12 MG/DL (ref 7–20)
BUN BLDV-MCNC: 13 MG/DL (ref 7–20)
CALCIUM SERPL-MCNC: 8.8 MG/DL (ref 8.3–10.6)
CALCIUM SERPL-MCNC: 9.1 MG/DL (ref 8.3–10.6)
CHLORIDE BLD-SCNC: 103 MMOL/L (ref 99–110)
CHLORIDE BLD-SCNC: 106 MMOL/L (ref 99–110)
CO2: 21 MMOL/L (ref 21–32)
CO2: 23 MMOL/L (ref 21–32)
CREAT SERPL-MCNC: 0.9 MG/DL (ref 0.9–1.3)
CREAT SERPL-MCNC: 0.9 MG/DL (ref 0.9–1.3)
GFR AFRICAN AMERICAN: >60
GFR AFRICAN AMERICAN: >60
GFR NON-AFRICAN AMERICAN: >60
GFR NON-AFRICAN AMERICAN: >60
GLUCOSE BLD-MCNC: 102 MG/DL (ref 70–99)
GLUCOSE BLD-MCNC: 187 MG/DL (ref 70–99)
POTASSIUM REFLEX MAGNESIUM: 6.7 MMOL/L (ref 3.5–5.1)
POTASSIUM SERPL-SCNC: 4.3 MMOL/L (ref 3.5–5.1)
SODIUM BLD-SCNC: 137 MMOL/L (ref 136–145)
SODIUM BLD-SCNC: 141 MMOL/L (ref 136–145)

## 2021-07-09 PROCEDURE — 0WUF4JZ SUPPLEMENT ABDOMINAL WALL WITH SYNTHETIC SUBSTITUTE, PERCUTANEOUS ENDOSCOPIC APPROACH: ICD-10-PCS | Performed by: SURGERY

## 2021-07-09 PROCEDURE — 3600000019 HC SURGERY ROBOT ADDTL 15MIN: Performed by: SURGERY

## 2021-07-09 PROCEDURE — 3700000000 HC ANESTHESIA ATTENDED CARE: Performed by: SURGERY

## 2021-07-09 PROCEDURE — 1200000000 HC SEMI PRIVATE

## 2021-07-09 PROCEDURE — 3600000009 HC SURGERY ROBOT BASE: Performed by: SURGERY

## 2021-07-09 PROCEDURE — 2500000003 HC RX 250 WO HCPCS

## 2021-07-09 PROCEDURE — 7100000001 HC PACU RECOVERY - ADDTL 15 MIN: Performed by: SURGERY

## 2021-07-09 PROCEDURE — 7100000000 HC PACU RECOVERY - FIRST 15 MIN: Performed by: SURGERY

## 2021-07-09 PROCEDURE — 49654 PR LAP, INCISIONAL HERNIA REPAIR,REDUCIBLE: CPT | Performed by: SURGERY

## 2021-07-09 PROCEDURE — 3700000001 HC ADD 15 MINUTES (ANESTHESIA): Performed by: SURGERY

## 2021-07-09 PROCEDURE — 93005 ELECTROCARDIOGRAM TRACING: CPT | Performed by: INTERNAL MEDICINE

## 2021-07-09 PROCEDURE — 0DNU4ZZ RELEASE OMENTUM, PERCUTANEOUS ENDOSCOPIC APPROACH: ICD-10-PCS | Performed by: SURGERY

## 2021-07-09 PROCEDURE — C1781 MESH (IMPLANTABLE): HCPCS | Performed by: SURGERY

## 2021-07-09 PROCEDURE — 2500000003 HC RX 250 WO HCPCS: Performed by: NURSE ANESTHETIST, CERTIFIED REGISTERED

## 2021-07-09 PROCEDURE — 2500000003 HC RX 250 WO HCPCS: Performed by: SURGERY

## 2021-07-09 PROCEDURE — 80048 BASIC METABOLIC PNL TOTAL CA: CPT

## 2021-07-09 PROCEDURE — 6360000002 HC RX W HCPCS: Performed by: SURGERY

## 2021-07-09 PROCEDURE — 6360000002 HC RX W HCPCS: Performed by: NURSE ANESTHETIST, CERTIFIED REGISTERED

## 2021-07-09 PROCEDURE — S2900 ROBOTIC SURGICAL SYSTEM: HCPCS | Performed by: SURGERY

## 2021-07-09 PROCEDURE — 8E0W4CZ ROBOTIC ASSISTED PROCEDURE OF TRUNK REGION, PERCUTANEOUS ENDOSCOPIC APPROACH: ICD-10-PCS | Performed by: SURGERY

## 2021-07-09 PROCEDURE — 6370000000 HC RX 637 (ALT 250 FOR IP): Performed by: SURGERY

## 2021-07-09 PROCEDURE — 6360000002 HC RX W HCPCS

## 2021-07-09 PROCEDURE — 2580000003 HC RX 258: Performed by: SURGERY

## 2021-07-09 PROCEDURE — 2580000003 HC RX 258: Performed by: ANESTHESIOLOGY

## 2021-07-09 PROCEDURE — 2580000003 HC RX 258: Performed by: NURSE ANESTHETIST, CERTIFIED REGISTERED

## 2021-07-09 PROCEDURE — 6360000002 HC RX W HCPCS: Performed by: INTERNAL MEDICINE

## 2021-07-09 PROCEDURE — 2709999900 HC NON-CHARGEABLE SUPPLY: Performed by: SURGERY

## 2021-07-09 PROCEDURE — 6360000002 HC RX W HCPCS: Performed by: ANESTHESIOLOGY

## 2021-07-09 DEVICE — MESH HERN W6XL8IN ELLIPSE W/ ECHO PS POS SYS VENTRALIGHT ST: Type: IMPLANTABLE DEVICE | Site: ABDOMEN | Status: FUNCTIONAL

## 2021-07-09 RX ORDER — SODIUM CHLORIDE 9 MG/ML
INJECTION, SOLUTION INTRAVENOUS CONTINUOUS
Status: DISCONTINUED | OUTPATIENT
Start: 2021-07-09 | End: 2021-07-10

## 2021-07-09 RX ORDER — ROCURONIUM BROMIDE 10 MG/ML
INJECTION, SOLUTION INTRAVENOUS PRN
Status: DISCONTINUED | OUTPATIENT
Start: 2021-07-09 | End: 2021-07-09 | Stop reason: SDUPTHER

## 2021-07-09 RX ORDER — LABETALOL HYDROCHLORIDE 5 MG/ML
INJECTION, SOLUTION INTRAVENOUS PRN
Status: DISCONTINUED | OUTPATIENT
Start: 2021-07-09 | End: 2021-07-09 | Stop reason: SDUPTHER

## 2021-07-09 RX ORDER — BUPROPION HYDROCHLORIDE 150 MG/1
150 TABLET ORAL 2 TIMES DAILY
Status: DISCONTINUED | OUTPATIENT
Start: 2021-07-09 | End: 2021-07-11 | Stop reason: HOSPADM

## 2021-07-09 RX ORDER — HYDRALAZINE HYDROCHLORIDE 20 MG/ML
10 INJECTION INTRAMUSCULAR; INTRAVENOUS EVERY 6 HOURS PRN
Status: DISCONTINUED | OUTPATIENT
Start: 2021-07-09 | End: 2021-07-11 | Stop reason: HOSPADM

## 2021-07-09 RX ORDER — PROPOFOL 10 MG/ML
INJECTION, EMULSION INTRAVENOUS PRN
Status: DISCONTINUED | OUTPATIENT
Start: 2021-07-09 | End: 2021-07-09 | Stop reason: SDUPTHER

## 2021-07-09 RX ORDER — SODIUM CHLORIDE 0.9 % (FLUSH) 0.9 %
5-40 SYRINGE (ML) INJECTION PRN
Status: DISCONTINUED | OUTPATIENT
Start: 2021-07-09 | End: 2021-07-11 | Stop reason: HOSPADM

## 2021-07-09 RX ORDER — ONDANSETRON 4 MG/1
4 TABLET, ORALLY DISINTEGRATING ORAL EVERY 8 HOURS PRN
Status: DISCONTINUED | OUTPATIENT
Start: 2021-07-09 | End: 2021-07-11 | Stop reason: HOSPADM

## 2021-07-09 RX ORDER — SODIUM CHLORIDE 9 MG/ML
INJECTION, SOLUTION INTRAVENOUS CONTINUOUS
Status: DISCONTINUED | OUTPATIENT
Start: 2021-07-09 | End: 2021-07-11 | Stop reason: HOSPADM

## 2021-07-09 RX ORDER — LIDOCAINE HYDROCHLORIDE 20 MG/ML
INJECTION, SOLUTION INFILTRATION; PERINEURAL PRN
Status: DISCONTINUED | OUTPATIENT
Start: 2021-07-09 | End: 2021-07-09 | Stop reason: SDUPTHER

## 2021-07-09 RX ORDER — ONDANSETRON 2 MG/ML
INJECTION INTRAMUSCULAR; INTRAVENOUS PRN
Status: DISCONTINUED | OUTPATIENT
Start: 2021-07-09 | End: 2021-07-09 | Stop reason: SDUPTHER

## 2021-07-09 RX ORDER — HYDROMORPHONE HCL 110MG/55ML
1 PATIENT CONTROLLED ANALGESIA SYRINGE INTRAVENOUS
Status: DISCONTINUED | OUTPATIENT
Start: 2021-07-09 | End: 2021-07-11 | Stop reason: HOSPADM

## 2021-07-09 RX ORDER — DIPHENHYDRAMINE HYDROCHLORIDE 50 MG/ML
12.5 INJECTION INTRAMUSCULAR; INTRAVENOUS
Status: DISCONTINUED | OUTPATIENT
Start: 2021-07-09 | End: 2021-07-09 | Stop reason: HOSPADM

## 2021-07-09 RX ORDER — HYDRALAZINE HYDROCHLORIDE 20 MG/ML
5 INJECTION INTRAMUSCULAR; INTRAVENOUS ONCE
Status: COMPLETED | OUTPATIENT
Start: 2021-07-09 | End: 2021-07-09

## 2021-07-09 RX ORDER — HYDRALAZINE HYDROCHLORIDE 20 MG/ML
5 INJECTION INTRAMUSCULAR; INTRAVENOUS EVERY 10 MIN PRN
Status: DISCONTINUED | OUTPATIENT
Start: 2021-07-09 | End: 2021-07-09 | Stop reason: HOSPADM

## 2021-07-09 RX ORDER — CARVEDILOL 6.25 MG/1
6.25 TABLET ORAL 2 TIMES DAILY WITH MEALS
Status: DISCONTINUED | OUTPATIENT
Start: 2021-07-09 | End: 2021-07-11 | Stop reason: HOSPADM

## 2021-07-09 RX ORDER — ONDANSETRON 2 MG/ML
4 INJECTION INTRAMUSCULAR; INTRAVENOUS PRN
Status: DISCONTINUED | OUTPATIENT
Start: 2021-07-09 | End: 2021-07-09 | Stop reason: HOSPADM

## 2021-07-09 RX ORDER — DEXAMETHASONE SODIUM PHOSPHATE 4 MG/ML
INJECTION, SOLUTION INTRA-ARTICULAR; INTRALESIONAL; INTRAMUSCULAR; INTRAVENOUS; SOFT TISSUE PRN
Status: DISCONTINUED | OUTPATIENT
Start: 2021-07-09 | End: 2021-07-09 | Stop reason: SDUPTHER

## 2021-07-09 RX ORDER — MEPERIDINE HYDROCHLORIDE 50 MG/ML
12.5 INJECTION INTRAMUSCULAR; INTRAVENOUS; SUBCUTANEOUS EVERY 5 MIN PRN
Status: DISCONTINUED | OUTPATIENT
Start: 2021-07-09 | End: 2021-07-09 | Stop reason: HOSPADM

## 2021-07-09 RX ORDER — OXYCODONE HYDROCHLORIDE AND ACETAMINOPHEN 5; 325 MG/1; MG/1
2 TABLET ORAL PRN
Status: DISCONTINUED | OUTPATIENT
Start: 2021-07-09 | End: 2021-07-09 | Stop reason: HOSPADM

## 2021-07-09 RX ORDER — ONDANSETRON 2 MG/ML
4 INJECTION INTRAMUSCULAR; INTRAVENOUS EVERY 6 HOURS PRN
Status: DISCONTINUED | OUTPATIENT
Start: 2021-07-09 | End: 2021-07-11 | Stop reason: HOSPADM

## 2021-07-09 RX ORDER — MORPHINE SULFATE 2 MG/ML
2 INJECTION, SOLUTION INTRAMUSCULAR; INTRAVENOUS EVERY 5 MIN PRN
Status: DISCONTINUED | OUTPATIENT
Start: 2021-07-09 | End: 2021-07-09 | Stop reason: HOSPADM

## 2021-07-09 RX ORDER — GABAPENTIN 300 MG/1
600 CAPSULE ORAL 3 TIMES DAILY
Status: DISCONTINUED | OUTPATIENT
Start: 2021-07-09 | End: 2021-07-11 | Stop reason: HOSPADM

## 2021-07-09 RX ORDER — LISINOPRIL 20 MG/1
20 TABLET ORAL DAILY
Status: DISCONTINUED | OUTPATIENT
Start: 2021-07-09 | End: 2021-07-11 | Stop reason: HOSPADM

## 2021-07-09 RX ORDER — FAMOTIDINE 20 MG/1
20 TABLET, FILM COATED ORAL 2 TIMES DAILY
Status: DISCONTINUED | OUTPATIENT
Start: 2021-07-09 | End: 2021-07-11 | Stop reason: HOSPADM

## 2021-07-09 RX ORDER — LABETALOL HYDROCHLORIDE 5 MG/ML
5 INJECTION, SOLUTION INTRAVENOUS EVERY 10 MIN PRN
Status: DISCONTINUED | OUTPATIENT
Start: 2021-07-09 | End: 2021-07-09 | Stop reason: HOSPADM

## 2021-07-09 RX ORDER — OXYCODONE HYDROCHLORIDE 5 MG/1
5 TABLET ORAL EVERY 4 HOURS PRN
Status: DISCONTINUED | OUTPATIENT
Start: 2021-07-09 | End: 2021-07-11 | Stop reason: HOSPADM

## 2021-07-09 RX ORDER — SODIUM CHLORIDE 0.9 % (FLUSH) 0.9 %
5-40 SYRINGE (ML) INJECTION EVERY 12 HOURS SCHEDULED
Status: DISCONTINUED | OUTPATIENT
Start: 2021-07-09 | End: 2021-07-11 | Stop reason: HOSPADM

## 2021-07-09 RX ORDER — SODIUM CHLORIDE, SODIUM LACTATE, POTASSIUM CHLORIDE, CALCIUM CHLORIDE 600; 310; 30; 20 MG/100ML; MG/100ML; MG/100ML; MG/100ML
INJECTION, SOLUTION INTRAVENOUS CONTINUOUS PRN
Status: DISCONTINUED | OUTPATIENT
Start: 2021-07-09 | End: 2021-07-09 | Stop reason: SDUPTHER

## 2021-07-09 RX ORDER — ACETAMINOPHEN 325 MG/1
650 TABLET ORAL EVERY 6 HOURS PRN
Status: DISCONTINUED | OUTPATIENT
Start: 2021-07-09 | End: 2021-07-11 | Stop reason: HOSPADM

## 2021-07-09 RX ORDER — ASPIRIN 81 MG/1
81 TABLET, CHEWABLE ORAL DAILY
Status: DISCONTINUED | OUTPATIENT
Start: 2021-07-10 | End: 2021-07-11 | Stop reason: HOSPADM

## 2021-07-09 RX ORDER — HYDRALAZINE HYDROCHLORIDE 20 MG/ML
5 INJECTION INTRAMUSCULAR; INTRAVENOUS EVERY 6 HOURS PRN
Status: DISCONTINUED | OUTPATIENT
Start: 2021-07-09 | End: 2021-07-09

## 2021-07-09 RX ORDER — HYDRALAZINE HYDROCHLORIDE 20 MG/ML
10 INJECTION INTRAMUSCULAR; INTRAVENOUS ONCE
Status: COMPLETED | OUTPATIENT
Start: 2021-07-09 | End: 2021-07-09

## 2021-07-09 RX ORDER — PROMETHAZINE HYDROCHLORIDE 25 MG/ML
6.25 INJECTION, SOLUTION INTRAMUSCULAR; INTRAVENOUS
Status: DISCONTINUED | OUTPATIENT
Start: 2021-07-09 | End: 2021-07-09 | Stop reason: HOSPADM

## 2021-07-09 RX ORDER — ATORVASTATIN CALCIUM 80 MG/1
80 TABLET, FILM COATED ORAL DAILY
Status: DISCONTINUED | OUTPATIENT
Start: 2021-07-09 | End: 2021-07-11 | Stop reason: HOSPADM

## 2021-07-09 RX ORDER — OXYCODONE HYDROCHLORIDE 5 MG/1
10 TABLET ORAL EVERY 4 HOURS PRN
Status: DISCONTINUED | OUTPATIENT
Start: 2021-07-09 | End: 2021-07-11 | Stop reason: HOSPADM

## 2021-07-09 RX ORDER — LABETALOL HYDROCHLORIDE 5 MG/ML
10 INJECTION, SOLUTION INTRAVENOUS EVERY 6 HOURS PRN
Status: DISCONTINUED | OUTPATIENT
Start: 2021-07-09 | End: 2021-07-11 | Stop reason: HOSPADM

## 2021-07-09 RX ORDER — SODIUM CHLORIDE 9 MG/ML
25 INJECTION, SOLUTION INTRAVENOUS PRN
Status: DISCONTINUED | OUTPATIENT
Start: 2021-07-09 | End: 2021-07-11 | Stop reason: HOSPADM

## 2021-07-09 RX ORDER — SODIUM CHLORIDE, SODIUM LACTATE, POTASSIUM CHLORIDE, CALCIUM CHLORIDE 600; 310; 30; 20 MG/100ML; MG/100ML; MG/100ML; MG/100ML
INJECTION, SOLUTION INTRAVENOUS CONTINUOUS
Status: DISCONTINUED | OUTPATIENT
Start: 2021-07-09 | End: 2021-07-09

## 2021-07-09 RX ORDER — FENTANYL CITRATE 50 UG/ML
INJECTION, SOLUTION INTRAMUSCULAR; INTRAVENOUS PRN
Status: DISCONTINUED | OUTPATIENT
Start: 2021-07-09 | End: 2021-07-09 | Stop reason: SDUPTHER

## 2021-07-09 RX ORDER — OXYCODONE HYDROCHLORIDE AND ACETAMINOPHEN 5; 325 MG/1; MG/1
1 TABLET ORAL PRN
Status: DISCONTINUED | OUTPATIENT
Start: 2021-07-09 | End: 2021-07-09 | Stop reason: HOSPADM

## 2021-07-09 RX ORDER — MORPHINE SULFATE 2 MG/ML
1 INJECTION, SOLUTION INTRAMUSCULAR; INTRAVENOUS EVERY 5 MIN PRN
Status: DISCONTINUED | OUTPATIENT
Start: 2021-07-09 | End: 2021-07-09 | Stop reason: HOSPADM

## 2021-07-09 RX ADMIN — HYDRALAZINE HYDROCHLORIDE 5 MG: 20 INJECTION INTRAMUSCULAR; INTRAVENOUS at 17:54

## 2021-07-09 RX ADMIN — FENTANYL CITRATE 100 MCG: 50 INJECTION INTRAMUSCULAR; INTRAVENOUS at 12:06

## 2021-07-09 RX ADMIN — ROCURONIUM BROMIDE 80 MG: 10 SOLUTION INTRAVENOUS at 11:11

## 2021-07-09 RX ADMIN — SODIUM CHLORIDE, SODIUM LACTATE, POTASSIUM CHLORIDE, AND CALCIUM CHLORIDE: .6; .31; .03; .02 INJECTION, SOLUTION INTRAVENOUS at 11:07

## 2021-07-09 RX ADMIN — CEFAZOLIN SODIUM 2 G: 10 INJECTION, POWDER, FOR SOLUTION INTRAVENOUS at 11:18

## 2021-07-09 RX ADMIN — FENTANYL CITRATE 100 MCG: 50 INJECTION INTRAMUSCULAR; INTRAVENOUS at 11:09

## 2021-07-09 RX ADMIN — HYDROMORPHONE HYDROCHLORIDE 1 MG: 2 INJECTION INTRAMUSCULAR; INTRAVENOUS; SUBCUTANEOUS at 13:50

## 2021-07-09 RX ADMIN — HYDROMORPHONE HYDROCHLORIDE 0.5 MG: 1 INJECTION, SOLUTION INTRAMUSCULAR; INTRAVENOUS; SUBCUTANEOUS at 14:11

## 2021-07-09 RX ADMIN — SODIUM CHLORIDE: 9 INJECTION, SOLUTION INTRAVENOUS at 18:55

## 2021-07-09 RX ADMIN — HYDRALAZINE HYDROCHLORIDE 5 MG: 20 INJECTION INTRAMUSCULAR; INTRAVENOUS at 14:28

## 2021-07-09 RX ADMIN — OXYCODONE 10 MG: 5 TABLET ORAL at 17:53

## 2021-07-09 RX ADMIN — HYDRALAZINE HYDROCHLORIDE 10 MG: 20 INJECTION INTRAMUSCULAR; INTRAVENOUS at 14:00

## 2021-07-09 RX ADMIN — SUGAMMADEX 200 MG: 100 INJECTION, SOLUTION INTRAVENOUS at 13:32

## 2021-07-09 RX ADMIN — HYDROMORPHONE HYDROCHLORIDE 0.5 MG: 1 INJECTION, SOLUTION INTRAMUSCULAR; INTRAVENOUS; SUBCUTANEOUS at 14:36

## 2021-07-09 RX ADMIN — ATORVASTATIN CALCIUM 80 MG: 80 TABLET, FILM COATED ORAL at 17:53

## 2021-07-09 RX ADMIN — LABETALOL HYDROCHLORIDE 5 MG: 5 INJECTION, SOLUTION INTRAVENOUS at 13:52

## 2021-07-09 RX ADMIN — PROPOFOL 150 MG: 10 INJECTION, EMULSION INTRAVENOUS at 11:11

## 2021-07-09 RX ADMIN — CEFAZOLIN SODIUM 2000 MG: 1 INJECTION, SOLUTION INTRAVENOUS at 18:54

## 2021-07-09 RX ADMIN — HYDROMORPHONE HYDROCHLORIDE 0.5 MG: 1 INJECTION, SOLUTION INTRAMUSCULAR; INTRAVENOUS; SUBCUTANEOUS at 15:46

## 2021-07-09 RX ADMIN — ONDANSETRON 4 MG: 2 INJECTION INTRAMUSCULAR; INTRAVENOUS at 11:18

## 2021-07-09 RX ADMIN — LABETALOL HYDROCHLORIDE 10 MG: 5 INJECTION INTRAVENOUS at 19:32

## 2021-07-09 RX ADMIN — DEXAMETHASONE SODIUM PHOSPHATE 8 MG: 4 INJECTION, SOLUTION INTRAMUSCULAR; INTRAVENOUS at 11:18

## 2021-07-09 RX ADMIN — GABAPENTIN 600 MG: 300 CAPSULE ORAL at 20:44

## 2021-07-09 RX ADMIN — HYDROMORPHONE HYDROCHLORIDE 1 MG: 2 INJECTION, SOLUTION INTRAMUSCULAR; INTRAVENOUS; SUBCUTANEOUS at 19:31

## 2021-07-09 RX ADMIN — LISINOPRIL 20 MG: 20 TABLET ORAL at 17:54

## 2021-07-09 RX ADMIN — BUPROPION HYDROCHLORIDE 150 MG: 150 TABLET, EXTENDED RELEASE ORAL at 20:44

## 2021-07-09 RX ADMIN — FAMOTIDINE 20 MG: 20 TABLET ORAL at 20:44

## 2021-07-09 RX ADMIN — HYDRALAZINE HYDROCHLORIDE 5 MG: 20 INJECTION INTRAMUSCULAR; INTRAVENOUS at 15:22

## 2021-07-09 RX ADMIN — OXYCODONE 10 MG: 5 TABLET ORAL at 23:00

## 2021-07-09 RX ADMIN — LIDOCAINE HYDROCHLORIDE 60 MG: 20 INJECTION, SOLUTION INFILTRATION; PERINEURAL at 11:11

## 2021-07-09 RX ADMIN — SODIUM CHLORIDE, SODIUM LACTATE, POTASSIUM CHLORIDE, AND CALCIUM CHLORIDE: .6; .31; .03; .02 INJECTION, SOLUTION INTRAVENOUS at 12:01

## 2021-07-09 RX ADMIN — SODIUM CHLORIDE, POTASSIUM CHLORIDE, SODIUM LACTATE AND CALCIUM CHLORIDE: 600; 310; 30; 20 INJECTION, SOLUTION INTRAVENOUS at 10:28

## 2021-07-09 RX ADMIN — LABETALOL HYDROCHLORIDE 5 MG: 5 INJECTION, SOLUTION INTRAVENOUS at 13:58

## 2021-07-09 RX ADMIN — HYDRALAZINE HYDROCHLORIDE 5 MG: 20 INJECTION INTRAMUSCULAR; INTRAVENOUS at 18:25

## 2021-07-09 RX ADMIN — CARVEDILOL 6.25 MG: 6.25 TABLET, FILM COATED ORAL at 17:53

## 2021-07-09 RX ADMIN — PROPOFOL 100 MG: 10 INJECTION, EMULSION INTRAVENOUS at 11:32

## 2021-07-09 ASSESSMENT — PULMONARY FUNCTION TESTS
PIF_VALUE: 22
PIF_VALUE: 22
PIF_VALUE: 24
PIF_VALUE: 26
PIF_VALUE: 22
PIF_VALUE: 23
PIF_VALUE: 24
PIF_VALUE: 22
PIF_VALUE: 24
PIF_VALUE: 21
PIF_VALUE: 18
PIF_VALUE: 24
PIF_VALUE: 24
PIF_VALUE: 19
PIF_VALUE: 2
PIF_VALUE: 27
PIF_VALUE: 19
PIF_VALUE: 24
PIF_VALUE: 24
PIF_VALUE: 22
PIF_VALUE: 19
PIF_VALUE: 24
PIF_VALUE: 23
PIF_VALUE: 21
PIF_VALUE: 22
PIF_VALUE: 24
PIF_VALUE: 21
PIF_VALUE: 27
PIF_VALUE: 20
PIF_VALUE: 24
PIF_VALUE: 19
PIF_VALUE: 21
PIF_VALUE: 21
PIF_VALUE: 20
PIF_VALUE: 19
PIF_VALUE: 5
PIF_VALUE: 24
PIF_VALUE: 21
PIF_VALUE: 22
PIF_VALUE: 24
PIF_VALUE: 23
PIF_VALUE: 27
PIF_VALUE: 27
PIF_VALUE: 23
PIF_VALUE: 24
PIF_VALUE: 21
PIF_VALUE: 24
PIF_VALUE: 22
PIF_VALUE: 21
PIF_VALUE: 24
PIF_VALUE: 24
PIF_VALUE: 27
PIF_VALUE: 19
PIF_VALUE: 24
PIF_VALUE: 27
PIF_VALUE: 4
PIF_VALUE: 24
PIF_VALUE: 24
PIF_VALUE: 21
PIF_VALUE: 23
PIF_VALUE: 18
PIF_VALUE: 2
PIF_VALUE: 22
PIF_VALUE: 25
PIF_VALUE: 22
PIF_VALUE: 24
PIF_VALUE: 22
PIF_VALUE: 24
PIF_VALUE: 0
PIF_VALUE: 25
PIF_VALUE: 15
PIF_VALUE: 13
PIF_VALUE: 22
PIF_VALUE: 22
PIF_VALUE: 27
PIF_VALUE: 26
PIF_VALUE: 24
PIF_VALUE: 2
PIF_VALUE: 24
PIF_VALUE: 20
PIF_VALUE: 20
PIF_VALUE: 19
PIF_VALUE: 24
PIF_VALUE: 22
PIF_VALUE: 21
PIF_VALUE: 17
PIF_VALUE: 22
PIF_VALUE: 19
PIF_VALUE: 24
PIF_VALUE: 23
PIF_VALUE: 22
PIF_VALUE: 22
PIF_VALUE: 2
PIF_VALUE: 20
PIF_VALUE: 24
PIF_VALUE: 21
PIF_VALUE: 24
PIF_VALUE: 4
PIF_VALUE: 21
PIF_VALUE: 22
PIF_VALUE: 21
PIF_VALUE: 18
PIF_VALUE: 24
PIF_VALUE: 24
PIF_VALUE: 1
PIF_VALUE: 24
PIF_VALUE: 24
PIF_VALUE: 1
PIF_VALUE: 21
PIF_VALUE: 24
PIF_VALUE: 24
PIF_VALUE: 22
PIF_VALUE: 24
PIF_VALUE: 19
PIF_VALUE: 25
PIF_VALUE: 21
PIF_VALUE: 23
PIF_VALUE: 22
PIF_VALUE: 24
PIF_VALUE: 22
PIF_VALUE: 23
PIF_VALUE: 21
PIF_VALUE: 22
PIF_VALUE: 23
PIF_VALUE: 3
PIF_VALUE: 24
PIF_VALUE: 23
PIF_VALUE: 22
PIF_VALUE: 24
PIF_VALUE: 27
PIF_VALUE: 21
PIF_VALUE: 27
PIF_VALUE: 13
PIF_VALUE: 1
PIF_VALUE: 26
PIF_VALUE: 22
PIF_VALUE: 23
PIF_VALUE: 22
PIF_VALUE: 24
PIF_VALUE: 25
PIF_VALUE: 17
PIF_VALUE: 24
PIF_VALUE: 19
PIF_VALUE: 17
PIF_VALUE: 24
PIF_VALUE: 24

## 2021-07-09 ASSESSMENT — PAIN DESCRIPTION - PAIN TYPE
TYPE: SURGICAL PAIN

## 2021-07-09 ASSESSMENT — PAIN DESCRIPTION - DESCRIPTORS
DESCRIPTORS: ACHING
DESCRIPTORS: ACHING

## 2021-07-09 ASSESSMENT — PAIN SCALES - GENERAL
PAINLEVEL_OUTOF10: 7
PAINLEVEL_OUTOF10: 8
PAINLEVEL_OUTOF10: 8
PAINLEVEL_OUTOF10: 0
PAINLEVEL_OUTOF10: 8
PAINLEVEL_OUTOF10: 8
PAINLEVEL_OUTOF10: 7
PAINLEVEL_OUTOF10: 9
PAINLEVEL_OUTOF10: 0
PAINLEVEL_OUTOF10: 0

## 2021-07-09 ASSESSMENT — PAIN - FUNCTIONAL ASSESSMENT
PAIN_FUNCTIONAL_ASSESSMENT: ACTIVITIES ARE NOT PREVENTED
PAIN_FUNCTIONAL_ASSESSMENT: 0-10
PAIN_FUNCTIONAL_ASSESSMENT: ACTIVITIES ARE NOT PREVENTED

## 2021-07-09 ASSESSMENT — PAIN DESCRIPTION - LOCATION
LOCATION: ABDOMEN

## 2021-07-09 ASSESSMENT — PAIN DESCRIPTION - FREQUENCY
FREQUENCY: CONTINUOUS
FREQUENCY: CONTINUOUS

## 2021-07-09 ASSESSMENT — PAIN DESCRIPTION - PROGRESSION
CLINICAL_PROGRESSION: GRADUALLY WORSENING
CLINICAL_PROGRESSION: GRADUALLY WORSENING

## 2021-07-09 ASSESSMENT — PAIN DESCRIPTION - ONSET
ONSET: ON-GOING
ONSET: ON-GOING

## 2021-07-09 ASSESSMENT — PAIN DESCRIPTION - ORIENTATION
ORIENTATION: LOWER
ORIENTATION: LOWER

## 2021-07-09 NOTE — ANESTHESIA PRE PROCEDURE
Department of Anesthesiology  Preprocedure Note       Name:  Digna    Age:  60 y.o.  :  1965                                          MRN:  [id number]         Date:  2021      Surgeon: Yamileth Dent):  Bradley Haji MD    Procedure: Procedure(s):  ROBOTIC INCISIONAL HERNIA REPAIR WITH MESH, eTAPP BLOCK, POSSIBLE OPEN PROCEDURE    Medications prior to admission:   Prior to Admission medications    Medication Sig Start Date End Date Taking? Authorizing Provider   gabapentin (NEURONTIN) 600 MG tablet Take 1 tablet by mouth 3 times daily for 30 days. 21 Yes MAT Haro CNP   buPROPion (ZYBAN) 150 MG extended release tablet Take 1 tablet by mouth 2 times daily 150 mg once daily for 3 days; increase to 150 mg twice daily 21 Yes MAT Haro CNP   aspirin 81 MG chewable tablet Take 1 tablet by mouth daily 3/14/21  Yes MAT Garcia CNP   lisinopril (PRINIVIL;ZESTRIL) 20 MG tablet Take 1 tablet by mouth daily 3/14/21  Yes MAT Garcia CNP   carvedilol (COREG) 6.25 MG tablet Take 1 tablet by mouth 2 times daily (with meals) 3/13/21  Yes MAT Garcia CNP   nicotine (NICODERM CQ) 21 MG/24HR Place 1 patch onto the skin daily  Patient not taking: Reported on 2021 3/29/21 5/10/21  Unique Contreras MD   nitroGLYCERIN (NITROSTAT) 0.4 MG SL tablet up to max of 3 total doses.  If no relief after 1 dose, call 911. 3/13/21   MAT Garcia CNP   atorvastatin (LIPITOR) 80 MG tablet Take 1 tablet by mouth daily 3/13/21   MAT Garcia CNP   clopidogrel (PLAVIX) 75 MG tablet Take 75 mg by mouth daily  18   Historical Provider, MD       Current medications:    Current Facility-Administered Medications   Medication Dose Route Frequency Provider Last Rate Last Admin    lactated ringers infusion   Intravenous Continuous Joao Weinberg MD 50 mL/hr at 21 1028 New Bag at 21 1028    lidocaine 1 % (PF) injection 0.1 mL  0.1 mL Intradermal Once PRN Kemal Samuel MD        ceFAZolin (ANCEF) 2000 mg in dextrose 5 % 100 mL IVPB  2,000 mg Intravenous On Call to Srini Samuels MD           Allergies:  No Known Allergies    Problem List:    Patient Active Problem List   Diagnosis Code    Acute CVA (cerebrovascular accident) ([facility] I63.9    Carotid artery stenosis with cerebral infarction ([facility] I63.239    Dyslipidemia E78.5    Essential hypertension I10    Ischemic foot I99.8    Peripheral vascular disease, unspecified ([facility] I73.9    Vascular occlusion I99.8    Chest pain R07.9    Abnormal stress test R94.39    Coronary artery disease involving native coronary artery of native heart with unstable angina pectoris ([facility] I25.110       Past Medical History:        Diagnosis Date    Bowel incontinence     Hyperlipidemia     Hypertension     PAD (peripheral artery disease) (HCC)     Poor vision     left eye    Pre-diabetes     Sleep apnea     NO CPAP    Stroke ([facility] 2016    x3 - memory deficit    Wears partial dentures     upper & lower       Past Surgical History:        Procedure Laterality Date    ABDOMEN SURGERY      colon polyps    FEMORAL-TIBIAL BYPASS GRAFT Right 06/24/2020    RIGHT FEMORAL TO PERONEAL  BYPASS GRAFT performed by Ranjit Harvey MD at [address] [place] [address] Right     VASCULAR SURGERY         Social History:    Social History     Tobacco Use    Smoking status: Current Every Day Smoker     Packs/day: 1.00     Years: 40.00     Pack years: 40.00     Types: Cigarettes    Smokeless tobacco: Never Used   Substance Use Topics    Alcohol use: Not Currently                                Ready to quit: Not Answered  Counseling given: Yes      Vital Signs (Current):   Vitals:    07/06/21 1007 07/09/21 1019   BP:  (!) 189/92   Pulse:  54   Resp:  16   Temp:  97.6 °F (36.4 °C)   TempSrc:  Temporal   SpO2:  100% Weight: 182 lb (82.6 kg)    Height: 5' 8\" (1.727 m)                                               BP Readings from Last 3 Encounters:   07/09/21 (!) 189/92   06/23/21 122/80   06/17/21 123/66       NPO Status: Time of last liquid consumption: 2100                        Time of last solid consumption: 2100                        Date of last liquid consumption: 07/08/21                        Date of last solid food consumption: 07/08/21    BMI:   Wt Readings from Last 3 Encounters:   07/06/21 182 lb (82.6 kg)   06/23/21 173 lb (78.5 kg)   06/17/21 180 lb (81.6 kg)     Body mass index is 27.67 kg/m². CBC:   Lab Results   Component Value Date    WBC 8.0 03/25/2021    RBC 3.96 03/25/2021    HGB 13.2 03/25/2021    HCT 38.8 03/25/2021    MCV 97.9 03/25/2021    RDW 13.9 03/25/2021     03/25/2021       CMP:   Lab Results   Component Value Date     03/25/2021    K 4.1 03/25/2021    K 3.8 03/13/2021     03/25/2021    CO2 25 03/25/2021    BUN 16 03/25/2021    CREATININE 1.1 03/25/2021    GFRAA >60 03/25/2021    GFRAA >60 07/11/2012    AGRATIO 2.0 03/25/2021    LABGLOM >60 03/25/2021    GLUCOSE 80 03/25/2021    PROT 6.4 03/25/2021    PROT 7.4 07/12/2011    CALCIUM 8.6 03/25/2021    BILITOT 0.3 03/25/2021    ALKPHOS 125 03/25/2021    AST 11 03/25/2021    ALT 8 03/25/2021       POC Tests: No results for input(s): POCGLU, POCNA, POCK, POCCL, POCBUN, POCHEMO, POCHCT in the last 72 hours.     Coags:   Lab Results   Component Value Date    PROTIME 12.5 05/30/2020    INR 1.08 05/30/2020    APTT 61.6 06/24/2020       HCG (If Applicable): No results found for: PREGTESTUR, PREGSERUM, HCG, HCGQUANT     ABGs: No results found for: PHART, PO2ART, ZEW9QZK, GUO6KNO, BEART, F5HDIUHO     Type & Screen (If Applicable):  No results found for: LABABO, LABRH    Drug/Infectious Status (If Applicable):  No results found for: HIV, HEPCAB    COVID-19 Screening (If Applicable):   Lab Results   Component Value Date    COVID19 Not Detected 06/19/2020           Anesthesia Evaluation  Patient summary reviewed and Nursing notes reviewed  Airway: Mallampati: II  TM distance: >3 FB   Neck ROM: full  Mouth opening: > = 3 FB Dental:    (+) edentulous      Pulmonary:   (+) sleep apnea:  decreased breath sounds,                             Cardiovascular:    (+) hypertension:, angina:, CAD:,         Rhythm: regular  Rate: normal                    Neuro/Psych:   (+) CVA: no interval change,             GI/Hepatic/Renal: Neg GI/Hepatic/Renal ROS            Endo/Other: Negative Endo/Other ROS                    Abdominal:             Vascular: negative vascular ROS. Other Findings:             Anesthesia Plan      general     ASA 3       Induction: intravenous. MIPS: Postoperative opioids intended and Prophylactic antiemetics administered. Anesthetic plan and risks discussed with patient. Plan discussed with CRNA.                   Max Rivas MD   7/9/2021

## 2021-07-09 NOTE — OP NOTE
Date of Surgery: 7/9/21    Preop Dx: Incisional Hernias    Postop Dx:   Same     Procedure:   Robotic Lysis of Adhesions and Incisional Hernia Repairs with Mesh     Surgeon:   Ilona Scheuermann     Assistant:       Anesthesia:   GETA     EBL:    <50ml    Specimen:   none    Complications:  none    Drains/Lines:   none    Indications:   61 yo with incisional hernias containing omentum and causing pain    Description:        Patient was given adequate description of the risks and rewards of the procedure, including bleeding, infection, possible injury to surrounding structures, and possibility of open procedure and freely consented. Patient was given appropriate antibiotics and brought to the OR where GETA anesthesia was induced. Patient was placed in supine position. Prepped and draped in usual sterile fashion. Local anesthetic injected in left upper abdomen abdomen and incision made in epidermis allowing for insertion of 5mm optiview trocar. Once it was inserted the abdomen was insufflated with CO2 to 15mmHg pressure. The 30 degree laparoscope was then inserted and peritoneal cavity was inspected. Findings were omental containing hernias. An 8mm trocar was inserted in the left lower abdomen and a 12mm trocar was inserted in the left mid abdomen. 5mm trocar upsized to 8mm. Robot then docked. Using a combination of sharp dissection and cautery, the hernia was reduced and adhesions lysed. Peritoneal sac was then removed using cautery and removed. The small bridges or fascia between the defects were taken down to create one common defect. This was closed at fascial level with strattafix suture. The defect was measured and an appropriate size [id number] piece of ventralight st echo mesh selected. It was inserted and positioned. It was secured circumferentially and centrally with strattafix suture. The balloon was then removed.   Under direct visualization the 12mm trocar and 8mm trocar were removed without bleeding seen at their insertion sites. The abdomen was allowed to desufflate and the final trocar was removed. The 12mm trocar site had the fascia closed with 0-0 vicryl figure of eight suture. Then, all trocar sites had the epidermis reapproximated with 4-0 monocryl subcuticular suture. Sterile dressing placed. All suture, sponge and instrument count correct times two at end of case. Transferred to PACU in stable condition.    Lukasz Sweet MD

## 2021-07-09 NOTE — PROGRESS NOTES
Patient admitted from OR to PACU. Bedside report received. Patient immediately hooked up to heart monitor. Patient's b/p elevated 221/89. Anaesthesiologist at bedside.   Nitro IVP  given per Andrew Brantley RN

## 2021-07-09 NOTE — H&P
I have reviewed the history and physical and examined the patient. I find no relevant changes. I have reviewed with the patient and/or family members, during the preoperative office visit the risks, benefits, and alternatives to the procedure.     Kayden Nicolas MD

## 2021-07-09 NOTE — PROGRESS NOTES
Pt admitted from PACU with BP readings 200s/100s. Upon admission Pt received home medications, 5mg hydralazine PRN, 5mg hydralazine one time dose, and pain meds for patient comfort. Also obtained a stat EKG. Most recent /115    MD aware. Continuing to monitor closely.

## 2021-07-09 NOTE — CONSULTS
Hospital Medicine Consult    PCP: MAT Padron CNP    Date of Admission: 7/9/2021    Date of Service: Pt seen/examined on 7/9/21    Reason for consult:  Uncontrolled HTN      History Of Present Illness:     60 y.o. male with hx of htn, hld, pad, cva, cad(stent 3/21/21), colon cancer(hx of abd surgery), prediabetes who presented to [facility] with ongoing abdominal pain. Pt has hx of abdominal hernia for the past 4-5 years and around 3/25/21, developing inc'g pain. Pt was referred to gen surgery with plans for intervention today(after able to come off anti-platelet meds from recent stenting). Pt was admitted by gen surgery service for elective hernia repair, which was performed on 7/9/21. [facility] service was consulted given uncontrolled HTN. He denies sob/cp/HA/vision changes and is currently asymptomatic. He believes his bp normally runs in 313C systolic at home. Past Medical History:          Diagnosis Date    Bowel incontinence     Hyperlipidemia     Hypertension     PAD [facility]peripheral artery disease) ([facility])     Poor vision     left eye    Pre-diabetes     Sleep apnea     NO CPAP    Stroke ([facility] [address] 2016    x3 - memory deficit    Wears partial dentures     upper & lower       Past Surgical History:          Procedure Laterality Date    ABDOMEN SURGERY      colon polyps    FEMORAL-TIBIAL BYPASS GRAFT Right 06/24/2020    RIGHT FEMORAL TO PERONEAL  BYPASS GRAFT performed by Chelly Barakat MD at [address] [place] [address] Right     VASCULAR SURGERY         Medications Prior to Admission:      Prior to Admission medications    Medication Sig Start Date End Date Taking? Authorizing Provider   gabapentin (NEURONTIN) 600 MG tablet Take 1 tablet by mouth 3 times daily for 30 days.  6/23/21 7/23/21 Yes MAT Padron CNP   buPROPion (ZYBAN) 150 MG extended release tablet Take 1 tablet by mouth 2 times daily 150 mg once daily for 3 days; increase to 150 mg twice daily 6/23/21 7/23/21 Yes Salvador Barclay, APRN - CNP   aspirin 81 MG chewable tablet Take 1 tablet by mouth daily 3/14/21  Yes MAT Das CNP   lisinopril (PRINIVIL;ZESTRIL) 20 MG tablet Take 1 tablet by mouth daily 3/14/21  Yes MAT Das CNP   carvedilol (COREG) 6.25 MG tablet Take 1 tablet by mouth 2 times daily (with meals) 3/13/21  Yes MAT Das CNP   nicotine (NICODERM CQ) 21 MG/24HR Place 1 patch onto the skin daily  Patient not taking: Reported on 7/6/2021 3/29/21 5/10/21  Deisy Harper MD   nitroGLYCERIN (NITROSTAT) 0.4 MG SL tablet up to max of 3 total doses. If no relief after 1 dose, call 911. 3/13/21   MAT Das CNP   atorvastatin (LIPITOR) 80 MG tablet Take 1 tablet by mouth daily 3/13/21   MAT Das CNP   clopidogrel (PLAVIX) 75 MG tablet Take 75 mg by mouth daily  7/16/18   Historical Provider, MD       Allergies:  Patient has no known allergies. Social History:      The patient currently lives at home    TOBACCO:   reports that he has been smoking cigarettes. He has a 40.00 pack-year smoking history. He has never used smokeless tobacco.  ETOH:   reports previous alcohol use. E-Cigarettes/Vaping Use     Questions Responses    E-Cigarette/Vaping Use Never User    Start Date     Passive Exposure     Quit Date     Counseling Given     Comments             Family History:       Reviewed in detail and negative for DM, CAD, Cancer, CVA. Positive as follows:        Problem Relation Age of Onset    Diabetes Mother        REVIEW OF SYSTEMS COMPLETED:   Pertinent positives as noted in the HPI. All other systems reviewed and negative. PHYSICAL EXAM PERFORMED:    BP (!) 190/99   Pulse 102   Temp 98 °F (36.7 °C) (Oral)   Resp 17   Ht 5' 8\" (1.727 m)   Wt 182 lb (82.6 kg)   SpO2 95%   BMI 27.67 kg/m²     General appearance:  No apparent distress, appears stated age and cooperative.   HEENT:  Normal cephalic, atraumatic [Post Op: _________] : a [unfilled] post op visit without obvious deformity. Pupils equal, round, and reactive to light. Extra ocular muscles intact. Conjunctivae/corneas clear. Neck: Supple, with full range of motion. No jugular venous distention. Trachea midline. Respiratory:  Normal respiratory effort. Clear to auscultation, bilaterally without Rales/Wheezes/Rhonchi. Cardiovascular:  Regular rate and rhythm with normal S1/S2 without murmurs, rubs or gallops. Abdomen: Soft, tender, non-distended with dec'd bowel sounds. abd band in place  Musculoskeletal:  No clubbing, cyanosis or edema bilaterally. Full range of motion without deformity. Skin: Skin color, texture, turgor normal.  No rashes or lesions. Neurologic:  Neurovascularly intact without any focal sensory/motor deficits. Cranial nerves: II-XII intact, grossly non-focal.  Psychiatric:  Alert and oriented, thought content appropriate, normal insight  Capillary Refill: Brisk,3 seconds, normal  Peripheral Pulses: +2 palpable, equal bilaterally       Labs:     No results for input(s): WBC, HGB, HCT, PLT in the last 72 hours. Recent Labs     07/09/21  1030 07/09/21  1501    137   K 6.7* 4.3    103   CO2 23 21   BUN 13 12   CREATININE 0.9 0.9   CALCIUM 9.1 8.8     No results for input(s): AST, ALT, BILIDIR, BILITOT, ALKPHOS in the last 72 hours. No results for input(s): INR in the last 72 hours. No results for input(s): Jadene Moh in the last 72 hours.     Urinalysis:      Lab Results   Component Value Date    NITRU Negative 03/25/2021    WBCUA 251 06/06/2016    BACTERIA 3+ 06/06/2016    RBCUA 243 06/06/2016    BLOODU Negative 03/25/2021    SPECGRAV 1.015 03/25/2021    GLUCOSEU Negative 03/25/2021       Radiology:     CXR: I have reviewed the CXR with the following interpretation: na  EKG:  I have reviewed the EKG with the following interpretation: sinus, rate of 94, nl axis, no gross ischemic changes appreciated    No orders to display       ASSESSMENT:    [address] [place] [address] Diagnosis Date Noted    Incisional hernia without obstruction or gangrene [K43.2] 07/09/2021    Incisional hernia, without obstruction or gangrene [K43.2]          PLAN:    Abdominal pain- due to prior incisional hernia, s/p mesh repair and robotic VIDA on 7/9/21  -mgmt per primary team    Uncontrolled htn- possibly from above, ?pain related as well and being off home meds  -resumed/continued home coreg, acei  -added prn hydralazine IV    CAD/PAD- with hx of stent in 3/2021  -continued asa/statin    Prior CVA- stable  -continued asa/statin    GERD- on pepcid  Tobacco use- on welbutrin  HLD- on statin    DVT Prophylaxis: lovenox  Diet: ADULT DIET; Full Liquid  Code Status: Full Code    PT/OT Eval Status: defer to primary    Dispo - trial prn IV hydralazine, control pain       Eros Hirsch MD    Thank you Bradley Nolan APRN - CNP for the opportunity to be involved in this patient's care. If you have any questions or concerns please feel free to contact me at [phone].

## 2021-07-09 NOTE — PROGRESS NOTES
Below is a copy of the reply note from Dr. Almita Naik in [facility]. \"Please obtain stat EKG if not yet performed today. Please ensure patient is on telemetry monitoring and that renal panel has been obtained.  Thank you\"

## 2021-07-09 NOTE — ANESTHESIA POSTPROCEDURE EVALUATION
Department of Anesthesiology  Postprocedure Note    Patient: Kayleigh Remy  MRN: [id number]  YOB: 1965  Date of evaluation: 7/9/2021  Time:  2:38 PM     Procedure Summary     Date: 07/09/21 Room / Location: [address] [place] [address]    Anesthesia Start: 1107 Anesthesia Stop: 5855    Procedure: ROBOTIC INCISIONAL HERNIA REPAIR WITH MESH, eTAPP BLOCK (N/A Abdomen) Diagnosis:       Incisional hernia, without obstruction or gangrene      (INCISIONAL HERNIA)    Surgeons: Nikki Gómez MD Responsible Provider: Marthena Burkitt, MD    Anesthesia Type: general ASA Status: 3          Anesthesia Type: general    Santo Phase I: Santo Score: 7    Santo Phase II:      Last vitals: Reviewed and per EMR flowsheets.        Anesthesia Post Evaluation    Patient location during evaluation: PACU  Patient participation: complete - patient participated  Level of consciousness: awake and alert  Pain score: 0  Airway patency: patent  Nausea & Vomiting: no nausea and no vomiting  Complications: no  Cardiovascular status: blood pressure returned to baseline  Respiratory status: acceptable  Hydration status: stable

## 2021-07-10 LAB
ANION GAP SERPL CALCULATED.3IONS-SCNC: 13 MMOL/L (ref 3–16)
BUN BLDV-MCNC: 11 MG/DL (ref 7–20)
CALCIUM SERPL-MCNC: 9.2 MG/DL (ref 8.3–10.6)
CHLORIDE BLD-SCNC: 98 MMOL/L (ref 99–110)
CO2: 21 MMOL/L (ref 21–32)
CREAT SERPL-MCNC: 0.7 MG/DL (ref 0.9–1.3)
EKG ATRIAL RATE: 94 BPM
EKG DIAGNOSIS: NORMAL
EKG P AXIS: 46 DEGREES
EKG P-R INTERVAL: 172 MS
EKG Q-T INTERVAL: 364 MS
EKG QRS DURATION: 96 MS
EKG QTC CALCULATION (BAZETT): 455 MS
EKG R AXIS: 45 DEGREES
EKG T AXIS: 46 DEGREES
EKG VENTRICULAR RATE: 94 BPM
GFR AFRICAN AMERICAN: >60
GFR NON-AFRICAN AMERICAN: >60
GLUCOSE BLD-MCNC: 146 MG/DL (ref 70–99)
GLUCOSE BLD-MCNC: 156 MG/DL (ref 70–99)
PERFORMED ON: ABNORMAL
POTASSIUM SERPL-SCNC: 3.9 MMOL/L (ref 3.5–5.1)
SODIUM BLD-SCNC: 132 MMOL/L (ref 136–145)

## 2021-07-10 PROCEDURE — 6360000002 HC RX W HCPCS: Performed by: INTERNAL MEDICINE

## 2021-07-10 PROCEDURE — 93010 ELECTROCARDIOGRAM REPORT: CPT | Performed by: INTERNAL MEDICINE

## 2021-07-10 PROCEDURE — 6370000000 HC RX 637 (ALT 250 FOR IP): Performed by: SURGERY

## 2021-07-10 PROCEDURE — 99024 POSTOP FOLLOW-UP VISIT: CPT | Performed by: SURGERY

## 2021-07-10 PROCEDURE — 2580000003 HC RX 258: Performed by: SURGERY

## 2021-07-10 PROCEDURE — 94761 N-INVAS EAR/PLS OXIMETRY MLT: CPT

## 2021-07-10 PROCEDURE — 1200000000 HC SEMI PRIVATE

## 2021-07-10 PROCEDURE — 2700000000 HC OXYGEN THERAPY PER DAY

## 2021-07-10 PROCEDURE — 36415 COLL VENOUS BLD VENIPUNCTURE: CPT

## 2021-07-10 PROCEDURE — 6360000002 HC RX W HCPCS: Performed by: SURGERY

## 2021-07-10 PROCEDURE — 80048 BASIC METABOLIC PNL TOTAL CA: CPT

## 2021-07-10 RX ORDER — KETOROLAC TROMETHAMINE 30 MG/ML
15 INJECTION, SOLUTION INTRAMUSCULAR; INTRAVENOUS EVERY 8 HOURS PRN
Status: DISCONTINUED | OUTPATIENT
Start: 2021-07-10 | End: 2021-07-11 | Stop reason: HOSPADM

## 2021-07-10 RX ADMIN — FAMOTIDINE 20 MG: 20 TABLET ORAL at 07:45

## 2021-07-10 RX ADMIN — LISINOPRIL 20 MG: 20 TABLET ORAL at 07:45

## 2021-07-10 RX ADMIN — SODIUM CHLORIDE, PRESERVATIVE FREE 10 ML: 5 INJECTION INTRAVENOUS at 07:55

## 2021-07-10 RX ADMIN — HYDROMORPHONE HYDROCHLORIDE 1 MG: 2 INJECTION, SOLUTION INTRAMUSCULAR; INTRAVENOUS; SUBCUTANEOUS at 06:41

## 2021-07-10 RX ADMIN — OXYCODONE 10 MG: 5 TABLET ORAL at 05:33

## 2021-07-10 RX ADMIN — ENOXAPARIN SODIUM 40 MG: 40 INJECTION SUBCUTANEOUS at 07:44

## 2021-07-10 RX ADMIN — FAMOTIDINE 20 MG: 20 TABLET ORAL at 21:14

## 2021-07-10 RX ADMIN — GABAPENTIN 600 MG: 300 CAPSULE ORAL at 21:14

## 2021-07-10 RX ADMIN — ACETAMINOPHEN 650 MG: 325 TABLET ORAL at 07:52

## 2021-07-10 RX ADMIN — ASPIRIN 81 MG: 81 TABLET, CHEWABLE ORAL at 07:45

## 2021-07-10 RX ADMIN — CARVEDILOL 6.25 MG: 6.25 TABLET, FILM COATED ORAL at 07:45

## 2021-07-10 RX ADMIN — OXYCODONE 10 MG: 5 TABLET ORAL at 21:13

## 2021-07-10 RX ADMIN — CARVEDILOL 6.25 MG: 6.25 TABLET, FILM COATED ORAL at 15:41

## 2021-07-10 RX ADMIN — BUPROPION HYDROCHLORIDE 150 MG: 150 TABLET, EXTENDED RELEASE ORAL at 07:45

## 2021-07-10 RX ADMIN — CEFAZOLIN SODIUM 2000 MG: 1 INJECTION, SOLUTION INTRAVENOUS at 03:59

## 2021-07-10 RX ADMIN — GABAPENTIN 600 MG: 300 CAPSULE ORAL at 13:17

## 2021-07-10 RX ADMIN — BUPROPION HYDROCHLORIDE 150 MG: 150 TABLET, EXTENDED RELEASE ORAL at 21:14

## 2021-07-10 RX ADMIN — GABAPENTIN 600 MG: 300 CAPSULE ORAL at 07:45

## 2021-07-10 RX ADMIN — ATORVASTATIN CALCIUM 80 MG: 80 TABLET, FILM COATED ORAL at 07:45

## 2021-07-10 RX ADMIN — HYDRALAZINE HYDROCHLORIDE 10 MG: 20 INJECTION INTRAMUSCULAR; INTRAVENOUS at 00:02

## 2021-07-10 RX ADMIN — OXYCODONE 10 MG: 5 TABLET ORAL at 09:53

## 2021-07-10 RX ADMIN — HYDROMORPHONE HYDROCHLORIDE 1 MG: 2 INJECTION, SOLUTION INTRAMUSCULAR; INTRAVENOUS; SUBCUTANEOUS at 01:12

## 2021-07-10 RX ADMIN — HYDRALAZINE HYDROCHLORIDE 10 MG: 20 INJECTION INTRAMUSCULAR; INTRAVENOUS at 06:28

## 2021-07-10 RX ADMIN — LABETALOL HYDROCHLORIDE 10 MG: 5 INJECTION INTRAVENOUS at 04:01

## 2021-07-10 ASSESSMENT — PAIN SCALES - GENERAL
PAINLEVEL_OUTOF10: 8
PAINLEVEL_OUTOF10: 0
PAINLEVEL_OUTOF10: 7
PAINLEVEL_OUTOF10: 0
PAINLEVEL_OUTOF10: 8
PAINLEVEL_OUTOF10: 0
PAINLEVEL_OUTOF10: 8
PAINLEVEL_OUTOF10: 0
PAINLEVEL_OUTOF10: 8
PAINLEVEL_OUTOF10: 8

## 2021-07-10 ASSESSMENT — PAIN DESCRIPTION - LOCATION
LOCATION: ABDOMEN
LOCATION: ABDOMEN

## 2021-07-10 ASSESSMENT — PAIN DESCRIPTION - PAIN TYPE
TYPE: SURGICAL PAIN
TYPE: SURGICAL PAIN

## 2021-07-10 ASSESSMENT — PAIN DESCRIPTION - PROGRESSION
CLINICAL_PROGRESSION: GRADUALLY WORSENING
CLINICAL_PROGRESSION: GRADUALLY WORSENING

## 2021-07-10 ASSESSMENT — PAIN DESCRIPTION - ONSET
ONSET: ON-GOING
ONSET: ON-GOING

## 2021-07-10 ASSESSMENT — PAIN - FUNCTIONAL ASSESSMENT
PAIN_FUNCTIONAL_ASSESSMENT: ACTIVITIES ARE NOT PREVENTED
PAIN_FUNCTIONAL_ASSESSMENT: ACTIVITIES ARE NOT PREVENTED

## 2021-07-10 ASSESSMENT — PAIN DESCRIPTION - FREQUENCY
FREQUENCY: CONTINUOUS
FREQUENCY: CONTINUOUS

## 2021-07-10 ASSESSMENT — PAIN DESCRIPTION - ORIENTATION
ORIENTATION: LOWER
ORIENTATION: LOWER

## 2021-07-10 ASSESSMENT — PAIN DESCRIPTION - DESCRIPTORS
DESCRIPTORS: ACHING
DESCRIPTORS: ACHING

## 2021-07-10 NOTE — PROGRESS NOTES
Hospitalist Progress Note      PCP: Mary Jacobo, APRN - CNP    Date of Admission: 7/9/2021       Reason for consult:  Uncontrolled HTN    Hospital Course: reviewed     Subjective:  bp improved, still with abd pain/soreness       Medications:  Reviewed    Infusion Medications    sodium chloride      sodium chloride Stopped (07/09/21 1464)    sodium chloride       Scheduled Medications    sodium chloride flush  5-40 mL Intravenous 2 times per day    enoxaparin  40 mg Subcutaneous Daily    famotidine  20 mg Oral BID    aspirin  81 mg Oral Daily    atorvastatin  80 mg Oral Daily    buPROPion  150 mg Oral BID    carvedilol  6.25 mg Oral BID WC    gabapentin  600 mg Oral TID    lisinopril  20 mg Oral Daily     PRN Meds: sodium chloride flush, sodium chloride, ondansetron **OR** ondansetron, acetaminophen, oxyCODONE **OR** oxyCODONE, HYDROmorphone, labetalol, hydrALAZINE      Intake/Output Summary (Last 24 hours) at 7/10/2021 1205  Last data filed at 7/10/2021 3047  Gross per 24 hour   Intake 520 ml   Output 2325 ml   Net -1805 ml       Physical Exam Performed:    /80   Pulse 88   Temp 98.6 °F (37 °C) (Oral)   Resp 18   Ht 5' 8\" (1.727 m)   Wt 184 lb 4.9 oz (83.6 kg)   SpO2 93%   BMI 28.02 kg/m²     General appearance:  No apparent distress, appears stated age and cooperative. HEENT:  Normal cephalic, atraumatic without obvious deformity. Pupils equal, round, and reactive to light. Extra ocular muscles intact. Conjunctivae/corneas clear. Neck: Supple, with full range of motion. No jugular venous distention. Trachea midline. Respiratory:  Normal respiratory effort. Clear to auscultation, bilaterally without Rales/Wheezes/Rhonchi. Cardiovascular:  Regular rate and rhythm with normal S1/S2 without murmurs, rubs or gallops. Abdomen: Soft, tender, non-distended with tympanic bowel sounds. abd band in place  Musculoskeletal:  No clubbing, cyanosis or edema bilaterally.   Full range of motion without deformity. Skin: Skin color, texture, turgor normal.  No rashes or lesions. Neurologic:  Neurovascularly intact without any focal sensory/motor deficits. Cranial nerves: II-XII intact, grossly non-focal.  Psychiatric:  Alert and oriented, thought content appropriate, normal insight  Capillary Refill: Brisk,3 seconds, normal  Peripheral Pulses: +2 palpable, equal bilaterally           Labs:   No results for input(s): WBC, HGB, HCT, PLT in the last 72 hours. Recent Labs     07/09/21  1030 07/09/21  1501 07/10/21  0808    137 132*   K 6.7* 4.3 3.9    103 98*   CO2 23 21 21   BUN 13 12 11   CREATININE 0.9 0.9 0.7*   CALCIUM 9.1 8.8 9.2     No results for input(s): AST, ALT, BILIDIR, BILITOT, ALKPHOS in the last 72 hours. No results for input(s): INR in the last 72 hours. No results for input(s): Shy Lowers in the last 72 hours. Urinalysis:      Lab Results   Component Value Date    NITRU Negative 03/25/2021    WBCUA 251 06/06/2016    BACTERIA 3+ 06/06/2016    RBCUA 243 06/06/2016    BLOODU Negative 03/25/2021    SPECGRAV 1.015 03/25/2021    GLUCOSEU Negative 03/25/2021       Radiology:  No orders to display           Assessment/Plan:    Active Hospital Problems    Diagnosis     Incisional hernia without obstruction or gangrene [K43.2]     Incisional hernia, without obstruction or gangrene [K43.2]          Abdominal pain- due to prior incisional hernia, s/p mesh repair and robotic VIDA on 7/9/21  -mgmt per primary team     Uncontrolled htn- possibly from above, ?pain related as well and being off home meds  -resumed/continued home coreg, acei  -added prn hydralazine IV     CAD/PAD- with hx of stent in 3/2021  -continued asa/statin     Prior CVA- stable  -continued asa/statin     GERD- on pepcid  Tobacco use- on welbutrin  HLD- on statin     DVT Prophylaxis: lovenox  Diet: ADULT DIET;  Full Liquid  Code Status: Full Code    PT/OT Eval Status: defer to primary team    Dispo - pending stable BP for 24hrs(likely tomorrow), otherwise per surgery    Tay Ambrocio MD

## 2021-07-10 NOTE — PLAN OF CARE
Problem: Falls - Risk of:  Goal: Will remain free from falls  Description: Will remain free from falls  Outcome: Ongoing  Note: Vick Freeman remained safe and free of falls during this shift, a bed alarm was used to ensure safety.       Problem: Pain:  Goal: Control of acute pain  Description: Control of acute pain  Outcome: Ongoing  Note: Vick Freeman was given PRN pain medication to help control his pain

## 2021-07-10 NOTE — PROGRESS NOTES
[facility] GENERAL SURGERY DAILY PROGRESS NOTE    SUBJECTIVE: Awake, alert    OBJECTIVE: CURRENT VITALS:  /80   Pulse 88   Temp 98.6 °F (37 °C) (Oral)   Resp 18   Ht 5' 8\" (1.727 m)   Wt 184 lb 4.9 oz (83.6 kg)   SpO2 93%   BMI 28.02 kg/m²          ABD: Soft.      LABS:      BMP:   Recent Labs     07/09/21  1030 07/09/21  1501 07/10/21  0808    137 132*   K 6.7* 4.3 3.9    103 98*   CO2 23 21 21   BUN 13 12 11   CREATININE 0.9 0.9 0.7*             ASSESSMENT:   POD 1 robotic ventral hernia repair  /  VIDA      PLAN:   OOB  IS  Pain control  Home soon         Phu Patel MD

## 2021-07-11 VITALS
WEIGHT: 172.6 LBS | TEMPERATURE: 97.6 F | HEART RATE: 95 BPM | HEIGHT: 68 IN | DIASTOLIC BLOOD PRESSURE: 84 MMHG | RESPIRATION RATE: 18 BRPM | BODY MASS INDEX: 26.16 KG/M2 | OXYGEN SATURATION: 95 % | SYSTOLIC BLOOD PRESSURE: 148 MMHG

## 2021-07-11 PROCEDURE — 99024 POSTOP FOLLOW-UP VISIT: CPT | Performed by: SURGERY

## 2021-07-11 PROCEDURE — 6360000002 HC RX W HCPCS: Performed by: SURGERY

## 2021-07-11 PROCEDURE — 6370000000 HC RX 637 (ALT 250 FOR IP): Performed by: SURGERY

## 2021-07-11 PROCEDURE — 2580000003 HC RX 258: Performed by: SURGERY

## 2021-07-11 RX ORDER — OXYCODONE HYDROCHLORIDE 5 MG/1
5 TABLET ORAL EVERY 6 HOURS PRN
Qty: 24 TABLET | Refills: 0 | Status: SHIPPED | OUTPATIENT
Start: 2021-07-11 | End: 2021-07-18

## 2021-07-11 RX ADMIN — GABAPENTIN 600 MG: 300 CAPSULE ORAL at 08:22

## 2021-07-11 RX ADMIN — ASPIRIN 81 MG: 81 TABLET, CHEWABLE ORAL at 08:22

## 2021-07-11 RX ADMIN — CARVEDILOL 6.25 MG: 6.25 TABLET, FILM COATED ORAL at 08:23

## 2021-07-11 RX ADMIN — ENOXAPARIN SODIUM 40 MG: 40 INJECTION SUBCUTANEOUS at 08:22

## 2021-07-11 RX ADMIN — FAMOTIDINE 20 MG: 20 TABLET ORAL at 08:23

## 2021-07-11 RX ADMIN — OXYCODONE 10 MG: 5 TABLET ORAL at 08:22

## 2021-07-11 RX ADMIN — ONDANSETRON 4 MG: 2 INJECTION INTRAMUSCULAR; INTRAVENOUS at 08:27

## 2021-07-11 RX ADMIN — SODIUM CHLORIDE, PRESERVATIVE FREE 10 ML: 5 INJECTION INTRAVENOUS at 08:23

## 2021-07-11 RX ADMIN — ATORVASTATIN CALCIUM 80 MG: 80 TABLET, FILM COATED ORAL at 08:23

## 2021-07-11 RX ADMIN — LISINOPRIL 20 MG: 20 TABLET ORAL at 08:23

## 2021-07-11 RX ADMIN — GABAPENTIN 600 MG: 300 CAPSULE ORAL at 14:04

## 2021-07-11 RX ADMIN — BUPROPION HYDROCHLORIDE 150 MG: 150 TABLET, EXTENDED RELEASE ORAL at 08:23

## 2021-07-11 ASSESSMENT — PAIN SCALES - GENERAL
PAINLEVEL_OUTOF10: 5
PAINLEVEL_OUTOF10: 7

## 2021-07-11 NOTE — PROGRESS NOTES
Pt a/o; vss; assessment complete and charted; c/o coughing, encouraged pt to use IS and to splint abd with pillow when coughing; call light in reach; will monitor

## 2021-07-11 NOTE — PROGRESS NOTES
Hospitalist Progress Note      PCP: Rosanne Garcia APRN - CNP    Date of Admission: 7/9/2021    Reason for consult:  Uncontrolled HTN     Hospital Course: reviewed      Subjective:  bp improved, still with abd pain/soreness , sitting in chair but was up and walking the hallways         Medications:  Reviewed    Infusion Medications    sodium chloride      sodium chloride 50 mL/hr at 07/10/21 1243     Scheduled Medications    sodium chloride flush  5-40 mL Intravenous 2 times per day    enoxaparin  40 mg Subcutaneous Daily    famotidine  20 mg Oral BID    aspirin  81 mg Oral Daily    atorvastatin  80 mg Oral Daily    buPROPion  150 mg Oral BID    carvedilol  6.25 mg Oral BID WC    gabapentin  600 mg Oral TID    lisinopril  20 mg Oral Daily     PRN Meds: ketorolac, sodium chloride flush, sodium chloride, ondansetron **OR** ondansetron, acetaminophen, oxyCODONE **OR** oxyCODONE, HYDROmorphone, labetalol, hydrALAZINE      Intake/Output Summary (Last 24 hours) at 7/11/2021 0939  Last data filed at 7/11/2021 0420  Gross per 24 hour   Intake 1860 ml   Output 900 ml   Net 960 ml       Physical Exam Performed:    BP (!) 148/84   Pulse 95   Temp 97.6 °F (36.4 °C) (Oral)   Resp 18   Ht 5' 8\" (1.727 m)   Wt 172 lb 9.6 oz (78.3 kg)   SpO2 95%   BMI 26.24 kg/m²     General appearance:  No apparent distress, appears stated age and cooperative. HEENT:  Normal cephalic, atraumatic without obvious deformity. Pupils equal, round, and reactive to light.  Extra ocular muscles intact. Conjunctivae/corneas clear. Neck: Supple, with full range of motion. No jugular venous distention. Trachea midline. Respiratory:  Normal respiratory effort. Clear to auscultation, bilaterally without Rales/Wheezes/Rhonchi. Cardiovascular:  Regular rate and rhythm with normal S1/S2 without murmurs, rubs or gallops. Abdomen: Soft, tender, non-distended with dec'd bowel sounds.  abd band in place, incisions c/d/i  Musculoskeletal:  No clubbing, cyanosis or edema bilaterally.  Full range of motion without deformity. Skin: Skin color, texture, turgor normal.  No rashes or lesions. Neurologic:  Neurovascularly intact without any focal sensory/motor deficits. Cranial nerves: II-XII intact, grossly non-focal.  Psychiatric:  Alert and oriented, thought content appropriate, normal insight  Capillary Refill: Brisk,3 seconds, normal  Peripheral Pulses: +2 palpable, equal bilaterally         Labs:   No results for input(s): WBC, HGB, HCT, PLT in the last 72 hours. Recent Labs     07/09/21  1030 07/09/21  1501 07/10/21  0808    137 132*   K 6.7* 4.3 3.9    103 98*   CO2 23 21 21   BUN 13 12 11   CREATININE 0.9 0.9 0.7*   CALCIUM 9.1 8.8 9.2     No results for input(s): AST, ALT, BILIDIR, BILITOT, ALKPHOS in the last 72 hours. No results for input(s): INR in the last 72 hours. No results for input(s): [place] in the last 72 hours. Urinalysis:      Lab Results   Component Value Date    NITRU Negative 03/25/2021    WBCUA 251 06/06/2016    BACTERIA 3+ 06/06/2016    RBCUA 243 06/06/2016    BLOODU Negative 03/25/2021    SPECGRAV 1.015 03/25/2021    GLUCOSEU Negative 03/25/2021       Radiology:  No orders to display           Assessment/Plan:    Active Hospital Problems    Diagnosis     Incisional hernia without obstruction or gangrene [K43.2]     Incisional hernia, without obstruction or gangrene [K43.2]             Abdominal pain- due to prior incisional hernia, s/p mesh repair and robotic VIDA on 7/9/21  -mgmt per primary team     Uncontrolled htn- possibly from above, ?pain related as well and being off home meds  -resumed/continued home coreg, acei, continue home meds on dc  -added prn hydralazine IV     CAD/PAD- with hx of stent in 3/2021  -continued asa/statin     Prior CVA- stable  -continued asa/statin     GERD- on pepcid  Tobacco use- on welbutrin  HLD- on statin     DVT Prophylaxis: lovenox  Diet: ADULT DIET;  Full Liquid  Code Status: Full Code    PT/OT Eval Status: ambulatory    Dispo - ok with dc from IM perspective    Rosette Lion MD

## 2021-07-11 NOTE — PROGRESS NOTES
Care assumed from previous RN. A&O, VSS, NSR on tele, assessment complete as documented. Aware of need to call for ambulation assistance. Denies needs at this time.

## 2021-07-11 NOTE — PROGRESS NOTES
Per md order, pt d/c to home; reviewed med rec and d/c instructions; questions answered; verbalized understanding; pt transported in wheelchair to private car.

## 2021-07-11 NOTE — DISCHARGE SUMMARY
Surgery Discharge Summary    Patient Identification  Joshua Calvo is a 60 y.o. male. :  1965  Admit Date:  2021    Discharge date:   No discharge date for patient encounter. Disposition: home    Discharge Diagnoses:   Patient Active Problem List   Diagnosis    [facility] [address]    Carotid artery stenosis with cerebral infarction ([facility] [address]    Dyslipidemia    Essential hypertension    Ischemic foot    Peripheral vascular disease, unspecified ([facility] [address]    Vascular occlusion    Chest pain    Abnormal stress test    Coronary artery disease involving native coronary artery of native heart with unstable angina pectoris ([facility] [address]    Incisional hernia, without obstruction or gangrene    Incisional hernia without obstruction or gangrene         Consults: none    Surgery: Ventral hernia repair    Patient Instructions: Activity: no heavy lifting for 3 weeks  Diet: clear liquids, advance as tolerated  Wound Care: as directed    Follow-up with Dr. Glen Vallecillo in 2 weeks. See pre-printed instructions in chart and given to patient upon discharge. Discharge Medications:      Asa Ramírez   Home Medication Instructions VCE:[id number]    Printed on:21 0952   Medication Information                      aspirin 81 MG chewable tablet  Take 1 tablet by mouth daily             atorvastatin (LIPITOR) 80 MG tablet  Take 1 tablet by mouth daily             buPROPion (ZYBAN) 150 MG extended release tablet  Take 1 tablet by mouth 2 times daily 150 mg once daily for 3 days; increase to 150 mg twice daily             carvedilol (COREG) 6.25 MG tablet  Take 1 tablet by mouth 2 times daily (with meals)             clopidogrel (PLAVIX) 75 MG tablet  Take 75 mg by mouth daily              gabapentin (NEURONTIN) 600 MG tablet  Take 1 tablet by mouth 3 times daily for 30 days.              lisinopril (PRINIVIL;ZESTRIL) 20 MG tablet  Take 1 tablet by mouth daily             nicotine (NICODERM CQ) 21 MG/24HR  Place 1 patch onto the skin daily             nitroGLYCERIN (NITROSTAT) 0.4 MG SL tablet  up to max of 3 total doses. If no relief after 1 dose, call 911. oxyCODONE (ROXICODONE) 5 MG immediate release tablet  Take 1 tablet by mouth every 6 hours as needed for Pain for up to 7 days. Intended supply: 7 days. Take lowest dose possible to manage pain                  Condition at discharge: Stable    HPI and Hospital Course: Surgery and uneventful recovery.         Valentin Gil MD    [address] [facility] Surgery

## 2021-07-12 ENCOUNTER — TELEPHONE (OUTPATIENT)
Dept: INTERNAL MEDICINE CLINIC | Age: 56
End: 2021-07-12

## 2021-07-12 NOTE — TELEPHONE ENCOUNTER
[facility] Transitions Initial Follow Up Call    Outreach made within 2 business days of discharge: Yes    Patient: Alondra Wu Patient : 1965   MRN: <[id number]>  Reason for Admission: There are no discharge diagnoses documented for the most recent discharge. Discharge Date: 21       Spoke with: Left voicemail for patient to call office with questions, concerns and to schedule HFU.     Discharge department/facility: [facility]    Scheduled appointment with PCP within 7-14 days    Follow Up  Future Appointments   Date Time Provider Byron Hardy   2021  8:30 AM Gillian Marroquin MD [facility] AND [facility]       [place], [place]

## 2021-07-15 ENCOUNTER — TELEPHONE (OUTPATIENT)
Dept: SURGERY | Age: 56
End: 2021-07-15

## 2021-07-15 RX ORDER — SULFAMETHOXAZOLE AND TRIMETHOPRIM 800; 160 MG/1; MG/1
1 TABLET ORAL 2 TIMES DAILY
Qty: 10 TABLET | Refills: 0 | Status: SHIPPED | OUTPATIENT
Start: 2021-07-15 | End: 2021-07-21

## 2021-07-15 RX ORDER — HYDROMORPHONE HCL 110MG/55ML
PATIENT CONTROLLED ANALGESIA SYRINGE INTRAVENOUS PRN
Status: DISCONTINUED | OUTPATIENT
Start: 2021-07-09 | End: 2021-07-15 | Stop reason: SDUPTHER

## 2021-07-15 NOTE — TELEPHONE ENCOUNTER
Patient's wife call the office stating that the patient's incisions are red and warm to the touch. Patient had an appt for today but was running 45 minutes behind and had to reschedule. Per Dr. Ladarius Bloom ok to start Bactrim bid for 5 days.

## 2021-07-19 ENCOUNTER — TELEPHONE (OUTPATIENT)
Dept: SURGERY | Age: 56
End: 2021-07-19

## 2021-07-19 ENCOUNTER — TELEPHONE (OUTPATIENT)
Dept: INTERNAL MEDICINE CLINIC | Age: 56
End: 2021-07-19

## 2021-07-19 NOTE — TELEPHONE ENCOUNTER
----- Message from Beronica Nidhi sent at 7/19/2021 11:01 AM EDT -----  Subject: Message to Provider    QUESTIONS  Information for Provider? Patient went to the pain doctor that DR. Cavazos   recommend. The pain dr recommended a different doctor to them that could   better suit him. The new doctor needs a fax of the last three visits with   Kim Poe, and needs a referral as well. [phone]- Fax.   [phone]-YDODK number  ---------------------------------------------------------------------------  --------------  Gene Odell AVALOS  What is the best way for the office to contact you? OK to leave message on   voicemail  Preferred Call Back Phone Number? [phone]  ---------------------------------------------------------------------------  --------------  SCRIPT ANSWERS  Relationship to Patient? Other  Representative Name? Robert Hillman  Is the Representative on the appropriate HIPAA document in Epic?  Yes

## 2021-07-19 NOTE — TELEPHONE ENCOUNTER
Patient's wife called the office asking for a new referral for pain management, particularly PPTI. Explained that the patient was referred to pain management for LBP and would need to call his PCP. Patient's wife states how dissatisfied they were about the appt they had today and that her  just needed some medication to help with pain not an injection. Apologized and offered to help her however I could as well as education on injections.

## 2021-07-19 NOTE — TELEPHONE ENCOUNTER
Patient's wife called the office back, states that her  is having severe abdominal pain and needs more Oxycodone. Explained that if he is having that much pain, he will need to go to the ED. Kris Da Silva hung the phone up.

## 2021-07-19 NOTE — TELEPHONE ENCOUNTER
[phone] (home)   LMTCB , Need to know who he was referred to and phone number. We can then do the referral and get office notes faxed over.

## 2021-07-20 ENCOUNTER — APPOINTMENT (OUTPATIENT)
Dept: CT IMAGING | Age: 56
DRG: 921 | End: 2021-07-20
Payer: MEDICARE

## 2021-07-20 ENCOUNTER — HOSPITAL ENCOUNTER (INPATIENT)
Age: 56
LOS: 3 days | Discharge: HOME | DRG: 921 | End: 2021-07-23
Attending: INTERNAL MEDICINE | Admitting: INTERNAL MEDICINE
Payer: MEDICARE

## 2021-07-20 DIAGNOSIS — R10.84 GENERALIZED ABDOMINAL PAIN: Primary | ICD-10-CM

## 2021-07-20 DIAGNOSIS — Z98.890 STATUS POST HERNIA REPAIR: ICD-10-CM

## 2021-07-20 DIAGNOSIS — R11.0 NAUSEA WITHOUT VOMITING: ICD-10-CM

## 2021-07-20 DIAGNOSIS — Z87.19 STATUS POST HERNIA REPAIR: ICD-10-CM

## 2021-07-20 PROBLEM — R10.9 ABDOMINAL PAIN: Status: ACTIVE | Noted: 2021-07-20

## 2021-07-20 PROBLEM — Z72.0 TOBACCO USE: Status: ACTIVE | Noted: 2021-07-20

## 2021-07-20 LAB
A/G RATIO: 1.1 (ref 1.1–2.2)
ALBUMIN SERPL-MCNC: 3.9 G/DL (ref 3.4–5)
ALP BLD-CCNC: 147 U/L (ref 40–129)
ALT SERPL-CCNC: 20 U/L (ref 10–40)
ANION GAP SERPL CALCULATED.3IONS-SCNC: 13 MMOL/L (ref 3–16)
AST SERPL-CCNC: 22 U/L (ref 15–37)
BASOPHILS ABSOLUTE: 0.1 K/UL (ref 0–0.2)
BASOPHILS RELATIVE PERCENT: 1.1 %
BILIRUB SERPL-MCNC: <0.2 MG/DL (ref 0–1)
BILIRUBIN URINE: NEGATIVE
BLOOD, URINE: NEGATIVE
BUN BLDV-MCNC: 14 MG/DL (ref 7–20)
CALCIUM SERPL-MCNC: 9.7 MG/DL (ref 8.3–10.6)
CHLORIDE BLD-SCNC: 99 MMOL/L (ref 99–110)
CLARITY: CLEAR
CO2: 21 MMOL/L (ref 21–32)
COLOR: NORMAL
CREAT SERPL-MCNC: 1 MG/DL (ref 0.9–1.3)
EOSINOPHILS ABSOLUTE: 0.2 K/UL (ref 0–0.6)
EOSINOPHILS RELATIVE PERCENT: 2.6 %
GFR AFRICAN AMERICAN: >60
GFR NON-AFRICAN AMERICAN: >60
GLOBULIN: 3.5 G/DL
GLUCOSE BLD-MCNC: 93 MG/DL (ref 70–99)
GLUCOSE URINE: NEGATIVE MG/DL
HCT VFR BLD CALC: 39.9 % (ref 40.5–52.5)
HEMOGLOBIN: 13.9 G/DL (ref 13.5–17.5)
KETONES, URINE: NEGATIVE MG/DL
LEUKOCYTE ESTERASE, URINE: NEGATIVE
LIPASE: 17 U/L (ref 13–60)
LYMPHOCYTES ABSOLUTE: 1.9 K/UL (ref 1–5.1)
LYMPHOCYTES RELATIVE PERCENT: 19.3 %
MCH RBC QN AUTO: 33.2 PG (ref 26–34)
MCHC RBC AUTO-ENTMCNC: 34.7 G/DL (ref 31–36)
MCV RBC AUTO: 95.6 FL (ref 80–100)
MICROSCOPIC EXAMINATION: NORMAL
MONOCYTES ABSOLUTE: 0.6 K/UL (ref 0–1.3)
MONOCYTES RELATIVE PERCENT: 6.3 %
NEUTROPHILS ABSOLUTE: 6.8 K/UL (ref 1.7–7.7)
NEUTROPHILS RELATIVE PERCENT: 70.7 %
NITRITE, URINE: NEGATIVE
PDW BLD-RTO: 14.2 % (ref 12.4–15.4)
PH UA: 6 (ref 5–8)
PLATELET # BLD: 364 K/UL (ref 135–450)
PMV BLD AUTO: 6.3 FL (ref 5–10.5)
POTASSIUM REFLEX MAGNESIUM: 4.6 MMOL/L (ref 3.5–5.1)
PROTEIN UA: NEGATIVE MG/DL
RBC # BLD: 4.17 M/UL (ref 4.2–5.9)
SODIUM BLD-SCNC: 133 MMOL/L (ref 136–145)
SPECIFIC GRAVITY UA: 1.01 (ref 1–1.03)
TOTAL PROTEIN: 7.4 G/DL (ref 6.4–8.2)
URINE REFLEX TO CULTURE: NORMAL
URINE TYPE: NORMAL
UROBILINOGEN, URINE: 0.2 E.U./DL
WBC # BLD: 9.6 K/UL (ref 4–11)

## 2021-07-20 PROCEDURE — 74177 CT ABD & PELVIS W/CONTRAST: CPT

## 2021-07-20 PROCEDURE — 96376 TX/PRO/DX INJ SAME DRUG ADON: CPT

## 2021-07-20 PROCEDURE — 6360000002 HC RX W HCPCS: Performed by: NURSE PRACTITIONER

## 2021-07-20 PROCEDURE — 99284 EMERGENCY DEPT VISIT MOD MDM: CPT

## 2021-07-20 PROCEDURE — 83690 ASSAY OF LIPASE: CPT

## 2021-07-20 PROCEDURE — 1200000000 HC SEMI PRIVATE

## 2021-07-20 PROCEDURE — 80053 COMPREHEN METABOLIC PANEL: CPT

## 2021-07-20 PROCEDURE — 96375 TX/PRO/DX INJ NEW DRUG ADDON: CPT

## 2021-07-20 PROCEDURE — 81003 URINALYSIS AUTO W/O SCOPE: CPT

## 2021-07-20 PROCEDURE — 96374 THER/PROPH/DIAG INJ IV PUSH: CPT

## 2021-07-20 PROCEDURE — 87086 URINE CULTURE/COLONY COUNT: CPT

## 2021-07-20 PROCEDURE — 85025 COMPLETE CBC W/AUTO DIFF WBC: CPT

## 2021-07-20 PROCEDURE — 6360000004 HC RX CONTRAST MEDICATION: Performed by: NURSE PRACTITIONER

## 2021-07-20 RX ORDER — ONDANSETRON 2 MG/ML
4 INJECTION INTRAMUSCULAR; INTRAVENOUS EVERY 30 MIN PRN
Status: DISCONTINUED | OUTPATIENT
Start: 2021-07-20 | End: 2021-07-21 | Stop reason: HOSPADM

## 2021-07-20 RX ORDER — MORPHINE SULFATE 4 MG/ML
4 INJECTION, SOLUTION INTRAMUSCULAR; INTRAVENOUS EVERY 30 MIN PRN
Status: DISCONTINUED | OUTPATIENT
Start: 2021-07-20 | End: 2021-07-21 | Stop reason: HOSPADM

## 2021-07-20 RX ADMIN — IOPAMIDOL 75 ML: 755 INJECTION, SOLUTION INTRAVENOUS at 20:32

## 2021-07-20 RX ADMIN — MORPHINE SULFATE 4 MG: 4 INJECTION, SOLUTION INTRAMUSCULAR; INTRAVENOUS at 20:52

## 2021-07-20 RX ADMIN — MORPHINE SULFATE 4 MG: 4 INJECTION, SOLUTION INTRAMUSCULAR; INTRAVENOUS at 23:28

## 2021-07-20 RX ADMIN — ONDANSETRON 4 MG: 2 INJECTION INTRAMUSCULAR; INTRAVENOUS at 20:52

## 2021-07-20 ASSESSMENT — ENCOUNTER SYMPTOMS
BACK PAIN: 0
COLOR CHANGE: 0
COUGH: 0
ABDOMINAL PAIN: 1
WHEEZING: 0
DIARRHEA: 0
VOMITING: 0
NAUSEA: 1
SHORTNESS OF BREATH: 0

## 2021-07-20 ASSESSMENT — PAIN SCALES - GENERAL
PAINLEVEL_OUTOF10: 8
PAINLEVEL_OUTOF10: 5
PAINLEVEL_OUTOF10: 7
PAINLEVEL_OUTOF10: 8

## 2021-07-20 ASSESSMENT — PAIN DESCRIPTION - PAIN TYPE: TYPE: ACUTE PAIN

## 2021-07-21 ENCOUNTER — APPOINTMENT (OUTPATIENT)
Dept: CT IMAGING | Age: 56
DRG: 921 | End: 2021-07-21
Payer: MEDICARE

## 2021-07-21 ENCOUNTER — TELEPHONE (OUTPATIENT)
Dept: INTERNAL MEDICINE CLINIC | Age: 56
End: 2021-07-21

## 2021-07-21 DIAGNOSIS — G89.29 OTHER CHRONIC PAIN: Primary | ICD-10-CM

## 2021-07-21 LAB
ANION GAP SERPL CALCULATED.3IONS-SCNC: 12 MMOL/L (ref 3–16)
BASOPHILS ABSOLUTE: 0.1 K/UL (ref 0–0.2)
BASOPHILS RELATIVE PERCENT: 1.1 %
BUN BLDV-MCNC: 13 MG/DL (ref 7–20)
CALCIUM SERPL-MCNC: 9.4 MG/DL (ref 8.3–10.6)
CHLORIDE BLD-SCNC: 103 MMOL/L (ref 99–110)
CO2: 22 MMOL/L (ref 21–32)
CREAT SERPL-MCNC: 0.9 MG/DL (ref 0.9–1.3)
EOSINOPHILS ABSOLUTE: 0.2 K/UL (ref 0–0.6)
EOSINOPHILS RELATIVE PERCENT: 2.5 %
GFR AFRICAN AMERICAN: >60
GFR NON-AFRICAN AMERICAN: >60
GLUCOSE BLD-MCNC: 108 MG/DL (ref 70–99)
HCT VFR BLD CALC: 37.6 % (ref 40.5–52.5)
HEMOGLOBIN: 13 G/DL (ref 13.5–17.5)
INR BLD: 1.12 (ref 0.88–1.12)
LYMPHOCYTES ABSOLUTE: 1.6 K/UL (ref 1–5.1)
LYMPHOCYTES RELATIVE PERCENT: 18.4 %
MCH RBC QN AUTO: 33.1 PG (ref 26–34)
MCHC RBC AUTO-ENTMCNC: 34.6 G/DL (ref 31–36)
MCV RBC AUTO: 95.7 FL (ref 80–100)
MONOCYTES ABSOLUTE: 0.5 K/UL (ref 0–1.3)
MONOCYTES RELATIVE PERCENT: 6 %
NEUTROPHILS ABSOLUTE: 6.1 K/UL (ref 1.7–7.7)
NEUTROPHILS RELATIVE PERCENT: 72 %
PDW BLD-RTO: 14.2 % (ref 12.4–15.4)
PLATELET # BLD: 326 K/UL (ref 135–450)
PMV BLD AUTO: 6.1 FL (ref 5–10.5)
POTASSIUM REFLEX MAGNESIUM: 4.1 MMOL/L (ref 3.5–5.1)
PROTHROMBIN TIME: 12.7 SEC (ref 9.9–12.7)
RBC # BLD: 3.93 M/UL (ref 4.2–5.9)
SODIUM BLD-SCNC: 137 MMOL/L (ref 136–145)
URINE CULTURE, ROUTINE: NORMAL
WBC # BLD: 8.5 K/UL (ref 4–11)

## 2021-07-21 PROCEDURE — 6360000002 HC RX W HCPCS: Performed by: RADIOLOGY

## 2021-07-21 PROCEDURE — 6370000000 HC RX 637 (ALT 250 FOR IP): Performed by: INTERNAL MEDICINE

## 2021-07-21 PROCEDURE — 85025 COMPLETE CBC W/AUTO DIFF WBC: CPT

## 2021-07-21 PROCEDURE — 87075 CULTR BACTERIA EXCEPT BLOOD: CPT

## 2021-07-21 PROCEDURE — 6360000002 HC RX W HCPCS: Performed by: NURSE PRACTITIONER

## 2021-07-21 PROCEDURE — 0W9F30Z DRAINAGE OF ABDOMINAL WALL WITH DRAINAGE DEVICE, PERCUTANEOUS APPROACH: ICD-10-PCS | Performed by: SURGERY

## 2021-07-21 PROCEDURE — 80048 BASIC METABOLIC PNL TOTAL CA: CPT

## 2021-07-21 PROCEDURE — 1200000000 HC SEMI PRIVATE

## 2021-07-21 PROCEDURE — 2580000003 HC RX 258: Performed by: NURSE PRACTITIONER

## 2021-07-21 PROCEDURE — 36415 COLL VENOUS BLD VENIPUNCTURE: CPT

## 2021-07-21 PROCEDURE — 6370000000 HC RX 637 (ALT 250 FOR IP): Performed by: SURGERY

## 2021-07-21 PROCEDURE — 87205 SMEAR GRAM STAIN: CPT

## 2021-07-21 PROCEDURE — 85610 PROTHROMBIN TIME: CPT

## 2021-07-21 PROCEDURE — 87070 CULTURE OTHR SPECIMN AEROBIC: CPT

## 2021-07-21 PROCEDURE — 6370000000 HC RX 637 (ALT 250 FOR IP): Performed by: NURSE PRACTITIONER

## 2021-07-21 PROCEDURE — 99222 1ST HOSP IP/OBS MODERATE 55: CPT | Performed by: SURGERY

## 2021-07-21 PROCEDURE — 2709999900 CT DRAINAGE VISCERAL PERCUTANEOUS

## 2021-07-21 RX ORDER — LISINOPRIL 20 MG/1
20 TABLET ORAL DAILY
Status: DISCONTINUED | OUTPATIENT
Start: 2021-07-21 | End: 2021-07-23 | Stop reason: HOSPADM

## 2021-07-21 RX ORDER — MIDAZOLAM HYDROCHLORIDE 5 MG/ML
INJECTION INTRAMUSCULAR; INTRAVENOUS
Status: COMPLETED | OUTPATIENT
Start: 2021-07-21 | End: 2021-07-21

## 2021-07-21 RX ORDER — ATORVASTATIN CALCIUM 80 MG/1
80 TABLET, FILM COATED ORAL DAILY
Status: DISCONTINUED | OUTPATIENT
Start: 2021-07-21 | End: 2021-07-23 | Stop reason: HOSPADM

## 2021-07-21 RX ORDER — BUPROPION HYDROCHLORIDE 150 MG/1
150 TABLET, EXTENDED RELEASE ORAL 2 TIMES DAILY
Status: DISCONTINUED | OUTPATIENT
Start: 2021-07-21 | End: 2021-07-23 | Stop reason: HOSPADM

## 2021-07-21 RX ORDER — MORPHINE SULFATE 2 MG/ML
2 INJECTION, SOLUTION INTRAMUSCULAR; INTRAVENOUS EVERY 4 HOURS PRN
Status: DISCONTINUED | OUTPATIENT
Start: 2021-07-21 | End: 2021-07-23 | Stop reason: HOSPADM

## 2021-07-21 RX ORDER — GABAPENTIN 300 MG/1
600 CAPSULE ORAL 3 TIMES DAILY
Status: DISCONTINUED | OUTPATIENT
Start: 2021-07-21 | End: 2021-07-23 | Stop reason: HOSPADM

## 2021-07-21 RX ORDER — LISINOPRIL 20 MG/1
20 TABLET ORAL ONCE
Status: COMPLETED | OUTPATIENT
Start: 2021-07-21 | End: 2021-07-21

## 2021-07-21 RX ORDER — ONDANSETRON 2 MG/ML
4 INJECTION INTRAMUSCULAR; INTRAVENOUS EVERY 6 HOURS PRN
Status: DISCONTINUED | OUTPATIENT
Start: 2021-07-21 | End: 2021-07-23 | Stop reason: HOSPADM

## 2021-07-21 RX ORDER — ACETAMINOPHEN 325 MG/1
650 TABLET ORAL EVERY 6 HOURS PRN
Status: DISCONTINUED | OUTPATIENT
Start: 2021-07-21 | End: 2021-07-23 | Stop reason: HOSPADM

## 2021-07-21 RX ORDER — ACETAMINOPHEN 650 MG/1
650 SUPPOSITORY RECTAL EVERY 6 HOURS PRN
Status: DISCONTINUED | OUTPATIENT
Start: 2021-07-21 | End: 2021-07-23 | Stop reason: HOSPADM

## 2021-07-21 RX ORDER — NICOTINE 21 MG/24HR
1 PATCH, TRANSDERMAL 24 HOURS TRANSDERMAL DAILY
Status: DISCONTINUED | OUTPATIENT
Start: 2021-07-21 | End: 2021-07-23

## 2021-07-21 RX ORDER — FENTANYL CITRATE 50 UG/ML
INJECTION, SOLUTION INTRAMUSCULAR; INTRAVENOUS
Status: COMPLETED | OUTPATIENT
Start: 2021-07-21 | End: 2021-07-21

## 2021-07-21 RX ORDER — SODIUM CHLORIDE 0.9 % (FLUSH) 0.9 %
5-40 SYRINGE (ML) INJECTION EVERY 12 HOURS SCHEDULED
Status: DISCONTINUED | OUTPATIENT
Start: 2021-07-21 | End: 2021-07-23 | Stop reason: HOSPADM

## 2021-07-21 RX ORDER — OXYCODONE HYDROCHLORIDE 5 MG/1
10 TABLET ORAL EVERY 4 HOURS PRN
Status: DISCONTINUED | OUTPATIENT
Start: 2021-07-21 | End: 2021-07-23 | Stop reason: HOSPADM

## 2021-07-21 RX ORDER — ONDANSETRON 4 MG/1
4 TABLET, ORALLY DISINTEGRATING ORAL EVERY 8 HOURS PRN
Status: DISCONTINUED | OUTPATIENT
Start: 2021-07-21 | End: 2021-07-23 | Stop reason: HOSPADM

## 2021-07-21 RX ORDER — SODIUM CHLORIDE 0.9 % (FLUSH) 0.9 %
5-40 SYRINGE (ML) INJECTION PRN
Status: DISCONTINUED | OUTPATIENT
Start: 2021-07-21 | End: 2021-07-23 | Stop reason: HOSPADM

## 2021-07-21 RX ORDER — SODIUM CHLORIDE 9 MG/ML
INJECTION, SOLUTION INTRAVENOUS CONTINUOUS
Status: ACTIVE | OUTPATIENT
Start: 2021-07-21 | End: 2021-07-21

## 2021-07-21 RX ORDER — POLYETHYLENE GLYCOL 3350 17 G/17G
17 POWDER, FOR SOLUTION ORAL DAILY PRN
Status: DISCONTINUED | OUTPATIENT
Start: 2021-07-21 | End: 2021-07-23 | Stop reason: HOSPADM

## 2021-07-21 RX ORDER — ASPIRIN 81 MG/1
81 TABLET, CHEWABLE ORAL DAILY
Status: DISCONTINUED | OUTPATIENT
Start: 2021-07-21 | End: 2021-07-23 | Stop reason: HOSPADM

## 2021-07-21 RX ORDER — OXYCODONE HYDROCHLORIDE 5 MG/1
5 TABLET ORAL EVERY 4 HOURS PRN
Status: DISCONTINUED | OUTPATIENT
Start: 2021-07-21 | End: 2021-07-23 | Stop reason: HOSPADM

## 2021-07-21 RX ORDER — SODIUM CHLORIDE 9 MG/ML
25 INJECTION, SOLUTION INTRAVENOUS PRN
Status: DISCONTINUED | OUTPATIENT
Start: 2021-07-21 | End: 2021-07-23 | Stop reason: HOSPADM

## 2021-07-21 RX ORDER — CARVEDILOL 6.25 MG/1
6.25 TABLET ORAL 2 TIMES DAILY WITH MEALS
Status: DISCONTINUED | OUTPATIENT
Start: 2021-07-21 | End: 2021-07-23 | Stop reason: HOSPADM

## 2021-07-21 RX ADMIN — CARVEDILOL 6.25 MG: 6.25 TABLET, FILM COATED ORAL at 17:21

## 2021-07-21 RX ADMIN — MIDAZOLAM HYDROCHLORIDE 1 MG: 5 INJECTION, SOLUTION INTRAMUSCULAR; INTRAVENOUS at 16:00

## 2021-07-21 RX ADMIN — OXYCODONE 5 MG: 5 TABLET ORAL at 18:58

## 2021-07-21 RX ADMIN — BUPROPION HYDROCHLORIDE 150 MG: 150 TABLET, EXTENDED RELEASE ORAL at 20:57

## 2021-07-21 RX ADMIN — MORPHINE SULFATE 2 MG: 2 INJECTION, SOLUTION INTRAMUSCULAR; INTRAVENOUS at 00:58

## 2021-07-21 RX ADMIN — GABAPENTIN 600 MG: 300 CAPSULE ORAL at 20:57

## 2021-07-21 RX ADMIN — SODIUM CHLORIDE: 9 INJECTION, SOLUTION INTRAVENOUS at 00:58

## 2021-07-21 RX ADMIN — FENTANYL CITRATE 50 MCG: 50 INJECTION INTRAMUSCULAR; INTRAVENOUS at 16:00

## 2021-07-21 RX ADMIN — LISINOPRIL 20 MG: 20 TABLET ORAL at 18:58

## 2021-07-21 RX ADMIN — MORPHINE SULFATE 2 MG: 2 INJECTION, SOLUTION INTRAMUSCULAR; INTRAVENOUS at 08:39

## 2021-07-21 RX ADMIN — SODIUM CHLORIDE, PRESERVATIVE FREE 10 ML: 5 INJECTION INTRAVENOUS at 21:25

## 2021-07-21 ASSESSMENT — PAIN SCALES - GENERAL
PAINLEVEL_OUTOF10: 7
PAINLEVEL_OUTOF10: 7
PAINLEVEL_OUTOF10: 8
PAINLEVEL_OUTOF10: 8
PAINLEVEL_OUTOF10: 6
PAINLEVEL_OUTOF10: 6

## 2021-07-21 ASSESSMENT — PAIN DESCRIPTION - LOCATION: LOCATION: ABDOMEN

## 2021-07-21 ASSESSMENT — PAIN DESCRIPTION - PAIN TYPE: TYPE: ACUTE PAIN;SURGICAL PAIN

## 2021-07-21 NOTE — SEDATION DOCUMENTATION
Patient is tolerating the procedure well. No distress noted. Patient remains on all cardiac monitoring and is on room air at this time. Will continue to monitor.

## 2021-07-21 NOTE — PROGRESS NOTES
Hospitalist Progress Note      PCP: Rosanne Garcia APRN - CNP    Date of Admission: 7/20/2021    Chief Complaint: Abdominal pain    Hospital Course: Recently passed outpatient for hernia repair comes back with abdominal pain. Imaging shows intra-abdominal hematoma. Seen by general surgery. Hematoma evacuation by IR is being planned. Subjective: No chest pain, no shortness of breath, no nausea or vomiting. Mild abdominal pain. Medications:  Reviewed    Infusion Medications    sodium chloride       Scheduled Medications    aspirin  81 mg Oral Daily    atorvastatin  80 mg Oral Daily    buPROPion  150 mg Oral BID    carvedilol  6.25 mg Oral BID WC    gabapentin  600 mg Oral TID    lisinopril  20 mg Oral Daily    nicotine  1 patch Transdermal Daily    sodium chloride flush  5-40 mL Intravenous 2 times per day     PRN Meds: sodium chloride flush, sodium chloride, ondansetron **OR** ondansetron, polyethylene glycol, acetaminophen **OR** acetaminophen, morphine      Intake/Output Summary (Last 24 hours) at 7/21/2021 1400  Last data filed at 7/21/2021 0538  Gross per 24 hour   Intake 0 ml   Output --   Net 0 ml       Physical Exam Performed:    /80   Pulse 73   Temp 98.1 °F (36.7 °C) (Oral)   Resp 18   Ht 5' 8\" (1.727 m)   Wt 172 lb (78 kg)   SpO2 95%   BMI 26.15 kg/m²     General appearance: No apparent distress, appears stated age and cooperative. HEENT: Pupils equal, round, and reactive to light. Conjunctivae/corneas clear. Neck: Supple, with full range of motion. No jugular venous distention. Trachea midline. Respiratory:  Normal respiratory effort. Clear to auscultation, bilaterally without Rales/Wheezes/Rhonchi. Cardiovascular: Regular rate and rhythm with normal S1/S2 without murmurs, rubs or gallops. Abdomen: Soft, mild generalized tenderness with no rebound tenderness, non-distended with normal bowel sounds. Musculoskeletal: No clubbing, cyanosis or edema bilaterally. Full range of motion without deformity. Skin: Skin color, texture, turgor normal.  No rashes or lesions. Neurologic:  Neurovascularly intact without any focal sensory/motor deficits. Cranial nerves: II-XII intact, grossly non-focal.  Psychiatric: Alert and oriented, thought content appropriate, normal insight  Capillary Refill: Brisk,3 seconds, normal   Peripheral Pulses: +2 palpable, equal bilaterally       Labs:   Recent Labs     07/20/21  1723 07/21/21  0655   WBC 9.6 8.5   HGB 13.9 13.0*   HCT 39.9* 37.6*    326     Recent Labs     07/20/21  1723 07/21/21  0655   * 137   K 4.6 4.1   CL 99 103   CO2 21 22   BUN 14 13   CREATININE 1.0 0.9   CALCIUM 9.7 9.4     Recent Labs     07/20/21  1723   AST 22   ALT 20   BILITOT <0.2   ALKPHOS 147*     Recent Labs     07/21/21  1045   INR 1.12     No results for input(s): Gennaro Seed in the last 72 hours. Urinalysis:      Lab Results   Component Value Date    NITRU Negative 07/20/2021    WBCUA 251 06/06/2016    BACTERIA 3+ 06/06/2016    RBCUA 243 06/06/2016    BLOODU Negative 07/20/2021    SPECGRAV 1.010 07/20/2021    GLUCOSEU Negative 07/20/2021       Radiology:  CT ABDOMEN PELVIS W IV CONTRAST Additional Contrast? None   Final Result   1. Large rim enhancing fluid collection as described. This may represent   hematoma given recent surgery. Correlate with presentation to exclude   superimposed infection and abscess formation. 2. Omental infarct in the mid upper pelvis. 3. Infectious or inflammatory airway process. 4. Other findings as described.          CT DRAINAGE VISCERAL PERCUTANEOUS    (Results Pending)           Assessment/Plan:    Active Hospital Problems    Diagnosis     Tobacco use [Z72.0]     Abdominal pain [R10.9]     Coronary artery disease involving native coronary artery of native heart with unstable angina pectoris ([facility] [address] [I25.110]     Dyslipidemia [E78.5]     Essential hypertension [I10]      PLAN:    Intra-abdominal hematoma  Noted recent surgical procedure. Evaluated by general surgery. Drainage with IR today. Hemodynamically stable. Hemoglobin also remained stable. Abdominal pain  Postop status and intra-abdominal hematoma combined. Continue analgesic management. Hypertension  Controlled blood pressure. Continue Coreg and lisinopril. Coronary artery disease  Asymptomatic. Continue aspirin, hold Plavix for now given intra-abdominal hematoma. To be resumed at discharge. Dyslipidemia  Continue atorvastatin    Discussed with the patient. Questions answered. DVT Prophylaxis: SCD only  Diet: Diet NPO  Code Status: Full Code    PT/OT Eval Status: Not indicated.   Patient is ambulatory and independent    Dispo -inpatient stay pending hematoma evacuation    Kimberly Francisco MD

## 2021-07-21 NOTE — PLAN OF CARE
Problem: Pain:  Goal: Pain level will decrease  Description: Pain level will decrease  7/21/2021 1407 by Molly Weaver RN  Outcome: Ongoing     Problem: Skin Integrity:  Goal: Will show no infection signs and symptoms  Description: Will show no infection signs and symptoms  Outcome: Ongoing

## 2021-07-21 NOTE — TELEPHONE ENCOUNTER
----- Message from Willem Tellez sent at 7/20/2021  5:46 PM EDT -----  Subject: Message to Provider    QUESTIONS  Information for Provider? Wife called with information for new provider? Dr. Carlisle Brendan, [facility] [address], [phone] Fax? [phone]. Please call her if questions.   ---------------------------------------------------------------------------  --------------  CALL BACK INFO  What is the best way for the office to contact you? OK to leave message on   voicemail  Preferred Call Back Phone Number? [phone]  ---------------------------------------------------------------------------  --------------  SCRIPT ANSWERS  Relationship to Patient?  Self

## 2021-07-21 NOTE — SEDATION DOCUMENTATION
Drain placed to left abd.  Was able to drain 250cc of dark red fluid  Right side of abd aspirated: 100cc dark red fluid

## 2021-07-21 NOTE — H&P
Hospital Medicine History & Physical      PCP: Winifred Burger, APRN - CNP    Date of Admission: 7/20/2021    Date of Service: Pt seen/examined on 7/20/2021 and Admitted to Inpatient with expected LOS greater than two midnights due to medical therapy. Chief Complaint:  Abdominal pain    History Of Present Illness:      60 y.o. male, with PMH of HTN, CAD, HLD and tobacco use, who presented to [facility] with abdominal pain. History obtained from the patient and review of EMR. The patient stated he was admitted on 7/9/2021 for hernia surgery with Dr. Dandre Haas. He stated he was discharged 3 days later, but has had abdominal pain since his surgery. The patient stated the swelling/bulge to his umbilical region has never gone away. He stated he has had some intermittent nausea without vomiting. Patient stated he has been taking his pain medication at home, but it has been ineffective. In the emergency room a CT abdomen pelvis was obtained that revealed a large rim-enhancing fluid collection. Which may represent a hematoma given recent surgery. General surgery was consulted in the emergency room and the patient has been made n.p.o. for anticipated surgery in the a.m. He was also given morphine. The patient will be admitted for further evaluation and treatment. He denied any other associated symptoms as well as any aggravating and/or alleviating factors. At the time of this assessment, the patient was resting comfortably in bed. He currently denies any chest pain, back pain, abdominal pain, shortness of breath, numbness, tingling, N/V/C/D, fever and/or chills.      Past Medical History:          Diagnosis Date    Bowel incontinence     Hyperlipidemia     Hypertension     PAD (peripheral artery disease) (HCC)     Poor vision     left eye    Pre-diabetes     Sleep apnea     NO CPAP    Stroke ([facility] [address] 2016    x3 - memory deficit    Wears partial dentures     upper & lower     Past Surgical History: Procedure Laterality Date    ABDOMEN SURGERY      colon polyps    FEMORAL-TIBIAL BYPASS GRAFT Right 06/24/2020    RIGHT FEMORAL TO PERONEAL  BYPASS GRAFT performed by Ashok Ocampo MD at [facility] N/A 7/9/2021    R[address]OTIC [address] WITH MESH, eTAPP BLOCK performed by Nikki Gómez MD at [address] [place] [address] Right     VASCULAR SURGERY       Medications Prior to Admission:      Prior to Admission medications    Medication Sig Start Date End Date Taking? Authorizing Provider   sulfamethoxazole-trimethoprim (BACTRIM DS;SEPTRA DS) 800-160 MG per tablet Take 1 tablet by mouth 2 times daily for 5 days 7/15/21 7/20/21  Nikki Gómez MD   gabapentin (NEURONTIN) 600 MG tablet Take 1 tablet by mouth 3 times daily for 30 days. 6/23/21 7/23/21  MAT Velasquez CNP   buPROPion (ZYBAN) 150 MG extended release tablet Take 1 tablet by mouth 2 times daily 150 mg once daily for 3 days; increase to 150 mg twice daily 6/23/21 7/23/21  MAT Velasquez CNP   nicotine (NICODERM CQ) 21 MG/24HR Place 1 patch onto the skin daily  Patient not taking: Reported on 7/6/2021 3/29/21 5/10/21  Angel Mills MD   aspirin 81 MG chewable tablet Take 1 tablet by mouth daily 3/14/21   MAT Johnston CNP   nitroGLYCERIN (NITROSTAT) 0.4 MG SL tablet up to max of 3 total doses. If no relief after 1 dose, call 911. 3/13/21   MAT Johnston CNP   lisinopril (PRINIVIL;ZESTRIL) 20 MG tablet Take 1 tablet by mouth daily 3/14/21   MAT Johnston CNP   atorvastatin (LIPITOR) 80 MG tablet Take 1 tablet by mouth daily 3/13/21   MAT Johnston CNP   carvedilol (COREG) 6.25 MG tablet Take 1 tablet by mouth 2 times daily (with meals) 3/13/21   MAT Johnston CNP   clopidogrel (PLAVIX) 75 MG tablet Take 75 mg by mouth daily  7/16/18   Historical Provider, MD     Allergies:  Patient has no known allergies.     Social 133*   K 4.6   CL 99   CO2 21   BUN 14   CREATININE 1.0   CALCIUM 9.7     Recent Labs     07/20/21  1723   AST 22   ALT 20   BILITOT <0.2   ALKPHOS 147*     Urinalysis:      Lab Results   Component Value Date    NITRU Negative 07/20/2021    WBCUA 251 06/06/2016    BACTERIA 3+ 06/06/2016    RBCUA 243 06/06/2016    BLOODU Negative 07/20/2021    SPECGRAV 1.010 07/20/2021    GLUCOSEU Negative 07/20/2021     Radiology:     CXR: I have reviewed the CXR with the following interpretation: N/a    EKG:  I have reviewed the EKG with the following interpretation: N/a    CT ABDOMEN PELVIS W IV CONTRAST Additional Contrast? None   Final Result   1. Large rim enhancing fluid collection as described. This may represent   hematoma given recent surgery. Correlate with presentation to exclude   superimposed infection and abscess formation. 2. Omental infarct in the mid upper pelvis. 3. Infectious or inflammatory airway process. 4. Other findings as described. ASSESSMENT:    Active Hospital Problems    Diagnosis Date Noted    Tobacco use [Z72.0] 07/20/2021    Abdominal pain [R10.9] 07/20/2021    Coronary artery disease involving native coronary artery of native heart with unstable angina pectoris ([facility] [address] [I25.110] 03/13/2021    Dyslipidemia [E78.5]     Essential hypertension [I10]      PLAN:    Abdominal pain  -CT abdomen pelvis revealed: large rim enhancing fluid collection. May represent hematoma given recent surgery.    -UA negative for infection  -morphine given in ED  -zofran given in ED  -general surgery consulted in ED - appreciate recommendations in advance  -NPO  -PRN pain medication  -MIVF    Essential HTN in setting of known CAD  -continue coreg and lisinopril  -continue ASA  -hold plavix for now d/t anticipating surgery in am    HLD  -continue atorvastatin    Tobacco use  -tobacco cessation  -nicotine patch    DVT Prophylaxis: SCD's      Diet: No diet orders on file     Code Status: Prior    PT/OT Eval Status: no indication for need at this time    Dispo - 2-3 days pending clinical improvement     MAT Baez CNP    Thank you MAT Person CNP for the opportunity to be involved in this patient's care.  If you have any questions or concerns please feel free to contact me at [phone].  ---------------------------------Dr. Sienna Cervantes-----------------------------------------

## 2021-07-21 NOTE — SEDATION DOCUMENTATION
Time out completed prior to procedure. Pt was place supine on the CT table. Pt was placed on all cardiac monitoring. Pt on room air. Oxygen is available if needed   Blood patch was drawn prior to procedure.

## 2021-07-21 NOTE — CONSULTS
Department of General Surgery Consult    PATIENT NAME: Digna Panchal   YOB: 1965    ADMISSION DATE: 7/20/2021  7:23 PM      TODAY'S DATE: 7/21/2021    Reason for Consult:  abd pain    Chief Complaint: same    Requesting Physician:  Roetting    HISTORY OF PRESENT ILLNESS:              The patient is a 60 y.o. male who presents with abdominal pain and swelling. Underwent robotic incisional hernia repair with mesh on 7/9. Postop has had swelling and pain at hernia repair site. No fevers or chills. No nausea. No ecchymosis or erythema. .    Past Medical History:        Diagnosis Date    Bowel incontinence     Hyperlipidemia     Hypertension     PAD (peripheral artery disease) (HCC)     Poor vision     left eye    Pre-diabetes     Sleep apnea     NO CPAP    Stroke ([address] 2016    x3 - memory deficit    Wears partial dentures     upper & lower       Past Surgical History:        Procedure Laterality Date    ABDOMEN SURGERY      colon polyps    FEMORAL-TIBIAL BYPASS GRAFT Right 06/24/2020    RIGHT FEMORAL TO PERONEAL  BYPASS GRAFT performed by Madai Darden MD at [place] [address] N/A 7/9/2021    ROBOTIC INCISIONAL HERNIA REPAIR WITH MESH, eTAPP BLOCK performed [facility] Haji [facility] at [facility]      LEG SURGERY Right     VASCULAR SURGERY         Current Medications:   Current Facility-Administered Medications: aspirin chewable tablet 81 mg, 81 mg, Oral, Daily  atorvastatin (LIPITOR) tablet 80 mg, 80 mg, Oral, Daily  buPROPion [facility]) extended release tablet 150 mg, 150 mg, Oral, BID  carvedilol (COREG) tablet 6.25 mg, 6.25 mg, Oral, BID WC  gabapentin (NEURONTIN) capsule 600 mg, 600 mg, Oral, TID  lisinopril (PRINIVIL;ZESTRIL) tablet 20 mg, 20 mg, Oral, Daily  nicotine (NICODERM CQ) 21 MG/24HR 1 patch, 1 patch, Transdermal, Daily  sodium chloride flush 0.9 % injection 5-40 mL, 5-40 mL, Intravenous, 2 times per day  sodium chloride flush 0.9 % injection 5-40 mL, 5-40 mL, Intravenous, PRN  0.9 % sodium chloride infusion, 25 mL, Intravenous, PRN  ondansetron (ZOFRAN-ODT) disintegrating tablet 4 mg, 4 mg, Oral, Q8H PRN **OR** ondansetron (ZOFRAN) injection 4 mg, 4 mg, Intravenous, Q6H PRN  polyethylene glycol (GLYCOLAX) packet 17 g, 17 g, Oral, Daily PRN  acetaminophen (TYLENOL) tablet 650 mg, 650 mg, Oral, Q6H PRN **OR** acetaminophen (TYLENOL) suppository 650 mg, 650 mg, Rectal, Q6H PRN  0.9 % sodium chloride infusion, , Intravenous, Continuous  morphine (PF) injection 2 mg, 2 mg, Intravenous, Q4H PRN  Prior to Admission medications    Medication Sig Start Date End Date Taking? Authorizing Provider   gabapentin (NEURONTIN) 600 MG tablet Take 1 tablet by mouth 3 times daily for 30 days. 6/23/21 7/23/21  MAT Nguyen CNP   buPROPion (ZYBAN) 150 MG extended release tablet Take 1 tablet by mouth 2 times daily 150 mg once daily for 3 days; increase to 150 mg twice daily 6/23/21 7/23/21  MAT Nguyen CNP   nicotine (NICODERM CQ) 21 MG/24HR Place 1 patch onto the skin daily  Patient not taking: Reported on 7/6/2021 3/29/21 5/10/21  Nataly Giles MD   aspirin 81 MG chewable tablet Take 1 tablet by mouth daily 3/14/21   Worth Leyden, APRN - CNP   nitroGLYCERIN (NITROSTAT) 0.4 MG SL tablet up to max of 3 total doses. If no relief after 1 dose, call 911. 3/13/21   Worth Leyden, APRN - CNP   lisinopril (PRINIVIL;ZESTRIL) 20 MG tablet Take 1 tablet by mouth daily 3/14/21   Worth Leyden, APRN - CNP   atorvastatin (LIPITOR) 80 MG tablet Take 1 tablet by mouth daily 3/13/21   Worth Leyden, APRN - CNP   carvedilol (COREG) 6.25 MG tablet Take 1 tablet by mouth 2 times daily (with meals) 3/13/21   Worth Leyden, APRN - CNP   clopidogrel (PLAVIX) 75 MG tablet Take 75 mg by mouth daily  7/16/18   Historical Provider, MD        Allergies:  Patient has no known allergies.     Social History:   TOBACCO: yes  ETOH:  no    Family possible evolving hematoma      IMPRESSION/RECOMMENDATIONS:    61 yo with postop hematoma  1. Given size of hernia and need for plavix postop likely represents a hematoma. No signs of infection on exam or labs. 2.  Discussed with IR and possible drain placement today. Discussed procedure with patient. 3.  Continue pain control. If improved after drain placement then possible discharge tomorrow.     Electronically signed by Houston Fernandez, [address] [place] [address]  [phone]

## 2021-07-21 NOTE — PRE SEDATION
Sedation Pre-Procedure Note    Patient Name: Laureano Vázquez   YOB: 1965  Room/Bed: 8097/2571-70  Medical Record Number: [id number]  Date: 7/21/2021   Time: 4:25 PM       Indication:  Abdominal fluid collections here for aspiration and drain placement. Consent: I have discussed with the patient and/or the patient representative the indication, alternatives, and the possible risks and/or complications of the planned procedure and the anesthesia methods. The patient and/or patient representative appear to understand and agree to proceed. Vital Signs:   Vitals:    07/21/21 1625   BP: (!) 168/79   Pulse: 68   Resp: 15   Temp:    SpO2: 98%       Past Medical History:   has a past medical history of Bowel incontinence, Hyperlipidemia, Hypertension, PAD (peripheral artery disease) ([address] Poor vision, Pre-diabetes, Sleep apnea, Stroke ([address] and Wears partial dentures. Past Surgical History:   has a past surgical history that includes Leg Surgery (Right); vascular surgery; Abdomen surgery; Femoral-tibial Bypass Graft (Right, 06/24/2020); Incisional hernia repair; and hernia repair (N/A, 7/9/2021). Medications:   Scheduled Meds:    aspirin  81 mg Oral Daily    atorvastatin  80 mg Oral Daily    buPROPion  150 mg Oral BID    carvedilol  6.25 mg Oral BID WC    gabapentin  600 mg Oral TID    lisinopril  20 mg Oral Daily    nicotine  1 patch Transdermal Daily    sodium chloride flush  5-40 mL Intravenous 2 times per day     Continuous Infusions:    sodium chloride       PRN Meds: sodium chloride flush, sodium chloride, ondansetron **OR** ondansetron, polyethylene glycol, acetaminophen **OR** acetaminophen, morphine  Home Meds:   Prior to Admission medications    Medication Sig Start Date End Date Taking? Authorizing Provider   gabapentin (NEURONTIN) 600 MG tablet Take 1 tablet by mouth 3 times daily for 30 days.  6/23/21 7/23/21  Maurilio Friend, APRN - CNP   buPROPion (ZYBAN) 150 MG extended release tablet Take 1 tablet by mouth 2 times daily 150 mg once daily for 3 days; increase to 150 mg twice daily 6/23/21 7/23/21  MAT Grey CNP   aspirin 81 MG chewable tablet Take 1 tablet by mouth daily 3/14/21   Nicki Merlin, APRN - CNP   nitroGLYCERIN (NITROSTAT) 0.4 MG SL tablet up to max of 3 total doses. If no relief after 1 dose, call 911. 3/13/21   Nicki Merlin, APRN - CNP   lisinopril (PRINIVIL;ZESTRIL) 20 MG tablet Take 1 tablet by mouth daily 3/14/21   Nicki Merlin, APRN - CNP   atorvastatin (LIPITOR) 80 MG tablet Take 1 tablet by mouth daily 3/13/21   Nicki Merlin, APRN - CNP   carvedilol (COREG) 6.25 MG tablet Take 1 tablet by mouth 2 times daily (with meals) 3/13/21   Nicki Merlin, APRN - CNP   clopidogrel (PLAVIX) 75 MG tablet Take 75 mg by mouth daily  7/16/18   Historical Provider, MD     Coumadin Use Last 7 Days:  no  Antiplatelet drug therapy use last 7 days: no  Other anticoagulant use last 7 days: no  Additional Medication Information:  n/a      Pre-Sedation Documentation and Exam:   I have reviewed the patient's history and review of systems.     Mallampati Airway Assessment:  Mallampati Class II - (soft palate, fauces & uvula are visible)    Prior History of Anesthesia Complications:   none    ASA Classification:  Class 2 - A normal healthy patient with mild systemic disease    Sedation/ Anesthesia Plan:   intravenous sedation    Medications Planned:   midazolam (Versed) intravenously and fentanyl intravenously    Patient is an appropriate candidate for plan of sedation: yes    Electronically signed by Xavi Diana MD on 7/21/2021 at 4:25 PM

## 2021-07-21 NOTE — ED NOTES
CT tech in room to transport patient. Tech stated he would start IV.      Arcadio Gaucher, SONIA  07/20/21 2022

## 2021-07-21 NOTE — BRIEF OP NOTE
Brief Postoperative Note    Roderick Delvalle  YOB: 1965  [id number]    Pre-operative Diagnosis: abdominal fluid collection and abdominal soft tissue collection    Post-operative Diagnosis: Same    Procedure: CT-guided Drain placement, CT guided Aspiration    Anesthesia: Moderate Sedation    Surgeons: Lizabeth Grove MD    Estimated Blood Loss: Less than 5 mL    Complications: None    Specimens: Was Obtained: 200cc dark fluid drained from intraabdominal collection. 100cc dark fluid drained from the soft tissue collection. Findings: Successful CT-guided drain placement in the intra-abdominal fluid collection and successful CT-guided aspiration of the superficial soft tissue collection.     Electronically signed by Lizabeth Grove MD on 7/21/2021 at 4:26 PM

## 2021-07-21 NOTE — CARE COORDINATION
CASE MANAGEMENT INITIAL ASSESSMENT      Reviewed chart and completed assessment  With: Pt  Explained Case Management role/services. Primary contact information: Spouse Eduardo Mena. Health Care Decision Maker :   Primary Decision Maker: Jordy Neil Spouse - [phone]    Secondary Decision Maker: Dominique Carrel Child - [phone]          Can this person be reached and be able to respond quickly, such as within a few minutes or hours? Yes  Who would be your back-up decision maker? Name: daughter Lisette Zuñiga. Phone Number: [phone]    Admit date/status: IP, 7/20/21  Diagnosis: Abdominal pain   Is this a Readmission?:  Yes admitted 7/9/21 for hernia surgery. Discharged home NN 3 days later. [id number]       3 night stay required: No    Living arrangements, Adls, care needs, prior to admission: lives in a one story house with wife and mom. Transportation: family    Durable Medical Equipment at home:  None  Services in the home and/or outpatient, prior to admission:None      PT/OT recs: None seen at this time. Hospital Exemption Notification (HEN): needed for SNF, not initiated. Barriers to discharge: None    Plan/comments: CM met with pt at bedside for initial assessment. Pt plans to return back home with current support system in place. Denies DCP needs at this time. If any needs or concerns should arise please advise.  Sameer Castro, RN      ECOC on chart for MD signature

## 2021-07-21 NOTE — PROGRESS NOTES
4 Eyes Skin Assessment     The patient is being assess for   Admission    I agree that 2 RN's have performed a thorough Head to Toe Skin Assessment on the patient. ALL assessment sites listed below have been assessed. Areas assessed for pressure by both nurses:   [x]   Head, Face, and Ears   [x]   Shoulders, Back, and Chest, Abdomen  [x]   Arms, Elbows, and Hands   [x]   Coccyx, Sacrum, and Ischium  [x]   Legs, Feet, and Heels    Previous surgical incisions on abdomen, and scattered abrasions. **SHARE this note so that the co-signing nurse is able to place an eSignature**    Co-signer eSignature: Electronically signed by Samson Pedroza RN on 7/23/21 at 9:23 AM EDT    Does the Patient have Skin Breakdown related to pressure?   No          S[place] Prevention initiated: NO  Wound Care Orders initiated:  NO      [facility] nurse consulted for Pressure Injury (Stage 3,4, Unstageable, DTI, NWPT, Complex wounds)and New or Established Ostomies:  NO    Primary Nurse eSignature: Electronically signed by Rosy Mohan RN on 7/21/21 at 5:15 AM EDT

## 2021-07-21 NOTE — SEDATION DOCUMENTATION
Pt arrived for image guided drain insertion abdomen. Procedure explained including the risk and benefits of the procedure. All questions answered. Pt verbalizes understanding of the procedure and states no more questions. Consent confirmed. Vital signs stable. Labs, allergies, medications, and code status reviewed. No contraindications noted. Vital Signs  Vitals:    07/21/21 1551   BP: (!) 184/81   Pulse: 66   Resp: 18   Temp:    SpO2: 98%    (vital signs in table format)    Pre Santo Score  2 - Able to move 4 extremities voluntarily on command  2 - BP+/- 20mmHg of normal  2 - Fully awake  2 - Able to maintain oxygen saturation >92% on room air  2 - Able to breathe deeply and cough freely    Allergies  Patient has no known allergies.  (allergies)    Labs  Lab Results   Component Value Date    INR 1.12 07/21/2021    PROTIME 12.7 07/21/2021     Lab Results   Component Value Date    CREATININE 0.9 07/21/2021    BUN 13 07/21/2021     07/21/2021    GFR >60 07/11/2012    K 4.1 07/21/2021     07/21/2021    CO2 22 07/21/2021     Lab Results   Component Value Date    WBC 8.5 07/21/2021    HGB 13.0 (L) 07/21/2021    HCT 37.6 (L) 07/21/2021    MCV 95.7 07/21/2021     07/21/2021

## 2021-07-21 NOTE — ED PROVIDER NOTES
**ADVANCED PRACTICE PROVIDER, I HAVE EVALUATED THIS [facility]  ED  EMERGENCY DEPARTMENT ENCOUNTER      Pt Name: Zhen Maldonado  YFF:[id number]  Gabrielegfmilli 1965  Date of evaluation: 7/20/2021  Provider: MAT Amanda CNP      Chief Complaint:    Chief Complaint   Patient presents with    Post-op Problem    Abdominal Pain     pt was seen and admitted 07/09 for hernia surgery. pt reports pain has not stopped since. Given percocet without relief         Nursing Notes, Past Medical Hx, Past Surgical Hx, Social Hx, Allergies, and Family Hx were all reviewed and agreed with or any disagreements were addressed in the HPI.    HPI: (Location, Duration, Timing, Severity, Quality, Assoc Sx, Context, Modifying factors)    Chief Complaint of abdominal pain since hernia repair     This is a  60 y.o. male who presents today with abdominal pain, states that he had hernia repair on July 9th, by Dr. Arnold Durham, he states that since surgery he has been having pain, and the swelling/bulge to the umbilical region has \"never gone down\". He has nausea without vomiting or diarrhea, he states his pain medicine is not working. He denies any cough, congestion, fever or chills. Denies any urinary symptoms. He rates his pain 8/10 on exam, states his pain is sharp and constant. Denies any any additional complaints, no additional aggravating or alleviating factors, he presents awake, alert and in no acute distress or toxic appearance, but is in obvious pain.      PastMedical/Surgical History:      Diagnosis Date    Bowel incontinence     Hyperlipidemia     Hypertension     PAD (peripheral artery disease) (HCC)     Poor vision     left eye    Pre-diabetes     Sleep apnea     NO CPAP    Stroke ([facility] [address] 2016    x3 - memory deficit    Wears partial dentures     upper & lower         Procedure Laterality Date    ABDOMEN SURGERY      colon polyps    FEMORAL-TIBIAL BYPASS GRAFT Right 06/24/2020 RIGHT FEMORAL TO PERONEAL  BYPASS GRAFT performed by Toney Zhou MD at [facility] 45 N/A 7/9/2021    ROBOTIC INCISIONAL HERNIA REPAIR WITH MESH, eTAPP BLOCK performed by Neetu Blum MD at [address] [place] [address]     VASCULAR SURGERY         Medications:  Previous Medications    ASPIRIN 81 MG CHEWABLE TABLET    Take 1 tablet by mouth daily    ATORVASTATIN (LIPITOR) 80 MG TABLET    Take 1 tablet by mouth daily    BUPROPION (ZYBAN) 150 MG EXTENDED RELEASE TABLET    Take 1 tablet by mouth 2 times daily 150 mg once daily for 3 days; increase to 150 mg twice daily    CARVEDILOL (COREG) 6.25 MG TABLET    Take 1 tablet by mouth 2 times daily (with meals)    CLOPIDOGREL (PLAVIX) 75 MG TABLET    Take 75 mg by mouth daily     GABAPENTIN (NEURONTIN) 600 MG TABLET    Take 1 tablet by mouth 3 times daily for 30 days. LISINOPRIL (PRINIVIL;ZESTRIL) 20 MG TABLET    Take 1 tablet by mouth daily    NICOTINE (NICODERM CQ) 21 MG/24HR    Place 1 patch onto the skin daily    NITROGLYCERIN (NITROSTAT) 0.4 MG SL TABLET    up to max of 3 total doses. If no relief after 1 dose, call 911. SULFAMETHOXAZOLE-TRIMETHOPRIM (BACTRIM DS;SEPTRA DS) 800-160 MG PER TABLET    Take 1 tablet by mouth 2 times daily for 5 days         Review of Systems:  (2-9 systems needed)  Review of Systems   Constitutional: Negative for chills and fever. HENT: Negative for congestion. Respiratory: Negative for cough, shortness of breath and wheezing. Cardiovascular: Negative for chest pain. Gastrointestinal: Positive for abdominal pain and nausea. Negative for diarrhea and vomiting. Patient complains of abdominal pain since having hernia repair on July 9th, he is having more pain and swelling, is having nausea without vomiting and no diarrhea. Genitourinary: Negative for dysuria, frequency and hematuria. Musculoskeletal: Negative for back pain.    Skin: Negative for color [facility] - [address], [facility] [employer]   Phone [phone]   COMPREHENSIVE METABOLIC PANEL W/ REFLEX TO MG FOR LOW K - Abnormal; Notable for the following components:    Sodium 133 (*)     Alkaline Phosphatase 147 (*)     All other components within normal limits    Narrative:     Performed at:  [address], [facility] [employer]   Phone [phone]   CULTURE, URINE   LIPASE    Narrative:     Performed at:  [place] [address] [employer]   Phone [phone]   URINE RT REFLEX TO CULTURE    Narrative:     Performed at:  [address], [facility] [employer]   Phone  of labs reviewed and were negative at this time or not returned at the time of this note. RADIOLOGY:   Non-plain film images such as CT, Ultrasound and MRI are read by the radiologist. Rosa Maria RODRIGUEZ, MAT - CNP have directly visualized the radiologic plain film image(s) with the below findings:      Interpretation per the Radiologist below, if available at the time of this note:    CT ABDOMEN PELVIS W IV CONTRAST Additional Contrast? None   Final Result   1. Large rim enhancing fluid collection as described. This may represent   hematoma given recent surgery. Correlate with presentation to exclude   superimposed infection and abscess formation. 2. Omental infarct in the mid upper pelvis. 3. Infectious or inflammatory airway process. 4. Other findings as described.                MEDICAL DECISION MAKING / ED COURSE:    Because of high probability of sudden clinical deterioration of the patient's condition and risk of further deterioration, critical care time required my full attention to the patient's condition; which included chart data review, documentation, medication ordering, reviewing the patient's old records, reevaluation patient's cardiac, pulmonary and neurological status. Reevaluation of vital signs. Consultations with ED attending and admitting physician. Ordering, interpreting reviewing diagnostic testing. Therefore a critical care time was 18 minutes of direct attention to the patient's condition did not include time spent on procedures. PROCEDURES:   Procedures    None    Patient was given:  Medications   morphine (PF) injection 4 mg (4 mg Intravenous Given 7/20/21 2328)   ondansetron (ZOFRAN) injection 4 mg (4 mg Intravenous Given 7/20/21 2052)   iopamidol (ISOVUE-370) 76 % injection 75 mL (75 mLs Intravenous Given 7/20/21 2032)     Patient complains of abdominal pain since having hernia repair on July 9th, he is having more pain and swelling, is having nausea without vomiting and no diarrhea. After evaluation and examination of the patient, I ordered IV access, blood work, urine, CT of the abdomen and medications. Urine shows no infection. CBC shows no sepsis or anemia. Metabolic panel shows no electrolyte disturbances or renal insufficiency. Liver functions are essentially normal with the exception of the alk phos being 147. Lipase is normal.  CT the abdomen shows large rim-enhancing fluid collection as described concerning of hematoma or superimposed infection with abscess formation. I paged general surgery on-call. Patient's pain is being controlled with pain meds. At 2313 I spoke with Dr. Olga Navarro, general surgery on call we discussed patient's case at length and he agreed patient may need surgical evaluation and possibly drained. He recommends patient be admitted to hospitalist and keep NPO, hold Plavix. Hospitalist was paged for admission via perfect serve to Dr. Melissa Feliciano. The patient tolerated their visit well. I evaluated the patient. The physician was available for consultation as needed.   The patient and / or the family were informed of the results of any tests, a time was given to

## 2021-07-21 NOTE — SEDATION DOCUMENTATION
Image guided drain insertion left middle abd completed. Dr. Divina Ferreira placed 10 Libyan 25 cm M-Drain LOT # [id number] EXP 1/2024 in the left. Drain/tube secured at the 12 cm line with sutures. 250 milliliters of dark red colored withdrawn immediately. Specimen sent to lab for culture. Drain/tube dressing clean, dry, and intact. Pt tolerated procedure without any signs or symptoms of distress. Vital signs stable. Report called to  RN. Pt transported back to [facility] in stable condition via bed by transport.      Medications  Versed: 1 mg  Fentanyl: 50 mcg    Vital Signs  Vitals:    07/21/21 1621   BP: (!) 181/72   Pulse: 68   Resp: 16   Temp:    SpO2: 97%    (vital signs in table format)    Post Santo  2 - Able to move 4 extremities voluntarily on command  2 - BP+/- 20mmHg of normal  2 - Fully awake  2 - Able to maintain oxygen saturation >92% on room air  2 - Able to breathe deeply and cough freely

## 2021-07-21 NOTE — ED NOTES
PS Gen Surgery @ 0261  RE: Consult for status post hernia repair - Andriy De Santiago per [place] Host, NP  Dr. Becca Hooks called back @ 21        Nelson Kang  07/20/21 9865

## 2021-07-22 LAB
ANION GAP SERPL CALCULATED.3IONS-SCNC: 12 MMOL/L (ref 3–16)
BUN BLDV-MCNC: 15 MG/DL (ref 7–20)
CALCIUM SERPL-MCNC: 10.1 MG/DL (ref 8.3–10.6)
CHLORIDE BLD-SCNC: 100 MMOL/L (ref 99–110)
CO2: 24 MMOL/L (ref 21–32)
CREAT SERPL-MCNC: 0.9 MG/DL (ref 0.9–1.3)
GFR AFRICAN AMERICAN: >60
GFR NON-AFRICAN AMERICAN: >60
GLUCOSE BLD-MCNC: 119 MG/DL (ref 70–99)
HCT VFR BLD CALC: 40.2 % (ref 40.5–52.5)
HEMOGLOBIN: 14.2 G/DL (ref 13.5–17.5)
MCH RBC QN AUTO: 33.5 PG (ref 26–34)
MCHC RBC AUTO-ENTMCNC: 35.3 G/DL (ref 31–36)
MCV RBC AUTO: 95 FL (ref 80–100)
PDW BLD-RTO: 14.2 % (ref 12.4–15.4)
PLATELET # BLD: 387 K/UL (ref 135–450)
PMV BLD AUTO: 6.4 FL (ref 5–10.5)
POTASSIUM SERPL-SCNC: 4.8 MMOL/L (ref 3.5–5.1)
RBC # BLD: 4.23 M/UL (ref 4.2–5.9)
SODIUM BLD-SCNC: 136 MMOL/L (ref 136–145)
WBC # BLD: 8.8 K/UL (ref 4–11)

## 2021-07-22 PROCEDURE — APPSS45 APP SPLIT SHARED TIME 31-45 MINUTES: Performed by: CLINICAL NURSE SPECIALIST

## 2021-07-22 PROCEDURE — 1200000000 HC SEMI PRIVATE

## 2021-07-22 PROCEDURE — 6370000000 HC RX 637 (ALT 250 FOR IP): Performed by: NURSE PRACTITIONER

## 2021-07-22 PROCEDURE — 36415 COLL VENOUS BLD VENIPUNCTURE: CPT

## 2021-07-22 PROCEDURE — 80048 BASIC METABOLIC PNL TOTAL CA: CPT

## 2021-07-22 PROCEDURE — 2580000003 HC RX 258: Performed by: NURSE PRACTITIONER

## 2021-07-22 PROCEDURE — 2580000003 HC RX 258: Performed by: INTERNAL MEDICINE

## 2021-07-22 PROCEDURE — 6370000000 HC RX 637 (ALT 250 FOR IP): Performed by: SURGERY

## 2021-07-22 PROCEDURE — 85027 COMPLETE CBC AUTOMATED: CPT

## 2021-07-22 PROCEDURE — 99024 POSTOP FOLLOW-UP VISIT: CPT | Performed by: SURGERY

## 2021-07-22 RX ORDER — OXYCODONE HYDROCHLORIDE 5 MG/1
5 TABLET ORAL EVERY 6 HOURS PRN
Qty: 12 TABLET | Refills: 0 | Status: SHIPPED | OUTPATIENT
Start: 2021-07-22 | End: 2021-07-25

## 2021-07-22 RX ORDER — SODIUM CHLORIDE 9 MG/ML
INJECTION, SOLUTION INTRAVENOUS ONCE
Status: COMPLETED | OUTPATIENT
Start: 2021-07-22 | End: 2021-07-22

## 2021-07-22 RX ADMIN — ATORVASTATIN CALCIUM 80 MG: 80 TABLET, FILM COATED ORAL at 09:37

## 2021-07-22 RX ADMIN — BUPROPION HYDROCHLORIDE 150 MG: 150 TABLET, EXTENDED RELEASE ORAL at 20:51

## 2021-07-22 RX ADMIN — SODIUM CHLORIDE, PRESERVATIVE FREE 10 ML: 5 INJECTION INTRAVENOUS at 09:37

## 2021-07-22 RX ADMIN — OXYCODONE 5 MG: 5 TABLET ORAL at 20:51

## 2021-07-22 RX ADMIN — LISINOPRIL 20 MG: 20 TABLET ORAL at 09:36

## 2021-07-22 RX ADMIN — GABAPENTIN 600 MG: 300 CAPSULE ORAL at 09:36

## 2021-07-22 RX ADMIN — SODIUM CHLORIDE: 9 INJECTION, SOLUTION INTRAVENOUS at 14:51

## 2021-07-22 RX ADMIN — ASPIRIN 81 MG: 81 TABLET, CHEWABLE ORAL at 09:37

## 2021-07-22 RX ADMIN — BUPROPION HYDROCHLORIDE 150 MG: 150 TABLET, EXTENDED RELEASE ORAL at 09:36

## 2021-07-22 RX ADMIN — SODIUM CHLORIDE, PRESERVATIVE FREE 10 ML: 5 INJECTION INTRAVENOUS at 20:51

## 2021-07-22 ASSESSMENT — PAIN SCALES - GENERAL
PAINLEVEL_OUTOF10: 6
PAINLEVEL_OUTOF10: 0

## 2021-07-22 ASSESSMENT — PAIN DESCRIPTION - PROGRESSION
CLINICAL_PROGRESSION: RAPIDLY IMPROVING

## 2021-07-22 ASSESSMENT — PAIN DESCRIPTION - PAIN TYPE: TYPE: ACUTE PAIN;SURGICAL PAIN

## 2021-07-22 ASSESSMENT — PAIN DESCRIPTION - ORIENTATION: ORIENTATION: LOWER

## 2021-07-22 ASSESSMENT — PAIN DESCRIPTION - LOCATION: LOCATION: ABDOMEN

## 2021-07-22 NOTE — PROGRESS NOTES
Hospitalist Progress Note      PCP: MAT Shah - CNP    Date of Admission: 7/20/2021    Chief Complaint: Abdominal pain    Hospital Course: Recently passed outpatient for hernia repair comes back with abdominal pain. Imaging shows intra-abdominal hematoma. Seen by general surgery. Hematoma evacuation by IR was performed yesterday. Patient feels fine    Subjective: No chest pain, no shortness of breath, no nausea or vomiting. Mild abdominal pain. Complaints are not worse today      Medications:  Reviewed    Infusion Medications    sodium chloride       Scheduled Medications    aspirin  81 mg Oral Daily    atorvastatin  80 mg Oral Daily    buPROPion  150 mg Oral BID    carvedilol  6.25 mg Oral BID WC    gabapentin  600 mg Oral TID    lisinopril  20 mg Oral Daily    nicotine  1 patch Transdermal Daily    sodium chloride flush  5-40 mL Intravenous 2 times per day     PRN Meds: sodium chloride flush, sodium chloride, ondansetron **OR** ondansetron, polyethylene glycol, acetaminophen **OR** acetaminophen, morphine, oxyCODONE **OR** oxyCODONE      Intake/Output Summary (Last 24 hours) at 7/22/2021 1142  Last data filed at 7/22/2021 0952  Gross per 24 hour   Intake 580 ml   Output 365 ml   Net 215 ml       Physical Exam Performed:    /75   Pulse 87   Temp 97.5 °F (36.4 °C) (Oral)   Resp 16   Ht 5' 8\" (1.727 m)   Wt 172 lb (78 kg)   SpO2 95%   BMI 26.15 kg/m²     General appearance: No apparent distress, appears stated age and cooperative. HEENT: Pupils equal, round, and reactive to light. Conjunctivae/corneas clear. Neck: Supple, with full range of motion. No jugular venous distention. Trachea midline. Respiratory:  Normal respiratory effort. Clear to auscultation, bilaterally without Rales/Wheezes/Rhonchi. Cardiovascular: Regular rate and rhythm with normal S1/S2 without murmurs, rubs or gallops.   Abdomen: Soft, mild  Distention and midline tenderness   Musculoskeletal: No clubbing, cyanosis or edema bilaterally. Full range of motion without deformity. Skin: Skin color, texture, turgor normal.  No rashes or lesions. Neurologic:  Neurovascularly intact without any focal sensory/motor deficits. Cranial nerves: II-XII intact, grossly non-focal.  Psychiatric: Alert and oriented, thought content appropriate, normal insight  Capillary Refill: Brisk,3 seconds, normal   Peripheral Pulses: +2 palpable, equal bilaterally       Labs:   Recent Labs     07/20/21  1723 07/21/21  0655 07/22/21  0717   WBC 9.6 8.5 8.8   HGB 13.9 13.0* 14.2   HCT 39.9* 37.6* 40.2*    326 387     Recent Labs     07/20/21  1723 07/21/21  0655 07/22/21  0717   * 137 136   K 4.6 4.1 4.8   CL 99 103 100   CO2 21 22 24   BUN 14 13 15   CREATININE 1.0 0.9 0.9   CALCIUM 9.7 9.4 10.1     Recent Labs     07/20/21  1723   AST 22   ALT 20   BILITOT <0.2   ALKPHOS 147*     Recent Labs     07/21/21  1045   INR 1.12     No results for input(s): Carrillo Bolden in the last 72 hours. Urinalysis:      Lab Results   Component Value Date    NITRU Negative 07/20/2021    WBCUA 251 06/06/2016    BACTERIA 3+ 06/06/2016    RBCUA 243 06/06/2016    BLOODU Negative 07/20/2021    SPECGRAV 1.010 07/20/2021    GLUCOSEU Negative 07/20/2021       Radiology:  CT DRAINAGE VISCERAL PERCUTANEOUS   Final Result   Successful CT guided placement of an abscess drainage catheter in the   intra-abdominal collection. Successful CT-guided aspiration of the superficial abdominal soft tissue   collection. CT ABDOMEN PELVIS W IV CONTRAST Additional Contrast? None   Final Result   1. Large rim enhancing fluid collection as described. This may represent   hematoma given recent surgery. Correlate with presentation to exclude   superimposed infection and abscess formation. 2. Omental infarct in the mid upper pelvis. 3. Infectious or inflammatory airway process. 4. Other findings as described. Assessment/Plan:    Active Hospital Problems    Diagnosis     Tobacco use [Z72.0]     Abdominal pain [R10.9]     Coronary artery disease involving native coronary artery of native heart with unstable angina pectoris ([facility] [address] [I25.110]     Dyslipidemia [E78.5]     Essential hypertension [I10]      PLAN:    Intra-abdominal hematoma  Noted recent surgical procedure. Evaluated by general surgery. Drainage with IR performed yesterday. Tolerated well  Hemodynamically stable. Hemoglobin also remained stable. Awaiting general surgery opinion for discharge     Abdominal pain  Postop status and intra-abdominal hematoma combined. Better today    Hypertension  Controlled blood pressure. Continue Coreg and lisinopril. Coronary artery disease  Asymptomatic. Continue aspirin and Plavix    Dyslipidemia  Continue atorvastatin    Discussed with the patient. Questions answered. DVT Prophylaxis: SCD only  Diet: ADULT DIET; Regular  Code Status: Full Code    PT/OT Eval Status: Not indicated.   Patient is ambulatory and independent    Dispo -inpatient stay pending surgery recommendations    Margarita Ivan MD

## 2021-07-22 NOTE — PROGRESS NOTES
Notified MD of hypotension. 500 cc Normal Saline bolus ordered to be given over 1 hour. RN initiated infusion. Patient laying supine in bed. Patient asymptomatic. A&Ox4.     1443: BP back within normal range. Patient on q 30 min BP check.        1510: /69

## 2021-07-22 NOTE — PROGRESS NOTES
[facility] SURGERY    PATIENT NAME: Austin Lester     TODAY'S DATE: 7/22/2021    CHIEF COMPLAINT: pain    INTERVAL HISTORY/HPI:    Pt with less pain, no nausea or emesis, no fevers. REVIEW OF SYSTEMS:  Pertinent positives and negatives as per interval history section    OBJECTIVE:  VITALS:  /66   Pulse 61   Temp 96.1 °F (35.6 °C) (Axillary)   Resp 16   Ht 5' 8\" (1.727 m)   Wt 172 lb (78 kg)   SpO2 100%   BMI 26.15 kg/m²     INTAKE/OUTPUT:    I/O last 3 completed shifts: In: 360 [P.O.:360]  Out: 355 [Drains:355]  I/O this shift:  In: 220 [P.O.:220]  Out: 35 [Drains:35]    CONSTITUTIONAL:  awake and alert  LUNGS:  Respirations easy and unlabored, no crackles or wheezing  CARD:  regular rate and rhythm  ABDOMEN:  normal bowel sounds, soft, non-distended, less tender, drain serosanguinous     Data:  CBC:   Recent Labs     07/20/21  1723 07/21/21  0655 07/22/21  0717   WBC 9.6 8.5 8.8   HGB 13.9 13.0* 14.2   HCT 39.9* 37.6* 40.2*    326 387     BMP:    Recent Labs     07/20/21  1723 07/21/21  0655 07/22/21  0717   * 137 136   K 4.6 4.1 4.8   CL 99 103 100   CO2 21 22 24   BUN 14 13 15   CREATININE 1.0 0.9 0.9   GLUCOSE 93 108* 119*     Hepatic:   Recent Labs     07/20/21  1723   AST 22   ALT 20   BILITOT <0.2   ALKPHOS 147*     Mag:    No results for input(s): MG in the last 72 hours. Phos:   No results for input(s): PHOS in the last 72 hours. INR:   Recent Labs     07/21/21  1045   INR 1.12       Radiology Review:  *Imaging personally reviewed by me. NA      ASSESSMENT AND PLAN:  59 yo s/p perc drain of subacute hematoma, prior incisional hernia repair  1. Improved postop drainage  2. Ok to discharge home with drain in place today. Will see in office in 7-10 days for possible removal.  3.  Ok to resume plavix at discharge as hematoma is liquefied and thus likely occurred a while ago.      Electronically signed by Christiano Vaz, [address] [place]

## 2021-07-22 NOTE — PLAN OF CARE
Patient reports no pain this AM. Patient reports rapid improvement in pain level after HAKAN Drain placement. Patient encouraged to call RN if pain level increases. Problem: Pain:  Goal: Control of acute pain  Description: Control of acute pain  7/22/2021 1003 by Cheyenne Owen RN  Outcome: Ongoing  7/22/2021 0649 by Matthias Graff RN  Outcome: Ongoing  Note: Pt scoring pain on 0-10 scale. Pain medications given per MAR. Pt instructed to call out when pain level increasing. Call light within reach. Nurse will continue to reassess and monitor.

## 2021-07-22 NOTE — PROGRESS NOTES
[facility]    PATIENT NAME: Joshua Calvo     TODAY'S DATE: 7/22/2021    CHIEF COMPLAINT: abd pain    INTERVAL HISTORY/HPI:    Pt is s/p placement of perc drain by IR this AM- pt reports he is feeling much better with less pain and pressure. Denies nausea, tolerating diet post procedure. REVIEW OF SYSTEMS:  Pertinent positives and negatives as per interval history section    OBJECTIVE:  VITALS:  /75   Pulse 87   Temp 97.5 °F (36.4 °C) (Oral)   Resp 16   Ht 5' 8\" (1.727 m)   Wt 172 lb (78 kg)   SpO2 95%   BMI 26.15 kg/m²     INTAKE/OUTPUT:    I/O last 3 completed shifts: In: 360 [P.O.:360]  Out: 355 [Drains:355]  I/O this shift:  In: 220 [P.O.:220]  Out: 10 [Drains:10]    CONSTITUTIONAL:  awake and alert  LUNGS:  Respirations easy and unlabored, no crackles or wheezing  CARD:  regular rate and rhythm  ABDOMEN:  normal bowel sounds, soft, mild distention, tenderness midline - perc drain in place with serosanguinous drainage,      Data:  CBC:   Recent Labs     07/20/21  1723 07/21/21  0655 07/22/21  0717   WBC 9.6 8.5 8.8   HGB 13.9 13.0* 14.2   HCT 39.9* 37.6* 40.2*    326 387     BMP:    Recent Labs     07/20/21  1723 07/21/21  0655 07/22/21  0717   * 137 136   K 4.6 4.1 4.8   CL 99 103 100   CO2 21 22 24   BUN 14 13 15   CREATININE 1.0 0.9 0.9   GLUCOSE 93 108* 119*     Hepatic:   Recent Labs     07/20/21  1723   AST 22   ALT 20   BILITOT <0.2   ALKPHOS 147*     Mag:    No results for input(s): MG in the last 72 hours. Phos:   No results for input(s): PHOS in the last 72 hours. INR:   Recent Labs     07/21/21  1045   INR 1.12       Radiology Review:  *Imaging personally reviewed by me.    PROCEDURE:   CT GUIDEDABDOMINALABSCESS DRAINAGE CATHETER PLACEMENT     CT-GUIDED ASPIRATION     MODERATE CONSCIOUS SEDATION     7/21/2021     HISTORY:   ORDERING SYSTEM PROVIDED HISTORY: hematoma drain placement   TECHNOLOGIST PROVIDED HISTORY:   Reason for exam:->hematoma drain placement     SEDATION:   1 mgversed and 50 mcg fentanyl were titrated intravenously for moderate   sedation monitored under my direction.  Total intraservice time of sedation   was 20 minutes.  The patient's vital signs were monitored throughout the   procedure and recorded in the patient's medical record by the nurse. TECHNIQUE:   Informed consent was obtained after a detailed explanation of the procedure   including risks, benefits, and alternatives.  Universal protocol was   followed.  A suitable skin site was prepped and draped in sterile fashion   following CT localization.  An 18 gauge needle was advanced under CT guidance   into the intra-abdominal fluid collection and a 0.035 guidewire was used to   place a 10 Sami abscess drainage catheter after the fashion tract was   dilated.  The catheter was sutured to the skin.  The catheter was attached to   MANISHA suction drainage. Then an 18 gauge needle was advanced under CT guidance into the more   superficial soft tissue collection.  Then aspiration was performed. The patient tolerated the procedure well well with no immediate complications. Estimated blood loss: Less than 5 cc     Dose modulation, iterative reconstruction, and/or weight based adjustment of   the mA/kV was utilized to reduce the radiation dose to as low as reasonably   achievable. DLP: 591.02 mGy-cm     FINDINGS:   200 cc mL of dark fluid was removed from the intra-abdominal collection and   sent for diagnostic tests. 100 cc of dark fluid was removed from the more superficial collection. Impression:     Successful CT guided placement of an abscess drainage catheter in the   intra-abdominal collection. Successful CT-guided aspiration of the superficial abdominal soft tissue   collection. ASSESSMENT AND PLAN:  S/P hernia repair with PO fluid collections, likely representing hematoma - fluid described as dark by IR.      Continue with drain to manisha   Continue with current pain control. Dispo: possibly later today or tomorrow - will discuss with surgeons. Electronically signed by MAT Strickland - CNP     [id number]  61 yo s/p perc drain of subacute hematoma, prior incisional hernia repair  1. Improved postop drainage  2. Ok to discharge home with drain in place today.   Will see in office in 7-10 days for possible removal.  3.  Ok to resume plavix at discharge as hematoma is liquefied and thus likely occurred a while ago.      Electronically signed by Genoveva Gil MD

## 2021-07-23 VITALS
BODY MASS INDEX: 26.07 KG/M2 | OXYGEN SATURATION: 100 % | TEMPERATURE: 97.6 F | HEIGHT: 68 IN | SYSTOLIC BLOOD PRESSURE: 149 MMHG | DIASTOLIC BLOOD PRESSURE: 85 MMHG | RESPIRATION RATE: 16 BRPM | HEART RATE: 65 BPM | WEIGHT: 172 LBS

## 2021-07-23 DIAGNOSIS — G89.29 OTHER CHRONIC PAIN: ICD-10-CM

## 2021-07-23 PROCEDURE — 6370000000 HC RX 637 (ALT 250 FOR IP): Performed by: NURSE PRACTITIONER

## 2021-07-23 PROCEDURE — 6370000000 HC RX 637 (ALT 250 FOR IP): Performed by: INTERNAL MEDICINE

## 2021-07-23 RX ORDER — CLOPIDOGREL BISULFATE 75 MG/1
75 TABLET ORAL DAILY
Status: DISCONTINUED | OUTPATIENT
Start: 2021-07-23 | End: 2021-07-23 | Stop reason: HOSPADM

## 2021-07-23 RX ORDER — NITROGLYCERIN 0.4 MG/1
0.4 TABLET SUBLINGUAL EVERY 5 MIN PRN
Status: DISCONTINUED | OUTPATIENT
Start: 2021-07-23 | End: 2021-07-23 | Stop reason: HOSPADM

## 2021-07-23 RX ORDER — GABAPENTIN 600 MG/1
TABLET ORAL
Qty: 90 TABLET | Refills: 0 | Status: SHIPPED | OUTPATIENT
Start: 2021-07-23 | End: 2021-08-20

## 2021-07-23 RX ADMIN — CARVEDILOL 6.25 MG: 6.25 TABLET, FILM COATED ORAL at 09:17

## 2021-07-23 RX ADMIN — ASPIRIN 81 MG: 81 TABLET, CHEWABLE ORAL at 09:16

## 2021-07-23 RX ADMIN — BUPROPION HYDROCHLORIDE 150 MG: 150 TABLET, EXTENDED RELEASE ORAL at 09:16

## 2021-07-23 RX ADMIN — GABAPENTIN 600 MG: 300 CAPSULE ORAL at 09:16

## 2021-07-23 RX ADMIN — ATORVASTATIN CALCIUM 80 MG: 80 TABLET, FILM COATED ORAL at 09:16

## 2021-07-23 RX ADMIN — CLOPIDOGREL BISULFATE 75 MG: 75 TABLET ORAL at 09:17

## 2021-07-23 ASSESSMENT — PAIN DESCRIPTION - PROGRESSION
CLINICAL_PROGRESSION: RAPIDLY IMPROVING

## 2021-07-23 ASSESSMENT — PAIN SCALES - GENERAL: PAINLEVEL_OUTOF10: 0

## 2021-07-23 NOTE — TELEPHONE ENCOUNTER
Refill request for GABAPENTIN 600 MG TABLET medication.      Name of Camron Garcia      Last visit - 6-     Pending visit - N/A    Last refill - 6-      Medication Contract signed - 7-   Last Toyin hicks- 7-        Additional Comments

## 2021-07-23 NOTE — CARE COORDINATION
Spoke to pt at bedside and to pt's RN. Pt noted to be IPTA. Will DC with drain. If pt and spouse express difficulty managing drain- please notify RENA Greenwood RN

## 2021-07-23 NOTE — DISCHARGE INSTR - COC
Continuity of Care Form    Patient Name: Jam Singh   :  1965  MRN:  [id number]    Admit date:  2021  Discharge date:  21    Code Status Order: Full Code   Advance Directives:     Admitting Physician:  Moni Vasquez MD  PCP: Chiara Biswas, MAT - CNP    Discharging Nurse: [facility] Unit/Room#: 8262/4793-17  Discharging Unit Phone Number: [phone]    Emergency Contact:   Extended Emergency Contact Information  Primary Emergency Contact: Danica Galeas  Address: [address] [place] [address], [address] [place] [address] Phone: [phone]  Relation: Spouse  Secondary Emergency Contact: [address] [address] Phone: [phone]  Relation: Child    Past Surgical History:  Past Surgical History:   Procedure Laterality Date    ABDOMEN SURGERY      colon polyps    CT VISCERAL PERCUTANEOUS DRAIN  2021    CT VISCERAL PERCUTANEOUS DRAIN 2021 Nhung Carrillo MD [facility] CT SCAN    FEMORAL-TIBIAL BYPASS GRAFT Right 2020    RIGHT FEMORAL TO PERONEAL  BYPASS GRAFT performed by David Cedillo MD at [facility] 3 2021    ROBOTIC [address] [place], eTAPP BLOCK performed by Joaquina Sandoval MD at [address] [place] [address] Right     VASCULAR SURGERY         Immunization History:   Immunization History   Administered Date(s) Administered    COVID-19, Mckeon Peter, PF, 30mcg/0.3mL 2021, 2021    Influenza, Intradermal, Quadrivalent, Preservative Free 10/13/2016, 2018, 2020    Pneumococcal Polysaccharide (Urathpstz87) 01/10/2014    Tdap (Boostrix, Adacel) 01/10/2014    Zoster Live (Zostavax) 2014       Active Problems:  Patient Active Problem List   Diagnosis Code    Acute CVA (cerebrovascular accident) ([facility] [address] I63.9    Carotid artery stenosis with cerebral infarction ([facility] [address] S36.180    Dyslipidemia E78.5    Essential hypertension I10    Ischemic foot I99.8    Peripheral vascular disease, unspecified ([facility]) I73.9    Vascular occlusion I99.8    Chest pain R07.9    Abnormal stress test R94.39    Coronary artery disease involving native coronary artery of native heart with unstable angina pectoris ([facility]) I25.110    Incisional hernia, without obstruction or gangrene K43.2    Incisional hernia without obstruction or gangrene K43.2    Tobacco use Z72.0    Abdominal pain R10.9       Isolation/Infection:   Isolation          No Isolation        Patient Infection Status     Infection Onset Added Last Indicated Last Indicated By Review Planned Expiration Resolved Resolved By    None active    Resolved    COVID-19 Rule Out 06/19/20 06/19/20 06/19/20 Covid-19 Ambulatory (Ordered)   06/21/20 Rule-Out Test Resulted          Nurse Assessment:  Last Vital Signs: BP (!) 149/85   Pulse 65   Temp 97.6 °F (36.4 °C) (Oral)   Resp 16   Ht 5' 8\" (1.727 m)   Wt 172 lb (78 kg)   SpO2 100%   BMI 26.15 kg/m²     Last documented pain score (0-10 scale): Pain Level: 0  Last Weight:   Wt Readings from Last 1 Encounters:   07/20/21 172 lb (78 kg)     Mental Status:  oriented and alert    IV Access:  - None    Nursing Mobility/ADLs:  Walking   Independent  Transfer  Independent  Bathing  Independent  Dressing  Independent  Toileting  Independent  Feeding  Independent  Med Admin  Independent  Med Delivery   none    Wound Care Documentation and Therapy:        Elimination:  Continence:   · Bowel: Yes  · Bladder: Yes  Urinary Catheter: None   Colostomy/Ileostomy/Ileal Conduit: No       Date of Last BM: ***    Intake/Output Summary (Last 24 hours) at 7/23/2021 0929  Last data filed at 7/23/2021 0645  Gross per 24 hour   Intake 560 ml   Output 95 ml   Net 465 ml     I/O last 3 completed shifts:   In: 12 [P.O.:560]  Out: 95 [Drains:95]    Safety Concerns:     None    Impairments/Disabilities:      None    Nutrition Therapy:  Current Nutrition Therapy:   - Oral Diet: General    Routes of Feeding: Oral  Liquids: No Restrictions  Daily Fluid Restriction: no  Last Modified Barium Swallow with Video (Video Swallowing Test): not done    Treatments at the Time of Hospital Discharge:   Respiratory Treatments: ***  Oxygen Therapy:  is not on home oxygen therapy. Ventilator:    - No ventilator support    Rehab Therapies: ***  Weight Bearing Status/Restrictions: No weight bearing restirctions  Other Medical Equipment (for information only, NOT a DME order):  ***  Other Treatments: ***    Patient's personal belongings (please select all that are sent with patient):  None    RN SIGNATURE:  Electronically signed by Mike Ochoa RN on 7/23/21 at 9:31 AM EDT    CASE MANAGEMENT/SOCIAL WORK SECTION    Inpatient Status Date: ***    Readmission Risk Assessment Score:  Readmission Risk              Risk of Unplanned Readmission:  12           Discharging to Facility/ Agency   · Name:   · Address:  · Phone:  · Fax:    Dialysis Facility (if applicable)   · Name:  · Address:  · Dialysis Schedule:  · Phone:  · Fax:    / signature: {Esignature:[id number]}    PHYSICIAN SECTION    Prognosis: {Prognosis:1448952317}    Condition at Discharge: [facility] Patient Condition:327211478}    Rehab Potential (if transferring to Rehab): {Prognosis:7766672486}    Recommended Labs or Other Treatments After Discharge: ***    Physician Certification: I certify the above information and transfer of Joshua Calvo  is necessary for the continuing treatment of the diagnosis listed and that he requires {Admit to Appropriate Level of Care:25724} for {GREATER/LESS:202916132} 30 days.      Update Admission H&P: {CHP DME Changes in HKEIS:132334926}    PHYSICIAN SIGNATURE:  {Esignature:[id number]}

## 2021-07-23 NOTE — PROGRESS NOTES
Pt's IV line removed without complications. Discussed d/c instructions with patient, given opportunity to ask questions, and provided new medication education with side effects. Follow up appointment information included in d/c instructions. Pt verbalized understanding of d/c instructions. Patient was discharged to home with all belongings and taken outside via wheelchair.     Emmie Lopez RN

## 2021-07-23 NOTE — TELEPHONE ENCOUNTER
Will only provide for 30 days while waiting on pain management appt. Follow up with [facility].  Allison Grider

## 2021-07-23 NOTE — DISCHARGE SUMMARY
Hospital Medicine Discharge Summary    Patient ID: Mala Jaramillo      Patient's PCP: Yan Gil, APRN - CNP    Admit Date: 7/20/2021     Discharge Date:   07/23/21     Admitting Physician: Nancy Oleary MD     Discharge Physician: Jose R Aggarwal MD     Discharge Diagnoses: Active Hospital Problems    Diagnosis     Tobacco use [Z72.0]     Abdominal pain [R10.9]     Coronary artery disease involving native coronary artery of native heart with unstable angina pectoris ([address] [I25.110]     Dyslipidemia [E78.5]     Essential hypertension [I10]        The patient was seen and examined on day of discharge and this discharge summary is in conjunction with any daily progress note from day of discharge. Hospital Course:     Patient who recently underwent outpatient hernia repair comes back with abdominal pain. Imaging showed intra-abdominal hematoma. Seen by general surgery. Hematoma evacuation by IR performed and a drain was left in place. Patient tolerated the procedure well. Aspirin and Plavix resumed postprocedure. Was followed by general surgery. Improvement was seen satisfactory and patient was cleared for discharge. Planned discharge was postponed by one day because of hypotension. On the day of discharge, patient's vitals have been stable. We will discharge the patient home. Surgery recommends that drain be kept in place. It will be addressed at the outpatient follow-up appointment. Patient is currently medically stable. Able to ambulate without any dizziness. Vitals are normal.    Follow-up with general surgery as already recommended. Patient's questions answered on the day of discharge. Physical Exam Performed:     BP (!) 143/76   Pulse 67   Temp 98.1 °F (36.7 °C) (Oral)   Resp 16   Ht 5' 8\" (1.727 m)   Wt 172 lb (78 kg)   SpO2 95%   BMI 26.15 kg/m²       General appearance:  No apparent distress, appears stated age and cooperative.   HEENT:  Normal cephalic, atraumatic without obvious deformity. Pupils equal, round, and reactive to light. Extra ocular muscles intact. Conjunctivae/corneas clear. Neck: Supple, with full range of motion. No jugular venous distention. Trachea midline. Respiratory:  Normal respiratory effort. Clear to auscultation, bilaterally without Rales/Wheezes/Rhonchi. Cardiovascular:  Regular rate and rhythm with normal S1/S2 without murmurs, rubs or gallops. Abdomen: Soft, non-tender, non-distended with normal bowel sounds. Dressing on the hematoma drainage site with drain in place. Musculoskeletal:  No clubbing, cyanosis or edema bilaterally. Full range of motion without deformity. Skin: Skin color, texture, turgor normal.  No rashes or lesions. Neurologic:  Neurovascularly intact without any focal sensory/motor deficits. Cranial nerves: II-XII intact, grossly non-focal.  Psychiatric:  Alert and oriented, thought content appropriate, normal insight  Capillary Refill: Brisk,< 3 seconds   Peripheral Pulses: +2 palpable, equal bilaterally       Labs: For convenience and continuity at follow-up the following most recent labs are provided:      CBC:    Lab Results   Component Value Date    WBC 8.8 07/22/2021    HGB 14.2 07/22/2021    HCT 40.2 07/22/2021     07/22/2021       Renal:    Lab Results   Component Value Date     07/22/2021    K 4.8 07/22/2021    K 4.1 07/21/2021     07/22/2021    CO2 24 07/22/2021    BUN 15 07/22/2021    CREATININE 0.9 07/22/2021    CALCIUM 10.1 07/22/2021         Significant Diagnostic Studies    Radiology:   CT DRAINAGE VISCERAL PERCUTANEOUS   Final Result   Successful CT guided placement of an abscess drainage catheter in the   intra-abdominal collection. Successful CT-guided aspiration of the superficial abdominal soft tissue   collection. CT ABDOMEN PELVIS W IV CONTRAST Additional Contrast? None   Final Result   1. Large rim enhancing fluid collection as described.   This may represent   hematoma given recent surgery. Correlate with presentation to exclude   superimposed infection and abscess formation. 2. Omental infarct in the mid upper pelvis. 3. Infectious or inflammatory airway process. 4. Other findings as described. Consults:     IP CONSULT TO GENERAL SURGERY  IP CONSULT TO HOSPITALIST  IP CONSULT TO HOSPITALIST    Disposition: Home    Condition at Discharge: Stable    Discharge Instructions/Follow-up: General surgery at the office    Code Status:  Full Code     Activity: activity as tolerated    Diet: regular diet      Discharge Medications:     Current Discharge Medication List           Details   oxyCODONE (ROXICODONE) 5 MG immediate release tablet Take 1 tablet by mouth every 6 hours as needed for Pain for up to 3 days. Intended supply: 3 days. Take lowest dose possible to manage pain  Qty: 12 tablet, Refills: 0    Comments: Reduce doses taken as pain becomes manageable  Associated Diagnoses: Status post hernia repair              Details   gabapentin (NEURONTIN) 600 MG tablet Take 1 tablet by mouth 3 times daily for 30 days. Qty: 90 tablet, Refills: 0    Associated Diagnoses: Other chronic pain      buPROPion (ZYBAN) 150 MG extended release tablet Take 1 tablet by mouth 2 times daily 150 mg once daily for 3 days; increase to 150 mg twice daily  Qty: 60 tablet, Refills: 2    Associated Diagnoses: Tobacco abuse      aspirin 81 MG chewable tablet Take 1 tablet by mouth daily  Qty: 30 tablet, Refills: 3      nitroGLYCERIN (NITROSTAT) 0.4 MG SL tablet up to max of 3 total doses. If no relief after 1 dose, call 911.   Qty: 25 tablet, Refills: 3      lisinopril (PRINIVIL;ZESTRIL) 20 MG tablet Take 1 tablet by mouth daily  Qty: 30 tablet, Refills: 3      atorvastatin (LIPITOR) 80 MG tablet Take 1 tablet by mouth daily  Qty: 30 tablet, Refills: 3      carvedilol (COREG) 6.25 MG tablet Take 1 tablet by mouth 2 times daily (with meals)  Qty: 60 tablet, Refills: 3      clopidogrel (PLAVIX) 75 MG tablet Take 75 mg by mouth daily              Time Spent on discharge is more than 45 minutes in the examination, evaluation, counseling and review of medications and discharge plan. Signed:    Brenda Joseph MD   7/23/2021      Thank you MAT Gonzalez CNP for the opportunity to be involved in this patient's care. If you have any questions or concerns please feel free to contact me at [phone].

## 2021-07-23 NOTE — PLAN OF CARE
Patient educated to turn himself every two hours. Heels elevated off bed. Skin assessed qshift. Patient is independent in the room, without skin breakdown at this time. RN will continued to monitor.         Problem: Skin Integrity:  Goal: Absence of new skin breakdown  Description: Absence of new skin breakdown  Outcome: Ongoing

## 2021-07-26 ENCOUNTER — OFFICE VISIT (OUTPATIENT)
Dept: SURGERY | Age: 56
End: 2021-07-26

## 2021-07-26 VITALS
SYSTOLIC BLOOD PRESSURE: 133 MMHG | HEIGHT: 68 IN | DIASTOLIC BLOOD PRESSURE: 68 MMHG | HEART RATE: 73 BPM | BODY MASS INDEX: 25.76 KG/M2 | WEIGHT: 170 LBS

## 2021-07-26 DIAGNOSIS — Z09 POSTOP CHECK: Primary | ICD-10-CM

## 2021-07-26 LAB
ANAEROBIC CULTURE: NORMAL
ANAEROBIC CULTURE: NORMAL
GRAM STAIN RESULT: NORMAL
GRAM STAIN RESULT: NORMAL
WOUND/ABSCESS: NORMAL
WOUND/ABSCESS: NORMAL

## 2021-07-26 PROCEDURE — 99024 POSTOP FOLLOW-UP VISIT: CPT | Performed by: SURGERY

## 2021-07-26 RX ORDER — OXYCODONE HYDROCHLORIDE 5 MG/1
5 TABLET ORAL EVERY 6 HOURS PRN
Status: ON HOLD | COMMUNITY
End: 2022-03-08 | Stop reason: SDUPTHER

## 2021-07-27 NOTE — PROGRESS NOTES
HPI: Nursing notes reviewed. Patient without much pain. No nausea. No fever. s having 50ml per day from drain. ROS:  10 point review of systems performed with pertinent positives in HPI    Phys:    Abd - soft, minimal tender, manisha serosanguinous   Incisions - no erythema    Assesment: 61 yo s/p robotic incisional hernia repair, perc drain of hematoma    Plan: 1. Continue manisha another week until output less   2.   No heavy lifting

## 2021-08-02 ENCOUNTER — OFFICE VISIT (OUTPATIENT)
Dept: SURGERY | Age: 56
End: 2021-08-02

## 2021-08-02 VITALS
HEART RATE: 87 BPM | HEIGHT: 68 IN | WEIGHT: 170 LBS | DIASTOLIC BLOOD PRESSURE: 64 MMHG | BODY MASS INDEX: 25.76 KG/M2 | SYSTOLIC BLOOD PRESSURE: 108 MMHG

## 2021-08-02 DIAGNOSIS — Z09 POSTOP CHECK: Primary | ICD-10-CM

## 2021-08-02 PROCEDURE — 99024 POSTOP FOLLOW-UP VISIT: CPT | Performed by: SURGERY

## 2021-08-03 NOTE — PROGRESS NOTES
HPI: Nursing notes reviewed. Patient with minimal output from drain. No pain. No fevers. ROS:  10 point review of systems performed with pertinent positives in HPI    Phys:   Abd - soft, nontender, nondistended, perc drain serous   Incisions - well healed    Assesment: 61 yo s/p perc drain postop hematoma    Plan: 1. Drain removed   2. Resume activity as tolerated   3.   Call if any questions

## 2021-08-09 RX ORDER — CARVEDILOL 6.25 MG/1
TABLET ORAL
Qty: 180 TABLET | Refills: 2 | Status: SHIPPED | OUTPATIENT
Start: 2021-08-09 | End: 2022-02-04 | Stop reason: SDUPTHER

## 2021-08-19 DIAGNOSIS — G89.29 OTHER CHRONIC PAIN: ICD-10-CM

## 2021-08-19 NOTE — TELEPHONE ENCOUNTER
Refill request for Gabapentin medication.      Name of [employer]      Last visit - 6/23/21     Pending visit - none    Last refill -7/23/21      Medication Contract signed -   Last Oarrs ran-         Additional Comments

## 2021-08-20 RX ORDER — GABAPENTIN 600 MG/1
TABLET ORAL
Qty: 90 TABLET | Refills: 0 | Status: SHIPPED | OUTPATIENT
Start: 2021-08-20 | End: 2021-09-22 | Stop reason: SDUPTHER

## 2021-09-16 ENCOUNTER — HOSPITAL ENCOUNTER (EMERGENCY)
Age: 56
Discharge: LEFT BEFORE DISCHARGE | End: 2021-09-16
Attending: EMERGENCY MEDICINE
Payer: MEDICARE

## 2021-09-16 VITALS
DIASTOLIC BLOOD PRESSURE: 94 MMHG | SYSTOLIC BLOOD PRESSURE: 169 MMHG | HEART RATE: 61 BPM | RESPIRATION RATE: 16 BRPM | OXYGEN SATURATION: 97 % | TEMPERATURE: 98 F

## 2021-09-16 RX ORDER — ATORVASTATIN CALCIUM 80 MG/1
TABLET, FILM COATED ORAL
Qty: 30 TABLET | Refills: 3 | Status: SHIPPED | OUTPATIENT
Start: 2021-09-16 | End: 2022-03-21 | Stop reason: SDUPTHER

## 2021-09-16 ASSESSMENT — PAIN SCALES - GENERAL: PAINLEVEL_OUTOF10: 7

## 2021-09-17 NOTE — ED TRIAGE NOTES
Patient has left prior to receiving a medical screening exam. Three separate attempts to locate the patient at three separate times were unsuccessful.  Multiple attempts to the patient were unsuccessful, therefor the patient was unable to be advised of the risks of leaving without a medical screening exam. Since the patient could not be located a written refusal of care was unable to be obtained

## 2021-09-21 ENCOUNTER — HOSPITAL ENCOUNTER (OUTPATIENT)
Dept: MRI IMAGING | Age: 56
Discharge: HOME | End: 2021-09-21
Payer: MEDICARE

## 2021-09-21 DIAGNOSIS — M54.50 LOW BACK PAIN, UNSPECIFIED BACK PAIN LATERALITY, UNSPECIFIED CHRONICITY, UNSPECIFIED WHETHER SCIATICA PRESENT: ICD-10-CM

## 2021-09-21 PROCEDURE — 72148 MRI LUMBAR SPINE W/O DYE: CPT

## 2021-09-22 DIAGNOSIS — G89.29 OTHER CHRONIC PAIN: ICD-10-CM

## 2021-09-22 NOTE — TELEPHONE ENCOUNTER
Refill request for Gabapentin medication. Name of Camron Garcia      Last visit - 6/23/21     Pending visit - none    Last refill -8/20/21      Medication Contract signed -PDMP Monitoring:    Last PDMP Nadia Ojeda as Reviewed [facility]):  Review User Review Instant Review Result   Lina Bryson 7/19/2021  8:51 AM Reviewed PDMP [1]     [unfilled]  Urine Drug Screenings (1 yr)    No resulted procedures found.        Medication Contract and Consent for Opioid Use Documents Filed      No documents found                 Last Brooke Hartman ran-         Additional Comments

## 2021-09-23 ENCOUNTER — TELEPHONE (OUTPATIENT)
Dept: GASTROENTEROLOGY | Age: 56
End: 2021-09-23

## 2021-09-23 ENCOUNTER — VIRTUAL VISIT (OUTPATIENT)
Dept: INTERNAL MEDICINE CLINIC | Age: 56
End: 2021-09-23
Payer: MEDICARE

## 2021-09-23 DIAGNOSIS — I10 ESSENTIAL HYPERTENSION: ICD-10-CM

## 2021-09-23 DIAGNOSIS — G89.29 CHRONIC BILATERAL LOW BACK PAIN, UNSPECIFIED WHETHER SCIATICA PRESENT: Primary | ICD-10-CM

## 2021-09-23 DIAGNOSIS — M54.50 CHRONIC BILATERAL LOW BACK PAIN, UNSPECIFIED WHETHER SCIATICA PRESENT: Primary | ICD-10-CM

## 2021-09-23 DIAGNOSIS — I25.10 CORONARY ARTERY DISEASE INVOLVING NATIVE HEART WITHOUT ANGINA PECTORIS, UNSPECIFIED VESSEL OR LESION TYPE: ICD-10-CM

## 2021-09-23 PROCEDURE — 99213 OFFICE O/P EST LOW 20 MIN: CPT | Performed by: NURSE PRACTITIONER

## 2021-09-23 PROCEDURE — G8427 DOCREV CUR MEDS BY ELIG CLIN: HCPCS | Performed by: NURSE PRACTITIONER

## 2021-09-23 PROCEDURE — 3017F COLORECTAL CA SCREEN DOC REV: CPT | Performed by: NURSE PRACTITIONER

## 2021-09-23 RX ORDER — GABAPENTIN 600 MG/1
TABLET ORAL
Qty: 90 TABLET | Refills: 2 | Status: SHIPPED | OUTPATIENT
Start: 2021-09-23 | End: 2021-12-22

## 2021-09-23 RX ORDER — MEDICAL SUPPLY, MISCELLANEOUS
1 EACH MISCELLANEOUS 2 TIMES DAILY
Qty: 1 EACH | Refills: 0 | Status: SHIPPED | OUTPATIENT
Start: 2021-09-23

## 2021-09-23 RX ORDER — CLOPIDOGREL BISULFATE 75 MG/1
75 TABLET ORAL DAILY
Qty: 30 TABLET | Refills: 2 | Status: SHIPPED | OUTPATIENT
Start: 2021-09-23 | End: 2022-02-04 | Stop reason: SDUPTHER

## 2021-09-23 ASSESSMENT — ENCOUNTER SYMPTOMS
DIARRHEA: 0
SORE THROAT: 0
SHORTNESS OF BREATH: 0
VOMITING: 0
CHEST TIGHTNESS: 0
COUGH: 0
CONSTIPATION: 0
SINUS PAIN: 0
NAUSEA: 0

## 2021-09-23 NOTE — TELEPHONE ENCOUNTER
Follow up prn after last visit on 8/2/21, s/p incisional hernia repair on 7/9/21. Patient's wife notified.

## 2021-09-23 NOTE — PROGRESS NOTES
----------         Review of Systems   Constitutional: Negative for fever. HENT: Negative for sinus pain and sore throat. Respiratory: Negative for cough, chest tightness and shortness of breath. Cardiovascular: Negative for chest pain and palpitations. Gastrointestinal: Negative for constipation, diarrhea, nausea and vomiting. Genitourinary: Negative for dysuria and urgency. Skin: Negative for rash. Neurological: Negative for dizziness and weakness. Prior to Visit Medications    Medication Sig Taking? Authorizing Provider   gabapentin (NEURONTIN) 600 MG tablet TAKE ONE TABLET BY MOUTH THREE TIMES A DAY Yes MAT Weiss CNP   clopidogrel (PLAVIX) 75 MG tablet Take 1 tablet by mouth daily Yes MAT Weiss CNP   Blood Pressure Monitoring (B-D ASSURE BPM/AUTO ARM CUFF) MISC 1 Device by Does not apply route 2 times daily Yes MAT Weiss CNP   atorvastatin (LIPITOR) 80 MG tablet TAKE ONE TABLET BY MOUTH DAILY Yes MAT Pang CNP   carvedilol (COREG) 6.25 MG tablet TAKE ONE TABLET BY MOUTH TWICE A DAY WITH MEALS Yes Corby Boland MD   oxyCODONE (ROXICODONE) 5 MG immediate release tablet Take 5 mg by mouth every 6 hours as needed for Pain. Yes Historical Provider, MD   aspirin 81 MG chewable tablet Take 1 tablet by mouth daily Yes MAT Banerjee CNP   nitroGLYCERIN (NITROSTAT) 0.4 MG SL tablet up to max of 3 total doses. If no relief after 1 dose, call 911.  Yes MAT Banerjee CNP   lisinopril (PRINIVIL;ZESTRIL) 20 MG tablet Take 1 tablet by mouth daily Yes MAT Banerjee CNP       Social History     Tobacco Use    Smoking status: Current Every Day Smoker     Packs/day: 0.50     Years: 40.00     Pack years: 20.00     Types: Cigarettes    Smokeless tobacco: Never Used   Vaping Use    Vaping Use: Never used   Substance Use Topics    Alcohol use: Not Currently    Drug use: No        No Known Allergies,   Past Medical History:   Diagnosis Date    Bowel incontinence     Hyperlipidemia     Hypertension     PAD (peripheral artery disease) (HCC)     Poor vision     left eye    Pre-diabetes     Sleep apnea     NO CPAP    Stroke ([facility] [address] 2016    x3 - memory deficit    Wears partial dentures     upper & lower   ,   Past Surgical History:   Procedure Laterality Date    ABDOMEN SURGERY      colon polyps    CT VISCERAL PERCUTANEOUS DRAIN  7/21/2021    CT VISCERAL PERCUTANEOUS DRAIN 7/21/2021 Maylin Blackwood MD [facility] CT SCAN    FEMORAL-TIBIAL BYPASS GRAFT Right 06/24/2020    RIGHT FEMORAL TO PERONEAL  BYPASS GRAFT performed by Michael Barber MD at [facility] N/A 7/9/2021    ROBOTIC INCISIONAL HERNIA REPAIR WITH MESH, eTAPP BLOCK performed by Nita Kurtz MD at [address] [place] [address]     VASCULAR SURGERY     ,   Social History     Tobacco Use    Smoking status: Current Every Day Smoker     Packs/day: 0.50     Years: 40.00     Pack years: 20.00     Types: Cigarettes    Smokeless tobacco: Never Used   Vaping Use    Vaping Use: Never used   Substance Use Topics    Alcohol use: Not Currently    Drug use: No   ,   Family History   Problem Relation Age of Onset    Diabetes Mother    ,   Immunization History   Administered Date(s) Administered    COVID-19, OpenLabel, PF, 30mcg/0.3mL 05/13/2021, 06/03/2021    Influenza, Intradermal, Quadrivalent, Preservative Free 10/13/2016, 09/11/2018, 02/11/2020    Pneumococcal Polysaccharide ([id number]) 01/10/2014    Tdap (Boostrix, Adacel) 01/10/2014    Zoster Live (Zostavax) 11/03/2014   ,   Health Maintenance   Topic Date Due    Hepatitis C screen  Never done    HIV screen  Never done    Low dose CT lung screening  Never done    Shingles Vaccine (2 of 3) 12/29/2014    Annual Wellness Visit (AWV)  Never done    Flu vaccine (1) 09/01/2021    A1C test (Diabetic or Prediabetic)  03/12/2022    Lipid screen  03/13/2022    Colon cancer screen colonoscopy  07/11/2022    Potassium monitoring  07/22/2022    Creatinine monitoring  07/22/2022    DTaP/Tdap/Td vaccine (2 - Td or Tdap) 01/10/2024    Pneumococcal 0-64 years Vaccine (2 of 2 - PPSV23) 11/08/2028    COVID-19 Vaccine  Completed    Hepatitis A vaccine  Aged Out    Hepatitis B vaccine  Aged Out    Hib vaccine  Aged Out    Meningococcal (ACWY) vaccine  Aged Out         PHYSICAL EXAMINATION:  [ INSTRUCTIONS:  \"[x]\" Indicates a positive item  \"[]\" Indicates a negative item  -- DELETE ALL ITEMS NOT EXAMINED]  Vital Signs: (As obtained by patient/caregiver or practitioner observation)    Blood pressure-  Heart rate-    Respiratory rate-    Temperature-  Pulse oximetry-     Constitutional: [x] Appears well-developed and well-nourished [x] No apparent distress      [] Abnormal-   Mental status  [x] Alert and awake  [x] Oriented to person/place/time [x]Able to follow commands      Eyes:  EOM    [x]  Normal  [] Abnormal-  Sclera  [x]  Normal  [] Abnormal -         Discharge [x]  None visible  [] Abnormal -    HENT:   [x] Normocephalic, atraumatic. [] Abnormal   [x] Mouth/Throat: Mucous membranes are moist.     External Ears [x] Normal  [] Abnormal-     Neck: [x] No visualized mass     Pulmonary/Chest: [x] Respiratory effort normal.  [x] No visualized signs of difficulty breathing or respiratory distress        [] Abnormal-      Musculoskeletal:   [x] Normal gait with no signs of ataxia         [x] Normal range of motion of neck        [] Abnormal-       Neurological:        [x] No Facial Asymmetry (Cranial nerve 7 motor function) (limited exam to video visit)          [] No gaze palsy        [] Abnormal-         Skin:        [x] No significant exanthematous lesions or discoloration noted on facial skin         [] Abnormal-            Psychiatric:       [x] Normal Affect [x] No Hallucinations        [] Abnormal-     Other pertinent observable physical exam findings-       ASSESSMENT/PLAN:    1.  Chronic bilateral low back pain, unspecified whether sciatica present    2. Coronary artery disease involving native heart without angina pectoris, unspecified vessel or lesion type    3. Essential hypertension      Jay Ramirez was seen today for other and hypertension. Diagnoses and all orders for this visit:    Chronic bilateral low back pain, unspecified whether sciatica present  -     Yara Agudelo MD, Orthopedic Surgery, [place]    Referral provided to ortho to address back pain. Coronary artery disease involving native heart without angina pectoris, unspecified vessel or lesion type  -     Blood Pressure Monitoring (B-D ASSURE BPM/AUTO ARM CUFF) MISC; 1 Device by Does not apply route 2 times daily    Essential hypertension  -     clopidogrel (PLAVIX) 75 MG tablet; Take 1 tablet by mouth daily    Continue BP meds. Encouraged decreasing sodium in the diet, weight loss, and gradual increases in exercise at least 20 minutes daily to further benefit BP    Return in about 3 months (around 12/23/2021), or if symptoms worsen or fail to improve, for 40 min. Alirio Stout is a 60 y.o. male being evaluated by a Virtual Visit (video visit) encounter to address concerns as mentioned above. A caregiver was present when appropriate. Due to this being a TeleHealth encounter (During JZVJT-00 public health emergency), evaluation of the following organ systems was limited: Vitals/Constitutional/EENT/Resp/CV/GI//MS/Neuro/Skin/Heme-Lymph-Imm. Pursuant to the emergency declaration under the [address] [place] [address] authority and the [employer] Act, this Virtual Visit was conducted with patient's (and/or legal guardian's) consent, to reduce the patient's risk of exposure to COVID-19 and provide necessary medical care.   The patient (and/or legal guardian) has also been advised to contact this office for worsening conditions or

## 2021-09-23 NOTE — TELEPHONE ENCOUNTER
Pt's wife 'Christine Lazcano' called and said  had hernia repair on 8/2. They want to know if he needs another visit with Dr. Jed Fraser. Please call her. Thank you!

## 2021-10-11 DIAGNOSIS — I73.9 PERIPHERAL VASCULAR DISEASE, UNSPECIFIED (HCC): ICD-10-CM

## 2021-10-11 DIAGNOSIS — I65.23 BILATERAL CAROTID ARTERY STENOSIS: Primary | ICD-10-CM

## 2021-10-19 ENCOUNTER — HOSPITAL ENCOUNTER (OUTPATIENT)
Dept: VASCULAR LAB | Age: 56
Discharge: HOME | End: 2021-10-19
Payer: MEDICARE

## 2021-10-19 DIAGNOSIS — I73.9 PERIPHERAL VASCULAR DISEASE, UNSPECIFIED (HCC): ICD-10-CM

## 2021-10-19 DIAGNOSIS — I65.23 BILATERAL CAROTID ARTERY STENOSIS: ICD-10-CM

## 2021-10-19 PROCEDURE — 93880 EXTRACRANIAL BILAT STUDY: CPT

## 2021-10-19 PROCEDURE — 93926 LOWER EXTREMITY STUDY: CPT

## 2021-10-22 ENCOUNTER — TELEPHONE (OUTPATIENT)
Dept: VASCULAR SURGERY | Age: 56
End: 2021-10-22

## 2021-10-22 NOTE — TELEPHONE ENCOUNTER
Called patient and spoke to patient and spouse regarding procedure for 10/26/21 at 10:30am and to arrive at 8:30am. Npo after midnight.  balaji

## 2021-10-25 ENCOUNTER — TELEPHONE (OUTPATIENT)
Dept: VASCULAR SURGERY | Age: 56
End: 2021-10-25

## 2021-10-26 ENCOUNTER — HOSPITAL ENCOUNTER (OUTPATIENT)
Dept: CARDIAC CATH/INVASIVE PROCEDURES | Age: 56
Discharge: HOME | End: 2021-10-26
Attending: SURGERY | Admitting: SURGERY
Payer: MEDICARE

## 2021-10-26 VITALS — BODY MASS INDEX: 26.67 KG/M2 | HEIGHT: 68 IN | WEIGHT: 176 LBS

## 2021-10-26 LAB
ANION GAP SERPL CALCULATED.3IONS-SCNC: 10 MMOL/L (ref 3–16)
BUN BLDV-MCNC: 13 MG/DL (ref 7–20)
CALCIUM SERPL-MCNC: 9.5 MG/DL (ref 8.3–10.6)
CHLORIDE BLD-SCNC: 106 MMOL/L (ref 99–110)
CO2: 24 MMOL/L (ref 21–32)
CREAT SERPL-MCNC: 0.9 MG/DL (ref 0.9–1.3)
GFR AFRICAN AMERICAN: >60
GFR NON-AFRICAN AMERICAN: >60
GLUCOSE BLD-MCNC: 130 MG/DL (ref 70–99)
HCT VFR BLD CALC: 41.4 % (ref 40.5–52.5)
HEMOGLOBIN: 14.2 G/DL (ref 13.5–17.5)
INR BLD: 1.35 (ref 0.88–1.12)
MCH RBC QN AUTO: 33.4 PG (ref 26–34)
MCHC RBC AUTO-ENTMCNC: 34.3 G/DL (ref 31–36)
MCV RBC AUTO: 97.4 FL (ref 80–100)
PDW BLD-RTO: 15.2 % (ref 12.4–15.4)
PLATELET # BLD: 205 K/UL (ref 135–450)
PMV BLD AUTO: 6.1 FL (ref 5–10.5)
POTASSIUM SERPL-SCNC: 4 MMOL/L (ref 3.5–5.1)
PROTHROMBIN TIME: 15.5 SEC (ref 9.9–12.7)
RBC # BLD: 4.25 M/UL (ref 4.2–5.9)
SODIUM BLD-SCNC: 140 MMOL/L (ref 136–145)
WBC # BLD: 7.8 K/UL (ref 4–11)

## 2021-10-26 PROCEDURE — 37224 HC FEM POP TERRITORY PLASTY: CPT

## 2021-10-26 PROCEDURE — 75710 ARTERY X-RAYS ARM/LEG: CPT

## 2021-10-26 PROCEDURE — C1760 CLOSURE DEV, VASC: HCPCS

## 2021-10-26 PROCEDURE — 6360000004 HC RX CONTRAST MEDICATION

## 2021-10-26 PROCEDURE — 2709999900 HC NON-CHARGEABLE SUPPLY

## 2021-10-26 PROCEDURE — C1769 GUIDE WIRE: HCPCS

## 2021-10-26 PROCEDURE — 37224 PR REVSC OPN/PRG FEM/POP W/ANGIOPLASTY UNI: CPT | Performed by: SURGERY

## 2021-10-26 PROCEDURE — 80048 BASIC METABOLIC PNL TOTAL CA: CPT

## 2021-10-26 PROCEDURE — 85027 COMPLETE CBC AUTOMATED: CPT

## 2021-10-26 PROCEDURE — 6360000002 HC RX W HCPCS

## 2021-10-26 PROCEDURE — 85610 PROTHROMBIN TIME: CPT

## 2021-10-26 PROCEDURE — C2623 CATH, TRANSLUMIN, DRUG-COAT: HCPCS

## 2021-10-26 PROCEDURE — 99152 MOD SED SAME PHYS/QHP 5/>YRS: CPT | Performed by: SURGERY

## 2021-10-26 PROCEDURE — 75710 ARTERY X-RAYS ARM/LEG: CPT | Performed by: SURGERY

## 2021-10-26 PROCEDURE — C1887 CATHETER, GUIDING: HCPCS

## 2021-10-26 PROCEDURE — 76937 US GUIDE VASCULAR ACCESS: CPT | Performed by: SURGERY

## 2021-10-26 PROCEDURE — C1725 CATH, TRANSLUMIN NON-LASER: HCPCS

## 2021-10-26 PROCEDURE — C1894 INTRO/SHEATH, NON-LASER: HCPCS

## 2021-10-26 RX ORDER — MIDAZOLAM HYDROCHLORIDE 5 MG/ML
INJECTION INTRAMUSCULAR; INTRAVENOUS
Status: COMPLETED | OUTPATIENT
Start: 2021-10-26 | End: 2021-10-26

## 2021-10-26 RX ORDER — FENTANYL CITRATE 50 UG/ML
INJECTION, SOLUTION INTRAMUSCULAR; INTRAVENOUS
Status: COMPLETED | OUTPATIENT
Start: 2021-10-26 | End: 2021-10-26

## 2021-10-26 RX ORDER — HYDRALAZINE HYDROCHLORIDE 20 MG/ML
INJECTION INTRAMUSCULAR; INTRAVENOUS
Status: COMPLETED | OUTPATIENT
Start: 2021-10-26 | End: 2021-10-26

## 2021-10-26 RX ORDER — HEPARIN SODIUM 1000 [USP'U]/ML
INJECTION, SOLUTION INTRAVENOUS; SUBCUTANEOUS
Status: COMPLETED | OUTPATIENT
Start: 2021-10-26 | End: 2021-10-26

## 2021-10-26 RX ORDER — SODIUM CHLORIDE 9 MG/ML
1000 INJECTION, SOLUTION INTRAVENOUS CONTINUOUS
Status: DISCONTINUED | OUTPATIENT
Start: 2021-10-26 | End: 2021-10-26 | Stop reason: HOSPADM

## 2021-10-26 RX ADMIN — FENTANYL CITRATE 25 MCG: 50 INJECTION, SOLUTION INTRAMUSCULAR; INTRAVENOUS at 13:07

## 2021-10-26 RX ADMIN — SODIUM CHLORIDE 1000 ML: 9 INJECTION, SOLUTION INTRAVENOUS at 12:20

## 2021-10-26 RX ADMIN — FENTANYL CITRATE 25 MCG: 50 INJECTION, SOLUTION INTRAMUSCULAR; INTRAVENOUS at 12:58

## 2021-10-26 RX ADMIN — MIDAZOLAM HYDROCHLORIDE 1 MG: 5 INJECTION INTRAMUSCULAR; INTRAVENOUS at 12:58

## 2021-10-26 RX ADMIN — HEPARIN SODIUM 5000 UNITS: 1000 INJECTION, SOLUTION INTRAVENOUS; SUBCUTANEOUS at 12:43

## 2021-10-26 RX ADMIN — HYDRALAZINE HYDROCHLORIDE 10 MG: 20 INJECTION INTRAMUSCULAR; INTRAVENOUS at 13:07

## 2021-10-26 RX ADMIN — FENTANYL CITRATE 25 MCG: 50 INJECTION, SOLUTION INTRAMUSCULAR; INTRAVENOUS at 12:45

## 2021-10-26 RX ADMIN — FENTANYL CITRATE 25 MCG: 50 INJECTION, SOLUTION INTRAMUSCULAR; INTRAVENOUS at 12:24

## 2021-10-26 RX ADMIN — FENTANYL CITRATE 25 MCG: 50 INJECTION, SOLUTION INTRAMUSCULAR; INTRAVENOUS at 12:25

## 2021-10-26 RX ADMIN — HYDRALAZINE HYDROCHLORIDE 10 MG: 20 INJECTION INTRAMUSCULAR; INTRAVENOUS at 12:34

## 2021-10-26 RX ADMIN — MIDAZOLAM HYDROCHLORIDE 1 MG: 5 INJECTION INTRAMUSCULAR; INTRAVENOUS at 12:24

## 2021-10-26 NOTE — H&P
History and Physical / Pre-Sedation Assessment    Patient:  Rigoberto Scott  :   1965    Intended Procedure: RLE angiogram with possible intervention      HPI: S/P Fem-peroneal bypass. Low graft velocities by duplex with concern for impending failure. Possible outflow stenosis. Nurses notes reviewed and agreed. Medications reviewed  Allergies:   Patient has no known allergies. Physical Exam:   CURRENT VITALS:  Ht 5' 8\" (1.727 m)   Wt 176 lb (79.8 kg)   BMI 26.76 kg/m²   Airway:Normal  Cardiac:Normal  Pulmonary:Normal  Abdomen:Normal        Pre-Procedure Assessment/Plan:  ASA 2 - Patient with mild systemic disease with no functional limitations    Mallampati Airway Assessment:  Mallampati Class II - (soft palate, fauces & uvula are visible)    Level of Sedation Plan: Moderate sedation    Post Procedure plan: Return to same level of care    I assessed the patient and find that the patient is in satisfactory condition to proceed with the planned procedure and sedation plan. I have explained the risk, benefits, and alternatives to the procedure. The patient understands and agrees to proceed.   Yes    Tammy Corona

## 2021-10-27 DIAGNOSIS — I73.9 PERIPHERAL VASCULAR DISEASE, UNSPECIFIED (HCC): Primary | ICD-10-CM

## 2021-10-27 NOTE — OP NOTE
[facility]                                OPERATIVE REPORT    PATIENT NAME: Elizabeth Guillermo                  :        1965  MED REC NO:   [id number]                          ROOM:  ACCOUNT NO:   [de-identified]                           ADMIT DATE: 10/26/2021  PROVIDER:     Genaro Arreaga MD    DATE OF PROCEDURE:  10/26/2021    PREOPERATIVE DIAGNOSIS:  Stenosis of right femoral peroneal bypass  graft. POSTOPERATIVE DIAGNOSIS:  Stenosis of right femoral peroneal bypass  graft. PROCEDURE PERFORMED:  1.  ______  2. Ultrasound-guided left femoral artery access. 3.  Right lower extremity angiogram.  4.  Angioplasty of high-grade stenosis of the femoral peroneal bypass  graft. ANESTHESIA:  Local with moderate monitored sedation. SURGEON:  Genaro Arreaga MD    INDICATIONS:  The patient is a 47-year-old male who has undergone  multiple prior right lower extremity vascular procedures including most  recently approximately 1 year ago a redo femoral to peroneal bypass  graft. Routine graft surveillance and duplex examinations demonstrated  very sluggish flow in the distal graft although no stenosis was clearly  identified. The velocities were as low enough to suggest impending  graft failure. Therefore the patient is brought to the angio suite to  undergo angiography with possible intervention. The patient has also  undergone prior right to left femoral-femoral crossover bypass. PROCEDURE:  The patient was brought to the angio suite, placed in supine  position. Under my direct supervision, Versed and fentanyl were  administered for moderate sedation. The patient was monitored by an  independent trained nurse observer using continuous blood pressure, EKG  and pulse oximetry. I spent approximately 50 minutes face-to-face with  the patient providing and directing sedation.   The left groin was  prepped and draped in sterile fashion. Ultrasound was used to identify  the common femoral artery above the femoral bifurcation, but below the  femoral-femoral bypass graft. The artery was patent and pulsatile. Under direct ultrasound guidance, this was accessed and a 5-Armenian  introducer sheath was placed. The wire and sheath were advanced into  the fem-fem bypass graft. A copy of the ultrasound image was saved and  placed in the patient's record. Through the bypass graft, a Bentson  wire was advanced through the fem-fem bypass graft and the glide  catheter was advanced over the wire. With the catheter in the common  femoral artery, right lower extremity angiograms were performed. The  catheter was then used to direct the wire into the deep femoral artery  where the bypass graft originated from. The glide catheter was removed. The short 5-Armenian sheath was removed from the left femoral artery and  replaced with a 5-Armenian 45 cm long destination sheath which was  advanced into the proximal deep femoral artery. Repeat angiograms were  performed and using a roadmapping technique, I then advanced a V-14  control wire through the deep femoral artery across the proximal  anastomosis into the proximal portion of the femoral peroneal bypass  graft across the area of high-grade stenosis in the mid thigh region. The patient was given 5000 units of intravenous heparin. Angioplasty  was then performed using a 3.5 x 40 mm AngioSculpt balloon. This was  slightly undersized and to prevent recurrent stenosis, I placed a 4 mm x  40 mm Lutonix drug-coated balloon. This was inflated to the appropriate  atmospheres for 3 minutes. The balloon was then deflated and removed. Repeat right lower extremity angiograms were then performed. The wire  was removed. The long sheath was removed over a Bentson wire, replaced  with a short 5-Armenian introducer sheath.   Retrograde left femoral  angiogram was performed and the sheath was then

## 2021-11-10 ENCOUNTER — TELEPHONE (OUTPATIENT)
Dept: INTERNAL MEDICINE CLINIC | Age: 56
End: 2021-11-10

## 2021-11-16 ENCOUNTER — TELEPHONE (OUTPATIENT)
Dept: INTERNAL MEDICINE CLINIC | Age: 56
End: 2021-11-16

## 2021-11-16 NOTE — TELEPHONE ENCOUNTER
Called and LM on VM for patient to CB. He has VV for Medicare AWV. Called a 2nd time and there wasn't an answer.

## 2021-12-22 DIAGNOSIS — G89.29 OTHER CHRONIC PAIN: ICD-10-CM

## 2021-12-22 RX ORDER — GABAPENTIN 600 MG/1
TABLET ORAL
Qty: 90 TABLET | Refills: 2 | Status: SHIPPED | OUTPATIENT
Start: 2021-12-22 | End: 2022-04-06

## 2021-12-22 NOTE — TELEPHONE ENCOUNTER
Refill request for GABAPENTIN medication.      Name of Joaquin Modenus      Last visit - 9/23/21     Pending visit - NONE    Last refill -11/22/21      Medication Contract signed -NOT FOUND  Last Oarrs ran- 7/19/21        Additional Comments

## 2022-01-01 ENCOUNTER — HOSPITAL ENCOUNTER (EMERGENCY)
Age: 57
Discharge: HOME | End: 2022-01-01
Payer: MEDICARE

## 2022-01-01 VITALS
WEIGHT: 182 LBS | OXYGEN SATURATION: 99 % | SYSTOLIC BLOOD PRESSURE: 144 MMHG | TEMPERATURE: 98 F | HEART RATE: 63 BPM | HEIGHT: 67 IN | DIASTOLIC BLOOD PRESSURE: 82 MMHG | RESPIRATION RATE: 16 BRPM | BODY MASS INDEX: 28.56 KG/M2

## 2022-01-01 DIAGNOSIS — L02.01 FACIAL ABSCESS: Primary | ICD-10-CM

## 2022-01-01 PROCEDURE — 10060 I&D ABSCESS SIMPLE/SINGLE: CPT

## 2022-01-01 PROCEDURE — 6370000000 HC RX 637 (ALT 250 FOR IP): Performed by: NURSE PRACTITIONER

## 2022-01-01 PROCEDURE — 99283 EMERGENCY DEPT VISIT LOW MDM: CPT

## 2022-01-01 RX ORDER — CEPHALEXIN 250 MG/1
500 CAPSULE ORAL ONCE
Status: COMPLETED | OUTPATIENT
Start: 2022-01-01 | End: 2022-01-01

## 2022-01-01 RX ORDER — SULFAMETHOXAZOLE AND TRIMETHOPRIM 800; 160 MG/1; MG/1
1 TABLET ORAL 2 TIMES DAILY
Qty: 20 TABLET | Refills: 0 | Status: SHIPPED | OUTPATIENT
Start: 2022-01-01 | End: 2022-01-11

## 2022-01-01 RX ORDER — SULFAMETHOXAZOLE AND TRIMETHOPRIM 800; 160 MG/1; MG/1
1 TABLET ORAL ONCE
Status: COMPLETED | OUTPATIENT
Start: 2022-01-01 | End: 2022-01-01

## 2022-01-01 RX ORDER — CEPHALEXIN 500 MG/1
500 CAPSULE ORAL 4 TIMES DAILY
Qty: 40 CAPSULE | Refills: 0 | Status: SHIPPED | OUTPATIENT
Start: 2022-01-01 | End: 2022-01-11

## 2022-01-01 RX ADMIN — CEPHALEXIN 500 MG: 250 CAPSULE ORAL at 11:25

## 2022-01-01 RX ADMIN — SULFAMETHOXAZOLE AND TRIMETHOPRIM 1 TABLET: 800; 160 TABLET ORAL at 11:25

## 2022-01-01 ASSESSMENT — PAIN DESCRIPTION - ORIENTATION: ORIENTATION: RIGHT

## 2022-01-01 ASSESSMENT — PAIN DESCRIPTION - LOCATION: LOCATION: EAR

## 2022-01-01 ASSESSMENT — PAIN SCALES - GENERAL: PAINLEVEL_OUTOF10: 5

## 2022-01-01 ASSESSMENT — PAIN DESCRIPTION - PAIN TYPE: TYPE: ACUTE PAIN

## 2022-01-01 NOTE — ED PROVIDER NOTES
Evaluated by Advanced Practice Provider    [address] [facility]  ED    CHIEF COMPLAINT  Otalgia (pt noticed a cyst in front of ear. Been there for 1 year)    HISTORY OF PRESENT ILLNESS  Jennifer Renner is a 60 y.o. male who presents to the ED with complaints of ear pain but patient actually has a cyst to the front of his ear that has been there for about a year but just became painful last night. Does not report pain up into or inside the ear it is all in the area of the cyst.  No fever, chills, sweats. No nausea, vomiting, diarrhea. No drainage from the cyst.    The patient is currently rating their pain as 5/10 and describes it as an aching type of pain. Treatments tried prior to arrival in the ED: None. The patient arrived to the ED via private car. Nursing notes reviewed.    Past Medical History:   Diagnosis Date    Bowel incontinence     Hyperlipidemia     Hypertension     PAD (peripheral artery disease) (HCC)     Poor vision     left eye    Pre-diabetes     Sleep apnea     NO CPAP    Stroke ([address] 2016    x3 - memory deficit    Wears partial dentures     upper & lower     Past Surgical History:   Procedure Laterality Date    ABDOMEN SURGERY      colon polyps    CT VISCERAL PERCUTANEOUS DRAIN  7/21/2021    CT VISCERAL PERCUTANEOUS DRAIN 7/21/2021 Juwan Stout MD [facility] CT SCAN    FEMORAL-TIBIAL BYPASS GRAFT Right 06/24/2020    RIGHT FEMORAL TO PERONEAL  BYPASS GRAFT performed by Susan Dewitt MD at [place] [id number] N/A 7/9/2021    ROBOTIC INCISIONAL HERNIA REPAIR WITH MESH, eTAPP BLOCK performed [facility] at [facility]      LEG SURGERY Right     VASCULAR SURGERY       Family History   Problem Relation Age of Onset    Diabetes Mother      Social History     Socioeconomic History    Marital status:      Spouse name: Not on file    Number of children: Not on file    Years of education: Not on file    Highest education level: Not on file   Occupational History    Not on file   Tobacco Use    Smoking status: Current Every Day Smoker     Packs/day: 0.50     Years: 40.00     Pack years: 20.00     Types: Cigarettes    Smokeless tobacco: Never Used   Vaping Use    Vaping Use: Never used   Substance and Sexual Activity    Alcohol use: Not Currently    Drug use: No    Sexual activity: Yes     Partners: Female   Other Topics Concern    Not on file   Social History Narrative    Not on file     Social Determinants of Health     Financial Resource Strain:     Difficulty of Paying Living Expenses: Not on file   Food Insecurity:     Worried About Running Out of Food in the Last Year: Not on file    Mohsen of Food in the Last Year: Not on file   Transportation Needs:     Lack of Transportation (Medical): Not on file    Lack of Transportation (Non-Medical): Not on file   Physical Activity:     Days of Exercise per Week: Not on file    Minutes of Exercise per Session: Not on file   Stress:     Feeling of Stress : Not on file   Social Connections:     Frequency of Communication with Friends and Family: Not on file    Frequency of Social Gatherings with Friends and Family: Not on file    Attends Confucianism Services: Not on file    Active Member of [address] [place] [address] [employer] or Organizations: Not on file    Attends Club or Organization Meetings: Not on file    Marital Status: Not on file   Intimate Partner Violence:     Fear of Current or Ex-Partner: Not on file    Emotionally Abused: Not on file    Physically Abused: Not on file    Sexually Abused: Not on file   Housing Stability:     Unable to Pay for Housing in the Last Year: Not on file    Number of [place] in the Last Year: Not on file    Unstable Housing in the Last Year: Not on file     No current facility-administered medications for this encounter.      Current Outpatient Medications   Medication Sig Dispense Refill    sulfamethoxazole-trimethoprim (BACTRIM DS) 800-160 MG per tablet Take 1 tablet by mouth 2 times daily for 10 days 20 tablet 0    cephALEXin (KEFLEX) 500 MG capsule Take 1 capsule by mouth 4 times daily for 10 days 40 capsule 0    gabapentin (NEURONTIN) 600 MG tablet TAKE ONE TABLET BY MOUTH THREE TIMES A DAY 90 tablet 2    clopidogrel (PLAVIX) 75 MG tablet Take 1 tablet by mouth daily 30 tablet 2    Blood Pressure Monitoring (B-D ASSURE BPM/AUTO ARM CUFF) MISC 1 Device by Does not apply route 2 times daily 1 each 0    atorvastatin (LIPITOR) 80 MG tablet TAKE ONE TABLET BY MOUTH DAILY 30 tablet 3    carvedilol (COREG) 6.25 MG tablet TAKE ONE TABLET BY MOUTH TWICE A DAY WITH MEALS 180 tablet 2    oxyCODONE (ROXICODONE) 5 MG immediate release tablet Take 5 mg by mouth every 6 hours as needed for Pain.  aspirin 81 MG chewable tablet Take 1 tablet by mouth daily 30 tablet 3    nitroGLYCERIN (NITROSTAT) 0.4 MG SL tablet up to max of 3 total doses. If no relief after 1 dose, call 911. 25 tablet 3    lisinopril (PRINIVIL;ZESTRIL) 20 MG tablet Take 1 tablet by mouth daily 30 tablet 3     No Known Allergies    REVIEW OF SYSTEMS    10 systems reviewed, pertinent positives per HPI otherwise noted to be negative    PHYSICAL EXAM  Vitals:    01/01/22 1111   BP: (!) 144/82   Pulse: 63   Resp: 16   Temp: 98 °F (36.7 °C)   SpO2: 99%       GENERAL: Patient is elderly, obese. Awake and alert. Cooperative. Resting in bed. No apparent distress. HEENT:  Normocephalic, atraumatic. Conjunctiva appear normal. Sclera is non-icteric. External ears are normal. Mucous membranes moist.  NECK: Supple with normal ROM. Trachea midline  LUNGS: Equal and symmetric chest rise. Breathing is unlabored. Speaking comfortably in full sentences. CADIOVASCULAR:  Regular rate and rhythm. GI: Non-distended. EXTREMITIES: Normal ROM, no edema, no tenderness, or deformity. Distal pulses palpable. No gross deformities or trauma noted. Moving all extremities equally and appropriately. BACK: No tenderness. SKIN: Warm, dry. To the preauricular area there is a approximate 1 cm rounded area of erythema with fluctuance. No open wound or drainage. No streaking erythema or lymphangitic patterns. PSYCHIATRIC: Mood and affect appropriate. Speech is clear and articulate. NEUROLOGIC: Alert and oriented. No focal motor or sensory deficits. PROCEDURES  I & D  The procedures, hazards, and the alternatives were discussed. Patient had full decisional capacity, voiced understanding and gave verbal consent to proceed. 2% plain lidocaine mixed with 0.5% plain Marcaine was injected at the edges of the abscess. Once local anesthesia was obtained, affected area was prepped and draped in sterile fashion. A cruciate incision was made in the center using #11 blade scalpel to allow for continued drainage. Purulent material was expressed. The area was cleansed and dressing was placed over the wound. LABS  No results found for this visit on 01/01/22. RADIOLOGY    No results found. ED COURSE/MDM  Patient seen and evaluated. Old records reviewed. I have evaluated this patient. My supervising physician was available for consultation. Diana Perez presented to the ED today with above noted complaints. Arrival vital signs: Afebrile and hemodynamically stable except for blood pressure elevated at 144/82. Well saturated on room air. Physical exam performed at 8398: Does reveal a right preauricular erythematous area consistent with abscess. This is with fluctuance, without induration upon palpation. I&D per above procedure note. The patient is currently afebrile, without tachycardia, and BP is normotensive. The patient will be started on Bactrim and Keflex for empiric antibiotic coverage. The patient received first dose in the ED.      Medications given in the ED:   Medications   sulfamethoxazole-trimethoprim (BACTRIM DS;SEPTRA DS) 800-160 MG per tablet 1 tablet (1 tablet Oral Given 1/1/22 1125)   cephALEXin (KEFLEX) capsule 500 mg (500 mg Oral Given 1/1/22 1125)        At this point I do not feel the patient requires further work up and it is reasonable to discharge the patient. The patient was instructed to monitor the area and if it begins to worsen to return to the ED. The patient was also instructed to follow up with either PCP or ED in 3 days for a wound re-check. The patient was instructed on wound care, signs and symptoms of increasing infection, and when to return to a health care provider. Please refer to AVS for further details regarding discharge instructions. A discussion was had with the patient regarding diagnosis, treatment/ plan of care, and follow up. All questions were answered. Patient will follow up as directed for further evaluation/treatment. The patient was given strict return precautions as we discussed symptoms that would necessitate return to the ED. Patient will return to ED for new/worsening symptoms. The patient verbalized their understanding and agreement with the above plan. Patient was sent home with a prescription for below medication/s. I did [place] patient on appropriate use of these medication. New Prescriptions    CEPHALEXIN (KEFLEX) 500 MG CAPSULE    Take 1 capsule by mouth 4 times daily for 10 days    SULFAMETHOXAZOLE-TRIMETHOPRIM (BACTRIM DS) 800-160 MG PER TABLET    Take 1 tablet by mouth 2 times daily for 10 days       I estimate there is LOW risk for CELLULITIS, COMPARTMENT SYNDROME, NECROTIZING FASCIITIS, TENDON OR NEUROVASCULAR INJURY, or FOREIGN BODY, thus I consider the discharge disposition reasonable. Also, there is no evidence or peritonitis, sepsis, or toxicity. Charly Salazar and I have discussed the diagnosis and risks, and we agree with discharging home to follow-up with their primary doctor. We also discussed returning to the Emergency Department immediately if new or worsening symptoms occur.  We have discussed the symptoms which are most concerning (e.g., changing or worsening pain, fever, numbness, weakness, cool or painful digits) that necessitate immediate return. CLINICAL IMPRESSION    1. Facial abscess           Discharge Vitals:  Blood pressure (!) 144/82, pulse 63, temperature 98 °F (36.7 °C), temperature source Oral, resp. rate 16, height 5' 7\" (1.702 m), weight 182 lb (82.6 kg), SpO2 99 %. FOLLOW UP  MAT Dial - CNP  [address] [place] [address] [address]    Call on 1/3/2022  For further evaluation    [facility]  ED  [facility] [address] [phone]  Go to   If symptoms worsen      DISPOSITION  Patient was discharged to home in good condition. Comment: Please note this report has been produced using speech recognition software and may contain errors related to that system including errors in grammar, punctuation, and spelling, as well as words and phrases that may be inappropriate. If there are any questions or concerns please feel free to contact the dictating provider for clarification.             MAT Esquivel CNP  01/01/22 1412

## 2022-01-19 ENCOUNTER — TELEPHONE (OUTPATIENT)
Dept: INTERNAL MEDICINE CLINIC | Age: 57
End: 2022-01-19

## 2022-01-19 PROBLEM — R10.9 ABDOMINAL PAIN: Status: RESOLVED | Noted: 2021-07-20 | Resolved: 2022-01-19

## 2022-01-19 PROBLEM — I63.9 ACUTE CVA (CEREBROVASCULAR ACCIDENT) (HCC): Status: RESOLVED | Noted: 2020-05-30 | Resolved: 2022-01-19

## 2022-01-19 PROBLEM — K43.2 INCISIONAL HERNIA WITHOUT OBSTRUCTION OR GANGRENE: Status: RESOLVED | Noted: 2021-07-09 | Resolved: 2022-01-19

## 2022-01-19 PROBLEM — I99.8 ISCHEMIC FOOT: Status: RESOLVED | Noted: 2020-06-22 | Resolved: 2022-01-19

## 2022-01-25 ENCOUNTER — TELEPHONE (OUTPATIENT)
Dept: INTERNAL MEDICINE CLINIC | Age: 57
End: 2022-01-25

## 2022-01-25 NOTE — TELEPHONE ENCOUNTER
Script sent on 12- #90 2 rf.   [phone] (home)   LM for pt to contact pharmacy to process refill.   ----- Message from Ruddy [address] Dr sent at 1/25/2022 12:11 PM EST -----  Subject: Refill Request    QUESTIONS  Name of Medication? gabapentin (NEURONTIN) 600 MG tablet  Patient-reported dosage and instructions? 1 tablet 3 times a diana  How many days do you have left? 3  Preferred Pharmacy? [address] phone number (if available)? [phone]  Additional Information for Provider? If he needs to make an appt for this   refill please call wife at [phone]  ---------------------------------------------------------------------------  --------------  CALL BACK INFO  What is the best way for the office to contact you? OK to leave message on   voicemail  Preferred Call Back Phone Number?  [phone]

## 2022-02-04 ENCOUNTER — TELEPHONE (OUTPATIENT)
Dept: INTERNAL MEDICINE CLINIC | Age: 57
End: 2022-02-04

## 2022-02-04 NOTE — TELEPHONE ENCOUNTER
States he had colonoscopy with Dr. Irven Severin with [address] roughly 2 years. Please contact their office for report and abstract.      Also please mail Advanced directive paperwork to patient

## 2022-02-07 NOTE — TELEPHONE ENCOUNTER
I have mailed out a copy of a Advance Directive to the patient and it will be going out in the mail today.

## 2022-02-07 NOTE — TELEPHONE ENCOUNTER
Called [facility] and they are faxing over the Colonoscopy and it will be abstracted as soon as it is faxed over.

## 2022-03-01 ENCOUNTER — TELEPHONE (OUTPATIENT)
Dept: VASCULAR SURGERY | Age: 57
End: 2022-03-01

## 2022-03-01 NOTE — TELEPHONE ENCOUNTER
FREESTYLE RAFFI 14 DAY READER) TANGELA; Called patient regarding apt for arterial duplex scan bilateral lower extremities.  Arrive at [place] at 12:30pm for 1:00pm scan. balaji "of cialis.  Consider urology as well.    - tadalafil (CIALIS) 20 MG tablet; Take 1 tablet (20 mg) by mouth daily as needed      40 minutes spent on the date of the encounter doing chart review, interpretation of tests, patient visit and documentation        BMI:   Estimated body mass index is 33.58 kg/m  as calculated from the following:    Height as of this encounter: 1.778 m (5' 10\").    Weight as of this encounter: 106.1 kg (234 lb).           No follow-ups on file.    Jesus Woodward MD  [facility]    Eliezer Gagnon is a 55 

## 2022-03-02 ENCOUNTER — HOSPITAL ENCOUNTER (OUTPATIENT)
Dept: VASCULAR LAB | Age: 57
Discharge: HOME | End: 2022-03-02
Payer: MEDICARE

## 2022-03-02 DIAGNOSIS — I73.9 PERIPHERAL VASCULAR DISEASE, UNSPECIFIED (HCC): ICD-10-CM

## 2022-03-02 PROCEDURE — 93925 LOWER EXTREMITY STUDY: CPT

## 2022-03-03 ENCOUNTER — PREP FOR PROCEDURE (OUTPATIENT)
Dept: VASCULAR SURGERY | Age: 57
End: 2022-03-03

## 2022-03-03 DIAGNOSIS — Z01.818 PREOP TESTING: Primary | ICD-10-CM

## 2022-03-03 DIAGNOSIS — I10 ESSENTIAL HYPERTENSION: ICD-10-CM

## 2022-03-03 RX ORDER — SODIUM CHLORIDE 0.9 % (FLUSH) 0.9 %
5-40 SYRINGE (ML) INJECTION EVERY 12 HOURS SCHEDULED
Status: CANCELLED | OUTPATIENT
Start: 2022-03-03

## 2022-03-03 RX ORDER — SODIUM CHLORIDE 0.9 % (FLUSH) 0.9 %
5-40 SYRINGE (ML) INJECTION PRN
Status: CANCELLED | OUTPATIENT
Start: 2022-03-03

## 2022-03-03 RX ORDER — SODIUM CHLORIDE 9 MG/ML
25 INJECTION, SOLUTION INTRAVENOUS PRN
Status: CANCELLED | OUTPATIENT
Start: 2022-03-03

## 2022-03-04 ENCOUNTER — ANESTHESIA EVENT (OUTPATIENT)
Dept: OPERATING ROOM | Age: 57
DRG: 254 | End: 2022-03-04
Payer: MEDICARE

## 2022-03-04 ENCOUNTER — HOSPITAL ENCOUNTER (OUTPATIENT)
Dept: PREADMISSION TESTING | Age: 57
Discharge: HOME | DRG: 254 | End: 2022-03-08
Payer: MEDICARE

## 2022-03-04 ENCOUNTER — OFFICE VISIT (OUTPATIENT)
Dept: VASCULAR SURGERY | Age: 57
End: 2022-03-04
Payer: MEDICARE

## 2022-03-04 VITALS
WEIGHT: 182 LBS | HEIGHT: 67 IN | DIASTOLIC BLOOD PRESSURE: 90 MMHG | SYSTOLIC BLOOD PRESSURE: 130 MMHG | BODY MASS INDEX: 28.56 KG/M2

## 2022-03-04 DIAGNOSIS — E78.41 ELEVATED LIPOPROTEIN(A): ICD-10-CM

## 2022-03-04 DIAGNOSIS — T82.858D BYPASS GRAFT STENOSIS, SUBSEQUENT ENCOUNTER: ICD-10-CM

## 2022-03-04 DIAGNOSIS — R73.02 IMPAIRED GLUCOSE TOLERANCE: Primary | ICD-10-CM

## 2022-03-04 DIAGNOSIS — Z01.818 PREOP TESTING: Primary | ICD-10-CM

## 2022-03-04 DIAGNOSIS — R73.02 IMPAIRED GLUCOSE TOLERANCE: ICD-10-CM

## 2022-03-04 DIAGNOSIS — Z01.818 PREOP TESTING: ICD-10-CM

## 2022-03-04 LAB
ANION GAP SERPL CALCULATED.3IONS-SCNC: 15 MMOL/L (ref 3–16)
BASOPHILS ABSOLUTE: 0.1 K/UL (ref 0–0.2)
BASOPHILS RELATIVE PERCENT: 1 %
BUN BLDV-MCNC: 19 MG/DL (ref 7–20)
CALCIUM SERPL-MCNC: 9.6 MG/DL (ref 8.3–10.6)
CHLORIDE BLD-SCNC: 103 MMOL/L (ref 99–110)
CHOLESTEROL, TOTAL: 215 MG/DL (ref 0–199)
CO2: 22 MMOL/L (ref 21–32)
CREAT SERPL-MCNC: 1 MG/DL (ref 0.9–1.3)
EKG ATRIAL RATE: 73 BPM
EKG DIAGNOSIS: NORMAL
EKG P AXIS: 49 DEGREES
EKG P-R INTERVAL: 154 MS
EKG Q-T INTERVAL: 414 MS
EKG QRS DURATION: 106 MS
EKG QTC CALCULATION (BAZETT): 456 MS
EKG R AXIS: 53 DEGREES
EKG T AXIS: 62 DEGREES
EKG VENTRICULAR RATE: 73 BPM
EOSINOPHILS ABSOLUTE: 0.3 K/UL (ref 0–0.6)
EOSINOPHILS RELATIVE PERCENT: 3.8 %
GFR AFRICAN AMERICAN: >60
GFR NON-AFRICAN AMERICAN: >60
GLUCOSE BLD-MCNC: 115 MG/DL (ref 70–99)
HCT VFR BLD CALC: 42.8 % (ref 40.5–52.5)
HDLC SERPL-MCNC: 56 MG/DL (ref 40–60)
HEMOGLOBIN: 14.7 G/DL (ref 13.5–17.5)
LDL CHOLESTEROL CALCULATED: 129 MG/DL
LYMPHOCYTES ABSOLUTE: 2 K/UL (ref 1–5.1)
LYMPHOCYTES RELATIVE PERCENT: 27 %
MCH RBC QN AUTO: 33.1 PG (ref 26–34)
MCHC RBC AUTO-ENTMCNC: 34.3 G/DL (ref 31–36)
MCV RBC AUTO: 96.6 FL (ref 80–100)
MONOCYTES ABSOLUTE: 0.5 K/UL (ref 0–1.3)
MONOCYTES RELATIVE PERCENT: 7 %
NEUTROPHILS ABSOLUTE: 4.6 K/UL (ref 1.7–7.7)
NEUTROPHILS RELATIVE PERCENT: 61.2 %
PDW BLD-RTO: 14.1 % (ref 12.4–15.4)
PLATELET # BLD: 276 K/UL (ref 135–450)
PMV BLD AUTO: 6.5 FL (ref 5–10.5)
POTASSIUM SERPL-SCNC: 4.5 MMOL/L (ref 3.5–5.1)
RBC # BLD: 4.43 M/UL (ref 4.2–5.9)
SARS-COV-2: NOT DETECTED
SODIUM BLD-SCNC: 140 MMOL/L (ref 136–145)
TRIGL SERPL-MCNC: 149 MG/DL (ref 0–150)
VLDLC SERPL CALC-MCNC: 30 MG/DL
WBC # BLD: 7.4 K/UL (ref 4–11)

## 2022-03-04 PROCEDURE — 83036 HEMOGLOBIN GLYCOSYLATED A1C: CPT

## 2022-03-04 PROCEDURE — 36415 COLL VENOUS BLD VENIPUNCTURE: CPT

## 2022-03-04 PROCEDURE — 93005 ELECTROCARDIOGRAM TRACING: CPT | Performed by: SURGERY

## 2022-03-04 PROCEDURE — 80048 BASIC METABOLIC PNL TOTAL CA: CPT

## 2022-03-04 PROCEDURE — U0005 INFEC AGEN DETEC AMPLI PROBE: HCPCS

## 2022-03-04 PROCEDURE — 99214 OFFICE O/P EST MOD 30 MIN: CPT | Performed by: SURGERY

## 2022-03-04 PROCEDURE — U0003 INFECTIOUS AGENT DETECTION BY NUCLEIC ACID (DNA OR RNA); SEVERE ACUTE RESPIRATORY SYNDROME CORONAVIRUS 2 (SARS-COV-2) (CORONAVIRUS DISEASE [COVID-19]), AMPLIFIED PROBE TECHNIQUE, MAKING USE OF HIGH THROUGHPUT TECHNOLOGIES AS DESCRIBED BY CMS-2020-01-R: HCPCS

## 2022-03-04 PROCEDURE — 85025 COMPLETE CBC W/AUTO DIFF WBC: CPT

## 2022-03-04 PROCEDURE — 93010 ELECTROCARDIOGRAM REPORT: CPT | Performed by: INTERNAL MEDICINE

## 2022-03-04 PROCEDURE — 80061 LIPID PANEL: CPT

## 2022-03-04 RX ORDER — CLOPIDOGREL BISULFATE 75 MG/1
TABLET ORAL
Qty: 30 TABLET | Refills: 0 | Status: SHIPPED | OUTPATIENT
Start: 2022-03-04 | End: 2022-03-21 | Stop reason: SDUPTHER

## 2022-03-04 RX ORDER — LISINOPRIL 20 MG/1
TABLET ORAL
Qty: 30 TABLET | Refills: 0 | Status: SHIPPED | OUTPATIENT
Start: 2022-03-04 | End: 2022-03-21 | Stop reason: SDUPTHER

## 2022-03-04 NOTE — PROGRESS NOTES
Outpatient Follow Up Note    Temo Valladares is 60 y.o. male who presents today for  follow up regarding:    Chief Complaint   Patient presents with   Geno Ho Other     patient coming in regarding talking about surgery for Monday. pamlr        S/P RLE redo bypass 6/2020, proximal graft stenosis treated 10/2021 with angioplasty. Recent f/u duplex shows recurrent stenosis in proximal graft.      Past Medical History:   Diagnosis Date    Abdominal pain 7/20/2021    Abnormal stress test     Acute CVA (cerebrovascular accident) ([address] 5/30/2020    Bowel incontinence     Hyperlipidemia     Hypertension     Incisional hernia without obstruction or gangrene 7/9/2021    Incisional hernia, without obstruction or gangrene     Ischemic foot 6/22/2020    [facility]     Poor vision     left eye    Pre-diabetes     Sleep apnea     NO CPAP    Stroke [facility] [address] 2016    x3 - memory deficit    Vascular occlusion     Wears partial dentures     upper & lower       Past Surgical History:   Procedure Laterality Date    ABDOMEN SURGERY      colon polyps    CT VISCERAL PERCUTANEOUS DRAIN  7/21/2021    CT VISCERAL PERCUTANEOUS DRAIN 7/21/2021 Hillary Nichole MD [facility] CT SCAN    FEMORAL-TIBIAL BYPASS GRAFT Right 06/24/2020    RIGHT FEMORAL TO PERONEAL  BYPASS GRAFT performed by Dara Luu MD at [facility] N/A 7/9/2021    ROBOTIC [address] eTAPP BLOCK performed [facility] Pito Lentz MD at [facility]      LEG SURGERY Right     VASCULAR SURGERY       Social History:    Social History     Tobacco Use   Smoking Status Current Every Day Smoker    Packs/day: 0.50    Years: 40.00    Pack years: 20.00    Types: Cigarettes   Smokeless Tobacco Never Used     Current Medications:  Current Outpatient Medications   Medication Sig Dispense Refill    clopidogrel (PLAVIX) 75 MG tablet TAKE ONE TABLET BY MOUTH DAILY 30 tablet 0    lisinopril (PRINIVIL;ZESTRIL) 20 MG tablet TAKE ONE TABLET BY MOUTH DAILY 30 tablet 0    nitroGLYCERIN (NITROSTAT) 0.4 MG SL tablet up to max of 3 total doses. If no relief after 1 dose, call 911. 25 tablet 0    carvedilol (COREG) 6.25 MG tablet TAKE ONE TABLET BY MOUTH TWICE A DAY WITH MEALS 60 tablet 0    Blood Pressure Monitoring (B-D ASSURE BPM/AUTO ARM CUFF) MISC 1 Device by Does not apply route 2 times daily 1 each 0    atorvastatin (LIPITOR) 80 MG tablet TAKE ONE TABLET BY MOUTH DAILY 30 tablet 3    oxyCODONE (ROXICODONE) 5 MG immediate release tablet Take 5 mg by mouth every 6 hours as needed for Pain.  aspirin 81 MG chewable tablet Take 1 tablet by mouth daily 30 tablet 3    gabapentin (NEURONTIN) 600 MG tablet TAKE ONE TABLET BY MOUTH THREE TIMES A DAY 90 tablet 2     No current facility-administered medications for this visit. Allergies:  Patient has no known allergies. REVIEW OF SYSTEMS:   A 14 point review of systems was completed. Pertinent positives identified in the HPI, all other review of systems negative. Objective:   PHYSICAL EXAM:        VITALS:  BP (!) 130/90 (Site: Right Upper Arm)   Ht 5' 7\" (1.702 m)   Wt 182 lb (82.6 kg)   BMI 28.51 kg/m²   CONSTITUTIONAL: Cooperative, no apparent distress, and appears well nourished / developed  NEUROLOGIC:  Awake and oriented to person, place and time. PSYCH: Calm affect. SKIN: Warm and dry. HEENT: Sclera non-icteric, normocephalic, neck supple, normal carotid pulses with no bruits and thyroid normal size. LUNGS:  No increased work of breathing and clear to auscultation, no crackles or wheezing  CARDIOVASCULAR:  Regular rate and rhythm with no murmurs, gallops, rubs, or abnormal heart sounds, normal PMI. Pulses:    femoral DP   RIGHT 2 doppler     ABDOMEN:  Normal bowel sounds, non-distended and non-tender to palpation  EXT: No edema, no calf tenderness.      DATA:        Graft Duplex:    Impressions   Right Impression   The right ankle/brachial index is 0.71 (the DP is 94 and the peroneal artery   is 100 mmHg). The common femoral artery demonstrates monophasic flow indicating possible   aortic-iliac inflow disease. There is a dilatation measuring 2.78 X 2.51 cm involving the right common   femoral artery. There is a patent proximal femoral - proximal peroneal artery bypass graft.       Inflow: 43.4 cm/s   Proximal anastomosis: 158 cm/s (3.87 mm)   Prox graft: 536 cm/s (3.81 mm)   Mid graft: 58.8 cm/s (3.71 mm)   Distal: 53.1 cm/s (4.51 mm)   Distal anastomosis: 27.4 cm/s (2.33 mm)   Outflow: 57.5 cm/s       Elevated velocities at the proximal anastomosis indicate a > 50% stenosis by   velocity criteria (velocity went from 43.4 to 158 cm/s). Elevated velocities at the proximal graft indicate a > 50% stenosis by   velocity criteria (velocity went from 68.2 to 536 cm/s). The posterior tibial artery appears to be occluded. Left Impression   The left ankle/brachial index is 0.86 (the DP is 106 and the PT is 120 mmHg). The common femoral artery demonstrates monophasic flow indicating possible   aortic-iliac inflow disease. Elevated velocities at the distal common femoral artery indicate a >50%   stenosis by velocity criteria (velocity went from 75.1 to 179 cm/s). <50% stenosis is noted in the superficial femoral, popliteal, anterior and   posterior tibial arteries. The right CAROL was 0.8 and the left CAROL was 0.8 on a previous exam on   10/19/2021.             Assessment:     Diagnosis Orders   Bypass graft stenosis, subsequent encounter         Plan:     Revision of bypass graft with patch angioplasty. Procedure, risks, benefits, and alternatives explained and understood.          Paul Coello MD, FACS

## 2022-03-04 NOTE — PROGRESS NOTES
Preoperative Screening for Elective Surgery/Invasive Procedures While COVID-19 present in the community     Have you had any of the following symptoms?none  o Fever, chills  o Cough  o Shortness of breath  o Muscle aches/pain  o Diarrhea  o Abdominal pain, nausea, vomiting  o Loss or decrease in taste and / or smell   Risk of Exposure  o Have you recently been hospitalized for COVID-19 or flu-like illness, if so when?no  o Recently diagnosed with COVID-19, if so when?no  o Recently tested for COVID-19, if so when?yes 3/4/22 for preop  o Have you been in close contact with a person or family member who currently has or recently had COVID-19? If yes, when and in what context?no  o Do you live with anybody who in the last 14 days has had fever, chills, shortness of breath, muscle aches, flu-like illness?no  o Do you have any close contacts or family members who are currently in the hospital for COVID-19 or flu-like illness? If yes, assess recent close contact with this person. no    Indicate if the patient has a positive screen by answering yes to one or more of the above questions. Patients who test positive or screen positive prior to surgery or on the day of surgery should be evaluated in conjunction with the surgeon/proceduralist/anesthesiologist to determine the urgency of the procedure.

## 2022-03-04 NOTE — TELEPHONE ENCOUNTER
----- Message from Marlyn eL MD sent at 2/10/2022  5:46 PM CST -----  All inflammatory/autoimmune markers neg. C/w endocrinology consult. C/w increased dose of abilify. Will see her at upcoming apt. LM and sent [id number] message regarding to need A1C drawn

## 2022-03-04 NOTE — PROGRESS NOTES
Blanca Giovani    Age 60 y.o.    male    1965    MRN [id number]    3/7/2022  Arrival Time_____________  OR Time____________[address]     Procedure(s):  REVISION RIGHT LOWER EXTREMITY BYPASS WITH PERICARDIAL PATCH                      General     Surgeon(s):  Lay Daley, MD      DAY ADMIT ___  SDS/OP ___  OUTPT IN BED ___         Phone [phone] (home)    PCP _____________________ Phone_________________ Epic ( ) Epic CE ( ) Appt ________    ADDITIONAL INFO __________________________________ Cardio/Consult _____________    NOTES _____________________________________________________________________    ____________________________________________________________________________    PAT APPT DATE:________ TIME: ________  FAXED QAD: _______  (__) H&P w/ hospitalist  ____________________________________________________________________________    COVID TEST: Date/Location______________        NURSING HISTORY COMPLETE: _______  (__) CBC       (__) W/ DIFF ___________  (__)  ECHO    __________  (__) Hgb A1C    ___________  (__) CHEST X RAY   __________  (__) LIPID PROFILE  ___________  (__) EKG   __________  (__) PT/PTT   ___________  (__) PFT's   __________  (__) BMP   ___________  (__) CAROTIDS  __________  (__) CMP   ___________  (__) VEIN MAPPING  __________  (__) U/A   ___________  (__) HISTORY & PHYSICAL __________  (__) URINE C & S  ___________  (__) CARDIAC CLEARANCE __________  (__) U/A W/ FLEX  ___________  (__) PULM.  CLEARANCE __________  (__) SERUM PREGNANCY ___________  (__) Check Epic DOS orders __________  (__) TYPE & SCREEN ________ repeat ( ) (__)  __________________ __________  (__) ALBUMIN   ___________  (__)  __________________ __________  (__) TRANSFERRIN  ___________  (__)  __________________ __________  (__) LIVER PROFILE  ___________  (__)  __________________ __________  (__) CARBOXY HGB  ___________  (__) URINE PREG DOS __________  (__) NICOTINE & MET.  ___________  (__) BLOOD SUGAR DOS __________  (__) PREALBUMIN  ___________    (__) MRSA NASAL SWAB ___________  (__) BLOOD THINNERS __________  (__) ACE/ ARBS: _____________________    (__) BETABLOCKERS ___________________

## 2022-03-05 LAB
ESTIMATED AVERAGE GLUCOSE: 125.5 MG/DL
HBA1C MFR BLD: 6 %

## 2022-03-07 ENCOUNTER — HOSPITAL ENCOUNTER (INPATIENT)
Age: 57
LOS: 1 days | Discharge: HOME | DRG: 254 | End: 2022-03-08
Attending: SURGERY | Admitting: SURGERY
Payer: MEDICARE

## 2022-03-07 ENCOUNTER — ANESTHESIA (OUTPATIENT)
Dept: OPERATING ROOM | Age: 57
DRG: 254 | End: 2022-03-07
Payer: MEDICARE

## 2022-03-07 VITALS
RESPIRATION RATE: 5 BRPM | OXYGEN SATURATION: 100 % | DIASTOLIC BLOOD PRESSURE: 64 MMHG | TEMPERATURE: 96.1 F | SYSTOLIC BLOOD PRESSURE: 130 MMHG

## 2022-03-07 DIAGNOSIS — Z86.73 HISTORY OF CVA (CEREBROVASCULAR ACCIDENT): ICD-10-CM

## 2022-03-07 DIAGNOSIS — Z01.818 PREOP TESTING: ICD-10-CM

## 2022-03-07 DIAGNOSIS — E78.41 ELEVATED LIPOPROTEIN(A): ICD-10-CM

## 2022-03-07 DIAGNOSIS — I73.9 PAD (PERIPHERAL ARTERY DISEASE) (HCC): Primary | ICD-10-CM

## 2022-03-07 DIAGNOSIS — T82.858D BYPASS GRAFT STENOSIS, SUBSEQUENT ENCOUNTER: Primary | ICD-10-CM

## 2022-03-07 LAB
ABO/RH: NORMAL
ANTIBODY SCREEN: NORMAL

## 2022-03-07 PROCEDURE — 6360000002 HC RX W HCPCS: Performed by: SURGERY

## 2022-03-07 PROCEDURE — 2060000000 HC ICU INTERMEDIATE R&B

## 2022-03-07 PROCEDURE — 2580000003 HC RX 258: Performed by: SURGERY

## 2022-03-07 PROCEDURE — 86901 BLOOD TYPING SEROLOGIC RH(D): CPT

## 2022-03-07 PROCEDURE — 2500000003 HC RX 250 WO HCPCS: Performed by: ANESTHESIOLOGY

## 2022-03-07 PROCEDURE — 6360000002 HC RX W HCPCS: Performed by: NURSE ANESTHETIST, CERTIFIED REGISTERED

## 2022-03-07 PROCEDURE — 04WY07Z REVISION OF AUTOLOGOUS TISSUE SUBSTITUTE IN LOWER ARTERY, OPEN APPROACH: ICD-10-PCS | Performed by: SURGERY

## 2022-03-07 PROCEDURE — C1768 GRAFT, VASCULAR: HCPCS | Performed by: SURGERY

## 2022-03-07 PROCEDURE — 7100000001 HC PACU RECOVERY - ADDTL 15 MIN: Performed by: SURGERY

## 2022-03-07 PROCEDURE — 35879 REVISE GRAFT W/VEIN: CPT | Performed by: SURGERY

## 2022-03-07 PROCEDURE — 86900 BLOOD TYPING SEROLOGIC ABO: CPT

## 2022-03-07 PROCEDURE — 94761 N-INVAS EAR/PLS OXIMETRY MLT: CPT

## 2022-03-07 PROCEDURE — 6360000002 HC RX W HCPCS

## 2022-03-07 PROCEDURE — 2580000003 HC RX 258: Performed by: NURSE ANESTHETIST, CERTIFIED REGISTERED

## 2022-03-07 PROCEDURE — 3600000007 HC SURGERY HYBRID BASE: Performed by: SURGERY

## 2022-03-07 PROCEDURE — 3700000001 HC ADD 15 MINUTES (ANESTHESIA): Performed by: SURGERY

## 2022-03-07 PROCEDURE — 2580000003 HC RX 258: Performed by: ANESTHESIOLOGY

## 2022-03-07 PROCEDURE — 6360000004 HC RX CONTRAST MEDICATION: Performed by: SURGERY

## 2022-03-07 PROCEDURE — 6370000000 HC RX 637 (ALT 250 FOR IP): Performed by: SURGERY

## 2022-03-07 PROCEDURE — 2709999900 HC NON-CHARGEABLE SUPPLY: Performed by: SURGERY

## 2022-03-07 PROCEDURE — 3600000017 HC SURGERY HYBRID ADDL 15MIN: Performed by: SURGERY

## 2022-03-07 PROCEDURE — A4217 STERILE WATER/SALINE, 500 ML: HCPCS | Performed by: SURGERY

## 2022-03-07 PROCEDURE — 3700000000 HC ANESTHESIA ATTENDED CARE: Performed by: SURGERY

## 2022-03-07 PROCEDURE — 86850 RBC ANTIBODY SCREEN: CPT

## 2022-03-07 PROCEDURE — 2700000000 HC OXYGEN THERAPY PER DAY

## 2022-03-07 PROCEDURE — 2500000003 HC RX 250 WO HCPCS: Performed by: NURSE ANESTHETIST, CERTIFIED REGISTERED

## 2022-03-07 PROCEDURE — 7100000000 HC PACU RECOVERY - FIRST 15 MIN: Performed by: SURGERY

## 2022-03-07 DEVICE — XENOSURE BIOLOGIC PATCH, 0.8CM X 8CM, EIFU
Type: IMPLANTABLE DEVICE | Site: LEG | Status: FUNCTIONAL
Brand: XENOSURE BIOLOGIC PATCH

## 2022-03-07 RX ORDER — HYDRALAZINE HYDROCHLORIDE 20 MG/ML
10 INJECTION INTRAMUSCULAR; INTRAVENOUS
Status: DISCONTINUED | OUTPATIENT
Start: 2022-03-07 | End: 2022-03-08 | Stop reason: HOSPADM

## 2022-03-07 RX ORDER — SODIUM CHLORIDE 0.9 % (FLUSH) 0.9 %
5-40 SYRINGE (ML) INJECTION PRN
Status: DISCONTINUED | OUTPATIENT
Start: 2022-03-07 | End: 2022-03-07 | Stop reason: HOSPADM

## 2022-03-07 RX ORDER — MORPHINE SULFATE 2 MG/ML
2 INJECTION, SOLUTION INTRAMUSCULAR; INTRAVENOUS
Status: DISCONTINUED | OUTPATIENT
Start: 2022-03-07 | End: 2022-03-08 | Stop reason: HOSPADM

## 2022-03-07 RX ORDER — MEPERIDINE HYDROCHLORIDE 50 MG/ML
12.5 INJECTION INTRAMUSCULAR; INTRAVENOUS; SUBCUTANEOUS EVERY 5 MIN PRN
Status: DISCONTINUED | OUTPATIENT
Start: 2022-03-07 | End: 2022-03-07 | Stop reason: HOSPADM

## 2022-03-07 RX ORDER — ONDANSETRON 2 MG/ML
INJECTION INTRAMUSCULAR; INTRAVENOUS PRN
Status: DISCONTINUED | OUTPATIENT
Start: 2022-03-07 | End: 2022-03-07 | Stop reason: SDUPTHER

## 2022-03-07 RX ORDER — LISINOPRIL 20 MG/1
20 TABLET ORAL DAILY
Status: DISCONTINUED | OUTPATIENT
Start: 2022-03-07 | End: 2022-03-08 | Stop reason: HOSPADM

## 2022-03-07 RX ORDER — GABAPENTIN 300 MG/1
300 CAPSULE ORAL 3 TIMES DAILY
Status: DISCONTINUED | OUTPATIENT
Start: 2022-03-07 | End: 2022-03-08 | Stop reason: HOSPADM

## 2022-03-07 RX ORDER — DEXAMETHASONE SODIUM PHOSPHATE 4 MG/ML
INJECTION, SOLUTION INTRA-ARTICULAR; INTRALESIONAL; INTRAMUSCULAR; INTRAVENOUS; SOFT TISSUE PRN
Status: DISCONTINUED | OUTPATIENT
Start: 2022-03-07 | End: 2022-03-07 | Stop reason: SDUPTHER

## 2022-03-07 RX ORDER — MORPHINE SULFATE 4 MG/ML
4 INJECTION, SOLUTION INTRAMUSCULAR; INTRAVENOUS
Status: DISCONTINUED | OUTPATIENT
Start: 2022-03-07 | End: 2022-03-08 | Stop reason: HOSPADM

## 2022-03-07 RX ORDER — OXYCODONE HYDROCHLORIDE 5 MG/1
10 TABLET ORAL EVERY 4 HOURS PRN
Status: DISCONTINUED | OUTPATIENT
Start: 2022-03-07 | End: 2022-03-08 | Stop reason: HOSPADM

## 2022-03-07 RX ORDER — PROMETHAZINE HYDROCHLORIDE 25 MG/1
12.5 TABLET ORAL EVERY 6 HOURS PRN
Status: DISCONTINUED | OUTPATIENT
Start: 2022-03-07 | End: 2022-03-08 | Stop reason: HOSPADM

## 2022-03-07 RX ORDER — ONDANSETRON 2 MG/ML
4 INJECTION INTRAMUSCULAR; INTRAVENOUS
Status: DISCONTINUED | OUTPATIENT
Start: 2022-03-07 | End: 2022-03-07 | Stop reason: HOSPADM

## 2022-03-07 RX ORDER — FENTANYL CITRATE 50 UG/ML
INJECTION, SOLUTION INTRAMUSCULAR; INTRAVENOUS PRN
Status: DISCONTINUED | OUTPATIENT
Start: 2022-03-07 | End: 2022-03-07 | Stop reason: SDUPTHER

## 2022-03-07 RX ORDER — LIDOCAINE HYDROCHLORIDE 20 MG/ML
INJECTION, SOLUTION INFILTRATION; PERINEURAL PRN
Status: DISCONTINUED | OUTPATIENT
Start: 2022-03-07 | End: 2022-03-07 | Stop reason: SDUPTHER

## 2022-03-07 RX ORDER — POTASSIUM CHLORIDE 7.45 MG/ML
10 INJECTION INTRAVENOUS PRN
Status: DISCONTINUED | OUTPATIENT
Start: 2022-03-07 | End: 2022-03-08 | Stop reason: HOSPADM

## 2022-03-07 RX ORDER — ONDANSETRON 2 MG/ML
4 INJECTION INTRAMUSCULAR; INTRAVENOUS EVERY 6 HOURS PRN
Status: DISCONTINUED | OUTPATIENT
Start: 2022-03-07 | End: 2022-03-08 | Stop reason: HOSPADM

## 2022-03-07 RX ORDER — SODIUM CHLORIDE 0.9 % (FLUSH) 0.9 %
10 SYRINGE (ML) INJECTION PRN
Status: DISCONTINUED | OUTPATIENT
Start: 2022-03-07 | End: 2022-03-08 | Stop reason: HOSPADM

## 2022-03-07 RX ORDER — CLONIDINE HYDROCHLORIDE 0.1 MG/1
0.1 TABLET ORAL
Status: DISCONTINUED | OUTPATIENT
Start: 2022-03-07 | End: 2022-03-08 | Stop reason: HOSPADM

## 2022-03-07 RX ORDER — HYDRALAZINE HYDROCHLORIDE 20 MG/ML
INJECTION INTRAMUSCULAR; INTRAVENOUS
Status: COMPLETED
Start: 2022-03-07 | End: 2022-03-07

## 2022-03-07 RX ORDER — CARVEDILOL 6.25 MG/1
6.25 TABLET ORAL 2 TIMES DAILY
Status: DISCONTINUED | OUTPATIENT
Start: 2022-03-07 | End: 2022-03-08 | Stop reason: HOSPADM

## 2022-03-07 RX ORDER — CLOPIDOGREL BISULFATE 75 MG/1
75 TABLET ORAL DAILY
Status: DISCONTINUED | OUTPATIENT
Start: 2022-03-07 | End: 2022-03-08 | Stop reason: HOSPADM

## 2022-03-07 RX ORDER — SODIUM CHLORIDE 9 MG/ML
25 INJECTION, SOLUTION INTRAVENOUS PRN
Status: DISCONTINUED | OUTPATIENT
Start: 2022-03-07 | End: 2022-03-07 | Stop reason: HOSPADM

## 2022-03-07 RX ORDER — SODIUM CHLORIDE 9 MG/ML
25 INJECTION, SOLUTION INTRAVENOUS PRN
Status: DISCONTINUED | OUTPATIENT
Start: 2022-03-07 | End: 2022-03-08 | Stop reason: HOSPADM

## 2022-03-07 RX ORDER — ATORVASTATIN CALCIUM 80 MG/1
80 TABLET, FILM COATED ORAL DAILY
Status: DISCONTINUED | OUTPATIENT
Start: 2022-03-07 | End: 2022-03-08 | Stop reason: HOSPADM

## 2022-03-07 RX ORDER — SODIUM CHLORIDE 0.9 % (FLUSH) 0.9 %
5-40 SYRINGE (ML) INJECTION EVERY 12 HOURS SCHEDULED
Status: DISCONTINUED | OUTPATIENT
Start: 2022-03-07 | End: 2022-03-07 | Stop reason: HOSPADM

## 2022-03-07 RX ORDER — SODIUM CHLORIDE 9 MG/ML
INJECTION, SOLUTION INTRAVENOUS CONTINUOUS
Status: DISCONTINUED | OUTPATIENT
Start: 2022-03-07 | End: 2022-03-08

## 2022-03-07 RX ORDER — SODIUM CHLORIDE 0.9 % (FLUSH) 0.9 %
10 SYRINGE (ML) INJECTION EVERY 12 HOURS SCHEDULED
Status: DISCONTINUED | OUTPATIENT
Start: 2022-03-07 | End: 2022-03-07 | Stop reason: HOSPADM

## 2022-03-07 RX ORDER — SODIUM CHLORIDE, SODIUM LACTATE, POTASSIUM CHLORIDE, CALCIUM CHLORIDE 600; 310; 30; 20 MG/100ML; MG/100ML; MG/100ML; MG/100ML
INJECTION, SOLUTION INTRAVENOUS CONTINUOUS
Status: DISCONTINUED | OUTPATIENT
Start: 2022-03-07 | End: 2022-03-07

## 2022-03-07 RX ORDER — OXYCODONE HYDROCHLORIDE 5 MG/1
5 TABLET ORAL EVERY 4 HOURS PRN
Status: DISCONTINUED | OUTPATIENT
Start: 2022-03-07 | End: 2022-03-08 | Stop reason: HOSPADM

## 2022-03-07 RX ORDER — ASPIRIN 81 MG/1
81 TABLET, CHEWABLE ORAL DAILY
Status: DISCONTINUED | OUTPATIENT
Start: 2022-03-08 | End: 2022-03-08 | Stop reason: HOSPADM

## 2022-03-07 RX ORDER — SODIUM CHLORIDE 0.9 % (FLUSH) 0.9 %
10 SYRINGE (ML) INJECTION PRN
Status: DISCONTINUED | OUTPATIENT
Start: 2022-03-07 | End: 2022-03-07 | Stop reason: HOSPADM

## 2022-03-07 RX ORDER — PROPOFOL 10 MG/ML
INJECTION, EMULSION INTRAVENOUS PRN
Status: DISCONTINUED | OUTPATIENT
Start: 2022-03-07 | End: 2022-03-07 | Stop reason: SDUPTHER

## 2022-03-07 RX ORDER — SODIUM CHLORIDE 0.9 % (FLUSH) 0.9 %
10 SYRINGE (ML) INJECTION EVERY 12 HOURS SCHEDULED
Status: DISCONTINUED | OUTPATIENT
Start: 2022-03-07 | End: 2022-03-08 | Stop reason: HOSPADM

## 2022-03-07 RX ORDER — EPHEDRINE SULFATE 50 MG/ML
INJECTION INTRAVENOUS PRN
Status: DISCONTINUED | OUTPATIENT
Start: 2022-03-07 | End: 2022-03-07 | Stop reason: SDUPTHER

## 2022-03-07 RX ORDER — HEPARIN SODIUM 1000 [USP'U]/ML
INJECTION, SOLUTION INTRAVENOUS; SUBCUTANEOUS PRN
Status: DISCONTINUED | OUTPATIENT
Start: 2022-03-07 | End: 2022-03-07 | Stop reason: SDUPTHER

## 2022-03-07 RX ADMIN — EPHEDRINE SULFATE 10 MG: 50 INJECTION INTRAVENOUS at 13:37

## 2022-03-07 RX ADMIN — SODIUM CHLORIDE: 9 INJECTION, SOLUTION INTRAVENOUS at 19:35

## 2022-03-07 RX ADMIN — OXYCODONE 5 MG: 5 TABLET ORAL at 22:12

## 2022-03-07 RX ADMIN — GABAPENTIN 300 MG: 300 CAPSULE ORAL at 22:08

## 2022-03-07 RX ADMIN — CLONIDINE HYDROCHLORIDE 0.1 MG: 0.1 TABLET ORAL at 15:56

## 2022-03-07 RX ADMIN — SODIUM CHLORIDE, PRESERVATIVE FREE 10 ML: 5 INJECTION INTRAVENOUS at 22:09

## 2022-03-07 RX ADMIN — ONDANSETRON 4 MG: 2 INJECTION INTRAMUSCULAR; INTRAVENOUS at 13:57

## 2022-03-07 RX ADMIN — LIDOCAINE HYDROCHLORIDE 60 MG: 20 INJECTION, SOLUTION INFILTRATION; PERINEURAL at 13:04

## 2022-03-07 RX ADMIN — HYDRALAZINE HYDROCHLORIDE 10 MG: 20 INJECTION INTRAMUSCULAR; INTRAVENOUS at 15:10

## 2022-03-07 RX ADMIN — OXYCODONE 10 MG: 5 TABLET ORAL at 16:05

## 2022-03-07 RX ADMIN — PHENYLEPHRINE HYDROCHLORIDE 20 MCG/MIN: 10 INJECTION INTRAVENOUS at 13:32

## 2022-03-07 RX ADMIN — DEXAMETHASONE SODIUM PHOSPHATE 4 MG: 4 INJECTION, SOLUTION INTRAMUSCULAR; INTRAVENOUS at 13:57

## 2022-03-07 RX ADMIN — FENTANYL CITRATE 50 MCG: 50 INJECTION INTRAMUSCULAR; INTRAVENOUS at 13:50

## 2022-03-07 RX ADMIN — CEFAZOLIN 2000 MG: 10 INJECTION, POWDER, FOR SOLUTION INTRAVENOUS at 13:04

## 2022-03-07 RX ADMIN — SODIUM CHLORIDE, SODIUM LACTATE, POTASSIUM CHLORIDE, AND CALCIUM CHLORIDE: .6; .31; .03; .02 INJECTION, SOLUTION INTRAVENOUS at 14:06

## 2022-03-07 RX ADMIN — CLOPIDOGREL BISULFATE 75 MG: 75 TABLET ORAL at 19:38

## 2022-03-07 RX ADMIN — MORPHINE SULFATE 2 MG: 2 INJECTION, SOLUTION INTRAMUSCULAR; INTRAVENOUS at 19:07

## 2022-03-07 RX ADMIN — ATORVASTATIN CALCIUM 80 MG: 80 TABLET, FILM COATED ORAL at 19:38

## 2022-03-07 RX ADMIN — LISINOPRIL 20 MG: 20 TABLET ORAL at 19:38

## 2022-03-07 RX ADMIN — FENTANYL CITRATE 100 MCG: 50 INJECTION INTRAMUSCULAR; INTRAVENOUS at 13:22

## 2022-03-07 RX ADMIN — SUGAMMADEX 200 MG: 100 INJECTION, SOLUTION INTRAVENOUS at 14:40

## 2022-03-07 RX ADMIN — SODIUM CHLORIDE, SODIUM LACTATE, POTASSIUM CHLORIDE, AND CALCIUM CHLORIDE: .6; .31; .03; .02 INJECTION, SOLUTION INTRAVENOUS at 12:59

## 2022-03-07 RX ADMIN — HEPARIN SODIUM 5000 UNITS: 1000 INJECTION, SOLUTION INTRAVENOUS; SUBCUTANEOUS at 13:50

## 2022-03-07 RX ADMIN — CARVEDILOL 6.25 MG: 6.25 TABLET, FILM COATED ORAL at 22:08

## 2022-03-07 RX ADMIN — PROPOFOL 150 MG: 10 INJECTION, EMULSION INTRAVENOUS at 13:04

## 2022-03-07 RX ADMIN — FAMOTIDINE 20 MG: 10 INJECTION, SOLUTION INTRAVENOUS at 09:56

## 2022-03-07 ASSESSMENT — PULMONARY FUNCTION TESTS
PIF_VALUE: 16
PIF_VALUE: 15
PIF_VALUE: 16
PIF_VALUE: 15
PIF_VALUE: 16
PIF_VALUE: 0
PIF_VALUE: 16
PIF_VALUE: 16
PIF_VALUE: 17
PIF_VALUE: 15
PIF_VALUE: 1
PIF_VALUE: 15
PIF_VALUE: 15
PIF_VALUE: 16
PIF_VALUE: 16
PIF_VALUE: 15
PIF_VALUE: 16
PIF_VALUE: 15
PIF_VALUE: 2
PIF_VALUE: 15
PIF_VALUE: 15
PIF_VALUE: 18
PIF_VALUE: 16
PIF_VALUE: 16
PIF_VALUE: 17
PIF_VALUE: 16
PIF_VALUE: 1
PIF_VALUE: 16
PIF_VALUE: 15
PIF_VALUE: 16
PIF_VALUE: 19
PIF_VALUE: 16
PIF_VALUE: 15
PIF_VALUE: 16
PIF_VALUE: 15
PIF_VALUE: 16
PIF_VALUE: 0
PIF_VALUE: 15
PIF_VALUE: 16
PIF_VALUE: 17
PIF_VALUE: 16
PIF_VALUE: 0
PIF_VALUE: 16
PIF_VALUE: 1
PIF_VALUE: 2
PIF_VALUE: 16
PIF_VALUE: 15
PIF_VALUE: 16
PIF_VALUE: 25
PIF_VALUE: 16
PIF_VALUE: 16
PIF_VALUE: 19
PIF_VALUE: 16
PIF_VALUE: 15
PIF_VALUE: 16
PIF_VALUE: 2
PIF_VALUE: 15
PIF_VALUE: 2
PIF_VALUE: 0
PIF_VALUE: 16
PIF_VALUE: 1
PIF_VALUE: 15
PIF_VALUE: 2
PIF_VALUE: 16
PIF_VALUE: 15
PIF_VALUE: 16
PIF_VALUE: 15
PIF_VALUE: 15
PIF_VALUE: 16

## 2022-03-07 ASSESSMENT — PAIN - FUNCTIONAL ASSESSMENT: PAIN_FUNCTIONAL_ASSESSMENT: 0-10

## 2022-03-07 ASSESSMENT — PAIN SCALES - GENERAL
PAINLEVEL_OUTOF10: 8
PAINLEVEL_OUTOF10: 5
PAINLEVEL_OUTOF10: 7
PAINLEVEL_OUTOF10: 7

## 2022-03-07 ASSESSMENT — ENCOUNTER SYMPTOMS: SHORTNESS OF BREATH: 1

## 2022-03-07 ASSESSMENT — LIFESTYLE VARIABLES: SMOKING_STATUS: 1

## 2022-03-07 NOTE — PROGRESS NOTES
Pt arrived to pacu from sx. VSS with elevated sbp in 200's  - will medicate with hydryalzine per order. Able to doppler pedal and post tibial pulses.

## 2022-03-07 NOTE — ANESTHESIA POSTPROCEDURE EVALUATION
Department of Anesthesiology  Postprocedure Note    Patient: Clementina Carcamo  MRN: [id number]  YOB: 1965  Date of evaluation: 3/7/2022  Time:  3:57 PM     Procedure Summary     Date: 03/07/22 Room / Location: [place] [address] (HYBRID) / [facility]    Anesthesia Start: 3339 Anesthesia Stop: 1500    Procedure: REVISION RIGHT LOWER EXTREMITY BYPASS WITH PERICARDIAL PATCH (Right Leg Lower) Diagnosis:       Peripheral vascular disease, unspecified ([address]      (PERIPHERAL VASCULAR DISEASE)    Surgeons: Iban Ingram MD Responsible Provider: Russell Kamara MD    Anesthesia Type: general ASA Status: 3          Anesthesia Type: general    Santo Phase I: Santo Score: 7    Santo Phase II:      Last vitals: Reviewed and per EMR flowsheets.    Vitals:    03/07/22 1505 03/07/22 1510 03/07/22 1515 03/07/22 1520   BP: (!) 205/94 (!) 193/99 (!) 196/90 (!) 155/80   Pulse: 76 75 72 71   Resp: 16 13 14 15   Temp: 96.9 °F (36.1 °C)      TempSrc: Temporal      SpO2: 92% 96% 98% 98%   Weight:       Height:           Anesthesia Post Evaluation    Patient location during evaluation: bedside  Patient participation: complete - patient participated  Level of consciousness: awake and alert  Airway patency: patent  Nausea & Vomiting: no nausea  Complications: no  Cardiovascular status: hemodynamically stable  Respiratory status: acceptable  Hydration status: euvolemic

## 2022-03-07 NOTE — PROGRESS NOTES
Report received from [facility] in PACU, patient transferred to floor. Vital signs stable at this time. Patient eating dinner.

## 2022-03-07 NOTE — BRIEF OP NOTE
Brief Postoperative Note      Patient: Mikie West  YOB: 1965  MRN: [id number]    Date of Procedure: 3/7/2022    Pre-Op Diagnosis: PERIPHERAL VASCULAR DISEASE, BYPAS GRAFT STENOSIS    Post-Op Diagnosis: Same       Procedure(s):  REVISION RIGHT LOWER EXTREMITY BYPASS WITH PERICARDIAL PATCH    Surgeon(s):  Elsa Mcgill MD    Assistant:  Surgical Assistant: Ivette Tian    Anesthesia: General    Estimated Blood Loss (mL): Minimal    Complications: None    Specimens:   * No specimens in log *    Implants:  Implant Name Type Inv.  Item Serial No.  Lot No. LRB No. Used Action   GRAFT VASC W0.8XL8CM THK0.35-0.75MM CAR PERICARD PROC BOV - [id number] Vascular grafts GRAFT VASC W0.8XL8CM THK0.35-0.75MM CAR PERICARD PROC BOV  [employer] [id number] Right 1 Implanted         Drains:   Closed/Suction Drain Left Abdomen Bulb 10 Wolof (Active)       Urethral Catheter Non-latex 16 fr (Active)         Electronically signed by Elsa Mcgill MD on 3/7/2022 at 2:42 PM

## 2022-03-07 NOTE — H&P
I have reviewed the history and physical (See note dated 3/4/2022) and examined the patient and find no relevant changes. I have reviewed with the patient and/or family the risks, benefits, and alternatives to the procedure.

## 2022-03-07 NOTE — PROGRESS NOTES
Incentive Spirometry education complete, see doc flow sheet for details.  Family at bedside  David Gandhi RN

## 2022-03-07 NOTE — ANESTHESIA PRE PROCEDURE
Department of Anesthesiology  Preprocedure Note       Name:  River Romero   Age:  60 y.o.  :  1965                                          MRN:  [id number]         Date:  3/7/2022      Surgeon: Tatianna Clements):  Robinson Nolasco MD    Procedure: Procedure(s):  REVISION RIGHT LOWER EXTREMITY BYPASS WITH PERICARDIAL PATCH    Medications prior to admission:   Prior to Admission medications    Medication Sig Start Date End Date Taking? Authorizing Provider   Evolocumab 140 MG/ML SOAJ Inject 1 Dose into the skin every 14 days 3/7/22   Margaret Sierra APRN - CNP   clopidogrel (PLAVIX) 75 MG tablet TAKE ONE TABLET BY MOUTH DAILY  Patient taking differently: Take 75 mg by mouth daily  3/4/22   MAT Li - CNP   lisinopril (PRINIVIL;ZESTRIL) 20 MG tablet TAKE ONE TABLET BY MOUTH DAILY  Patient taking differently: Take 20 mg by mouth daily  3/4/22   MAT Li - CNP   nitroGLYCERIN (NITROSTAT) 0.4 MG SL tablet up to max of 3 total doses. If no relief after 1 dose, call 911. 22   MAT Li - CNP   carvedilol (COREG) 6.25 MG tablet TAKE ONE TABLET BY MOUTH TWICE A DAY WITH MEALS  Patient taking differently: Take 6.25 mg by mouth 2 times daily TAKE ONE TABLET BY MOUTH TWICE A DAY WITH MEALS 22   MAT Li - CNP   gabapentin (NEURONTIN) 600 MG tablet TAKE ONE TABLET BY MOUTH THREE TIMES A DAY  Patient taking differently: Take 300 mg by mouth 3 times daily. TAKE ONE TABLET BY MOUTH THREE TIMES A DAY 21  MAT Li CNP   Blood Pressure Monitoring (B-D ASSURE BPM/AUTO ARM CUFF) MISC 1 Device by Does not apply route 2 times daily 21   MAT Li CNP   atorvastatin (LIPITOR) 80 MG tablet TAKE ONE TABLET BY MOUTH DAILY  Patient taking differently: Take 80 mg by mouth daily  21   MAT Torre CNP   oxyCODONE (ROXICODONE) 5 MG immediate release tablet Take 5 mg by mouth every 6 hours as needed for Pain.     Historical Provider, MD   aspirin 81 MG 6/22/2020    PAD (peripheral artery disease) (HCC)     Poor vision     left eye    Pre-diabetes     Sleep apnea     NO CPAP    Stroke ([facility] [address] 2016    x3 - memory deficit    Vascular occlusion     Wears partial dentures     upper & lower       Past Surgical History:        Procedure Laterality Date    ABDOMEN SURGERY      colon polyps    CT VISCERAL PERCUTANEOUS DRAIN  7/21/2021    CT VISCERAL PERCUTANEOUS DRAIN 7/21/2021 Nena Borrero MD [facility] CT SCAN    FEMORAL-TIBIAL BYPASS GRAFT Right 06/24/2020    RIGHT FEMORAL TO PERONEAL  BYPASS GRAFT performed by Robinson Nolasco MD at [place] [address] N/A 7/9/2021    ROBOTIC INCISIONAL HERNIA REPAIR WITH MESH, eTAPP BLOCK performed by Mane Regan MD at [facility]      LEG SURGERY Right     VASCULAR SURGERY         Social History:    Social History     Tobacco Use    Smoking status: Current Every Day Smoker     Packs/day: 0.50     Years: 40.00     Pack years: 20.00     Types: Cigarettes    Smokeless tobacco: Never Used   Substance Use Topics    Alcohol use: Not Currently                                Ready to quit: Not Answered  Counseling given: Yes      Vital Signs (Current): There were no vitals filed for this visit.                                            BP Readings from Last 3 Encounters:   03/04/22 (!) 130/90   01/01/22 (!) 144/82   09/16/21 (!) 169/94       NPO Status:  MN+, SEE MAR FOR AM MEDS                                                                               BMI:   Wt Readings from Last 3 Encounters:   03/04/22 182 lb (82.6 kg)   01/01/22 182 lb (82.6 kg)   10/26/21 176 lb (79.8 kg)     There is no height or weight on file to calculate BMI.    CBC:   Lab Results   Component Value Date    WBC 7.4 03/04/2022    RBC 4.43 03/04/2022    HGB 14.7 03/04/2022    HCT 42.8 03/04/2022    MCV 96.6 03/04/2022    RDW 14.1 03/04/2022     03/04/2022       CMP:   Lab Results   Component Value Date    NA 140 03/04/2022    K 4.5 03/04/2022    K 4.1 07/21/2021     03/04/2022    CO2 22 03/04/2022    BUN 19 03/04/2022    CREATININE 1.0 03/04/2022    GFRAA >60 03/04/2022    GFRAA >60 07/11/2012    AGRATIO 1.1 07/20/2021    LABGLOM >60 03/04/2022    GLUCOSE 115 03/04/2022    PROT 7.4 07/20/2021    PROT 7.4 07/12/2011    CALCIUM 9.6 03/04/2022    BILITOT <0.2 07/20/2021    ALKPHOS 147 07/20/2021    AST 22 07/20/2021    ALT 20 07/20/2021       POC Tests: No results for input(s): POCGLU, POCNA, POCK, POCCL, POCBUN, POCHEMO, POCHCT in the last 72 hours. Coags:   Lab Results   Component Value Date    PROTIME 15.5 10/26/2021    INR 1.35 10/26/2021    APTT 61.6 06/24/2020       HCG (If Applicable): No results found for: PREGTESTUR, PREGSERUM, HCG, HCGQUANT     ABGs: No results found for: PHART, PO2ART, OSF1IDC, PWI5OIN, BEART, Z3GIOUSX     Type & Screen (If Applicable):  No results found for: LABABO, LABRH    Drug/Infectious Status (If Applicable):  No results found for: HIV, HEPCAB    COVID-19 Screening (If Applicable):   Lab Results   Component Value Date    COVID19 Not Detected 03/04/2022           Anesthesia Evaluation  Patient summary reviewed no history of anesthetic complications:   Airway: Mallampati: II  TM distance: >3 FB   Neck ROM: full  Mouth opening: > = 3 FB Dental:    (+) upper dentures and lower dentures      Pulmonary: breath sounds clear to auscultation  (+) shortness of breath (EXERT.):  sleep apnea (SNORES):  current smoker (1 PPD)    (-) COPD and asthma          Patient did not smoke on day of surgery. Cardiovascular:    (+) hypertension:, past MI: > 6 months, CAD: non-obstructive, SORIANO:, hyperlipidemia    (-) pacemaker, CABG/stent, dysrhythmias,  angina and  CHF    ECG reviewed  Rhythm: regular  Rate: normal    Stress test reviewed       Beta Blocker:  Dose within 24 Hrs         Neuro/Psych:   (+) CVA (2016 // 5/20, DEC VISION L EYE, DEC.  MEMORY):,    (-) seizures and TIA GI/Hepatic/Renal:        (-) GERD, liver disease, no renal disease, bowel prep and no morbid obesity       Endo/Other:    (+) : arthritis:., no malignancy/cancer. (-) no malignancy/cancer               Abdominal:       Abdomen: soft. Vascular:   + PVD, aortic or cerebral, . Other Findings:             Anesthesia Plan      general     ASA 3       Induction: intravenous. MIPS: Postoperative opioids intended and Prophylactic antiemetics administered. Anesthetic plan and risks discussed with patient. Use of blood products discussed with patient whom consented to blood products. Plan discussed with CRNA. This pre-anesthesia assessment may be used as a history and physical.    DOS STAFF ADDENDUM:    Pt seen and examined, chart reviewed (including anesthesia, drug and allergy history). No interval changes to history and physical examination. Anesthetic plan, risks, benefits, alternatives, and personnel involved discussed with patient. Patient verbalized an understanding and agrees to proceed.       Jarvis Marie MD  March 7, 2022  9:30 AM      Jarvis Marie MD   3/7/2022

## 2022-03-08 VITALS
RESPIRATION RATE: 17 BRPM | HEIGHT: 67 IN | TEMPERATURE: 97.8 F | WEIGHT: 196 LBS | OXYGEN SATURATION: 95 % | BODY MASS INDEX: 30.76 KG/M2 | SYSTOLIC BLOOD PRESSURE: 109 MMHG | HEART RATE: 64 BPM | DIASTOLIC BLOOD PRESSURE: 68 MMHG

## 2022-03-08 LAB
ANION GAP SERPL CALCULATED.3IONS-SCNC: 13 MMOL/L (ref 3–16)
BUN BLDV-MCNC: 22 MG/DL (ref 7–20)
CALCIUM SERPL-MCNC: 9.2 MG/DL (ref 8.3–10.6)
CHLORIDE BLD-SCNC: 104 MMOL/L (ref 99–110)
CO2: 23 MMOL/L (ref 21–32)
CREAT SERPL-MCNC: 0.9 MG/DL (ref 0.9–1.3)
GFR AFRICAN AMERICAN: >60
GFR NON-AFRICAN AMERICAN: >60
GLUCOSE BLD-MCNC: 130 MG/DL (ref 70–99)
HCT VFR BLD CALC: 36.8 % (ref 40.5–52.5)
HEMOGLOBIN: 12.6 G/DL (ref 13.5–17.5)
MCH RBC QN AUTO: 32.5 PG (ref 26–34)
MCHC RBC AUTO-ENTMCNC: 34.3 G/DL (ref 31–36)
MCV RBC AUTO: 95 FL (ref 80–100)
PDW BLD-RTO: 14.2 % (ref 12.4–15.4)
PLATELET # BLD: 222 K/UL (ref 135–450)
PMV BLD AUTO: 6.3 FL (ref 5–10.5)
POTASSIUM REFLEX MAGNESIUM: 4.7 MMOL/L (ref 3.5–5.1)
RBC # BLD: 3.87 M/UL (ref 4.2–5.9)
SODIUM BLD-SCNC: 140 MMOL/L (ref 136–145)
WBC # BLD: 11.3 K/UL (ref 4–11)

## 2022-03-08 PROCEDURE — 36415 COLL VENOUS BLD VENIPUNCTURE: CPT

## 2022-03-08 PROCEDURE — 80048 BASIC METABOLIC PNL TOTAL CA: CPT

## 2022-03-08 PROCEDURE — 2580000003 HC RX 258: Performed by: SURGERY

## 2022-03-08 PROCEDURE — 85027 COMPLETE CBC AUTOMATED: CPT

## 2022-03-08 PROCEDURE — 6370000000 HC RX 637 (ALT 250 FOR IP): Performed by: SURGERY

## 2022-03-08 RX ORDER — OXYCODONE HYDROCHLORIDE 5 MG/1
5 TABLET ORAL EVERY 6 HOURS PRN
Qty: 28 TABLET | Refills: 0 | Status: SHIPPED | OUTPATIENT
Start: 2022-03-08 | End: 2022-03-15

## 2022-03-08 RX ADMIN — LISINOPRIL 20 MG: 20 TABLET ORAL at 09:22

## 2022-03-08 RX ADMIN — OXYCODONE 10 MG: 5 TABLET ORAL at 10:47

## 2022-03-08 RX ADMIN — GABAPENTIN 300 MG: 300 CAPSULE ORAL at 09:22

## 2022-03-08 RX ADMIN — ASPIRIN 81 MG 81 MG: 81 TABLET ORAL at 09:22

## 2022-03-08 RX ADMIN — OXYCODONE 10 MG: 5 TABLET ORAL at 06:29

## 2022-03-08 RX ADMIN — CLOPIDOGREL BISULFATE 75 MG: 75 TABLET ORAL at 09:22

## 2022-03-08 RX ADMIN — ATORVASTATIN CALCIUM 80 MG: 80 TABLET, FILM COATED ORAL at 09:22

## 2022-03-08 RX ADMIN — SODIUM CHLORIDE, PRESERVATIVE FREE 10 ML: 5 INJECTION INTRAVENOUS at 09:22

## 2022-03-08 RX ADMIN — CARVEDILOL 6.25 MG: 6.25 TABLET, FILM COATED ORAL at 09:22

## 2022-03-08 ASSESSMENT — PAIN SCALES - GENERAL
PAINLEVEL_OUTOF10: 0
PAINLEVEL_OUTOF10: 0
PAINLEVEL_OUTOF10: 2
PAINLEVEL_OUTOF10: 7
PAINLEVEL_OUTOF10: 7

## 2022-03-08 ASSESSMENT — PAIN DESCRIPTION - ONSET: ONSET: GRADUAL

## 2022-03-08 ASSESSMENT — PAIN DESCRIPTION - ORIENTATION: ORIENTATION: RIGHT

## 2022-03-08 ASSESSMENT — PAIN DESCRIPTION - LOCATION
LOCATION: GROIN;LEG
LOCATION: HEAD

## 2022-03-08 ASSESSMENT — PAIN DESCRIPTION - PAIN TYPE
TYPE: ACUTE PAIN
TYPE: SURGICAL PAIN

## 2022-03-08 NOTE — OP NOTE
[facility] [place] [address]                                OPERATIVE REPORT    PATIENT NAME: Rhonda Lockwood                  :        1965  MED REC NO:   [id number]                          ROOM:       1505  ACCOUNT NO:   [de-identified]                           ADMIT DATE: 2022  PROVIDER:     Karl Kerr MD    DATE OF PROCEDURE:  2022    PREOPERATIVE DIAGNOSIS:  Stenosis of right femoral to peroneal artery  bypass graft. POSTOPERATIVE DIAGNOSIS:  Stenosis of right femoral to peroneal artery  bypass graft. OPERATION PERFORMED:  Exploration of proximal portion of the graft with  pericardial patch angioplasty of the vein graft stenosis. SURGEON:  Karl Kerr MD    ANESTHESIA:  General.    INDICATIONS  The patient is a 78-year-old male with history of  peripheral vascular disease, he is status post right femoral peroneal  artery bypass. He has undergone intervention for a proximal vein graft  stenosis previously. At this point, repeat graft duplex examination was  performed and shows a recurrent stenosis. The patient is brought to the  operating room at this time to undergo operative correction of the  stenosis. OPERATIVE PROCEDURE:  The patient was brought to the operating room and  placed in the supine position. Ultrasound was performed of the right  leg. The area of stenosis was identified and marked on the skin. The  leg was prepped and draped in sterile fashion. An oblique incision was  made in the proximal thigh region and carried down through subcutaneous  tissues. The sartorius muscle was then mobilized and retracted  medially, exposing the underlying vein graft which was in a subsartorial  tunnel. Duplex examination was performed intraoperatively and the  position of stenosis again identified.   The patient was given 5000 units  of intravenous heparin and after approximately 3 minutes, the vein graft  was normal, it was clamped proximal and distal to the stenosis. A  longitudinal arteriotomy was made in the vein graft through the area of  stenosis which was clearly identified as an intimal hyperplasia and  thickening at a prior valve site. This was debrided slightly. A patch  angioplasty was performed using a bovine pericardial patch, secured  using running 6-0 Prolene suture. After completing the anastomosis,  flow was established down the leg. Duplex examination showed no  residual stenosis. The proximal vein graft was punctured and a 20-gauge  angiocatheter was placed. Angiogram was performed showing a widely  patent vein graft without stenosis and a widely patent distal  anastomosis to the peroneal artery. The angiocatheter was removed. The  small puncture site closed using a 6-0 Prolene figure-of-eight suture. The operative site was irrigated with antibiotic saline solution and  closed using several layers of running 2-0 Vicryl suture, 3-0 Vicryl  suture, then running 4-0 Monocryl subcuticular sutures were used to  reapproximate the skin edges. The wound was dressed with Dermabond and  Prineo. There were no complications of procedure. At the end of  procedure, sponge, needle and instrument counts were correct. The  patient was extubated in the operating room, transferred to the recovery  area in a stable and awake condition. Estimated blood loss less than 50  mL.         Aundrea Sanabria MD    D: 03/07/2022 16:24:52       T: 03/07/2022 16:28:22     [email or web address]  Job#: [id number]     Doc#: [id number]    CC:

## 2022-03-08 NOTE — CARE COORDINATION
Discharge order noted. Spoke with patient at bedside. He states he lives at home with his wife. He denies any DME or services at home and is independent at home. Denies needs from CM.

## 2022-03-08 NOTE — PROGRESS NOTES
Vascular Surgery Progress Note      SUBJECTIVE:  No c/o this am.  Foot feels better. OBJECTIVE    Physical  CURRENT VITALS:  /64   Pulse 73   Temp 97.7 °F (36.5 °C) (Oral)   Resp 16   Ht 5' 7\" (1.702 m)   Wt 196 lb (88.9 kg)   SpO2 92%   BMI 30.70 kg/m²   24 HR INTAKE/OUTPUT:    Intake/Output Summary (Last 24 hours) at 3/8/2022 0753  Last data filed at 3/8/2022 1086  Gross per 24 hour   Intake 1799.4 ml   Output 1000 ml   Net 799.4 ml     R thigh incision intact  Palpable graft pulse  Foot warm    Data  CBC:   Lab Results   Component Value Date    WBC 11.3 03/08/2022    RBC 3.87 03/08/2022    HGB 12.6 03/08/2022    HCT 36.8 03/08/2022    MCV 95.0 03/08/2022    MCH 32.5 03/08/2022    MCHC 34.3 03/08/2022    RDW 14.2 03/08/2022     03/08/2022    MPV 6.3 03/08/2022     ASSESSMENT AND PLAN    POD #1 Graft revision   Proctor out   Ambulate   Home later today after voiding and ambulation.

## 2022-03-08 NOTE — PROGRESS NOTES
Writer reviewed discharge orders with patient. All questions answered. Patient IV removed with no complications following. Patient taken to the front lobby via wheelchair.     3/8/2022  Shawnee Galarza RN

## 2022-03-15 ENCOUNTER — TELEPHONE (OUTPATIENT)
Dept: VASCULAR SURGERY | Age: 57
End: 2022-03-15

## 2022-03-15 ENCOUNTER — HOSPITAL ENCOUNTER (OUTPATIENT)
Dept: VASCULAR LAB | Age: 57
Discharge: HOME | End: 2022-03-15
Payer: MEDICARE

## 2022-03-15 DIAGNOSIS — I73.9 PERIPHERAL VASCULAR DISEASE, UNSPECIFIED (HCC): Primary | ICD-10-CM

## 2022-03-15 DIAGNOSIS — I73.9 PERIPHERAL VASCULAR DISEASE, UNSPECIFIED (HCC): ICD-10-CM

## 2022-03-15 PROCEDURE — 93925 LOWER EXTREMITY STUDY: CPT

## 2022-03-15 NOTE — TELEPHONE ENCOUNTER
Called patient and spoke with spouse regarding results of graft scan from today. Per Dr Kristen Lopez graft is open. Foot pain as it is something he is going to have to work through.  pambetty

## 2022-03-18 ENCOUNTER — TELEPHONE (OUTPATIENT)
Dept: PHARMACY | Facility: CLINIC | Age: 57
End: 2022-03-18

## 2022-03-18 NOTE — TELEPHONE ENCOUNTER
[facility] HEALTH CLINICAL PHARMACY: ADHERENCE REVIEW  Identified care gap per [employer]: fills at [facility]: ACE/ARB and Statin adherence    Last Visit: 03/04/22    Patient also appears to be prescribed: Atorvastatin 80mg and Lisinopril 20mg     Patient found in Outcomes MTM and is currently eligible for TIP    ASSESSMENT  ACE/ARB ADHERENCE    Per evoke  Portal:  Lisinopril last filled on 03/04/22 for 30 day supply. BP Readings from Last 3 Encounters:   03/08/22 109/68   03/07/22 130/64   03/04/22 (!) 130/90     Estimated Creatinine Clearance: 95 mL/min (based on SCr of 0.9 mg/dL). STATIN ADHERENCE    Per evoke  Portal:  Atorvastatin last filled on 1/06/22 for 30 day supply. Lab Results   Component Value Date    CHOL 215 (H) 03/04/2022    TRIG 149 03/04/2022    HDL 56 03/04/2022    LDLCALC 129 (H) 03/04/2022     ALT   Date Value Ref Range Status   07/20/2021 20 10 - 40 U/L Final     AST   Date Value Ref Range Status   07/20/2021 22 15 - 37 U/L Final     The 10-year ASCVD risk score ([address] et al., 2013) is: 10.7%    Values used to calculate the score:      Age: 60 years      Sex: Male      Is Non- : No      Diabetic: No      Tobacco smoker: Yes      Systolic Blood Pressure: 178 mmHg      Is BP treated: Yes      HDL Cholesterol: 56 mg/dL      Total Cholesterol: 215 mg/dL     PLAN  The following are interventions that have been identified:   - Patient eligible for 90 day supply of Aotrvastatin and Lisinopril    Attempting to reach patient to review changing prescription from a 30-day supply to a 90-day supply.  Left message asking for return call     called pt to discuss switching Atorvastatin 80mg and Lisinopril 20mg to a 90ds    No future appointments.     [address] // Department, toll free [phone], Option 1

## 2022-03-21 ENCOUNTER — TELEPHONE (OUTPATIENT)
Dept: VASCULAR SURGERY | Age: 57
End: 2022-03-21

## 2022-03-21 DIAGNOSIS — I10 ESSENTIAL HYPERTENSION: ICD-10-CM

## 2022-03-21 RX ORDER — LISINOPRIL 20 MG/1
20 TABLET ORAL DAILY
Qty: 90 TABLET | Refills: 0 | Status: SHIPPED | OUTPATIENT
Start: 2022-03-21 | End: 2022-04-25

## 2022-03-21 RX ORDER — ATORVASTATIN CALCIUM 80 MG/1
80 TABLET, FILM COATED ORAL DAILY
Qty: 90 TABLET | Refills: 0 | Status: SHIPPED | OUTPATIENT
Start: 2022-03-21 | End: 2022-04-25

## 2022-03-21 RX ORDER — CLOPIDOGREL BISULFATE 75 MG/1
75 TABLET ORAL DAILY
Qty: 90 TABLET | Refills: 0 | Status: SHIPPED | OUTPATIENT
Start: 2022-03-21 | End: 2022-07-18

## 2022-03-21 RX ORDER — CARVEDILOL 6.25 MG/1
6.25 TABLET ORAL 2 TIMES DAILY WITH MEALS
Qty: 180 TABLET | Refills: 0 | Status: SHIPPED | OUTPATIENT
Start: 2022-03-21 | End: 2022-09-21 | Stop reason: SDUPTHER

## 2022-03-21 NOTE — TELEPHONE ENCOUNTER
Collin Ocampo, APRN - CNP, patient out of refills.  Rx pended for your signature/modification as appropriate    LOV: 2/4/22  Next: to be scheduled    Thank you,  Rikki Gottron Just, PharmD, [facility]  Department, toll free: [phone], option 1

## 2022-03-21 NOTE — TELEPHONE ENCOUNTER
Spoke with pt wife and she stated she handles his medications. She declined 90ds at this time. She said its better to fill a 30ds.      She stated he needs refills for Atorvastatin 80 per wife takes nightly, Lisinopril 20mg morning, Clopidogrel 75mg morning, Carvedilol 6.25mg morning BID    Sending encounter to Kristopher Pak to request refills for the medications below     [address] [address] [phone] [facility] [phone]   [address] [place] [address]   Phone:  [phone]  Fax:  [phone]    Authorizing Provider: MAT Osullivan CNP FOR MEDS BELOW:  Carvedilol 6.25mg morning BID  Clopidogrel 75mg morning  Lisinopril 20mg morning       Authorizing Provider: MAT Velásquez CNP  Atorvastatin 80mg Daily

## 2022-03-21 NOTE — TELEPHONE ENCOUNTER
Called patient back and [facility] as he is having the toe pain, per Dr Jose Cruz Andrea need a podiatrist. Young Shankar

## 2022-03-22 NOTE — TELEPHONE ENCOUNTER
For [place] in place:  No   Recommendation Provided To: Provider: 4 via Note to Provider and Patient/Caregiver: 4 via Telephone   Intervention Detail: Adherence Monitorin   Gap Closed?: No    Intervention Accepted By: Provider: 4 and Patient/Caregiver: 4   Time Spent (min): 20

## 2022-04-05 DIAGNOSIS — G89.29 OTHER CHRONIC PAIN: ICD-10-CM

## 2022-04-05 NOTE — TELEPHONE ENCOUNTER
Refill request for GABAPENTIN medication.      Name of Saloni Flyzik      Last visit - 2/4/22     Pending visit - 9/21/22    Last refill -3/3/22      Medication Contract signed -   Last Oarrs ran-         Additional Comments

## 2022-04-06 ENCOUNTER — TELEPHONE (OUTPATIENT)
Dept: INTERNAL MEDICINE CLINIC | Age: 57
End: 2022-04-06

## 2022-04-06 DIAGNOSIS — G89.29 OTHER CHRONIC PAIN: ICD-10-CM

## 2022-04-06 RX ORDER — GABAPENTIN 600 MG/1
TABLET ORAL
Qty: 90 TABLET | Refills: 2 | OUTPATIENT
Start: 2022-04-06

## 2022-04-06 RX ORDER — GABAPENTIN 300 MG/1
300 CAPSULE ORAL 3 TIMES DAILY
Qty: 90 CAPSULE | Refills: 2 | Status: SHIPPED | OUTPATIENT
Start: 2022-04-06 | End: 2022-05-17 | Stop reason: SDUPTHER

## 2022-04-06 NOTE — TELEPHONE ENCOUNTER
----- Message from Julio Soares sent at 5/1/3273 10:50 AM EDT -----  Subject: Message to Provider    QUESTIONS  Information for Provider? Pt requesting call back to discuss the change in   dosage for the gabapentin back to 300 instead of 600mg   ---------------------------------------------------------------------------  --------------  CALL BACK INFO  What is the best way for the office to contact you? OK to leave message on   voicemail  Preferred Call Back Phone Number? [phone]  ---------------------------------------------------------------------------  --------------  SCRIPT ANSWERS  Relationship to Patient?  Self

## 2022-04-06 NOTE — TELEPHONE ENCOUNTER
The refill said patient reported 300 mg BID so I changed to match said dose. Is he still taking 600 mg BID?

## 2022-04-12 ENCOUNTER — HOSPITAL ENCOUNTER (OUTPATIENT)
Dept: VASCULAR LAB | Age: 57
Discharge: HOME | End: 2022-04-12
Payer: MEDICARE

## 2022-04-12 DIAGNOSIS — I65.23 BILATERAL CAROTID ARTERY STENOSIS: ICD-10-CM

## 2022-04-12 DIAGNOSIS — I65.23 BILATERAL CAROTID ARTERY STENOSIS: Primary | ICD-10-CM

## 2022-04-12 PROCEDURE — 93880 EXTRACRANIAL BILAT STUDY: CPT

## 2022-04-13 ENCOUNTER — TELEPHONE (OUTPATIENT)
Dept: VASCULAR SURGERY | Age: 57
End: 2022-04-13

## 2022-04-13 NOTE — TELEPHONE ENCOUNTER
Called patient regarding results and spoke with Spouse and per Dr Cutler Core carotid duplex scan looks good.  Pamlr

## 2022-04-25 ENCOUNTER — TELEPHONE (OUTPATIENT)
Dept: PHARMACY | Facility: CLINIC | Age: 57
End: 2022-04-25

## 2022-04-25 RX ORDER — ATORVASTATIN CALCIUM 80 MG/1
TABLET, FILM COATED ORAL
Qty: 90 TABLET | Refills: 1 | Status: SHIPPED | OUTPATIENT
Start: 2022-04-25 | End: 2022-09-21 | Stop reason: SDUPTHER

## 2022-04-25 RX ORDER — LISINOPRIL 20 MG/1
TABLET ORAL
Qty: 90 TABLET | Refills: 1 | Status: SHIPPED | OUTPATIENT
Start: 2022-04-25 | End: 2022-08-04 | Stop reason: SDUPTHER

## 2022-04-25 NOTE — TELEPHONE ENCOUNTER
[facility] CLINICAL PHARMACY: ADHERENCE REVIEW  Identified care gap per [employer]: fills at [facility]: ACE/ARB and Statin adherence    Last Visit: 2/4/22    Patient also appears to be prescribed: Lisinopril 20 and Atorvastatin 80mg    Patient found in Outcomes MT and is currently eligible for TIP    ASSESSMENT  ACE/ARB ADHERENCE    Insurance Records claims through 4/3/22 (Prior Year [place] = 0%; YTD [place] = 100%; Potential Fail Date: 06/09/22):   lisinopril last filled on 02/04/22 for 30 day supply. Next refill due: 04/05/22    Per evoke  Portal:  Lisinopril last filled on 03/04/22 for 30 day supply. Per [facility]:   Latricia Torre last picked up on 4/21/22 for 90 day supply. 0 refills remaining. Billed through [employer]     BP Readings from Last 3 Encounters:   03/08/22 109/68   03/07/22 130/64   03/04/22 (!) 130/90     Estimated Creatinine Clearance: 95 mL/min (based on SCr of 0.9 mg/dL). [address] Records claims through 4/3/22 (Prior Year [place] = 0%; YTD [place] = 50%; Potential Fail Date: 07/16/22): Atorvastatin last filled on 01/06/22 for 90 day supply. Next refill due: 06/19/22    Per evoke  Portal:  Atorvastatin last filled on 3/21/22 for 90 day supply. Per [address]:   Atorvastatin last picked up on 3/28/21 for 90 day supply. 0 refills remaining.  Billed through [place]   Component Value Date    CHOL 215 (H) 03/04/2022    TRIG 149 03/04/2022    HDL 56 03/04/2022    LDLCALC 129 (H) 03/04/2022     ALT   Date Value Ref Range Status   07/20/2021 20 10 - 40 U/L Final     AST   Date Value Ref Range Status   07/20/2021 22 15 - 37 U/L Final     The 10-year ASCVD risk score (Dustin Catherine, et al., 2013) is: 10.7%    Values used to calculate the score:      Age: 60 years      Sex: Male      Is Non- : No      Diabetic: No      Tobacco smoker: Yes      Systolic Blood Pressure: 463 mmHg      Is BP treated: Yes      HDL Cholesterol: 56 mg/dL      Total Cholesterol: 215 mg/dL     PLAN  No patient out reach planned at this time. Pt appears to be adherent at this time.   Pharmacy will send a refill request back for both medications      Future Appointments   Date Time Provider Byron Hardy   2022  1:40 PM MAT Marina - CNP MMA  [address] // Department, toll free [phone], Option 1     For Pharmacy [id number] [place] [address] Road in place:  No   Recommendation Provided To: Pharmacy: 2   Intervention Detail: Adherence Monitorin   Gap Closed?: No    Intervention Accepted By: Pharmacy: 2   Time Spent (min): 20

## 2022-05-17 DIAGNOSIS — Z91.81 AT RISK FOR FALLS: ICD-10-CM

## 2022-05-17 DIAGNOSIS — G89.29 CHRONIC BILATERAL LOW BACK PAIN, UNSPECIFIED WHETHER SCIATICA PRESENT: Primary | ICD-10-CM

## 2022-05-17 DIAGNOSIS — M54.50 CHRONIC BILATERAL LOW BACK PAIN, UNSPECIFIED WHETHER SCIATICA PRESENT: Primary | ICD-10-CM

## 2022-05-17 RX ORDER — GABAPENTIN 300 MG/1
CAPSULE ORAL
Qty: 270 CAPSULE | OUTPATIENT
Start: 2022-05-17

## 2022-05-17 RX ORDER — GABAPENTIN 300 MG/1
300 CAPSULE ORAL 3 TIMES DAILY
Qty: 90 CAPSULE | Refills: 2 | Status: SHIPPED | OUTPATIENT
Start: 2022-05-17 | End: 2022-05-18 | Stop reason: SDUPTHER

## 2022-05-17 NOTE — TELEPHONE ENCOUNTER
Refill request for GABAPENTIN medication. Name of Saloni Ortega      Last visit - 2-4-22     Pending visit - 9-21-22    Last refill -4-6-22      PDMP Monitoring:    Last PDMP Jed Toussaint as Reviewed [facility]):  Review User Review Instant Review Result   Kassie Mortimer 7/19/2021  8:51 AM Reviewed PDMP [1]     [unfilled]  Urine Drug Screenings (1 yr)    No resulted procedures found.        Medication Contract and Consent for Opioid Use Documents Filed      No documents found                   Additional Comments

## 2022-05-17 NOTE — LETTER
[facility] TATYANA [address] [facility]  Phone: [phone]  Fax: [phone]    MAT Padron CNP        May 17, 2022      Patient needs shower bars and bars around the toilet in the bathroom in the place that he lives.       Sincerely,          MAT Padron CNP

## 2022-05-17 NOTE — TELEPHONE ENCOUNTER
Refill request for GABAPENTIN medication.      Name of Saloni Catalyze      Last visit - 2/4/22     Pending visit - 9/21/22    Last refill -4/6/22      Medication Contract signed -   Last Oarrs ran-         Additional Comments

## 2022-05-18 RX ORDER — GABAPENTIN 300 MG/1
300 CAPSULE ORAL 3 TIMES DAILY
Qty: 90 CAPSULE | Refills: 2 | Status: SHIPPED | OUTPATIENT
Start: 2022-05-18 | End: 2022-06-15

## 2022-05-18 NOTE — TELEPHONE ENCOUNTER
Refill request for GABAPENTIN medication.      Name of Saloni Anderson [place]      Last visit - 2-4-2022     Pending visit - 9-    Last refill - 2-      Medication Contract signed - 7-   Last Lilly Curtis ran- 7-        Additional Comments

## 2022-05-18 NOTE — TELEPHONE ENCOUNTER
----- Message from Saw Hassan sent at 5/18/2022  1:56 PM EDT -----  Subject: Medication Problem    QUESTIONS  Name of Medication? gabapentin (NEURONTIN) 300 MG capsule  Patient-reported dosage and instructions? 300MG capsule   What question or problem do you have with the medication? PT's wife said   there was a mistake made on the subscription when it was called in and the   pharmacy will not fill it until they speak with the PCP. Please follow up   with the PT directly. Preferred Pharmacy? [facility] [id number] - [address] [address] [phone] - f [phone]  Pharmacy phone number (if available)? [phone]  Additional Information for Provider?   ---------------------------------------------------------------------------  --------------  CALL BACK INFO  What is the best way for the office to contact you? OK to leave message on   voicemail  Preferred Call Back Phone Number? [phone]  ---------------------------------------------------------------------------  --------------  SCRIPT ANSWERS  Relationship to Patient? Other  Representative Name? Mrs. Kieran Lazcano  Is the Representative on the appropriate HIPAA document in Epic?  Yes

## 2022-05-19 ENCOUNTER — TELEPHONE (OUTPATIENT)
Dept: INTERNAL MEDICINE CLINIC | Age: 57
End: 2022-05-19

## 2022-05-19 NOTE — LETTER
[facility] TATYANA Prieto [address] [facility]  Phone: [phone]  Fax: [phone]    MAT Malhotra CNP        May 19, 2022      The patient Cary Culver needs shower bars and handicap bars around the toilet in his bathroom in the place where he lives due to his health conditions.        Sincerely,          MAT Malhotra CNP

## 2022-05-19 NOTE — TELEPHONE ENCOUNTER
Typing letter for medical necessity. Letter typed. Also faxed a DME Order for a Shower/Bath Chair for the patient along with a Demographic Sheet to Melissa Blackman in [place], [place].      Radha    Phone    [phone]    Fax    [phone]

## 2022-05-19 NOTE — LETTER
[facility] TATYANA Prieto [address] [facility]  Phone: [phone]  Fax: [phone]    MAT Ward CNP        May 19, 2022      The patient Franco Zarate. Rick  needs shower bars and handicap bars around the toilet in his bathroom in the place where he lives due to his health conditions. Please don't hesitate to call if you have any questions.            Sincerely,          MAT Ward - CNP

## 2022-05-19 NOTE — TELEPHONE ENCOUNTER
[employer] called to let us know Medicare will not cover a shower chair . They want you to put in an order for an adjustable bedside commode . Please fax to [phone]        Bedside commodes can be used as a shower chair .

## 2022-05-19 NOTE — LETTER
[facility] TATYANA Prieto [address] [facility]  Phone: [phone]  Fax: [phone]    MAT Garcia CNP        May 19, 2022      The patient Carmela Molina. Naomi Tejeda needs shower bars and handicap bars around the toilet in his bathroom in the place where he lives due to chronic back pain and increased fall risk. Please don't hesitate to call.        Sincerely,          MAT Garcia - CNP

## 2022-05-19 NOTE — TELEPHONE ENCOUNTER
Junior Radford took this message, routing it to you. [employer] called to let us know Medicare will not cover a shower chair . They want you to put in an order for an adjustable bedside commode . Please fax to [phone]           Bedside commodes can be used as a shower chair .

## 2022-05-24 PROBLEM — Z86.73 HISTORY OF CEREBRAL INFARCTION: Status: RESOLVED | Noted: 2022-05-24 | Resolved: 2022-05-24

## 2022-05-24 PROBLEM — R07.9 CHEST PAIN: Status: RESOLVED | Noted: 2021-03-11 | Resolved: 2022-05-24

## 2022-05-24 PROBLEM — Z86.73 HISTORY OF CEREBRAL INFARCTION: Status: ACTIVE | Noted: 2022-05-24

## 2022-05-24 NOTE — TELEPHONE ENCOUNTER
Patient is needing a bedside commode ordered because Medicare will not pay for a bath/shower chair. It can be used the same way.

## 2022-05-25 NOTE — TELEPHONE ENCOUNTER
Faxed to [facility] in [place].      Phone #                                 Fax #    [phone]

## 2022-06-14 ENCOUNTER — TELEPHONE (OUTPATIENT)
Dept: INTERNAL MEDICINE CLINIC | Age: 57
End: 2022-06-14

## 2022-06-14 NOTE — TELEPHONE ENCOUNTER
Message taken by [facility].       Patient is still experiencing pain and feels he benefits from the 600mg     Please send in script for 600mg Gabapentin

## 2022-06-14 NOTE — TELEPHONE ENCOUNTER
Patient is still experiencing pain and feels he benefits from the 600mg    Please send in script for 600mg Gabapentin

## 2022-06-14 NOTE — TELEPHONE ENCOUNTER
Is he still experiencing neuropathic pain symptoms? If so can increase to 600 mg TID.  Not sure why the dose was switched

## 2022-06-14 NOTE — TELEPHONE ENCOUNTER
Patient's wife is calling today asking about the patient taking Gabapentin 300mg vs Gabapentin 600mg, which he states he used to take.   Please advise

## 2022-06-15 RX ORDER — NITROGLYCERIN 0.4 MG/1
TABLET SUBLINGUAL
Qty: 25 TABLET | Refills: 2 | Status: SHIPPED | OUTPATIENT
Start: 2022-06-15

## 2022-06-15 RX ORDER — GABAPENTIN 600 MG/1
600 TABLET ORAL 3 TIMES DAILY
Qty: 90 TABLET | Refills: 2 | Status: SHIPPED | OUTPATIENT
Start: 2022-06-15 | End: 2022-08-04 | Stop reason: SDUPTHER

## 2022-06-15 RX ORDER — NITROGLYCERIN 0.4 MG/1
TABLET SUBLINGUAL
Qty: 25 TABLET | Refills: 0 | OUTPATIENT
Start: 2022-06-15

## 2022-06-15 NOTE — TELEPHONE ENCOUNTER
Refill request for NITROGLYCERIN medication.      Name of Saloni Anderson [employer]      Last visit - 2/4/22     Pending visit - 9/21/22    Last refill -2/4/22      Medication Contract signed -   Last Oarrs ran-         Additional Comments

## 2022-06-24 ENCOUNTER — OFFICE VISIT (OUTPATIENT)
Dept: ORTHOPEDIC SURGERY | Age: 57
End: 2022-06-24
Payer: MEDICARE

## 2022-06-24 VITALS — BODY MASS INDEX: 30.76 KG/M2 | WEIGHT: 196 LBS | HEIGHT: 67 IN

## 2022-06-24 DIAGNOSIS — M54.50 ACUTE BILATERAL LOW BACK PAIN, UNSPECIFIED WHETHER SCIATICA PRESENT: Primary | ICD-10-CM

## 2022-06-24 PROCEDURE — 99204 OFFICE O/P NEW MOD 45 MIN: CPT | Performed by: PHYSICIAN ASSISTANT

## 2022-06-24 NOTE — PROGRESS NOTES
New Patient: LUMBAR SPINE    Referring Provider:  No ref. provider found    CHIEF COMPLAINT:    Chief Complaint   Patient presents with    New Patient     Low back pain        HISTORY OF PRESENT ILLNESS:       Mr. Chery Gaspar  is a pleasant 60 y.o. male, who has a recent history of CVA x3 MI x1 peripheral artery disease, here for consultation regarding his LBP . He states his pain began insidiously about 20+ years ago. His pain has steadily continued since then. He rates his back pain 10/10, bilateral buttock pain 4/10 with seldom radiation of pain into either lower extremity. He describes the pain as constant. Pain is worse with physical activity, standing, walking and improved some with sitting and lying. The leg pain radiates down the posterior aspect of his legs intermittently. He denies numbness and tingling in his right or left leg. He has a sense of weakness of his right and left leg and has a history of incontinence to bowel that has been evaluated and treated but denies any bladder dysfunction. .  The pain at times disrupts his sleep.    Pain Assessment  Location of Pain: Back  Location Modifiers: Medial,Lateral  Severity of Pain: 8  Quality of Pain: Aching,Throbbing,Sharp  Duration of Pain: Persistent  Frequency of Pain: Constant  Aggravating Factors: Walking,Standing,Stairs  Limiting Behavior: Yes  Relieving Factors: Rest  Result of Injury: No  Work-Related Injury: No  Are there other pain locations you wish to document?: No]  Current/Past Treatment:   · Physical Therapy: None  · Chiropractic: None  · Injection: None  · Medications: None    Past Medical History:   Past Medical History:   Diagnosis Date    Abdominal pain 7/20/2021    Abnormal stress test     Acute CVA [facility]cerebrovascular accident) ([facility] [address] 5/30/2020    Bowel incontinence     Chest pain 3/11/2021    History of cerebral infarction 5/24/2022    Hyperlipidemia     Hypertension     Incisional hernia without obstruction or gangrene 7/9/2021    Incisional hernia, without obstruction or gangrene     Ischemic foot 6/22/2020    PAD (peripheral artery disease) (HCC)     Poor vision     left eye    Pre-diabetes     Sleep apnea     NO CPAP    Stroke ([facility] [address] 2016    x3 - memory deficit    Vascular occlusion     Wears partial dentures     upper & lower        Past Surgical History:     Past Surgical History:   Procedure Laterality Date    ABDOMEN SURGERY      colon polyps    CT VISCERAL PERCUTANEOUS DRAIN  7/21/2021    CT VISCERAL PERCUTANEOUS DRAIN 7/21/2021 Kristie Cruz MD [facility] CT SCAN    FEMORAL BYPASS Right 3/7/2022    REVISION RIGHT LOWER EXTREMITY BYPASS WITH PERICARDIAL PATCH performed by Yee Reich MD at [facility] FEMORAL-TIBIAL BYPASS GRAFT Right 06/24/2020    RIGHT FEMORAL TO PERONEAL  BYPASS GRAFT performed by Yee Reich MD at [facility] 3 7/9/2021    ROBOTIC INCISIONAL HERNIA REPAIR WITH MESH, eTAPP BLOCK performed by Nancy Suoza MD at [address] [place] [address] Right     VASCULAR SURGERY         Current Medications:     Current Outpatient Medications:     gabapentin (NEURONTIN) 600 MG tablet, Take 1 tablet by mouth 3 times daily for 90 days. , Disp: 90 tablet, Rfl: 2    nitroGLYCERIN (NITROSTAT) 0.4 MG SL tablet, up to max of 3 total doses.  If no relief after 1 dose, call 911., Disp: 25 tablet, Rfl: 2    lisinopril (PRINIVIL;ZESTRIL) 20 MG tablet, TAKE ONE TABLET BY MOUTH DAILY, Disp: 90 tablet, Rfl: 1    atorvastatin (LIPITOR) 80 MG tablet, TAKE ONE TABLET BY MOUTH DAILY, Disp: 90 tablet, Rfl: 1    carvedilol (COREG) 6.25 MG tablet, Take 1 tablet by mouth 2 times daily (with meals), Disp: 180 tablet, Rfl: 0    clopidogrel (PLAVIX) 75 MG tablet, Take 1 tablet by mouth daily, Disp: 90 tablet, Rfl: 0    Evolocumab 140 MG/ML SOAJ, Inject 1 Dose into the skin every 14 days, Disp: 2 pen, Rfl: 2    Blood Pressure Monitoring (B-D ASSURE BPM/AUTO ARM CUFF) [facility], 1 Device by Does not apply route 2 times daily, Disp: 1 each, Rfl: 0    aspirin 81 MG chewable tablet, Take 1 tablet by mouth daily, Disp: 30 tablet, Rfl: 3    Allergies:  Patient has no known allergies. Social History:    reports that he has been smoking cigarettes. He has a 20.00 pack-year smoking history. He has never used smokeless tobacco. He reports previous alcohol use. He reports that he does not use drugs. Family History:   Family History   Problem Relation Age of Onset    Diabetes Mother        REVIEW OF SYSTEMS: Full ROS noted & scanned   CONSTITUTIONAL: Denies unexplained weight loss, fevers, chills or fatigue  NEUROLOGICAL: Denies unsteady gait or progressive weakness  MUSCULOSKELETAL: Denies joint swelling or redness  PSYCHOLOGICAL: Denies anxiety, depression   SKIN: Denies skin changes, delayed healing, rash, itching   HEMATOLOGIC: Denies easy bleeding or bruising  ENDOCRINE: Denies excessive thirst, urination, heat/cold  RESPIRATORY: Denies current dyspnea, cough  GI: Denies nausea, vomiting, diarrhea   : Denies bowel or bladder issues      PHYSICAL EXAM:    Vitals: Height 5' 7\" (1.702 m), weight 196 lb (88.9 kg). GENERAL EXAM:  · General Apparence: Patient is adequately groomed with no evidence of malnutrition. · Orientation: The patient is oriented to time, place and person. · Mood & Affect:The patient's mood and affect are appropriate. · Vascular: Examination reveals no swelling tenderness in upper or lower extremities. Good capillary refill. · Lymphatic: The lymphatic examination bilaterally reveals all areas to be without enlargement or induration  · Sensation: Sensation is intact without deficit  · Coordination/Balance: Good coordination. LUMBAR/SACRAL EXAMINATION:  · Inspection: Local inspection shows no step-off or bruising. Lumbar alignment is normal.  Sagittal and Coronal balance is neutral.      · Palpation:   No evidence of tenderness at the midline.   No tenderness bilaterally at the paraspinal or trochanters. There is no step-off or paraspinal spasm. · Range of Motion: Lumbar flexion, extension and rotation are moderately limited due to pain. · Strength:   Strength testing is 5/5 in all muscle groups tested. · Special Tests:   Straight leg raise and crossed SLR negative. Leg length and pelvis level. · Skin: There are no rashes, ulcerations or lesions. · Reflexes: Reflexes are symmetrically 2+ at the patellar and ankle tendons. Clonus absent bilaterally at the feet. · Gait & station: normal, patient ambulates without assistance    · Additional Examinations:   · RIGHT LOWER EXTREMITY: Inspection/examination of the right lower extremity does not show any tenderness, deformity or injury. Range of motion is unremarkable. There is no gross instability. There are no rashes, ulcerations or lesions. Strength and tone are normal.  · LEFT LOWER EXTREMITY:  Inspection/examination of the left lower extremity does not show any tenderness, deformity or injury. Range of motion is unremarkable. There is no gross instability. There are no rashes, ulcerations or lesions. Strength and tone are normal.    Diagnostic Testing:    X-rays: 3views of the lumbar spine to include AP and lateral , and spot lateral were obtained today in the office and independently reviewed with the patient by myself which shows well-maintained vertebral heights with sacralized L5. MRI of the lumbar spine that was obtained on 9/21/2021 was independently reviewed with the patient today which shows  Impression   1. 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   2. Severe left L5-S1 lateral recess narrowing secondary to a synovial cyst   that compresses the traversing left S1 nerve root. 3. Multilevel neural foraminal narrowing as detailed above and greatest   involving the right L5 neural foramen where it is moderate.    4. 3.1 cm fusiform infrarenal abdominal aortic aneurysm.     RECOMMENDATIONS:   3.1 cm infrarenal abdominal aortic aneurysm. Recommend follow-up every 3   years. Impression:   Severe left L5-S1 narrowing due to synovial cyst  Lumbar spondylosis  Degenerative disc disease  Facet hypertrophy    1. Acute bilateral low back pain, unspecified whether sciatica present        Plan:      · We discussed the diagnosis and treatment options including observation, additional oral steroids, physical therapy, epidural injections and additional imaging. He wishes to proceed with outpatient physical therapy for lumbar flexibility, and core strengthening exercises with modalities of choice. If he finds that he has had no significant improvement with these conservative treatments he will contact the office for for referral to Dr. Cuong Coronado for spinal injections. .    · Follow up -as needed    Old records were reviewed.     Steffany Diaz PA-C  Board certified by the [address] [facility]

## 2022-07-05 ENCOUNTER — TELEPHONE (OUTPATIENT)
Dept: PHARMACY | Facility: CLINIC | Age: 57
End: 2022-07-05

## 2022-07-05 NOTE — TELEPHONE ENCOUNTER
[facility] CLINICAL PHARMACY: ADHERENCE REVIEW  Identified care gap per [employer]: fills at [address]: ACE/ARB and Statin adherence    Last Visit: 2/4/22    Patient identified as LIS = 4, therefore member has a 15% insurance - in cases where the plan's co-pays are less, the lesser applies    Patient found in Outcomes MTM and is not currently eligible for CMR/TIP    ASSESSMENT  ACE/ARB ADHERENCE    Insurance Records claims through 6/12/22 (Prior Year [place] Diaz = n/a%; YTD [place] = 88%; Potential Fail Date: 9/8/22): Lisinopril 20 mg tablets last filled on 4/20/22 for 90 day supply. Next refill due: 7/19/22    Per [address]:   Lisinopril ready for  for 90 day supply. Billed through [address] $0    BP Readings from Last 3 Encounters:   03/08/22 109/68   03/07/22 130/64   03/04/22 (!) 130/90     Estimated Creatinine Clearance: 95 mL/min (based on SCr of 0.9 mg/dL). [address] Records claims through 6/12/22 (Prior Year Chi Diaz = n/a%; YTD [place] Diaz = 72%; Potential Fail Date: 7/17/22): Atorvastatin 80 mg tablets last filled on 3/21/22 for 90 day supply. Next refill due: 6/19/22    Per [address]:   Atorvastatin 80 mg tablets last filled on 7/1/22 for 90 day supply and ready for . 1 refills remaining.  Billed through [address] . $0    Lab Results   Component Value Date    CHOL 215 (H) 03/04/2022    TRIG 149 03/04/2022    HDL 56 03/04/2022    LDLCALC 129 (H) 03/04/2022     ALT   Date Value Ref Range Status   07/20/2021 20 10 - 40 U/L Final     AST   Date Value Ref Range Status   07/20/2021 22 15 - 37 U/L Final     The 10-year ASCVD risk score (Tyler Councilman., et al., 2013) is: 10.7%    Values used to calculate the score:      Age: 60 years      Sex: Male      Is Non- : No      Diabetic: No      Tobacco smoker: Yes      Systolic Blood Pressure: 184 mmHg      Is BP treated: Yes      HDL Cholesterol: 56 mg/dL      Total Cholesterol: 215 mg/dL     PLAN  Both prescriptions filled and

## 2022-07-12 ENCOUNTER — HOSPITAL ENCOUNTER (OUTPATIENT)
Dept: PHYSICAL THERAPY | Age: 57
Setting detail: THERAPIES SERIES
Discharge: HOME | End: 2022-07-12

## 2022-07-12 NOTE — FLOWSHEET NOTE
[facility],  [address]        Physical Therapy  Cancellation/No-show Note  Patient Name:  Terry Abernathy  :  1965   Date:  2022  Cancelled visits to date: 0  No-shows to date: 1-initial eval    For today's appointment patient:  []  Cancelled  []  Rescheduled appointment  [x]  No-show     Reason given by patient:  []  Patient ill  []  Conflicting appointment  []  No transportation    []  Conflict with work  [x]  No reason given  []  Other:     Comments:      Electronically signed by:  Treasure Cowden, PT , DPT

## 2022-07-16 DIAGNOSIS — I10 ESSENTIAL HYPERTENSION: ICD-10-CM

## 2022-07-18 RX ORDER — CLOPIDOGREL BISULFATE 75 MG/1
TABLET ORAL
Qty: 90 TABLET | Refills: 0 | Status: SHIPPED | OUTPATIENT
Start: 2022-07-18 | End: 2022-10-19

## 2022-07-18 NOTE — TELEPHONE ENCOUNTER
Refill request for  CLOPIDOGREL 75 MG TABLETmedication.      Name of [address] [place] [address]      Last visit - 2-4-2022     Pending visit - 9-    Last refill - 4-      Medication Contract signed -   Last Christiano Valverde ran-         Additional Comments

## 2022-08-03 NOTE — TELEPHONE ENCOUNTER
Refill request for medication. GABAPENTIN  LISINOPRIL    Name of Saloni Ortega      Last visit - 9-23-21     Pending visit - 9-21-22    Last refill -6-15-22      PDMP Monitoring:    Last PDMP Marylee Liter as Reviewed [facility]):  Review User Review Instant Review Result   Jodee Marino 7/19/2021  8:51 AM Reviewed PDMP [1]     [unfilled]  Urine Drug Screenings (1 yr)    No resulted procedures found.        Medication Contract and Consent for Opioid Use Documents Filed        No documents found                          Additional Comments

## 2022-08-04 RX ORDER — LISINOPRIL 20 MG/1
TABLET ORAL
Qty: 90 TABLET | Refills: 1 | Status: SHIPPED | OUTPATIENT
Start: 2022-08-04

## 2022-08-04 RX ORDER — GABAPENTIN 600 MG/1
600 TABLET ORAL 3 TIMES DAILY
Qty: 90 TABLET | Refills: 2 | Status: SHIPPED | OUTPATIENT
Start: 2022-08-04 | End: 2022-11-02

## 2022-09-21 ENCOUNTER — OFFICE VISIT (OUTPATIENT)
Dept: INTERNAL MEDICINE CLINIC | Age: 57
End: 2022-09-21
Payer: MEDICARE

## 2022-09-21 VITALS
OXYGEN SATURATION: 97 % | TEMPERATURE: 97.3 F | BODY MASS INDEX: 32.26 KG/M2 | HEART RATE: 81 BPM | SYSTOLIC BLOOD PRESSURE: 126 MMHG | RESPIRATION RATE: 14 BRPM | WEIGHT: 206 LBS | DIASTOLIC BLOOD PRESSURE: 78 MMHG

## 2022-09-21 DIAGNOSIS — I70.202 ATHEROSCLEROSIS OF ARTERY OF LEFT LOWER EXTREMITY (HCC): ICD-10-CM

## 2022-09-21 DIAGNOSIS — I25.110 CORONARY ARTERY DISEASE INVOLVING NATIVE CORONARY ARTERY OF NATIVE HEART WITH UNSTABLE ANGINA PECTORIS (HCC): ICD-10-CM

## 2022-09-21 DIAGNOSIS — I63.239: ICD-10-CM

## 2022-09-21 DIAGNOSIS — I10 ESSENTIAL HYPERTENSION: ICD-10-CM

## 2022-09-21 DIAGNOSIS — Z72.0 TOBACCO ABUSE: ICD-10-CM

## 2022-09-21 DIAGNOSIS — F33.0 MAJOR DEPRESSIVE DISORDER, RECURRENT, MILD (HCC): ICD-10-CM

## 2022-09-21 DIAGNOSIS — I73.9 PVD (PERIPHERAL VASCULAR DISEASE) (HCC): ICD-10-CM

## 2022-09-21 DIAGNOSIS — R10.30 LOWER ABDOMINAL PAIN: ICD-10-CM

## 2022-09-21 DIAGNOSIS — I25.118 ATHEROSCLEROTIC HEART DISEASE OF NATIVE CORONARY ARTERY WITH OTHER FORMS OF ANGINA PECTORIS (HCC): ICD-10-CM

## 2022-09-21 DIAGNOSIS — I65.23 BILATERAL CAROTID ARTERY STENOSIS: ICD-10-CM

## 2022-09-21 DIAGNOSIS — I65.29 STENOSIS OF CAROTID ARTERY, UNSPECIFIED LATERALITY: ICD-10-CM

## 2022-09-21 DIAGNOSIS — E78.5 DYSLIPIDEMIA: ICD-10-CM

## 2022-09-21 DIAGNOSIS — T82.858D BYPASS GRAFT STENOSIS, SUBSEQUENT ENCOUNTER: ICD-10-CM

## 2022-09-21 DIAGNOSIS — Z72.0 TOBACCO USE: ICD-10-CM

## 2022-09-21 DIAGNOSIS — Z23 NEED FOR INFLUENZA VACCINATION: Primary | ICD-10-CM

## 2022-09-21 PROCEDURE — 90674 CCIIV4 VAC NO PRSV 0.5 ML IM: CPT | Performed by: NURSE PRACTITIONER

## 2022-09-21 PROCEDURE — 3288F FALL RISK ASSESSMENT DOCD: CPT | Performed by: NURSE PRACTITIONER

## 2022-09-21 PROCEDURE — G0008 ADMIN INFLUENZA VIRUS VAC: HCPCS | Performed by: NURSE PRACTITIONER

## 2022-09-21 PROCEDURE — 99214 OFFICE O/P EST MOD 30 MIN: CPT | Performed by: NURSE PRACTITIONER

## 2022-09-21 RX ORDER — BUPROPION HYDROCHLORIDE 150 MG/1
TABLET, EXTENDED RELEASE ORAL
Qty: 60 TABLET | Refills: 2 | Status: SHIPPED | OUTPATIENT
Start: 2022-09-21

## 2022-09-21 RX ORDER — CLOPIDOGREL BISULFATE 75 MG/1
75 TABLET ORAL DAILY
COMMUNITY
Start: 2021-09-23 | End: 2022-09-21

## 2022-09-21 RX ORDER — CARVEDILOL 6.25 MG/1
6.25 TABLET ORAL 2 TIMES DAILY WITH MEALS
Qty: 180 TABLET | Refills: 0 | Status: SHIPPED | OUTPATIENT
Start: 2022-09-21

## 2022-09-21 RX ORDER — ATORVASTATIN CALCIUM 80 MG/1
TABLET, FILM COATED ORAL
Qty: 90 TABLET | Refills: 1 | Status: SHIPPED | OUTPATIENT
Start: 2022-09-21

## 2022-09-21 SDOH — ECONOMIC STABILITY: FOOD INSECURITY: WITHIN THE PAST 12 MONTHS, THE FOOD YOU BOUGHT JUST DIDN'T LAST AND YOU DIDN'T HAVE MONEY TO GET MORE.: NEVER TRUE

## 2022-09-21 SDOH — ECONOMIC STABILITY: FOOD INSECURITY: WITHIN THE PAST 12 MONTHS, YOU WORRIED THAT YOUR FOOD WOULD RUN OUT BEFORE YOU GOT MONEY TO BUY MORE.: NEVER TRUE

## 2022-09-21 ASSESSMENT — ANXIETY QUESTIONNAIRES
4. TROUBLE RELAXING: 0
7. FEELING AFRAID AS IF SOMETHING AWFUL MIGHT HAPPEN: 0
2. NOT BEING ABLE TO STOP OR CONTROL WORRYING: 0
5. BEING SO RESTLESS THAT IT IS HARD TO SIT STILL: 0
IF YOU CHECKED OFF ANY PROBLEMS ON THIS QUESTIONNAIRE, HOW DIFFICULT HAVE THESE PROBLEMS MADE IT FOR YOU TO DO YOUR WORK, TAKE CARE OF THINGS AT HOME, OR GET ALONG WITH OTHER PEOPLE: NOT DIFFICULT AT ALL
1. FEELING NERVOUS, ANXIOUS, OR ON EDGE: 0
3. WORRYING TOO MUCH ABOUT DIFFERENT THINGS: 0
6. BECOMING EASILY ANNOYED OR IRRITABLE: 0
GAD7 TOTAL SCORE: 0

## 2022-09-21 ASSESSMENT — PATIENT HEALTH QUESTIONNAIRE - PHQ9
2. FEELING DOWN, DEPRESSED OR HOPELESS: 0
3. TROUBLE FALLING OR STAYING ASLEEP: 0
9. THOUGHTS THAT YOU WOULD BE BETTER OFF DEAD, OR OF HURTING YOURSELF: 0
SUM OF ALL RESPONSES TO PHQ QUESTIONS 1-9: 0
5. POOR APPETITE OR OVEREATING: 0
4. FEELING TIRED OR HAVING LITTLE ENERGY: 0
6. FEELING BAD ABOUT YOURSELF - OR THAT YOU ARE A FAILURE OR HAVE LET YOURSELF OR YOUR FAMILY DOWN: 0
SUM OF ALL RESPONSES TO PHQ9 QUESTIONS 1 & 2: 0
7. TROUBLE CONCENTRATING ON THINGS, SUCH AS READING THE NEWSPAPER OR WATCHING TELEVISION: 0
SUM OF ALL RESPONSES TO PHQ QUESTIONS 1-9: 0
SUM OF ALL RESPONSES TO PHQ QUESTIONS 1-9: 0
8. MOVING OR SPEAKING SO SLOWLY THAT OTHER PEOPLE COULD HAVE NOTICED. OR THE OPPOSITE, BEING SO FIGETY OR RESTLESS THAT YOU HAVE BEEN MOVING AROUND A LOT MORE THAN USUAL: 0
1. LITTLE INTEREST OR PLEASURE IN DOING THINGS: 0
SUM OF ALL RESPONSES TO PHQ QUESTIONS 1-9: 0
10. IF YOU CHECKED OFF ANY PROBLEMS, HOW DIFFICULT HAVE THESE PROBLEMS MADE IT FOR YOU TO DO YOUR WORK, TAKE CARE OF THINGS AT HOME, OR GET ALONG WITH OTHER PEOPLE: 0

## 2022-09-21 ASSESSMENT — ENCOUNTER SYMPTOMS
VOMITING: 0
SINUS PAIN: 0
SORE THROAT: 0
CONSTIPATION: 0
COUGH: 0
SHORTNESS OF BREATH: 0
CHEST TIGHTNESS: 0
NAUSEA: 0
DIARRHEA: 0

## 2022-09-21 ASSESSMENT — SOCIAL DETERMINANTS OF HEALTH (SDOH): HOW HARD IS IT FOR YOU TO PAY FOR THE VERY BASICS LIKE FOOD, HOUSING, MEDICAL CARE, AND HEATING?: NOT HARD AT ALL

## 2022-09-21 NOTE — PROGRESS NOTES
[address]  [facility] Internal Medicine  [address]  Tel:[phone]    Percy Wagner is a 60 y.o. male who presents today for his medical conditions/complaints as noted below. Percy Wagner is c/o of Follow-up (6 month) and Immunizations (Flu )      Chief Complaint   Patient presents with    Follow-up     6 month    Immunizations     Flu        HPI:     Mr. Gricelda Goncalves presents for follow up of HTN. He states he has been doing well. No recent ER visits as he previously. He states he has been taking his medications regularly and does not recall missing a dose. States he feels normal currently. Hypertension:  Home blood pressure monitoring: No.  He is not adherent to a low sodium diet. Patient denies chest pain, shortness of breath, headache, lightheadedness, blurred vision, peripheral edema, palpitations, dry cough and fatigue. Antihypertensive medication side effects: no medication side effects noted. Use of agents associated with hypertension: none. HTN/HLD Treatment Adherence:   Medication compliance:  compliant all of the time  Diet compliance:  compliant most of the time  Weight trend: stable  Current exercise: no regular exercise  Barriers: none    Hypertension:  Home blood pressure monitoring: Yes - intermittently. Patient denies chest pain, shortness of breath, headache, lightheadedness, blurred vision, peripheral edema, palpitations, dry cough, and fatigue. Antihypertensive medication side effects: no medication side effects noted. Use of agents associated with hypertension: none.                                         Sodium (mmol/L)       Date                     Value                 03/08/2022               140              ---------- BUN (mg/dL)       Date                     Value                 03/08/2022               22 (H)           ---------- Glucose (mg/dL)       Date                     Value 03/08/2022               130 (H)          ----------  Potassium reflex Magnesium (mmol/L)       Date                     Value                 03/08/2022               4.7              ---------- Creatinine (mg/dL)       Date                     Value                 03/08/2022               0.9              ----------     Hyperlipidemia:  No new myalgias or GI upset on atorvastatin (Lipitor). Lab Results       Component                Value               Date                       CHOL                     215 (H)             03/04/2022                 TRIG                     149                 03/04/2022                 HDL                      56                  03/04/2022                 LDLCALC                  129 (H)             03/04/2022            Lab Results       Component                Value               Date                       ALT                      20                  07/20/2021                 AST                      22                  07/20/2021              Underwent a cath of his BLE by Dr. Alma Gupta for BLE PAD. States his symptoms have improved overall. He also mentions acute abdominal pain at his previous umbilical hernia repair site. Denies protrusion, however states he feels a near constant pinching/ache. Denies changes in BM.      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 AT [place] CT SCAN    FEMORAL BYPASS Right 3/7/2022    REVISION RIGHT LOWER EXTREMITY BYPASS WITH PERICARDIAL PATCH performed by Zia Amado MD at [address] Right 06/24/2020    RIGHT FEMORAL TO PERONEAL  BYPASS GRAFT performed by Zia Amado MD at [address] N/A 7/9/2021    ROBOTIC [address] WITH MESH, eTAPP BLOCK performed by Adair Long MD at [facility] Right     VASCULAR SURGERY         Family History   Problem Relation Age of Onset    Diabetes Mother        Social History     Tobacco Use    Smoking status: Every Day     Packs/day: 0.50     Years: 40.00     Pack years: 20.00     Types: Cigarettes    Smokeless tobacco: Never   Substance Use Topics    Alcohol use: Not Currently        Current Outpatient Medications   Medication Sig Dispense Refill    carvedilol (COREG) 6.25 MG tablet Take 1 tablet by mouth 2 times daily (with meals) 180 tablet 0    buPROPion (ZYBAN) 150 MG extended release tablet 150 mg once daily for 7 days; increase to 150 mg twice daily 60 tablet 2    atorvastatin (LIPITOR) 80 MG tablet TAKE ONE TABLET BY MOUTH DAILY 90 tablet 1    gabapentin (NEURONTIN) 600 MG tablet Take 1 tablet by mouth in the morning and 1 tablet at noon and 1 tablet before bedtime. Do all this for 90 days. 90 tablet 2    lisinopril (PRINIVIL;ZESTRIL) 20 MG tablet TAKE ONE TABLET BY MOUTH DAILY 90 tablet 1    clopidogrel (PLAVIX) 75 MG tablet TAKE ONE TABLET BY MOUTH DAILY 90 tablet 0    nitroGLYCERIN (NITROSTAT) 0.4 MG SL tablet up to max of 3 total doses.  If no relief after 1 dose, call 911. 25 tablet 2    Evolocumab 140 MG/ML SOAJ Inject 1 Dose into the skin every 14 days 2 pen 2    Blood Pressure Monitoring (B-D ASSURE BPM/AUTO ARM CUFF) MISC 1 Device by Does not apply route 2 times daily 1 each 0    aspirin 81 MG chewable tablet Take 1 tablet by mouth daily 30 tablet 3     No current facility-administered medications for this visit. No Known Allergies    Subjective:      Review of Systems   Constitutional:  Negative for fever. HENT:  Negative for sinus pain and sore throat. Respiratory:  Negative for cough, chest tightness and shortness of breath. Cardiovascular:  Negative for chest pain and palpitations. Gastrointestinal:  Negative for constipation, diarrhea, nausea and vomiting. Genitourinary:  Negative for dysuria and urgency. Skin:  Negative for rash. Neurological:  Negative for dizziness and weakness. Objective:     Vitals:    09/21/22 1347   BP: 126/78   Site: Right Upper Arm   Position: Sitting   Cuff Size: Medium Adult   Pulse: 81   Resp: 14   Temp: 97.3 °F (36.3 °C)   TempSrc: Temporal   SpO2: 97%   Weight: 206 lb (93.4 kg)       Physical Exam  Constitutional:       Appearance: Normal appearance. He is well-developed. HENT:      Head: Normocephalic. Right Ear: Hearing and external ear normal.      Left Ear: Hearing and external ear normal.      Nose: Nose normal.   Eyes:      General: Lids are normal. Lids are everted, no foreign bodies appreciated. Conjunctiva/sclera: Conjunctivae normal.      Pupils: Pupils are equal, round, and reactive to light. Neck:      Thyroid: No thyroid mass. Vascular: Normal carotid pulses. No carotid bruit or JVD. Cardiovascular:      Rate and Rhythm: Normal rate and regular rhythm. No extrasystoles are present. Chest Wall: PMI is not displaced. Pulses:           Radial pulses are 2+ on the right side and 2+ on the left side. Dorsalis pedis pulses are 2+ on the right side and 2+ on the left side. Heart sounds: Normal heart sounds, S1 normal and S2 normal. Heart sounds not distant. No murmur heard. No friction rub. No gallop. No S3 or S4 sounds. Pulmonary:      Effort: Pulmonary effort is normal. No respiratory distress. Breath sounds: Normal breath sounds. No decreased breath sounds, wheezing, rhonchi or rales. Abdominal:      General: Bowel sounds are normal. There is no distension. Palpations: Abdomen is soft. Tenderness: There is no abdominal tenderness. There is no rebound. Musculoskeletal:         General: Normal range of motion. Cervical back: Full passive range of motion without pain, normal range of motion and neck supple. Skin:     General: Skin is warm and dry. Neurological:      Cranial Nerves: No cranial nerve deficit. Psychiatric:         Speech: Speech normal.         Behavior: Behavior normal.         Thought Content: Thought content normal.         Judgment: Judgment normal.       Assessment & Plan: The following diagnoses and conditions are stable with no further orders unless indicated:    1. Need for influenza vaccination    2. Tobacco abuse    3. Atherosclerotic heart disease of native coronary artery with other forms of angina pectoris ([address]    4. Bilateral carotid artery stenosis    5. Essential hypertension    6. PVD (peripheral vascular disease) ([address]    7. Coronary artery disease involving native coronary artery of native heart with unstable angina pectoris ([address]    8. Dyslipidemia    9. Tobacco use    10. Major depressive disorder, recurrent, mild    11. Lower abdominal pain    12. Bypass graft stenosis, subsequent encounter        Ravinder Ely was seen today for follow-up and immunizations. Diagnoses and all orders for this visit:    Need for influenza vaccination  -     Influenza, FLUCELVAX, (age 10 mo+), IM, Preservative Free, 0.5 mL    Tobacco abuse  -     buPROPion (ZYBAN) 150 MG extended release tablet; 150 mg once daily for 7 days; increase to 150 mg twice daily      Highly encouraged smoking cessation to further decrease PAD/CAD risk.  Trial of zyban to aid in cessation. Atherosclerotic heart disease of native coronary artery with other forms of angina pectoris ([address]  Bilateral carotid artery stenosis  Essential hypertension  PVD (peripheral vascular disease) ([address]  Coronary artery disease involving native coronary artery of native heart with unstable angina pectoris (HCC)  -     carvedilol (COREG) 6.25 MG tablet; Take 1 tablet by mouth 2 times daily (with meals)    Continue current regimen. Continue care with cardiology/Yajaira. Encouraged decreasing sodium in the diet, weight loss, and gradual increases in exercise at least 20 minutes daily to further benefit BP    Encouraged decreasing fatty, cholesterol rich foods in the diet (I.e. Red meats, butter, whole fat milk). Dietary choices like olive oil instead of butter, baked foods instead of fried can help to further prevent future elevations. Foods high in omega fatty acids can help to further decrease lipids additionally. Emphasis placed on incorporating fish, lean proteins (chicken, turkey, pork), fresh fruits and vegetables. Dyslipidemia      Encouraged decreasing fatty, cholesterol rich foods in the diet (I.e. Red meats, butter, whole fat milk). Dietary choices like olive oil instead of butter, baked foods instead of fried can help to further prevent future elevations. Foods high in omega fatty acids can help to further decrease lipids additionally. Emphasis placed on incorporating fish, lean proteins (chicken, turkey, pork), fresh fruits and vegetables. Major depressive disorder, recurrent, mild    Stable. Continue to monitor. Lower abdominal pain  -     CT ABDOMEN WO CONTRAST Additional Contrast? None; Future    Evaluate pain with CT to ensure no recurrence of hernia. Consider gen surg follow up. Other orders  -     atorvastatin (LIPITOR) 80 MG tablet; TAKE ONE TABLET BY MOUTH DAILY    Bypass graft stenosis, subsequent encounter     Stable. Continue to monitor.  Continue statin/plavix    Return in about 6 months (around 3/21/2023) for 40 min. Patientshould call the office immediately with new or ongoing signs or symptoms or worsening, or proceed to the emergency room. If you are on medications which could impair your senses, you are at risk of weakness, falls,dizziness, or drowsiness. You should be careful during activities which could place you at risk of harm, such as climbing, using stairs, operating machinery, or driving vehicles. If you feel you cannot safely do theseactivities, you should request others to help you, or avoid the activities altogether. If you are drowsy for any other reason, you should use the same precautions as listed above. Call if pattern of symptoms change or persists for an extended time.       Lo Webb

## 2022-10-14 PROBLEM — I70.202 ATHEROSCLEROSIS OF ARTERY OF LEFT LOWER EXTREMITY (HCC): Status: ACTIVE | Noted: 2018-10-16

## 2022-10-19 DIAGNOSIS — I10 ESSENTIAL HYPERTENSION: ICD-10-CM

## 2022-10-19 RX ORDER — CLOPIDOGREL BISULFATE 75 MG/1
TABLET ORAL
Qty: 90 TABLET | Refills: 1 | Status: SHIPPED | OUTPATIENT
Start: 2022-10-19

## 2022-10-19 NOTE — TELEPHONE ENCOUNTER
Refill request for clopidegrel  medication.      Name [facility]      Last visit - 9-     Pending visit - 3-    Last refill -7-      Medication Contract signed -   Last Irma hicks-         Additional Comments

## 2022-11-04 ENCOUNTER — TELEPHONE (OUTPATIENT)
Dept: INTERNAL MEDICINE CLINIC | Age: 57
End: 2022-11-04

## 2022-11-04 NOTE — TELEPHONE ENCOUNTER
Patient's wife is calling today stating patient has had back issues which have been increasing. The wife states he has had a lighter stream when urinating as well. No pain on urination, no blood, no urgency.      Patient is scheduled on Monday, 11/7

## 2022-11-07 ENCOUNTER — OFFICE VISIT (OUTPATIENT)
Dept: INTERNAL MEDICINE CLINIC | Age: 57
End: 2022-11-07
Payer: MEDICARE

## 2022-11-07 ENCOUNTER — HOSPITAL ENCOUNTER (OUTPATIENT)
Age: 57
Discharge: HOME | End: 2022-11-07
Payer: MEDICARE

## 2022-11-07 VITALS
SYSTOLIC BLOOD PRESSURE: 124 MMHG | HEART RATE: 75 BPM | TEMPERATURE: 98.1 F | BODY MASS INDEX: 32.11 KG/M2 | WEIGHT: 205 LBS | DIASTOLIC BLOOD PRESSURE: 80 MMHG | OXYGEN SATURATION: 99 % | RESPIRATION RATE: 12 BRPM

## 2022-11-07 DIAGNOSIS — Z13.220 SCREENING FOR LIPID DISORDERS: ICD-10-CM

## 2022-11-07 DIAGNOSIS — G89.29 CHRONIC LEFT-SIDED LOW BACK PAIN WITHOUT SCIATICA: ICD-10-CM

## 2022-11-07 DIAGNOSIS — R39.11 URINARY HESITANCY: ICD-10-CM

## 2022-11-07 DIAGNOSIS — Z12.5 ENCOUNTER FOR SCREENING FOR MALIGNANT NEOPLASM OF PROSTATE: ICD-10-CM

## 2022-11-07 DIAGNOSIS — R73.03 PREDIABETES: ICD-10-CM

## 2022-11-07 DIAGNOSIS — Z13.1 SCREENING FOR DIABETES MELLITUS: ICD-10-CM

## 2022-11-07 DIAGNOSIS — M54.50 CHRONIC LEFT-SIDED LOW BACK PAIN WITHOUT SCIATICA: ICD-10-CM

## 2022-11-07 DIAGNOSIS — M54.9 BACK PAIN, UNSPECIFIED BACK LOCATION, UNSPECIFIED BACK PAIN LATERALITY, UNSPECIFIED CHRONICITY: Primary | ICD-10-CM

## 2022-11-07 LAB
BILIRUBIN, POC: NEGATIVE
BLOOD URINE, POC: NEGATIVE
CHOLESTEROL, TOTAL: 147 MG/DL (ref 0–199)
CLARITY, POC: NORMAL
COLOR, POC: YELLOW
GLUCOSE URINE, POC: NEGATIVE
HDLC SERPL-MCNC: 54 MG/DL (ref 40–60)
KETONES, POC: NEGATIVE
LDL CHOLESTEROL CALCULATED: 66 MG/DL
LEUKOCYTE EST, POC: NEGATIVE
NITRITE, POC: NEGATIVE
PH, POC: 7
PROSTATE SPECIFIC ANTIGEN: 2.14 NG/ML (ref 0–4)
PROTEIN, POC: NEGATIVE
SPECIFIC GRAVITY, POC: 1.01
TRIGL SERPL-MCNC: 135 MG/DL (ref 0–150)
UROBILINOGEN, POC: 0.2
VLDLC SERPL CALC-MCNC: 27 MG/DL

## 2022-11-07 PROCEDURE — 84153 ASSAY OF PSA TOTAL: CPT

## 2022-11-07 PROCEDURE — 3074F SYST BP LT 130 MM HG: CPT | Performed by: NURSE PRACTITIONER

## 2022-11-07 PROCEDURE — 80061 LIPID PANEL: CPT

## 2022-11-07 PROCEDURE — 3078F DIAST BP <80 MM HG: CPT | Performed by: NURSE PRACTITIONER

## 2022-11-07 PROCEDURE — 83036 HEMOGLOBIN GLYCOSYLATED A1C: CPT

## 2022-11-07 PROCEDURE — 81002 URINALYSIS NONAUTO W/O SCOPE: CPT | Performed by: NURSE PRACTITIONER

## 2022-11-07 PROCEDURE — 36415 COLL VENOUS BLD VENIPUNCTURE: CPT

## 2022-11-07 PROCEDURE — 99214 OFFICE O/P EST MOD 30 MIN: CPT | Performed by: NURSE PRACTITIONER

## 2022-11-07 RX ORDER — GABAPENTIN 800 MG/1
800 TABLET ORAL 3 TIMES DAILY
Qty: 90 TABLET | Refills: 0 | Status: SHIPPED | OUTPATIENT
Start: 2022-11-07 | End: 2022-12-07

## 2022-11-07 RX ORDER — PREDNISONE 10 MG/1
TABLET ORAL
Qty: 30 TABLET | Refills: 0 | Status: SHIPPED | OUTPATIENT
Start: 2022-11-07

## 2022-11-07 ASSESSMENT — ENCOUNTER SYMPTOMS
COUGH: 0
SHORTNESS OF BREATH: 0
SINUS PAIN: 0
BOWEL INCONTINENCE: 0
NAUSEA: 0
VOMITING: 0
SORE THROAT: 0
CONSTIPATION: 0
DIARRHEA: 0
CHEST TIGHTNESS: 0
BACK PAIN: 1

## 2022-11-07 NOTE — PROGRESS NOTES
[address] [facility]  [address]  Tel:[phone]    Karel Ridley is a 60 y.o. male who presents today for his medical conditions/complaints as noted below. Karel Ridley is c/o of Back Pain (Been going for about 3 months. Lower back pain.//Ran a urinalysis because he is having some lower back pain. )      Chief Complaint   Patient presents with    Back Pain     Been going for about 3 months. Lower back pain. Ran a urinalysis because he is having some lower back pain. HPI:     Back Pain  This is a chronic problem. The current episode started more than 1 month ago (3-4 months more acute, has been a problem for several years now altogether). The problem occurs constantly. The problem has been gradually worsening since onset. The pain is present in the lumbar spine. The quality of the pain is described as shooting and stabbing. The pain does not radiate. The pain is at a severity of 8/10. The pain is severe. The pain is The same all the time. The symptoms are aggravated by bending, position, twisting and coughing. Stiffness is present In the morning. Associated symptoms include dysuria. Pertinent negatives include no bladder incontinence, bowel incontinence, chest pain, fever, headaches, leg pain, numbness, paresis, paresthesias, pelvic pain or weakness. Risk factors include obesity, sedentary lifestyle and lack of exercise. He has tried NSAIDs and heat (Previous pain management, neurosurgery) for the symptoms. The treatment provided no relief. Dysuria   This is a new problem. The current episode started more than 1 month ago. The problem occurs every urination. The problem has been unchanged. The patient is experiencing no pain. There has been no fever. Associated symptoms include frequency and hesitancy. Pertinent negatives include no discharge, flank pain, hematuria, nausea, sweats, urgency or vomiting.  He has tried nothing for the symptoms. The treatment provided no relief. There is no history of kidney stones or recurrent UTIs.      Past Medical History:   Diagnosis Date    Abdominal pain 7/20/2021    Abnormal stress test     Acute CVA (cerebrovascular accident) ([address] 5/30/2020    Bowel incontinence     Cerebral infarction due to unspecified occlusion or stenosis of unspecified carotid artery ([address] 3/19/2015    Chest pain 3/11/2021    History of cerebral infarction 5/24/2022    Hyperlipidemia     Hypertension     Incisional hernia without obstruction or gangrene 7/9/2021    Incisional hernia, without obstruction or gangrene     Ischemic foot 6/22/2020    PAD (peripheral artery disease) ([facility])     Poor vision     left eye    Pre-diabetes     Sleep apnea     NO CPAP    Stroke ([address] 2016    x3 - memory deficit    Vascular occlusion     Wears partial dentures     upper & lower        Past Surgical History:   Procedure Laterality Date    ABDOMEN SURGERY      colon polyps    CT VISCERAL PERCUTANEOUS DRAIN  7/21/2021    CT VISCERAL PERCUTANEOUS DRAIN 7/21/2021 Bryant Correa MD [facility] CT SCAN    FEMORAL BYPASS Right 3/7/2022    REVISION RIGHT LOWER EXTREMITY BYPASS WITH PERICARDIAL PATCH performed by Chicho Kaye MD at [address] Right 06/24/2020    RIGHT FEMORAL TO PERONEAL  BYPASS GRAFT performed by Chicho Kaye MD at [address] N/A 7/9/2021    ROBOTIC [address] [place], eTAPP BLOCK performed by Sharon Miranda MD at [facility] Right     VASCULAR SURGERY         Family History   Problem Relation Age of Onset    Diabetes Mother        Social History     Tobacco Use    Smoking status: Every Day     Packs/day: 0.50     Years: 40.00     Pack years: 20.00     Types: Cigarettes    Smokeless tobacco: Never   Substance Use Topics    Alcohol use: Not Currently        Current Outpatient Medications   Medication Sig Dispense Refill    gabapentin (NEURONTIN) 800 MG tablet Take 1 tablet by mouth 3 times daily for 30 days. 90 tablet 0    predniSONE (DELTASONE) 10 MG tablet Take 40 mg for 3 days then 30 mg for 3 days then 20 mg for 3 days then 10 mg for 3 days 30 tablet 0    clopidogrel (PLAVIX) 75 MG tablet TAKE ONE TABLET BY MOUTH DAILY 90 tablet 1    carvedilol (COREG) 6.25 MG tablet Take 1 tablet by mouth 2 times daily (with meals) 180 tablet 0    buPROPion (ZYBAN) 150 MG extended release tablet 150 mg once daily for 7 days; increase to 150 mg twice daily 60 tablet 2    atorvastatin (LIPITOR) 80 MG tablet TAKE ONE TABLET BY MOUTH DAILY 90 tablet 1    lisinopril (PRINIVIL;ZESTRIL) 20 MG tablet TAKE ONE TABLET BY MOUTH DAILY 90 tablet 1    nitroGLYCERIN (NITROSTAT) 0.4 MG SL tablet up to max of 3 total doses. If no relief after 1 dose, call 911. 25 tablet 2    Evolocumab 140 MG/ML SOAJ Inject 1 Dose into the skin every 14 days 2 pen 2    Blood Pressure Monitoring (B-D ASSURE BPM/AUTO ARM CUFF) MISC 1 Device by Does not apply route 2 times daily 1 each 0    aspirin 81 MG chewable tablet Take 1 tablet by mouth daily 30 tablet 3     No current facility-administered medications for this visit. No Known Allergies    Subjective:      Review of Systems   Constitutional:  Negative for fever. HENT:  Negative for sinus pain and sore throat. Respiratory:  Negative for cough, chest tightness and shortness of breath. Cardiovascular:  Negative for chest pain and palpitations. Gastrointestinal:  Negative for bowel incontinence, constipation, diarrhea, nausea and vomiting. Genitourinary:  Positive for dysuria, frequency and hesitancy. Negative for bladder incontinence, flank pain, hematuria, pelvic pain and urgency. Musculoskeletal:  Positive for back pain and myalgias. Skin:  Negative for rash. Neurological:  Negative for dizziness, weakness, numbness, headaches and paresthesias.      Objective:     Vitals:    11/07/22 0849   BP: 124/80   Site: Right Upper Arm   Position: Sitting   Cuff Size: Medium Adult   Pulse: 75   Resp: 12   Temp: 98.1 °F (36.7 °C)   TempSrc: Temporal   SpO2: 99%   Weight: 205 lb (93 kg)       Physical Exam  Vitals and nursing note reviewed. Constitutional:       Appearance: Normal appearance. He is well-developed. HENT:      Head: Normocephalic and atraumatic. Right Ear: Hearing and external ear normal.      Left Ear: Hearing and external ear normal.      Nose: Nose normal.   Eyes:      General: Lids are normal.      Pupils: Pupils are equal, round, and reactive to light. Cardiovascular:      Rate and Rhythm: Normal rate. Pulses: Normal pulses. Pulmonary:      Effort: Pulmonary effort is normal. No accessory muscle usage or respiratory distress. Abdominal:      General: Bowel sounds are normal.      Palpations: Abdomen is soft. Musculoskeletal:      Cervical back: Normal range of motion. Lumbar back: Spasms and tenderness present. Normal range of motion. Back:    Skin:     General: Skin is warm and dry. Neurological:      Mental Status: He is alert. Psychiatric:         Speech: Speech normal.       Assessment & Plan: The following diagnoses and conditions are stable with no further orders unless indicated:    1. Back pain, unspecified back location, unspecified back pain laterality, unspecified chronicity    2. Chronic left-sided low back pain without sciatica    3. Urinary hesitancy    4. Screening for lipid disorders    5. Screening for diabetes mellitus    6. Encounter for screening for malignant neoplasm of prostate     7. Prediabetes         Wilma Olmstead was seen today for back pain. Diagnoses and all orders for this visit:    Back pain, unspecified back location, unspecified back pain laterality, unspecified chronicity  -     POCT Urinalysis no Micro  -     Culture, Urine    Chronic left-sided low back pain without sciatica  -     gabapentin (NEURONTIN) 800 MG tablet;  Take 1 tablet by mouth 3 times daily for 30 days. -     predniSONE (DELTASONE) 10 MG tablet; Take 40 mg for 3 days then 30 mg for 3 days then 20 mg for 3 days then 10 mg for 3 days    Will trial steroid for relief of acute inflammation. Consider follow up with previous neurosurg or pain management. Can increase gabapentin to 800 mg TID for neuropathic pain relief. Urinary hesitancy  -     PSA Screening; Future    Check PSA to evaluate prostate for enlargement. Encouraged avoiding excess hydration in the evening to prevent overnight frequency. Consider sitting to alleviate prostate pressure. Consider trial of flomax vs urology referral.     Screening for lipid disorders  -     LIPID PANEL; Future    Screening for diabetes mellitus  -     Hemoglobin A1C; Future    Encounter for screening for malignant neoplasm of prostate   -     PSA Screening; Future    Prediabetes   -     Hemoglobin A1C; Future      Return if symptoms worsen or fail to improve. Patientshould call the office immediately with new or ongoing signs or symptoms or worsening, or proceed to the emergency room. If you are on medications which could impair your senses, you are at risk of weakness, falls,dizziness, or drowsiness. You should be careful during activities which could place you at risk of harm, such as climbing, using stairs, operating machinery, or driving vehicles. If you feel you cannot safely do theseactivities, you should request others to help you, or avoid the activities altogether. If you are drowsy for any other reason, you should use the same precautions as listed above. Call if pattern of symptoms change or persists for an extended time.       Caffie Solo

## 2022-11-08 LAB
ESTIMATED AVERAGE GLUCOSE: 137 MG/DL
HBA1C MFR BLD: 6.4 %

## 2022-11-09 LAB — URINE CULTURE, ROUTINE: NORMAL

## 2022-12-20 ENCOUNTER — APPOINTMENT (OUTPATIENT)
Dept: CT IMAGING | Age: 57
End: 2022-12-20
Payer: MEDICARE

## 2022-12-20 ENCOUNTER — APPOINTMENT (OUTPATIENT)
Dept: GENERAL RADIOLOGY | Age: 57
End: 2022-12-20
Payer: MEDICARE

## 2022-12-20 ENCOUNTER — HOSPITAL ENCOUNTER (OUTPATIENT)
Age: 57
Setting detail: OBSERVATION
Discharge: HOME | End: 2022-12-22
Attending: EMERGENCY MEDICINE | Admitting: INTERNAL MEDICINE
Payer: MEDICARE

## 2022-12-20 DIAGNOSIS — R26.89 BALANCE PROBLEM: ICD-10-CM

## 2022-12-20 DIAGNOSIS — Z86.73 HISTORY OF MULTIPLE STROKES: ICD-10-CM

## 2022-12-20 DIAGNOSIS — R20.2 NUMBNESS AND TINGLING IN RIGHT HAND: ICD-10-CM

## 2022-12-20 DIAGNOSIS — R20.0 NUMBNESS AND TINGLING IN RIGHT HAND: ICD-10-CM

## 2022-12-20 DIAGNOSIS — Z72.0 TOBACCO ABUSE: ICD-10-CM

## 2022-12-20 DIAGNOSIS — R29.90 STROKE-LIKE EPISODE: Primary | ICD-10-CM

## 2022-12-20 PROBLEM — G45.9 TIA (TRANSIENT ISCHEMIC ATTACK): Status: ACTIVE | Noted: 2022-12-20

## 2022-12-20 LAB
A/G RATIO: 1.3 (ref 1.1–2.2)
ALBUMIN SERPL-MCNC: 4.3 G/DL (ref 3.4–5)
ALP BLD-CCNC: 159 U/L (ref 40–129)
ALT SERPL-CCNC: 15 U/L (ref 10–40)
ANION GAP SERPL CALCULATED.3IONS-SCNC: 12 MMOL/L (ref 3–16)
AST SERPL-CCNC: 17 U/L (ref 15–37)
BASOPHILS ABSOLUTE: 0.2 K/UL (ref 0–0.2)
BASOPHILS RELATIVE PERCENT: 2.2 %
BILIRUB SERPL-MCNC: 0.3 MG/DL (ref 0–1)
BILIRUBIN URINE: NEGATIVE
BLOOD, URINE: NEGATIVE
BUN BLDV-MCNC: 20 MG/DL (ref 7–20)
CALCIUM SERPL-MCNC: 9.6 MG/DL (ref 8.3–10.6)
CHLORIDE BLD-SCNC: 102 MMOL/L (ref 99–110)
CLARITY: CLEAR
CO2: 23 MMOL/L (ref 21–32)
COLOR: YELLOW
CREAT SERPL-MCNC: 0.9 MG/DL (ref 0.9–1.3)
EOSINOPHILS ABSOLUTE: 0.1 K/UL (ref 0–0.6)
EOSINOPHILS RELATIVE PERCENT: 1 %
GFR SERPL CREATININE-BSD FRML MDRD: >60 ML/MIN/{1.73_M2}
GLUCOSE BLD-MCNC: 128 MG/DL (ref 70–99)
GLUCOSE URINE: NEGATIVE MG/DL
HCT VFR BLD CALC: 40.3 % (ref 40.5–52.5)
HEMOGLOBIN: 13.5 G/DL (ref 13.5–17.5)
INFLUENZA A: NOT DETECTED
INFLUENZA B: NOT DETECTED
KETONES, URINE: NEGATIVE MG/DL
LEUKOCYTE ESTERASE, URINE: NEGATIVE
LYMPHOCYTES ABSOLUTE: 2.4 K/UL (ref 1–5.1)
LYMPHOCYTES RELATIVE PERCENT: 28.5 %
MCH RBC QN AUTO: 32.3 PG (ref 26–34)
MCHC RBC AUTO-ENTMCNC: 33.5 G/DL (ref 31–36)
MCV RBC AUTO: 96.2 FL (ref 80–100)
MICROSCOPIC EXAMINATION: NORMAL
MONOCYTES ABSOLUTE: 0.5 K/UL (ref 0–1.3)
MONOCYTES RELATIVE PERCENT: 5.8 %
NEUTROPHILS ABSOLUTE: 5.2 K/UL (ref 1.7–7.7)
NEUTROPHILS RELATIVE PERCENT: 62.5 %
NITRITE, URINE: NEGATIVE
PDW BLD-RTO: 14.8 % (ref 12.4–15.4)
PH UA: 6 (ref 5–8)
PLATELET # BLD: 313 K/UL (ref 135–450)
PMV BLD AUTO: 6.3 FL (ref 5–10.5)
POTASSIUM REFLEX MAGNESIUM: 4.3 MMOL/L (ref 3.5–5.1)
PROTEIN UA: NEGATIVE MG/DL
RBC # BLD: 4.19 M/UL (ref 4.2–5.9)
SARS-COV-2 RNA, RT PCR: NOT DETECTED
SODIUM BLD-SCNC: 137 MMOL/L (ref 136–145)
SPECIFIC GRAVITY UA: 1.02 (ref 1–1.03)
TOTAL PROTEIN: 7.5 G/DL (ref 6.4–8.2)
TROPONIN: <0.01 NG/ML
URINE REFLEX TO CULTURE: NORMAL
URINE TYPE: NORMAL
UROBILINOGEN, URINE: 0.2 E.U./DL
WBC # BLD: 8.3 K/UL (ref 4–11)

## 2022-12-20 PROCEDURE — 71046 X-RAY EXAM CHEST 2 VIEWS: CPT

## 2022-12-20 PROCEDURE — 85025 COMPLETE CBC W/AUTO DIFF WBC: CPT

## 2022-12-20 PROCEDURE — 70450 CT HEAD/BRAIN W/O DYE: CPT

## 2022-12-20 PROCEDURE — 81003 URINALYSIS AUTO W/O SCOPE: CPT

## 2022-12-20 PROCEDURE — 84484 ASSAY OF TROPONIN QUANT: CPT

## 2022-12-20 PROCEDURE — 70496 CT ANGIOGRAPHY HEAD: CPT

## 2022-12-20 PROCEDURE — 87636 SARSCOV2 & INF A&B AMP PRB: CPT

## 2022-12-20 PROCEDURE — 93005 ELECTROCARDIOGRAM TRACING: CPT | Performed by: NURSE PRACTITIONER

## 2022-12-20 PROCEDURE — G0378 HOSPITAL OBSERVATION PER HR: HCPCS

## 2022-12-20 PROCEDURE — 6360000004 HC RX CONTRAST MEDICATION: Performed by: NURSE PRACTITIONER

## 2022-12-20 PROCEDURE — 99285 EMERGENCY DEPT VISIT HI MDM: CPT

## 2022-12-20 PROCEDURE — 80053 COMPREHEN METABOLIC PANEL: CPT

## 2022-12-20 RX ADMIN — IOPAMIDOL 75 ML: 755 INJECTION, SOLUTION INTRAVENOUS at 22:17

## 2022-12-20 ASSESSMENT — ENCOUNTER SYMPTOMS
COUGH: 0
SHORTNESS OF BREATH: 0
ABDOMINAL PAIN: 0
VOMITING: 0
NAUSEA: 0
BACK PAIN: 0

## 2022-12-20 ASSESSMENT — LIFESTYLE VARIABLES
HOW MANY STANDARD DRINKS CONTAINING ALCOHOL DO YOU HAVE ON A TYPICAL DAY: PATIENT DOES NOT DRINK
HOW OFTEN DO YOU HAVE A DRINK CONTAINING ALCOHOL: NEVER

## 2022-12-20 ASSESSMENT — PAIN SCALES - GENERAL: PAINLEVEL_OUTOF10: 0

## 2022-12-20 ASSESSMENT — PAIN - FUNCTIONAL ASSESSMENT: PAIN_FUNCTIONAL_ASSESSMENT: 0-10

## 2022-12-21 ENCOUNTER — APPOINTMENT (OUTPATIENT)
Dept: MRI IMAGING | Age: 57
End: 2022-12-21
Payer: MEDICARE

## 2022-12-21 PROBLEM — R56.9 NEW ONSET SEIZURE (HCC): Status: ACTIVE | Noted: 2022-12-21

## 2022-12-21 LAB
ANION GAP SERPL CALCULATED.3IONS-SCNC: 9 MMOL/L (ref 3–16)
BUN BLDV-MCNC: 18 MG/DL (ref 7–20)
CALCIUM SERPL-MCNC: 9.6 MG/DL (ref 8.3–10.6)
CHLORIDE BLD-SCNC: 104 MMOL/L (ref 99–110)
CHOLESTEROL, TOTAL: 122 MG/DL (ref 0–199)
CO2: 25 MMOL/L (ref 21–32)
CREAT SERPL-MCNC: 0.9 MG/DL (ref 0.9–1.3)
EKG ATRIAL RATE: 70 BPM
EKG DIAGNOSIS: NORMAL
EKG P AXIS: 39 DEGREES
EKG P-R INTERVAL: 150 MS
EKG Q-T INTERVAL: 400 MS
EKG QRS DURATION: 102 MS
EKG QTC CALCULATION (BAZETT): 432 MS
EKG R AXIS: 54 DEGREES
EKG T AXIS: 43 DEGREES
EKG VENTRICULAR RATE: 70 BPM
ESTIMATED AVERAGE GLUCOSE: 142.7 MG/DL
GFR SERPL CREATININE-BSD FRML MDRD: >60 ML/MIN/{1.73_M2}
GLUCOSE BLD-MCNC: 128 MG/DL (ref 70–99)
HBA1C MFR BLD: 6.6 %
HCT VFR BLD CALC: 39.4 % (ref 40.5–52.5)
HDLC SERPL-MCNC: 39 MG/DL (ref 40–60)
HEMOGLOBIN: 13.1 G/DL (ref 13.5–17.5)
LDL CHOLESTEROL CALCULATED: 57 MG/DL
MCH RBC QN AUTO: 32.1 PG (ref 26–34)
MCHC RBC AUTO-ENTMCNC: 33.3 G/DL (ref 31–36)
MCV RBC AUTO: 96.6 FL (ref 80–100)
PDW BLD-RTO: 14.5 % (ref 12.4–15.4)
PLATELET # BLD: 276 K/UL (ref 135–450)
PMV BLD AUTO: 6.6 FL (ref 5–10.5)
POTASSIUM REFLEX MAGNESIUM: 4.4 MMOL/L (ref 3.5–5.1)
RBC # BLD: 4.07 M/UL (ref 4.2–5.9)
SODIUM BLD-SCNC: 138 MMOL/L (ref 136–145)
TRIGL SERPL-MCNC: 132 MG/DL (ref 0–150)
VLDLC SERPL CALC-MCNC: 26 MG/DL
WBC # BLD: 8 K/UL (ref 4–11)

## 2022-12-21 PROCEDURE — 97530 THERAPEUTIC ACTIVITIES: CPT

## 2022-12-21 PROCEDURE — 95816 EEG AWAKE AND DROWSY: CPT | Performed by: PSYCHIATRY & NEUROLOGY

## 2022-12-21 PROCEDURE — 80061 LIPID PANEL: CPT

## 2022-12-21 PROCEDURE — 36415 COLL VENOUS BLD VENIPUNCTURE: CPT

## 2022-12-21 PROCEDURE — G0378 HOSPITAL OBSERVATION PER HR: HCPCS

## 2022-12-21 PROCEDURE — 85027 COMPLETE CBC AUTOMATED: CPT

## 2022-12-21 PROCEDURE — 92523 SPEECH SOUND LANG COMPREHEN: CPT

## 2022-12-21 PROCEDURE — 96372 THER/PROPH/DIAG INJ SC/IM: CPT

## 2022-12-21 PROCEDURE — 92507 TX SP LANG VOICE COMM INDIV: CPT

## 2022-12-21 PROCEDURE — 80048 BASIC METABOLIC PNL TOTAL CA: CPT

## 2022-12-21 PROCEDURE — 97116 GAIT TRAINING THERAPY: CPT

## 2022-12-21 PROCEDURE — 92610 EVALUATE SWALLOWING FUNCTION: CPT

## 2022-12-21 PROCEDURE — 95816 EEG AWAKE AND DROWSY: CPT

## 2022-12-21 PROCEDURE — 97161 PT EVAL LOW COMPLEX 20 MIN: CPT

## 2022-12-21 PROCEDURE — 6360000002 HC RX W HCPCS: Performed by: NURSE PRACTITIONER

## 2022-12-21 PROCEDURE — 97165 OT EVAL LOW COMPLEX 30 MIN: CPT

## 2022-12-21 PROCEDURE — 92526 ORAL FUNCTION THERAPY: CPT

## 2022-12-21 PROCEDURE — 70551 MRI BRAIN STEM W/O DYE: CPT

## 2022-12-21 PROCEDURE — 6370000000 HC RX 637 (ALT 250 FOR IP): Performed by: NURSE PRACTITIONER

## 2022-12-21 PROCEDURE — 83036 HEMOGLOBIN GLYCOSYLATED A1C: CPT

## 2022-12-21 PROCEDURE — 99223 1ST HOSP IP/OBS HIGH 75: CPT | Performed by: PSYCHIATRY & NEUROLOGY

## 2022-12-21 PROCEDURE — 93010 ELECTROCARDIOGRAM REPORT: CPT | Performed by: INTERNAL MEDICINE

## 2022-12-21 RX ORDER — BUPROPION HYDROCHLORIDE 150 MG/1
150 TABLET ORAL 2 TIMES DAILY
Status: DISCONTINUED | OUTPATIENT
Start: 2022-12-21 | End: 2022-12-22 | Stop reason: HOSPADM

## 2022-12-21 RX ORDER — ATORVASTATIN CALCIUM 80 MG/1
80 TABLET, FILM COATED ORAL NIGHTLY
Status: DISCONTINUED | OUTPATIENT
Start: 2022-12-21 | End: 2022-12-22 | Stop reason: HOSPADM

## 2022-12-21 RX ORDER — GABAPENTIN 400 MG/1
800 CAPSULE ORAL 3 TIMES DAILY
Status: DISCONTINUED | OUTPATIENT
Start: 2022-12-21 | End: 2022-12-22 | Stop reason: HOSPADM

## 2022-12-21 RX ORDER — ONDANSETRON 4 MG/1
4 TABLET, ORALLY DISINTEGRATING ORAL EVERY 8 HOURS PRN
Status: DISCONTINUED | OUTPATIENT
Start: 2022-12-21 | End: 2022-12-22 | Stop reason: HOSPADM

## 2022-12-21 RX ORDER — ONDANSETRON 2 MG/ML
4 INJECTION INTRAMUSCULAR; INTRAVENOUS EVERY 6 HOURS PRN
Status: DISCONTINUED | OUTPATIENT
Start: 2022-12-21 | End: 2022-12-22 | Stop reason: HOSPADM

## 2022-12-21 RX ORDER — LISINOPRIL 20 MG/1
20 TABLET ORAL DAILY
Status: DISCONTINUED | OUTPATIENT
Start: 2022-12-21 | End: 2022-12-22 | Stop reason: HOSPADM

## 2022-12-21 RX ORDER — ASPIRIN 81 MG/1
81 TABLET, CHEWABLE ORAL DAILY
Status: DISCONTINUED | OUTPATIENT
Start: 2022-12-21 | End: 2022-12-21 | Stop reason: SDUPTHER

## 2022-12-21 RX ORDER — GABAPENTIN 400 MG/1
800 CAPSULE ORAL 3 TIMES DAILY
Status: DISCONTINUED | OUTPATIENT
Start: 2022-12-21 | End: 2022-12-21

## 2022-12-21 RX ORDER — ATORVASTATIN CALCIUM 80 MG/1
1 TABLET, FILM COATED ORAL DAILY
Status: DISCONTINUED | OUTPATIENT
Start: 2022-12-21 | End: 2022-12-21 | Stop reason: SDUPTHER

## 2022-12-21 RX ORDER — M-VIT,TX,IRON,MINS/CALC/FOLIC 27MG-0.4MG
1 TABLET ORAL DAILY
Status: ON HOLD | COMMUNITY
End: 2022-12-22 | Stop reason: HOSPADM

## 2022-12-21 RX ORDER — ASPIRIN 300 MG/1
300 SUPPOSITORY RECTAL DAILY
Status: DISCONTINUED | OUTPATIENT
Start: 2022-12-21 | End: 2022-12-22 | Stop reason: HOSPADM

## 2022-12-21 RX ORDER — POLYETHYLENE GLYCOL 3350 17 G/17G
17 POWDER, FOR SOLUTION ORAL DAILY PRN
Status: DISCONTINUED | OUTPATIENT
Start: 2022-12-21 | End: 2022-12-22 | Stop reason: HOSPADM

## 2022-12-21 RX ORDER — CARVEDILOL 6.25 MG/1
6.25 TABLET ORAL 2 TIMES DAILY WITH MEALS
Status: DISCONTINUED | OUTPATIENT
Start: 2022-12-21 | End: 2022-12-22 | Stop reason: HOSPADM

## 2022-12-21 RX ORDER — ENOXAPARIN SODIUM 100 MG/ML
40 INJECTION SUBCUTANEOUS DAILY
Status: DISCONTINUED | OUTPATIENT
Start: 2022-12-21 | End: 2022-12-22 | Stop reason: HOSPADM

## 2022-12-21 RX ORDER — ASPIRIN 81 MG/1
81 TABLET ORAL DAILY
Status: DISCONTINUED | OUTPATIENT
Start: 2022-12-21 | End: 2022-12-22 | Stop reason: HOSPADM

## 2022-12-21 RX ORDER — CLOPIDOGREL BISULFATE 75 MG/1
75 TABLET ORAL DAILY
Status: DISCONTINUED | OUTPATIENT
Start: 2022-12-21 | End: 2022-12-22 | Stop reason: HOSPADM

## 2022-12-21 RX ADMIN — ASPIRIN 81 MG: 81 TABLET, COATED ORAL at 09:40

## 2022-12-21 RX ADMIN — CARVEDILOL 6.25 MG: 6.25 TABLET, FILM COATED ORAL at 17:14

## 2022-12-21 RX ADMIN — BUPROPION HYDROCHLORIDE 150 MG: 150 TABLET, EXTENDED RELEASE ORAL at 09:40

## 2022-12-21 RX ADMIN — CARVEDILOL 6.25 MG: 6.25 TABLET, FILM COATED ORAL at 09:45

## 2022-12-21 RX ADMIN — GABAPENTIN 800 MG: 400 CAPSULE ORAL at 09:40

## 2022-12-21 RX ADMIN — BUPROPION HYDROCHLORIDE 150 MG: 150 TABLET, EXTENDED RELEASE ORAL at 00:56

## 2022-12-21 RX ADMIN — GABAPENTIN 800 MG: 400 CAPSULE ORAL at 00:56

## 2022-12-21 RX ADMIN — GABAPENTIN 800 MG: 400 CAPSULE ORAL at 20:42

## 2022-12-21 RX ADMIN — CLOPIDOGREL BISULFATE 75 MG: 75 TABLET ORAL at 09:40

## 2022-12-21 RX ADMIN — ENOXAPARIN SODIUM 40 MG: 100 INJECTION SUBCUTANEOUS at 09:40

## 2022-12-21 RX ADMIN — LISINOPRIL 20 MG: 20 TABLET ORAL at 09:40

## 2022-12-21 RX ADMIN — BUPROPION HYDROCHLORIDE 150 MG: 150 TABLET, EXTENDED RELEASE ORAL at 20:42

## 2022-12-21 RX ADMIN — GABAPENTIN 800 MG: 400 CAPSULE ORAL at 15:22

## 2022-12-21 RX ADMIN — ATORVASTATIN CALCIUM 80 MG: 80 TABLET, FILM COATED ORAL at 20:42

## 2022-12-21 ASSESSMENT — PAIN SCALES - GENERAL
PAINLEVEL_OUTOF10: 0

## 2022-12-21 NOTE — PROGRESS NOTES
Hospitalist Progress Note      PCP: Feng Reynaga, APRN - CNP    Date of Admission: 12/20/2022    Chief Complaint:   Right sided weakness    Hospital Course:      59 y.o. male, with past medical history of obesity, hypertension, CAD, hyperlipidemia, tobacco use who presented to [facility] with right arm and leg weakness and numbness. History obtained from the patient and review of EMR. The patient stated has been experiencing right arm weakness and numbness for the last 3 to 4 weeks. However, he stated over the last 3 days his symptoms have been getting more progressive and worse. The patient does have chronic left-sided deficits due to a previous CVA. Per EMR, the patient's daughter was at the bedside and stated that the patient started complaining of right leg numbness and weakness 2 days ago. She reported that the patient's balance has been off slightly and she is concerned he may be having another stroke. In the emergency room a CT head was obtained that revealed no acute intracranial abnormality. The patient's UA was negative for infection. CTA head and neck was also obtained in the emergency room that revealed revealed no large vessel occlusion of the intracranial arteries. Chronic occlusion of the left internal carotid artery with retrograde reconstitution of this supraclinoid segment supplying the left ophthalmic artery. Age indeterminate occlusion of the proximal left common carotid artery. No patent right common internal carotid artery stent without significant stenosis. New age-indeterminate occlusion of the right vertebral artery origin with collateral reconstitution of the V3 segment. Severe stenosis of the left vertebral artery origin with collateral reconstitution of the cervical segments. Stable moderate stenosis of the proximal left subclavian artery. 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   The patient was admitted for further evaluation and treatment. An MRI of the brain without contrast and echocardiogram have been ordered for the a.m The patient denied any other associated symptoms as well as any aggravating and/or alleviating factors. At the time of this assessment, the patient was resting comfortably in bed. He currently denies any chest pain, back pain, abdominal pain, shortness of breath, numbness, tingling, N/V/C/D, fever and/or chills. Subjective:     Patient states he is left-handed. He states he had previous weakness and has right arm from previous CVA. Patient with history of left internal carotid artery occlusion. Patient states he has noticed weakness on the right arm that has been worsening over the past week or so. Patient was concerned for possible recurrent stroke and presented to the ED. Patient states he has been compliant with medications. Medications:  Reviewed    Infusion Medications   Scheduled Medications    buPROPion  150 mg Oral BID    carvedilol  6.25 mg Oral BID WC    clopidogrel  75 mg Oral Daily    lisinopril  20 mg Oral Daily    enoxaparin  40 mg SubCUTAneous Daily    aspirin  81 mg Oral Daily    Or    aspirin  300 mg Rectal Daily    atorvastatin  80 mg Oral Nightly    gabapentin  800 mg Oral TID     PRN Meds: ondansetron **OR** ondansetron, polyethylene glycol, perflutren lipid microspheres      Intake/Output Summary (Last 24 hours) at 12/21/2022 1342  Last data filed at 12/21/2022 1035  Gross per 24 hour   Intake 840 ml   Output 0 ml   Net 840 ml       Physical Exam Performed:    /76   Pulse 74   Temp 98.2 °F (36.8 °C) (Oral)   Resp 16   Ht 5' 7\" (1.702 m)   Wt 205 lb 14.6 oz (93.4 kg)   SpO2 99%   BMI 32.25 kg/m²     General appearance: No apparent distress, appears stated age and cooperative. HEENT: Pupils equal, round, and reactive to light. Conjunctivae/corneas clear. Neck: Supple, with full range of motion. No jugular venous distention. Trachea midline.   Respiratory:  Normal respiratory effort. Clear to auscultation, bilaterally without Rales/Wheezes/Rhonchi. Cardiovascular: Regular rate and rhythm with normal S1/S2 without murmurs, rubs or gallops. Abdomen: Soft, non-tender, non-distended with normal bowel sounds. Musculoskeletal: No clubbing, cyanosis or edema bilaterally. Full range of motion without deformity. Skin: Skin color, texture, turgor normal.  No rashes or lesions. Neurologic: Patient with slight facial asymmetry on the right side of his face. Patient does have slightly reduced  strength in his right hand. Some slowing noted with cerebellar testing on the right side. Psychiatric: Alert and oriented, thought content appropriate, normal insight  Capillary Refill: Brisk, 3 seconds, normal   Peripheral Pulses: +2 palpable, equal bilaterally       Labs:   Recent Labs     12/20/22 1823 12/21/22  0725   WBC 8.3 8.0   HGB 13.5 13.1*   HCT 40.3* 39.4*    276     Recent Labs     12/20/22 1823 12/21/22  0725    138   K 4.3 4.4    104   CO2 23 25   BUN 20 18   CREATININE 0.9 0.9   CALCIUM 9.6 9.6     Recent Labs     12/20/22 1823   AST 17   ALT 15   BILITOT 0.3   ALKPHOS 159*     No results for input(s): INR in the last 72 hours. Recent Labs     12/20/22 1823   TROPONINI <0.01       Urinalysis:      Lab Results   Component Value Date/Time    NITRU Negative 12/20/2022 07:57 PM    WBCUA 251 06/06/2016 06:00 PM    BACTERIA 3+ 06/06/2016 06:00 PM    RBCUA 243 06/06/2016 06:00 PM    BLOODU Negative 12/20/2022 07:57 PM    SPECGRAV 1.025 12/20/2022 07:57 PM    GLUCOSEU Negative 12/20/2022 07:57 PM       Radiology:  MRI brain without contrast   Final Result   1. No acute intracranial abnormality. No acute infarct. 2. Large chronic infarct involving the left MCA territory. 3. Loss of the normal signal void within the left internal carotid artery   compatible with chronic occlusion/slow flow.    4. Smaller chronic infarcts within the right parietal lobe and right cerebellum. 5. Mild global parenchymal volume loss with chronic microvascular ischemic   changes. CTA HEAD NECK W CONTRAST   Final Result   1. No large vessel occlusion of the intracranial arteries. 2. Chronic occlusion of the left internal carotid artery with retrograde   reconstitution of the supraclinoid segment supplying the left ophthalmic   artery. 3. New, age-indeterminate occlusion of the proximal left common carotid   artery. 4. New patent right common-internal carotid artery stent without significant   stenosis. 5. New, age-indeterminate occlusion of the right vertebral artery origin with   collateral reconstitution of the V3 segment. 6. Severe stenosis of the left vertebral artery origin with collateral   reconstitution of the cervical segments. 7. Stable moderate stenosis of the proximal left subclavian artery. 8. 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 CT HEAD WO CONTRAST   Final Result   Large old left MCA infarct which is unchanged. Small old right posterior occipital infarct which is more apparent. Mild atrophy and mild chronic microischemic disease scattered in the deep   white matter which is unchanged with no acute intracranial abnormality. XR CHEST (2 VW)   Final Result   No acute abnormality             IP CONSULT TO NEUROLOGY  IP CONSULT TO PHARMACY    Assessment/Plan:    Active Hospital Problems    Diagnosis     TIA (transient ischemic attack) [G45.9]      Priority: Medium    Tobacco use [Z72.0]     Coronary artery disease involving native coronary artery of native heart with unstable angina pectoris ([facility] [I25.110]     Bilateral carotid artery stenosis [I65.23]     Primary hypertension [I10]      1. TIA versus CVA. CT scan and CTA did not demonstrate any new abnormalities. Patient with a left old MCA infarct. Patient also with old right posterior septal infarct. MRI ordered. Echo ordered.   Neurology consult requested. 2.  Hypertension with CAD. We will continue Coreg and lisinopril. Patient will continue Plavix and aspirin. 3.  Hyperlipidemia. Patient will continue on his home dose of atorvastatin. 4.  Obesity -  With Body mass index is 32.25 kg/m². Complicating assessment and treatment. Placing patient at risk for multiple co-morbidities as well as early death and contributing to the patient's presentation. Counseled on weight loss. DVT Prophylaxis: Lovenox, aspirin and Plavix  Diet: ADULT DIET; Dysphagia - Soft and Bite Sized;  No Drinking Straws  Code Status: Full Code  PT/OT Eval Status: Ordered    Dispo -Home with outpatient PT when stable    Appropriate for A1 Discharge Unit: Yes      Adelina Carson MD

## 2022-12-21 NOTE — ED PROVIDER NOTES
Emergency Department Provider Note  Location: [address] [place] [address]  ED  12/20/2022     Patient Identification  Alan Fernandez is a 59 y.o. male    Chief Complaint  Extremity Weakness (Right side weakness x1 month worsening over past 2 days. Hx of stroke with left side weakness deficit. ) and Numbness (Reports numbness in right arm beginning today that started approx 1600.)      HPI    (History provided by patient)    Patient is a 59 y.o. 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 Patient had initially said that his numbness started around 4:00, approximately 3 hours prior to arrival, but the daughter states that it is started more over the past 2 days as well. Unclear last known well with the numbness. They state that his balance has been slightly off as well, concerning them that he is having another stroke. He has not had any headaches, no falls. No chest pain or shortness of breath. No other concerns today in the emergency department including nausea, vomiting, diarrhea. Denies any dizziness. Per daughter, patient does not see a neurologist at this time, sees Dr. Beverly Aguilar for her heart and vascular. When asked patient the severity of his numbness in his weakness, especially in his right side, he says \"I almost fell, but it is not bad. \"  On any oral anticoagulations.     I have reviewed the following nursing documentation:  Allergies: No Known Allergies    Past medical history:  has a past medical history of Abdominal pain (7/20/2021), Abnormal stress test, Acute CVA (cerebrovascular accident) ([address] (5/30/2020), Bowel incontinence, Cerebral infarction due to unspecified occlusion or stenosis of unspecified carotid artery ([address] (3/19/2015), Chest pain (3/11/2021), History of cerebral infarction (5/24/2022), Hyperlipidemia, Hypertension, Incisional hernia without obstruction or gangrene (7/9/2021), Incisional hernia, without obstruction or gangrene, 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 Past surgical history:  has a past surgical history that includes Leg Surgery (Right); vascular surgery; Abdomen surgery; Femoral-tibial Bypass Graft (Right, 06/24/2020); Incisional hernia repair; hernia repair (N/A, 7/9/2021); CT DRAINAGE VISCERAL PERCUTANEOUS (7/21/2021); and femoral bypass (Right, 3/7/2022). Home medications:   Prior to Admission medications    Medication Sig Start Date End Date Taking? Authorizing Provider   gabapentin (NEURONTIN) 800 MG tablet Take 1 tablet by mouth 3 times daily for 30 days. 11/7/22 12/7/22  MAT Coelho CNP   predniSONE (DELTASONE) 10 MG tablet Take 40 mg for 3 days then 30 mg for 3 days then 20 mg for 3 days then 10 mg for 3 days 11/7/22   MAT Coelho CNP   clopidogrel (PLAVIX) 75 MG tablet TAKE ONE TABLET BY MOUTH DAILY 10/19/22   MAT Coelho CNP   carvedilol (COREG) 6.25 MG tablet Take 1 tablet by mouth 2 times daily (with meals) 9/21/22   MAT Coelho CNP   buPROPion (ZYBAN) 150 MG extended release tablet 150 mg once daily for 7 days; increase to 150 mg twice daily 9/21/22   MAT Coelho CNP   atorvastatin (LIPITOR) 80 MG tablet TAKE ONE TABLET BY MOUTH DAILY 9/21/22   MAT Coelho CNP   lisinopril (PRINIVIL;ZESTRIL) 20 MG tablet TAKE ONE TABLET BY MOUTH DAILY 8/4/22   MAT Coelho CNP   nitroGLYCERIN (NITROSTAT) 0.4 MG SL tablet up to max of 3 total doses.  If no relief after 1 dose, call 911. 6/15/22   MAT Coelho - CNP   Evolocumab 140 MG/ML SOAJ Inject 1 Dose into the skin every 14 days 3/7/22   Brooke White, APRN - CNP   Blood Pressure Monitoring (B-D ASSURE BPM/AUTO ARM CUFF) MISC 1 Device by Does not apply route 2 times daily 9/23/21   Brain Overcast, APRN - CNP   aspirin 81 MG chewable tablet Take 1 tablet by mouth daily 3/14/21   Cristhian Arthur, APRN - CNP       Social history:  reports that he has been smoking cigarettes. He has a 20.00 pack-year smoking history. He has never used smokeless tobacco. He reports that he does not currently use alcohol. He reports that he does not use drugs. Family history:    Family History   Problem Relation Age of Onset    Diabetes Mother          ROS  Review of Systems   Constitutional:  Negative for activity change, appetite change and fever. HENT:  Negative for congestion and postnasal drip. Respiratory:  Negative for cough and shortness of breath. Cardiovascular:  Negative for chest pain. Gastrointestinal:  Negative for abdominal pain, nausea and vomiting. Musculoskeletal:  Negative for back pain and neck pain. Skin:  Negative for rash and wound. Neurological:  Positive for weakness and numbness. Negative for dizziness, light-headedness and headaches. Exam  Vitals:    12/20/22 1811 12/20/22 1913   BP: 128/78 120/78   Pulse: 70 65   Resp: 18 19   Temp: 97.3 °F (36.3 °C)    TempSrc: Oral    SpO2: 100% 97%   Height: 5' 7\" (1.702 m)          Physical Exam  Constitutional:       Appearance: Normal appearance. He is normal weight. He is not ill-appearing or diaphoretic. HENT:      Head: Normocephalic and atraumatic. Right Ear: External ear normal.      Left Ear: External ear normal.      Nose: Nose normal.      Mouth/Throat:      Mouth: Mucous membranes are moist.      Pharynx: Oropharynx is clear. Eyes:      General:         Right eye: No discharge. Left eye: No discharge. Conjunctiva/sclera: Conjunctivae normal.   Cardiovascular:      Rate and Rhythm: Normal rate and regular rhythm.    Pulmonary:      Effort: Pulmonary effort is normal. No respiratory distress. Breath sounds: Normal breath sounds. Abdominal:      General: Abdomen is flat. Palpations: Abdomen is soft. Musculoskeletal:         General: No swelling or tenderness. Cervical back: Normal range of motion and neck supple. Skin:     General: Skin is warm and dry. Findings: No rash. Neurological:      General: No focal deficit present. Mental Status: He is alert and oriented to person, place, and time. Cranial Nerves: No cranial nerve deficit. Sensory: Sensory deficit present. Motor: Weakness present. Coordination: Coordination normal.   Psychiatric:         Mood and Affect: Mood normal.         Thought Content:  Thought content normal.     Phoebe Santiago Galeas's NIH Stroke Scale at 9:22 PM is:  Level of Consciousness:  0 - alert; keenly responsive    LOC Questions:  0 - answers both questions correctly    LOC Commands:  0 - performs both tasks correctly    Best Gaze:  0 - normal    Visual Fields:  0 - no visual loss    Facial Palsy:  0 - normal symmetric movement    Motor-Arm-Left:  0 - no drift, limb holds 90 (or 45) degrees for full 10 seconds    Motor-Leg-Left:  1 - drift; leg falls by the end of the 5 second period but does not hit bed    Motor-Arm-Right:  1 - drift, limb holds 90 (or 45) degrees but drifts down before full 10 seconds: does not hit bed    Motor-Leg-Right:  1 - drift; leg falls by the end of the 5 second period but does not hit bed    Limb Ataxia:  0 - absent    Sensory:  1 - mild to moderate sensory loss; patient feels pinprick is less sharp or is dull on the affected side; there is a loss of superficial pain with pinprick but patient is aware of being touched     Best Language:  0 - no aphasia, normal    Dysarthria:  0 - normal    Extinction and Inattention:  0 - no abnormality    ED Course    ED Medication Orders (From admission, onward)      None              Radiology  XR CHEST (2 VW)    Result Date: 12/20/2022  EXAMINATION: TWO XRAY VIEWS OF THE CHEST 12/20/2022 6:29 pm COMPARISON: 03/11/2021 HISTORY: ORDERING SYSTEM PROVIDED HISTORY: generalized weakness cardiac eval TECHNOLOGIST PROVIDED HISTORY: Reason for exam:->generalized weakness cardiac eval Reason for Exam: generalized weakness, cardiac eval FINDINGS: The heart and pulmonary vascularity are within normal limits. There are no focal areas of consolidation or pleural effusion. The osseous structures are intact. No acute abnormality     CT HEAD WO CONTRAST    Result Date: 12/20/2022  EXAMINATION: CT OF THE HEAD WITHOUT CONTRAST  12/20/2022 7:56 pm TECHNIQUE: CT of the head was performed without the administration of intravenous contrast. Automated exposure control, iterative reconstruction, and/or weight based adjustment of the mA/kV was utilized to reduce the radiation dose to as low as reasonably achievable. COMPARISON: 05/30/2020 HISTORY: ORDERING SYSTEM PROVIDED HISTORY: increased weakness r/o lesions, large stroke, newthis week TECHNOLOGIST PROVIDED HISTORY: Reason for exam:->increased weakness r/o lesions, large stroke, newthis week Has a \"code stroke\" or \"stroke alert\" been called? ->No Decision Support Exception - unselect if not a suspected or confirmed emergency medical condition->Emergency Medical Condition (MA) Reason for Exam: right sided weakness for several days Relevant Medical/Surgical History: hx of prior CVA FINDINGS: BRAIN/VENTRICLES: The ventricles are mildly enlarged and there is diffuse mild prominence of the cortical sulci which is unchanged. There is moderate low density and atrophy along the left parietal lobe extending into the deep white matter and into the parietooccipital and temporal region on left which is grossly unchanged. Intracranial hemorrhage or edema is seen. There is no extra-axial fluid collection or mass. There is mild periventricular low density bilaterally which is unchanged.   There is mild focal atrophy and low density along the right parietooccipital region extending inferiorly which is more apparent. ORBITS: The visualized portion of the orbits demonstrate no acute abnormality. SINUSES: The visualized paranasal sinuses and mastoid air cells demonstrate no acute abnormality. SOFT TISSUES/SKULL:  There is a left-sided craniotomy flap which is unchanged in position. Large old left MCA infarct which is unchanged. Small old right posterior occipital infarct which is more apparent. Mild atrophy and mild chronic microischemic disease scattered in the deep white matter which is unchanged with no acute intracranial abnormality.       Labs  Results for orders placed or performed during the hospital encounter of 12/20/22   COVID-19 & Influenza Combo    Specimen: Nasopharyngeal Swab   Result Value Ref Range    SARS-CoV-2 RNA, RT PCR NOT DETECTED NOT DETECTED    INFLUENZA A NOT DETECTED NOT DETECTED    INFLUENZA B NOT DETECTED NOT DETECTED   CBC with Auto Differential   Result Value Ref Range    WBC 8.3 4.0 - 11.0 K/uL    RBC 4.19 (L) 4.20 - 5.90 M/uL    Hemoglobin 13.5 13.5 - 17.5 g/dL    Hematocrit 40.3 (L) 40.5 - 52.5 %    MCV 96.2 80.0 - 100.0 fL    MCH 32.3 26.0 - 34.0 pg    MCHC 33.5 31.0 - 36.0 g/dL    RDW 14.8 12.4 - 15.4 %    Platelets 353 851 - 234 K/uL    MPV 6.3 5.0 - 10.5 fL    Neutrophils % 62.5 %    Lymphocytes % 28.5 %    Monocytes % 5.8 %    Eosinophils % 1.0 %    Basophils % 2.2 %    Neutrophils Absolute 5.2 1.7 - 7.7 K/uL    Lymphocytes Absolute 2.4 1.0 - 5.1 K/uL    Monocytes Absolute 0.5 0.0 - 1.3 K/uL    Eosinophils Absolute 0.1 0.0 - 0.6 K/uL    Basophils Absolute 0.2 0.0 - 0.2 K/uL   CMP w/ Reflex to MG   Result Value Ref Range    Sodium 137 136 - 145 mmol/L    Potassium reflex Magnesium 4.3 3.5 - 5.1 mmol/L    Chloride 102 99 - 110 mmol/L    CO2 23 21 - 32 mmol/L    Anion Gap 12 3 - 16    Glucose 128 (H) 70 - 99 mg/dL    BUN 20 7 - 20 mg/dL    Creatinine 0.9 0.9 - 1.3 mg/dL    Est, Glom Filt Rate >60 >60    Calcium 9.6 8.3 - 10.6 mg/dL    Total Protein 7.5 6.4 - 8.2 g/dL    Albumin 4.3 3.4 - 5.0 g/dL    Albumin/Globulin Ratio 1.3 1.1 - 2.2    Total Bilirubin 0.3 0.0 - 1.0 mg/dL    Alkaline Phosphatase 159 (H) 40 - 129 U/L    ALT 15 10 - 40 U/L    AST 17 15 - 37 U/L   Urinalysis with Reflex to Culture    Specimen: Urine   Result Value Ref Range    Color, UA Yellow Straw/Yellow    Clarity, UA Clear Clear    Glucose, Ur Negative Negative mg/dL    Bilirubin Urine Negative Negative    Ketones, Urine Negative Negative mg/dL    Specific Gravity, UA 1.025 1.005 - 1.030    Blood, Urine Negative Negative    pH, UA 6.0 5.0 - 8.0    Protein, UA Negative Negative mg/dL    Urobilinogen, Urine 0.2 <2.0 E.U./dL    Nitrite, Urine Negative Negative    Leukocyte Esterase, Urine Negative Negative    Microscopic Examination Not Indicated     Urine Type NotGiven     Urine Reflex to Culture Not Indicated    Troponin   Result Value Ref Range    Troponin <0.01 <0.01 ng/mL       Procedures  Procedures      MDM  Patient seen and evaluated. Relevant records reviewed. - Patient is a 59 y.o. male with history of old previous infarct, left-sided deficits, who presented emergency department today with right hand subjective numbness, and what I describe is very mild weakness in his right upper extremity. Does have some drift on right lower extremity as well. Apparently he almost fell yesterday. Arrives emergency department today vital signs stable, answering questions appropriately. Unclear last known well; did not call a stroke alert as patient symptoms have been going on for are on and off for the past 2 days, if not a week. Is on Plavix, no thinners. Laboratory evaluation today reveals reassuring CBC, CMP, troponin, COVID-19, influenza, and urinalysis.     CT head with large old left MCA infarct which is unchanged, however the small old right posterior septal infarct is more prominent, which is concerning for possible posterior symptoms like the balance issues that patient has been having, but does not necessarily explain his right-sided lateralizing symptoms. Again, no vision problems. Not a TNKase candidate as his symptoms have been going on for multiple days, not an LVO candidate, no CTA obtained. However, given his change in symptomatology, no close follow-up with neurology, and some balance issues, would like to admit for neurological evaluation, likely MRI if appropriate. As this patient requires further evaluation and management as above, this patient will be admitted to the hospital for subsequent care. I spoke with [facility] who accepts patient for admission. They are available to place admission orders. Medications - No data to display    - I discussed the results, including any incidental findings, with patient. Questions answered. Patient/family agreeable to plan and express understanding of plan. Disposition:  Admit to telemetry in fair condition. Is this patient to be included in the SEP-1 Core Measure due to severe sepsis or septic shock? No   Exclusion criteria - the patient is NOT to be included for SEP-1 Core Measure due to: Infection is not suspected    Clinical Impression:  1. Stroke-like episode    2. Numbness and tingling in right hand    3. Balance problem    4. History of multiple strokes        Blood pressure 120/78, pulse 65, temperature 97.3 °F (36.3 °C), temperature source Oral, resp. rate 19, height 5' 7\" (1.702 m), SpO2 97 %. Carlos Gil DO, am the primary attending of record and contributed the majority of evaluation and treatment of emergent care for this encounter. This chart was generated in part by using Dragon Dictation system and may contain errors related to that system including errors in grammar, punctuation, and spelling, as well as words and phrases that may be inappropriate. If there are any questions or concerns please feel free to contact the dictating provider for clarification. BLAIR RICKS, [facility], DO  12/20/22 5090

## 2022-12-21 NOTE — PLAN OF CARE
Problem: Discharge Planning  Goal: Discharge to home or other facility with appropriate resources  Outcome: Progressing  Flowsheets (Taken 12/21/2022 6650)  Discharge to home or other facility with appropriate resources: Identify barriers to discharge with patient and caregiver     Problem: Pain  Goal: Verbalizes/displays adequate comfort level or baseline comfort level  Outcome: Progressing  Flowsheets (Taken 12/21/2022 9927)  Verbalizes/displays adequate comfort level or baseline comfort level: Assess pain using appropriate pain scale

## 2022-12-21 NOTE — CONSULTS
Pharmacist to clarify  home medication list per Dr. Alysia Senior:     - Home med list was reviewed with pt.'s wife over the phone and up dated accordingly .    Romi Pascal/Tracy. 12/21/22 11:53 AM EST

## 2022-12-21 NOTE — PROGRESS NOTES
Occupational Therapy  Facility/Department: [place] [address] - MED SURG  Occupational Therapy Initial Assessment/Treatment/Discharge Summary  1x only    Name: Pieter Ackerman  : 1965  MRN: [id number]  Date of Service: 2022    Discharge Recommendations:  Outpatient OT          Patient Diagnosis(es): The primary encounter diagnosis was Stroke-like episode. Diagnoses of Numbness and tingling in right hand, Balance problem, and History of multiple strokes were also pertinent to this visit. Past Medical History:  has a past medical history of Abdominal pain, Abnormal stress test, Acute CVA (cerebrovascular accident) ([address] Bowel incontinence, Cerebral infarction due to unspecified occlusion or stenosis of unspecified carotid artery ([address] Chest pain, History of cerebral infarction, Hyperlipidemia, Hypertension, Incisional hernia without obstruction or gangrene, Incisional hernia, without obstruction or gangrene, Ischemic foot, PAD (peripheral artery disease) ([facility] Poor vision, Pre-diabetes, Sleep apnea, Stroke [facility]), Vascular occlusion, and Wears partial dentures. Past Surgical History:  has a past surgical history that includes Leg Surgery (Right); vascular surgery; Abdomen surgery; Femoral-tibial Bypass Graft (Right, 2020); Incisional hernia repair; hernia repair (N/A, 2021); CT DRAINAGE VISCERAL PERCUTANEOUS (2021); and femoral bypass (Right, 3/7/2022). Assessment   Performance deficits / Impairments: Decreased strength;Decreased fine motor control;Decreased coordination  Patient admitted from home (lives with spouse) with R side weakness, pt with h/o L MCA infarct. Today independent with ADLs/transfers and mobility, however did not decreased coordination in R hand with digit opposition and tying pants. Pt would benefit from outpatient OT for R hand strengthening and coordination.    Prognosis: Good  No Skilled OT: No OT goals identified  REQUIRES OT FOLLOW-UP: No        Plan Occupational Therapy Plan  Times Per Week: 1x only     Restrictions  Restrictions/Precautions  Restrictions/Precautions: Up as Tolerated, Fall Risk    Subjective   General  Chart Reviewed: Yes, Orders, Progress Notes, Imaging  Patient assessed for rehabilitation services?: Yes  Family / Caregiver Present: No  Referring Practitioner: MAT Best CNP 12/21/22  Diagnosis: TIA vs CVA     Social/Functional History  Social/Functional History  Lives With: Spouse  Type of Home: Apartment  Home Layout: One level  Home Access: Level entry  Bathroom Shower/Tub: Tub/Shower unit  Bathroom Toilet: Standard  Bathroom Equipment: Shower chair  Home Equipment: Walker, rolling  Has the patient had two or more falls in the past year or any fall with injury in the past year?: No  ADL Assistance: Independent  Homemaking Responsibilities: Yes  Meal Prep Responsibility: Secondary  Laundry Responsibility: Secondary  Cleaning Responsibility: Secondary  Shopping Responsibility: Secondary  Ambulation Assistance: Independent  Transfer Assistance: Independent  Active : Yes  Occupation: Retired  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, delivered meat       Objective   Heart Rate: 74  [address] [address]: Monitor  BP: 128/76  BP Location: Left upper arm  BP Method: Automatic  Patient Position: Sitting  MAP (Calculated): 93  Resp: 16  SpO2: 99 %  O2 Device: None (Room air)    Pain: denies      Bed Mobility Training  Bed Mobility Training: No    Balance  Sitting: Intact  Standing: Intact  Transfer Training  Transfer Training: Yes  Overall Level of Assistance: Independent  Sit to Stand: Independent  Stand to Sit: Independent  Toilet Transfer: Independent    Gait  Overall Level of Assistance: Independent     AROM: Within functional limits  PROM: Within functional limits  Strength:  Within functional limits (equal except for R elbow ext.)  Coordination: Generally decreased, functional (in R hand)  Tone: Normal  Sensation: Intact    ADL  Feeding: Independent  Grooming: Independent  UE Dressing: Independent  LE Dressing: Independent  Toileting: Independent      Vision  Vision: Impaired  Vision Exceptions: Wears glasses at all times  Hearing  Hearing: Exceptions to [facility]  Hearing Exceptions: Hard of hearing/hearing concerns (L ear worse than R)  Cognition  Overall Cognitive Status: Exceptions  Arousal/Alertness: Appropriate responses to stimuli  Following Commands: Follows multistep commands with increased time; Follows multistep commands with repitition  Attention Span: Attends with cues to redirect  Memory: Decreased short term memory  Safety Judgement: Decreased awareness of need for safety  Insights: Decreased awareness of deficits  Initiation: Does not require cues  Sequencing: Requires cues for some  Orientation  Overall Orientation Status: Within Functional Limits         Hand Dominance  Hand Dominance: Left     Unable to tie pants effectively and decreased completion of  digit opposition     AM-PAC Score        AM-[facility] Inpatient Daily Activity Raw Score: 24 (12/21/22 1129)  AM-PAC Inpatient ADL T-Scale Score : 57.54 (12/21/22 1129)  ADL Inpatient CMS 0-100% Score: 0 (12/21/22 1129)  ADL Inpatient CMS G-Code Modifier : 509 [address] (12/21/22 1129)      Goals  Short Term Goals  Time Frame for Short Term Goals: 1 session  Short Term Goal 1: Pt will complete LE dressing with supervision-stg MET 12/21  Patient Goals   Patient goals : \"to go home\"       Therapy Time   Individual Concurrent Group Co-treatment   Time In 0830         Time Out 0855         Minutes 25         Timed Code Treatment Minutes: 51 [facility]       Viridiana Beck, OTR/L

## 2022-12-21 NOTE — CONSULTS
In patient Neurology consult        [facility]      Wesley Bustos MD      Talon Gray  1965    Date of Service: 12/21/2022    Referring Physician: Ayo Carcamo MD      Reason for the consult and CC: Acute on chronic right-sided weakness    HPI:   The patient is a 59 y.o.  male, with a PMH of multiple vascular surgeries including bilateral CEA, PAD, CAD, HTN, HLD, remote CVA, who presented to [facility] with acute on chronic right-sided weakness. The patient has a history of a large left MCA infarct with some residual right-sided weakness. He states over the past few days, his right arm has felt more weak than usual and has been painful. He denies fever, chills, headache, dizziness, vision changes, dysarthria, dysphagia, tinnitus. He tells me his right facial droop is chronic.           Family History   Problem Relation Age of Onset    Diabetes Mother      Past Surgical History:   Procedure Laterality Date    ABDOMEN SURGERY      colon polyps    CT VISCERAL PERCUTANEOUS DRAIN  7/21/2021    CT VISCERAL PERCUTANEOUS DRAIN 7/21/2021 Susan Brady MD [facility] CT SCAN    FEMORAL BYPASS Right 3/7/2022    REVISION RIGHT LOWER EXTREMITY BYPASS WITH PERICARDIAL PATCH performed by Xuan Vargas MD at [address] [facility] GRAFT Right 06/24/2020    RIGHT FEMORAL TO PERONEAL  BYPASS GRAFT performed by Xuan Vargas MD at [address] N/A 7/9/2021    ROBOTIC [address] [place], eTAPP BLOCK performed by Sonido Barraagn MD at [address] [facility] Right     VASCULAR SURGERY          Past Medical History:   Diagnosis Date    Abdominal pain 7/20/2021    Abnormal stress test     Acute CVA (cerebrovascular accident) ([address] 5/30/2020    Bowel incontinence     Cerebral infarction due to unspecified occlusion or stenosis of unspecified carotid artery ([address] 3/19/2015    Chest pain 3/11/2021    History of cerebral infarction 5/24/2022 Hyperlipidemia     Hypertension     Incisional hernia without obstruction or gangrene 7/9/2021    Incisional hernia, without obstruction or gangrene     Ischemic foot 6/22/2020    PAD (peripheral artery disease) (HCC)     Poor vision     left eye    Pre-diabetes     Sleep apnea     NO CPAP    Stroke [facility] 2016    x3 - memory deficit    Vascular occlusion     Wears partial dentures     upper & lower     Social History     Tobacco Use    Smoking status: Every Day     Packs/day: 0.50     Years: 40.00     Pack years: 20.00     Types: Cigarettes    Smokeless tobacco: Never   Vaping Use    Vaping Use: Never used   Substance Use Topics    Alcohol use: Not Currently    Drug use: No     No Known Allergies  Current Facility-Administered Medications   Medication Dose Route Frequency Provider Last Rate Last Admin    buPROPion (WELLBUTRIN XL) extended release tablet 150 mg  150 mg Oral BID Jackie Panchal, APRN - CNP   150 mg at 12/21/22 0056    carvedilol (COREG) tablet 6.25 mg  6.25 mg Oral BID WC Jackie Panchal, APRN - CNP        clopidogrel (PLAVIX) tablet 75 mg  75 mg Oral Daily Jackie Panchal, APRN - CNP        lisinopril (PRINIVIL;ZESTRIL) tablet 20 mg  20 mg Oral Daily Jackie Panchal, APRN - CNP        ondansetron (ZOFRAN-ODT) disintegrating tablet 4 mg  4 mg Oral Q8H PRN Jackie Panchal, APRN - CNP        Or    ondansetron (ZOFRAN) injection 4 mg  4 mg IntraVENous Q6H PRN Jackie Panchal, APRN - CNP        polyethylene glycol (GLYCOLAX) packet 17 g  17 g Oral Daily PRN Jackie Panchal, APRN - CNP        enoxaparin (LOVENOX) injection 40 mg  40 mg SubCUTAneous Daily Myrtle Dandy Knabb, APRN - CNP        aspirin EC tablet 81 mg  81 mg Oral Daily Jackie Panchal, APRN - CNP        Or    aspirin suppository 300 mg  300 mg Rectal Daily Jackie Panchal, APRN - CNP        perflutren lipid microspheres (DEFINITY) injection 1.5 mL  1.5 mL IntraVENous ONCE PRN Jackie Panchal, APRN - CNP        atorvastatin (LIPITOR) tablet 80 mg  80 mg Oral Nightly Myrtle Dandy Knabb, APRN - CNP        gabapentin (NEURONTIN) capsule 800 mg  800 mg Oral TID Garrett Freedman APRN - CNP   800 mg at 12/21/22 0056       ROS : A 10-14 system review of constitutional, cardiovascular, respiratory, eyes, musculoskeletal, endocrine, GI, ENT, skin, hematological, genitourinary, psychiatric and neurologic systems was obtained and updated today and is unremarkable except as mentioned in my HPI      Exam:     Constitutional:   Vitals:    12/21/22 0002 12/21/22 0018 12/21/22 0442 12/21/22 0745   BP: 128/74  (!) 103/54 112/78   Pulse: 62  61 72   Resp: 20  18 16   Temp: 97.9 °F (36.6 °C)  98.1 °F (36.7 °C) 97.3 °F (36.3 °C)   TempSrc: Oral  Oral Oral   SpO2: 96%  93% 94%   Weight:  205 lb 14.6 oz (93.4 kg)     Height:           General appearance and observation: Normal development and appear in no acute distress. Eye:  Fundus: No blurring of optic disc. Neck: supple  Cardiovascular: No lower leg edema with good pulsation. Mental Status:   Oriented to person, place, problem, and time. Memory: Good immediate recall. Intact remote memory  Normal attention span and concentration. Language: chronic expressive aphasia   Good fund of Knowledge. Aware of current events and vocabulary   Cranial Nerves:   II: Visual fields: Full. Pupils: equal, round, reactive to light  III,IV,VI: Extra Ocular Movements are intact. No nystagmus  V: Facial sensation is intact  VII: Facial strength and movements: right facial droop. VIII: Hearing: Intact  IX: Palate elevation is symmetric  XI: Shoulder shrug is intact  XII: Tongue movements are normal  Musculoskeletal: RUE 4/5, worse distally. Tone: Normal tone. Reflexes: Symmetric 2+ in the arms and 2+ in the legs   Planters: flexor bilaterally. Coordination: no pronator drift, no dysmetria with FNF in upper extremities. Slow REM right hand  Sensation: normal to all modalities in both arms and legs. Gait/Posture: steady gait and normal posturing and station. Data:  LABS:   Lab Results   Component Value Date/Time     12/21/2022 07:25 AM    K 4.4 12/21/2022 07:25 AM     12/21/2022 07:25 AM    CO2 25 12/21/2022 07:25 AM    BUN 18 12/21/2022 07:25 AM    CREATININE 0.9 12/21/2022 07:25 AM    GFRAA >60 03/08/2022 05:05 AM    GFRAA >60 07/11/2012 09:20 AM    LABGLOM >60 12/21/2022 07:25 AM    GLUCOSE 128 12/21/2022 07:25 AM    CALCIUM 9.6 12/21/2022 07:25 AM     Lab Results   Component Value Date/Time    WBC 8.0 12/21/2022 07:25 AM    RBC 4.07 12/21/2022 07:25 AM    HGB 13.1 12/21/2022 07:25 AM    HCT 39.4 12/21/2022 07:25 AM    MCV 96.6 12/21/2022 07:25 AM    RDW 14.5 12/21/2022 07:25 AM     12/21/2022 07:25 AM     Lab Results   Component Value Date    INR 1.35 (H) 10/26/2021    PROTIME 15.5 (H) 10/26/2021       Neuroimaging was independently reviewed by myself and discussed results with the patient and/or family  Reviewed notes from different physicians  Reviewed lab and blood testing    Impression:     Acute right-sided weakness - concern for thromboembolic CVA as the patient is a vasculopath. CT head without acute findings. Intracranial stenosis   Occluded left common carotid  HTN  HLD    Recommendation:     MRI of brain and ECHO   Monitor on tele. Continue ASA, Plavix, statin. Continue home BP meds. F/u A1c, lipid panel. PT/OT/SLP  We will follow. Thank you for referring such patient. If you have any questions regarding my consult note, please don't hesitate to call me. Izzy Ha CNP  Attending Supervising [de-identified] Attestation Statement      The patient was seen 12/21/2022 in conjunction with the nurse practitioner with independent history, evaluation and examination. I agree with the note which has been adjusted to reflect my findings, with the addition of the following: The patient is 59 y.o.  male  who was admitted for recurrent right-sided weakness.   History of remote left hemispheric CVA, hypertension, PAD and other medical issues. Onset was all prior to admissions. Description recurrent spells of right arm and leg weakness mainly flaccidity in his right arm for few minutes. Degree can be severe. No other relieving or aggravating factors. No clear triggers. No dysphagia or dysarthria or headaches. He came to the ED where he was admitted. Further work-up with neuroimaging showed remote left MCA stroke otherwise no new strokes. Today he denies any other new symptoms. Known  chronic left ICA occlusion. On examination:  No acute distress  Awake and alert x3. Nonfluent aphasia. Appears appropriate with intact recent and remote memory. Pupil reactive and symmetric, extraocular motor intact, no ophthalmoplegia, face is symmetric and tongue is midline  Chronic right-sided weakness, the same 4/5. Normal tone  No sensory disturbance or abnormal movement  Hyperreflexic on the right compared to left    Impression:  Recurrent right-sided weakness. Possible differential could include TIA, reactivation of his old stroke or focal seizure. Left occluded CCA. Maximize medical therapy and avoid low blood pressure  Remote left MCA stroke  Hypertension  Hyperlipidemia      Recommendation:  Agree with above note  Aspirin and Plavix  Statin  EEG  PT and OT  Speech  Blood pressure monitor and blood pressure control  LDL and A1c  Telemetry  DVT and GI prophylaxis  Continue current blood pressure medications  Stroke education provided        Electronically signed by Shyla Morrow MD on 12/21/22 at 4:07 PM EST       This dictation was generated by voice recognition computer software.  Although all attempts are made to edit the dictation for accuracy, there may be errors in the  transcription that are not intended

## 2022-12-21 NOTE — PROGRESS NOTES
Speech Language Pathology  Clinical Bedside Swallow Assessment  Facility/Department: [facility] B3 - MED SURG        Recommendations:  Diet recommendation: IDDSI 6 Soft and Bite Sized Solids; IDDSI 0 Thin Liquids (No Straws); Meds whole with thin liquids  Instrumentation: Not recommended at this time  Risk management: upright for all intake, stay upright for at least 30 mins after intake, small bites/sips, no straws, alternate bites/sips, slow rate of intake, and general aspiration precautions      Claudette Real  : 1965 (59 y.o.)   MRN: [id number]  ROOM: [address] [place] [address]  ADMISSION DATE: 2022  PATIENT DIAGNOSIS(ES): TIA (transient ischemic attack) [G45.9]  Balance problem [R26.89]  Numbness and tingling in right hand [R20.0, R20.2]  Stroke-like episode [R29.90]  History of multiple strokes [Z86.73]  Chief Complaint   Patient presents with    Extremity Weakness     Right side weakness x1 month worsening over past 2 days. Hx of stroke with left side weakness deficit. Numbness     Reports numbness in right arm beginning today that started approx 1600.      Patient Active Problem List    Diagnosis Date Noted    TIA (transient ischemic attack) 2022    Atherosclerosis of artery of left lower extremity ([address] 10/16/2018    Bypass graft stenosis ([address]     Tobacco use 2021    Coronary artery disease involving native coronary artery of native heart with unstable angina pectoris ([address] 2021    PVD (peripheral vascular disease) ([address] 2020    Bilateral carotid artery stenosis     Elevated lipoprotein(a)     Primary hypertension 2011     Past Medical History:   Diagnosis Date    Abdominal pain 2021    Abnormal stress test     Acute CVA (cerebrovascular accident) ([address] 2020    Bowel incontinence     Cerebral infarction due to unspecified occlusion or stenosis of unspecified carotid artery ([address] 3/19/2015    Chest pain 3/11/2021    History of cerebral infarction 5/24/2022    Hyperlipidemia     Hypertension     Incisional hernia without obstruction or gangrene 7/9/2021    Incisional hernia, without obstruction or gangrene     Ischemic foot 6/22/2020    PAD (peripheral artery disease) (HCC)     Poor vision     left eye    Pre-diabetes     Sleep apnea     NO CPAP    Stroke ([facility] 2016    x3 - memory deficit    Vascular occlusion     Wears partial dentures     upper & lower     Past Surgical History:   Procedure Laterality Date    ABDOMEN SURGERY      colon polyps    CT VISCERAL PERCUTANEOUS DRAIN  7/21/2021    CT VISCERAL PERCUTANEOUS DRAIN 7/21/2021 Tracey Nagel MD [facility] CT SCAN    FEMORAL BYPASS Right 3/7/2022    REVISION RIGHT LOWER EXTREMITY BYPASS WITH PERICARDIAL PATCH performed by Byron Lowe MD at [facility] [address] Right 06/24/2020    RIGHT FEMORAL TO PERONEAL  BYPASS GRAFT performed by Byron Lowe MD at [facility] N/A 7/9/2021    ROBOTIC [address] [place], eTAPP BLOCK performed by Tank Oswald MD at [address] [facility] Right     VASCULAR SURGERY       No Known Allergies    DATE ONSET: 12/20/22    Date of Evaluation: 12/21/2022   Evaluating Therapist: RAFAEL Mitchell    Chart Reviewed: : [x] Yes [] No    Current Diet: ADULT DIET; Regular    Recent Chest Radiography: [x] Chest XR   [] CT of Chest  Date: 12/20/22  Impression   No acute abnormality         Pain: There was no reported or observed pain symptoms during the session    Reason for Referral  Servando Larkin was referred for a bedside swallow evaluation to assess the efficiency of their swallow function, identify signs and symptoms of aspiration and make recommendations regarding safe dietary consistencies, effective compensatory strategies, and safe eating environment.     Assessment    Medical record review/interview: Per MD H&P: \"Per MD H&P: \"59 y.o. male, with past medical history of obesity, hypertension, CAD, hyperlipidemia, tobacco use who presented to [facility] with right arm and leg weakness and numbness. History obtained from the patient and review of EMR. The patient stated has been experiencing right arm weakness and numbness for the last 3 to 4 weeks. However, he stated over the last 3 days his symptoms have been getting more progressive and worse. The patient does have chronic left-sided deficits due to a previous CVA. Per EMR, the patient's daughter was at the bedside and stated that the patient started complaining of right leg numbness and weakness 2 days ago. She reported that the patient's balance has been off slightly and she is concerned he may be having another stroke. In the emergency room a CT head was obtained that revealed no acute intracranial abnormality. The patient's UA was negative for infection. CTA head and neck was also obtained in the emergency room that revealed revealed no large vessel occlusion of the intracranial arteries. Chronic occlusion of the left internal carotid artery with retrograde reconstitution of this supraclinoid segment supplying the left ophthalmic artery. Age indeterminate occlusion of the proximal left common carotid artery. No patent right common internal carotid artery stent without significant stenosis. New age-indeterminate occlusion of the right vertebral artery origin with collateral reconstitution of the V3 segment. Severe stenosis of the left vertebral artery origin with collateral reconstitution of the cervical segments. Stable moderate stenosis of the proximal left subclavian artery. 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 The patient was admitted for further evaluation and treatment. An MRI of the brain without contrast and echocardiogram have been ordered for the a.m The patient denied any other associated symptoms as well as any aggravating and/or alleviating factors.  At the time of this assessment, the patient was resting comfortably in bed. He currently denies any chest pain, back pain, abdominal pain, shortness of breath, numbness, tingling, N/V/C/D, fever and/or chills. \"         Predisposing dysphagia risk factors: Hx of neurological disease , Hx of CVA, and Cognitive Deficit  Clinical signs of possible chronic dysphagia: N/A  Precipitating dysphagia risk factors: N/A    Patient Complaints:  Odynophagia: [] Yes [x] No  Globus Sensation: [] Yes [x] No  SOB with PO intake: [] Yes [x] No  Increased WOB with PO intake: [] Yes [x] No  Reflux Sx's: [] Yes [x] No  Weight loss: [] Yes [x] No  Coughing/Choking with PO intake: [] Yes [x] No  Reduced Appetite: [] Yes [x] No    Vitals/labs:   SpO2: 94% to 97% (improved after PO)  RR: 16-20  BP:112/78  HR:72       CBC:   Recent Labs     12/21/22  0725   WBC 8.0   HGB 13.1*         BMP:  Recent Labs     12/21/22  0725      K 4.4      CO2 25   BUN 18   CREATININE 0.9   GLUCOSE 128*          Cranial nerve / Oral motor exam:   CN V (trigeminal): ophthalmic, maxillary, and mandibular facial sensation- Impaired R  CN VII (facial): Impaired R  CN IX/X (glossopharyngeal/vagus): MPT: Reduced; pitch range: Reduced; vocal quality: weak; cough: Strong-perceptually  CN XII (hypoglossal):  Impaired R     Oral motor exam   Minimal right side lingual and labial weakness and reduced ROM    Laryngeal function exam:   Secretions: WFL  Vocal quality: See CN exam above  MPT: See CN exam above  Pitch range: See CN exam above  Cough: See CN exam above    Oral Care Status:    [] Oral Care [facility]  [] Poor oral care status  [] Edentulous  [x] Upper Dentures; has bottom dentures but does not wear them  [] Lower Dentures  [] Missing/Broken Teeth  [] Evidence of dental cavities/carries    PO trials:   IDDSI 0 (thin): Largely timely swallows; intermittent 2nd swallow needed; Mild reduced laryngeal elevation per palpitation; x 1 instance of s/s of aspiration with thins via straws in the from of delayed throat clearing    IDDSI 2 (mildly thick): Largely timely swallows; intermittent 2nd swallow needed; Mild reduced laryngeal elevation per palpitation; No s/s of aspiration    IDDSI 3 (moderately thick): Largely timely swallows; intermittent 2nd swallow needed; Mild reduced laryngeal elevation per palpitation; No s/s of aspiration    IDDSI 4 (puree): Largely timely swallows; intermittent 2nd swallow needed; Mild reduced laryngeal elevation per palpitation; No s/s of aspiration    IDDSI 6 (soft and bite sized): Mildly prolonged mastication; Largely timely swallows; intermittent 2nd swallow needed; Mild reduced laryngeal elevation per palpitation; No s/s of aspiration    IDDSI 7 (regular): reduced and inefficient mastication; prolonged time needed for bolus prep; Largely timely swallows; intermittent 2nd swallow needed; Mild reduced laryngeal elevation per palpitation; No s/s of aspiration      Impressions:  Mild-moderate oral and mild pharyngeal dysphagia. The oral phase is marked by slow and inefficient mastication of solids of increase texture. Swallow onset was largely timely. Mildly reduced laryngeal elevation per palpitation. Intermittent 2nd swallows needed per bite/sip. Only x 1 instance of possible s/s of aspiration that occurred with thin water via straw. A Soft and bite sized diet with thins liquids/no straws is therefore recommended. Recommendations:  Diet recommendation: IDDSI 6 Soft and Bite Sized Solids; IDDSI 0 Thin Liquids;  Meds whole with thin liquids  Instrumentation: Not indicated at this time  Risk management: upright for all intake, stay upright for at least 30 mins after intake, small bites/sips, no straws, alternate bites/sips, slow rate of intake, and general aspiration precautions    Prognosis: Good      The pt will benefit from ongoing Speech Therapy services, as an outpatient, to target cognitive deficits, dysarthria, Aphasia-like deficits and dysphagia    Recommended Intervention:   [x] Dysphagia tx  [] Videostroboscopy                      [] NPO   [] MBS       [] Speech/Cog Eval    [x] Therapeutic PO Trials     [] Ice Chips   [] Other:  [] FEES                                                 Dysphagia Therapeutic Intervention:   []  Bolus control Exercises  []  Oral Motor Exercises  []  Wood Water Protocol  []  Thermal Stimulation  []  Oral Care    []  Vital Stim/NMES  []  Laryngeal Exercises  [x]  Patient/Family Education  []  Pharyngeal Exercises  [x]  Therapeutic PO trials with SLP  [x]  Diet tolerance monitoring  []  Other:     Referrals:  [] ENT    [] PT  [] Pulmonology [] GI  [] Neurology  [] RD  [] OT   []     Goals:  Short Term Goals:  Timeframe for Short Term Goals: (5 days-12/26/22)  Goal 1: The patient will tolerate recommended diet with no clinical s/s of aspiration   Goal 2: The patient will recall/perform recommended compensatory strategies given no cues      Long Term Goals:   Timeframe for Long Term Goals: (7 days - 12/28/22)  Goal 1: The patient will tolerate least restrictive diet with no clinical s/s of aspiration or worsening respiratory/pulmonary status    Treatment:  Skilled instruction completed with patient re: evidenced based practice regarding recommendations and POC, importance of oral care to reduce adverse affects in the event of aspiration, and instruction of recommended compensatory strategies developed based upon clinical exam. Pt able to recall/demonstrate compensatory strategies with min cues.       Pt Education: SLP educated the patient re: Role of SLP, rationale for completion of assessment, results of assessment, and recommendations  Pt Education Response: verbalized understanding    Duration/Frequency of Tx: 3-7 x week for LOS    Individuals Consulted:   []  Patient     []  NP         [x]  RN   []  RD                   []  MD      []  Family Member                        []  PA    [] Other:      Safety Devices / Report:  [x]  All fall risk precautions in place []  Safety handoff completed with RN  []  Bed alarm in place  []  Left in bed     []  Chair alarm in place  []  Left in chair   []  Call light in reach   []  Other: Total Treatment Time / Charges        Time in Time out Total Time / units   Speech / Mariaelena Sapphire / Cog Eval:  0813 0834 21 min / 1 unit   Bedside Swallow Eval 0747 0805 18 min / 1 unit   Dysphagia Treatment 0805 0813 8 min / 1 unit          Signature:   Kristi Arias, [address] [facility]  [address][id number]  Speech-Language Pathologist  Phone: [phone]  Speech Desk: [phone]

## 2022-12-21 NOTE — PROCEDURES
Patient: Eryn Hernandez    MR Number: [id number]  YOB: 1965  Date of Visit: 12/21/2022    Clinical History:  The patient is a 59y.o. years old male with possible new onset focal seizure. Method: The EEG was performed utilizing the international 10/20 of electrode placements of both referential and bipolar montages. The patient was awake and drowsy through out the recording. Photic stimulation was performed. Findings: The background of the EEG showed normal alpha posterior background of 8-9 - HZ and amplitude of 20-40 UV. This background was symmetric, waxing and waning, and reactive with eye opening and closure. As the patient became drowsy, generalized diffuse slowing was seen through recording at 6-7 HZ. This generalized slowing was symmetric, non rhythmical, and continuous. No spike or sharp waves were seen. Intermittent left central sharp transit was seen few times throughout the recording. Photic stimulation did not activate EEG. Impression: This EEG  is within normal limits. There is no evidence of epileptiform discharges, focal, or lateralizing abnormalities.       Nataliia Lam MD      Board certified in clinical neurophysiology

## 2022-12-21 NOTE — PROGRESS NOTES
Physical Therapy  Facility/Department: [place] [address] [facility] SURG  Physical Therapy Initial Assessment/Treatment/Discharge Summary    Name: Rolm Mcburney  : 1965  MRN: [id number]  Date of Service: 2022    Discharge Recommendations:  Home with assist PRN   PT Equipment Recommendations  Equipment Needed: No      Patient Diagnosis(es): The primary encounter diagnosis was Stroke-like episode. Diagnoses of Numbness and tingling in right hand, Balance problem, and History of multiple strokes were also pertinent to this visit. Past Medical History:  has a past medical history of Abdominal pain, Abnormal stress test, Acute CVA (cerebrovascular accident) ([facility] [address] Bowel incontinence, Cerebral infarction due to unspecified occlusion or stenosis of unspecified carotid artery ([facility] [address] Chest pain, History of cerebral infarction, Hyperlipidemia, Hypertension, Incisional hernia without obstruction or gangrene, Incisional hernia, without obstruction or gangrene, Ischemic foot, PAD (peripheral artery disease) ([facility] [address] Poor vision, Pre-diabetes, Sleep apnea, Stroke [facility]), Vascular occlusion, and Wears partial dentures. Past Surgical History:  has a past surgical history that includes Leg Surgery (Right); vascular surgery; Abdomen surgery; Femoral-tibial Bypass Graft (Right, 2020); Incisional hernia repair; hernia repair (N/A, 2021); CT DRAINAGE VISCERAL PERCUTANEOUS (2021); and femoral bypass (Right, 3/7/2022). Assessment   Body Structures, Functions, Activity Limitations Requiring Skilled Therapeutic Intervention: Decreased functional mobility   Assessment: Pt presents to [facility] with R sided numbness/tingling. Pt has PMH of L MCA and R residual weakness/increased tone. Pt typically IND with no AD. Upon eval, pt performs transfers, ambulation, and steps IND/at baseline level; only required increased assist when performing stairs without UE support.  No further PT need at this time; recommend home with prn assist  Treatment Diagnosis: impaired functional mobility  Therapy Prognosis: Good  Decision Making: Low Complexity  No Skilled PT:  At baseline function  Requires PT Follow-Up: No  Activity Tolerance  Activity Tolerance: Patient tolerated evaluation without incident     Plan   Physcial Therapy Plan  General Plan: Discharge  Current Treatment Recommendations: Gait training, Safety education & training, Patient/Caregiver education & training  Safety Devices  Type of Devices: Patient at risk for falls, Left in chair, Call light within reach, Gait belt, Nurse notified     Restrictions  Restrictions/Precautions  Restrictions/Precautions: Up as Tolerated, Fall Risk     Subjective   General  Chart Reviewed: Yes  Patient assessed for rehabilitation services?: Yes  Response To Previous Treatment: Not applicable  Family / Caregiver Present: No  Referring Practitioner: MAT Pathak CNP  Referral Date : 12/21/22  Diagnosis: TIA  Follows Commands: Within Functional Limits  General Comment  Comments: RN cleared pt for PT eval  Subjective  Subjective: Pt sitting up in chair upon arrival, agreeable to PT eval    Social/Functional History  Social/Functional History  Lives With: Spouse  Type of Home: Apartment  Home Layout: One level  Home Access: Level entry  Bathroom Shower/Tub: Tub/Shower unit  Bathroom Toilet: Standard  Bathroom Equipment: Shower chair  Home Equipment: Walker, rolling  Has the patient had two or more falls in the past year or any fall with injury in the past year?: No  ADL Assistance: Independent  Homemaking Responsibilities: Yes  Meal Prep Responsibility: Secondary  Laundry Responsibility: Secondary  Cleaning Responsibility: Secondary  Shopping Responsibility: Secondary  Ambulation Assistance: Independent  Transfer Assistance: Independent  Active : Yes  Occupation: Retired  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[address], delivered meat    [address]: Impaired  Vision Exceptions: Wears glasses at all times  Hearing  Hearing: Exceptions to [facility]  Hearing Exceptions: Hard of hearing/hearing concerns      Cognition   Orientation  Overall Orientation Status: Within Functional Limits  Orientation Level: Oriented X4     Objective   Heart Rate: 74  Heart Rate Source: Monitor  BP: 128/76  BP Location: Left upper arm  BP Method: Automatic  Patient Position: Sitting  MAP (Calculated): 93  Resp: 16  SpO2: 99 %  O2 Device: None (Room air)    Gross Assessment  AROM: Within functional limits  Strength: Within functional limits  Coordination: Within functional limits (Mildly impaired on R side)  Tone: Abnormal (Increased tone on R side)  Sensation: Intact (pt reports numbness and tingling is intermittent; currently not experiencing any)     Bed Mobility Training  Bed Mobility Training: No (Pt in chair at beginning and end of session)  Balance  Sitting: Intact  Standing: Intact  Transfer Training  Transfer Training: Yes  Overall Level of Assistance: Independent  Sit to Stand: Independent  Stand to Sit: Independent  Toilet Transfer: Independent  Gait Training  Gait Training: Yes  Gait  Overall Level of Assistance: Independent  Base of Support: Shift to left  Swing Pattern: Right asymmetrical  Stance: Right decreased  Gait Abnormalities: Trunk sway increased;Decreased step clearance; Altered arm swing (Increased tone on R side which is baseline from previous stroke)  Distance (ft): 200 Feet  Assistive Device: Gait belt  Rail Use: Left  Stairs - Level of Assistance: Supervision (Pt ascends/descends with reciprocal pattern and LHR and supervision; pt then ascends/descends with reciprocal pattern and no HR requiring CGA due to increased unsteadiness)  Number of Stairs Trained: 3    AM-PAC Score  AM-PAC Inpatient Mobility Raw Score : 24 (12/21/22 1134)  AM-PAC Inpatient T-Scale Score : 61.14 (12/21/22 1134)  Mobility Inpatient CMS 0-100% Score: 0 (12/21/22 1134)  Mobility Inpatient CMS G-Code Modifier : [facility] (12/21/22 1134)      Goals  Short Term Goals  Time Frame for Short Term Goals: 1 session  Short Term Goal 1: Pt will ambulate with LRAD and IND x150 ft --12/21 Goal Met  Patient Goals   Patient Goals : \"to go home\"      Education  Patient Education  Education Given To: Patient  Education Provided: Role of Therapy;Plan of Care;Transfer Training;Home Exercise Program  Education Method: Verbal  Barriers to Learning: Cognition  Education Outcome: Verbalized understanding;Demonstrated understanding      Therapy Time   Individual Concurrent Group Co-treatment   Time In 0900         Time Out 0920         Minutes 20         Timed Code Treatment Minutes: 10 Minutes (10 min eval)       Vika Sanchez, PT

## 2022-12-21 NOTE — H&P
Hospital Medicine History & Physical      PCP: MAT Sánchez - CNP    Date of Admission: 12/20/2022    Date of Service: Pt seen/examined on 12/20/2022 and Admitted to observation with expected LOS less than two midnights due to medical therapy. Chief Complaint: Right arm and leg weakness and numbness    History Of Present Illness:      59 y.o. male, with past medical history of obesity, hypertension, CAD, hyperlipidemia, tobacco use who presented to [facility] with right arm and leg weakness and numbness. History obtained from the patient and review of EMR. The patient stated has been experiencing right arm weakness and numbness for the last 3 to 4 weeks. However, he stated over the last 3 days his symptoms have been getting more progressive and worse. The patient does have chronic left-sided deficits due to a previous CVA. Per EMR, the patient's daughter was at the bedside and stated that the patient started complaining of right leg numbness and weakness 2 days ago. She reported that the patient's balance has been off slightly and she is concerned he may be having another stroke. In the emergency room a CT head was obtained that revealed no acute intracranial abnormality. The patient's UA was negative for infection. CTA head and neck was also obtained in the emergency room that revealed revealed no large vessel occlusion of the intracranial arteries. Chronic occlusion of the left internal carotid artery with retrograde reconstitution of this supraclinoid segment supplying the left ophthalmic artery. Age indeterminate occlusion of the proximal left common carotid artery. No patent right common internal carotid artery stent without significant stenosis. New age-indeterminate occlusion of the right vertebral artery origin with collateral reconstitution of the V3 segment. Severe stenosis of the left vertebral artery origin with collateral reconstitution of the cervical segments.   Stable moderate stenosis of the proximal left subclavian artery. 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 The patient was admitted for further evaluation and treatment. An MRI of the brain without contrast and echocardiogram have been ordered for the a.m The patient denied any other associated symptoms as well as any aggravating and/or alleviating factors. At the time of this assessment, the patient was resting comfortably in bed. He currently denies any chest pain, back pain, abdominal pain, shortness of breath, numbness, tingling, N/V/C/D, fever and/or chills.      Past Medical History:          Diagnosis Date    Abdominal pain 7/20/2021    Abnormal stress test     Acute CVA (cerebrovascular accident) ([address] 5/30/2020    Bowel incontinence     Cerebral infarction due to unspecified occlusion or stenosis of unspecified carotid artery ([address] 3/19/2015    Chest pain 3/11/2021    History of cerebral infarction 5/24/2022    Hyperlipidemia     Hypertension     Incisional hernia without obstruction or gangrene 7/9/2021    Incisional hernia, without obstruction or gangrene     Ischemic foot 6/22/2020    PAD (peripheral artery disease) ([facility])     Poor vision     left eye    Pre-diabetes     Sleep apnea     NO CPAP    Stroke ([address] 2016    x3 - memory deficit    Vascular occlusion     Wears partial dentures     upper & lower     Past Surgical History:          Procedure Laterality Date    ABDOMEN SURGERY      colon polyps    CT VISCERAL PERCUTANEOUS DRAIN  7/21/2021    CT VISCERAL PERCUTANEOUS DRAIN 7/21/2021 Juana Vincent MD [facility] CT SCAN    FEMORAL BYPASS Right 3/7/2022    REVISION RIGHT LOWER EXTREMITY BYPASS WITH PERICARDIAL PATCH [facility]ifer[facility] [address] Right 06/24/2020    RIGHT FEMORAL TO PERONEAL  BYPASS GRAFT performed by Vonda Sandifer, MD at [address] 7/9/2021    ROBOTIC [address], eTAPP BLOCK performed by Lilia Carrillo MD at [address]     VASCULAR SURGERY       Medications Prior to Admission:      Prior to Admission medications    Medication Sig Start Date End Date Taking? Authorizing Provider   gabapentin (NEURONTIN) 800 MG tablet Take 1 tablet by mouth 3 times daily for 30 days. 11/7/22 12/7/22  MAT Saleem CNP   predniSONE (DELTASONE) 10 MG tablet Take 40 mg for 3 days then 30 mg for 3 days then 20 mg for 3 days then 10 mg for 3 days 11/7/22   MAT Saleem CNP   clopidogrel (PLAVIX) 75 MG tablet TAKE ONE TABLET BY MOUTH DAILY 10/19/22   MAT Saleem CNP   carvedilol (COREG) 6.25 MG tablet Take 1 tablet by mouth 2 times daily (with meals) 9/21/22   MAT Saleem CNP   buPROPion (ZYBAN) 150 MG extended release tablet 150 mg once daily for 7 days; increase to 150 mg twice daily 9/21/22   MAT Saleem CNP   atorvastatin (LIPITOR) 80 MG tablet TAKE ONE TABLET BY MOUTH DAILY 9/21/22   MAT Saleem CNP   lisinopril (PRINIVIL;ZESTRIL) 20 MG tablet TAKE ONE TABLET BY MOUTH DAILY 8/4/22   MAT Saleem CNP   nitroGLYCERIN (NITROSTAT) 0.4 MG SL tablet up to max of 3 total doses. If no relief after 1 dose, call 911. 6/15/22   MAT Saleem CNP   Evolocumab 140 MG/ML SOAJ Inject 1 Dose into the skin every 14 days 3/7/22   MAT Saleem CNP   Blood Pressure Monitoring (B-D ASSURE BPM/AUTO ARM CUFF) MISC 1 Device by Does not apply route 2 times daily 9/23/21   MAT Saleem CNP   aspirin 81 MG chewable tablet Take 1 tablet by mouth daily 3/14/21   MAT Tran CNP     Allergies:  Patient has no known allergies. Social History:      The patient currently lives at home    TOBACCO:   reports that he has been smoking cigarettes. He has a 20.00 pack-year smoking history. He has never used smokeless tobacco.  ETOH:   reports that he does not currently use alcohol.   E-cigarette/Vaping Questions Responses    E-cigarette/Vaping Use Never User    Start Date     Passive Exposure     Quit Date     Counseling Given     Comments           Family History:      Reviewed and negative in regards to presenting illness/complaint. Problem Relation Age of Onset    Diabetes Mother      REVIEW OF SYSTEMS COMPLETED:   Pertinent positives as noted in the HPI. All other systems reviewed and negative. PHYSICAL EXAM PERFORMED:    BP (!) 154/88   Pulse 66   Temp 97.3 °F (36.3 °C) (Oral)   Resp 19   Ht 5' 7\" (1.702 m)   SpO2 98%   BMI 32.11 kg/m²     General appearance: Pleasant, obese male in no apparent distress, appears stated age and cooperative. HEENT:  Pupils equal, round, and reactive to light. Extra ocular muscles intact. Conjunctivae/corneas clear. Neck: Supple, with full range of motion. No jugular venous distention. Trachea midline. Respiratory:  Normal respiratory effort. Clear to auscultation, bilaterally without Rales/Wheezes/Rhonchi. Cardiovascular:  Regular rate and rhythm with normal S1/S2 without murmurs, rubs or gallops. Abdomen: Soft, obese round non-tender, non-distended with normal bowel sounds. Musculoskeletal:  No clubbing, cyanosis or edema bilaterally. Full range of motion without deformity. Skin: Skin color, texture, turgor normal.  No significant rashes or lesions. Neurologic:  Neurovascularly intact .  Cranial nerves: II-XII intact, grossly non-focal.  Psychiatric:  Alert and oriented, thought content appropriate, normal insight  Capillary Refill: Brisk,3 seconds, normal  Peripheral Pulses: +2 palpable, equal bilaterally     Labs:     Recent Labs     12/20/22 1823   WBC 8.3   HGB 13.5   HCT 40.3*        Recent Labs     12/20/22  1823      K 4.3      CO2 23   BUN 20   CREATININE 0.9   CALCIUM 9.6     Recent Labs     12/20/22  1823   AST 17   ALT 15   BILITOT 0.3   ALKPHOS 159*     Recent Labs     12/20/22 1823   TROPONINI <0.01     Urinalysis: Lab Results   Component Value Date/Time    NITRU Negative 12/20/2022 07:57 PM    WBCUA 251 06/06/2016 06:00 PM    BACTERIA 3+ 06/06/2016 06:00 PM    RBCUA 243 06/06/2016 06:00 PM    BLOODU Negative 12/20/2022 07:57 PM    SPECGRAV 1.025 12/20/2022 07:57 PM    GLUCOSEU Negative 12/20/2022 07:57 PM     Radiology:     CXR: I have reviewed the CXR with the following interpretation: No acute cardiopulmonary findings    EKG:  I have reviewed the EKG with the following interpretation: pending    CTA HEAD NECK W CONTRAST   Final Result   1. No large vessel occlusion of the intracranial arteries. 2. Chronic occlusion of the left internal carotid artery with retrograde   reconstitution of the supraclinoid segment supplying the left ophthalmic   artery. 3. New, age-indeterminate occlusion of the proximal left common carotid   artery. 4. New patent right common-internal carotid artery stent without significant   stenosis. 5. New, age-indeterminate occlusion of the right vertebral artery origin with   collateral reconstitution of the V3 segment. 6. Severe stenosis of the left vertebral artery origin with collateral   reconstitution of the cervical segments. 7. Stable moderate stenosis of the proximal left subclavian artery. 8. 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 CT HEAD WO CONTRAST   Final Result   Large old left MCA infarct which is unchanged. Small old right posterior occipital infarct which is more apparent. Mild atrophy and mild chronic microischemic disease scattered in the deep   white matter which is unchanged with no acute intracranial abnormality.          XR CHEST (2 VW)   Final Result   No acute abnormality           Consults:    IP CONSULT TO NEUROLOGY    ASSESSMENT:    Active Hospital Problems    Diagnosis Date Noted    TIA (transient ischemic attack) [G45.9] 12/20/2022     Priority: Medium    Tobacco use [Z72.0] 07/20/2021    Coronary artery disease involving native coronary artery of native heart with unstable angina pectoris ([facility] [address] [I25.110] 03/13/2021    Bilateral carotid artery stenosis [I65.23]     Primary hypertension [I10] 05/06/2011     PLAN:    TIA vs CVA - symptoms include right arm and leg weakness and numbness   CT head revealed: Large old left MCA infarct which is unchanged. A small old right posterior septal infarct which is more apparent. Mild atrophy and mild chronic micro ischemic disease scattered in the deep white matter which is unchanged with no acute intracranial abnormality. -CTA head and neck revealed no large vessel occlusion of the intracranial arteries. Chronic occlusion of the left internal carotid artery with retrograde reconstitution of this supraclinoid segment supplying the left ophthalmic artery. Age indeterminate occlusion of the proximal left common carotid artery. No patent right common internal carotid artery stent without significant stenosis. New age-indeterminate occlusion of the right vertebral artery origin with collateral reconstitution of the V3 segment. Severe stenosis of the left vertebral artery origin with collateral reconstitution of the cervical segments. Stable moderate stenosis of the proximal left subclavian artery. 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 -UA negative for infection  -MRI brain without contrast in the a.m.  -Echo in a.m.  -PT/OT eval  -Aspirin and statin daily  -Monitor on tele. -Consult neurology for further recs - appreciated in advance. Obesity  With Body mass index is 32.1 kg/m². Complicating assessment and treatment. Placing patient at risk for multiple co-morbidities as well as early death and contributing to the patient's presentation.  Counseled on weight loss    Essential hypertension in setting of known CAD  -Continue Coreg and lisinopril  -Continue Plavix and aspirin    Hyperlipidemia  -Continue atorvastatin    Tobacco use  -Tobacco cessation  -Nicotine patch  DVT Prophylaxis: Lovenox    Diet: No diet orders on file  Ordered  Code Status: Prior    PT/OT Eval Status: Ordered    Dispo -1-2 days pending clinical improvement     MAT Parsons - CNP    Thank you MAT Schulte CNP for the opportunity to be involved in this patient's care. If you have any questions or concerns please feel free to contact me at [phone].   ----------------------------dR. Teagan Cervantes---------------------------------

## 2022-12-21 NOTE — CONSULTS
Consult marlon Goldsmith  Date:12/21/2022,  Time:7:43 AM        Electronically signed by Kindara on 12/21/2022 at 7:43 AM

## 2022-12-22 VITALS
WEIGHT: 205.91 LBS | OXYGEN SATURATION: 97 % | BODY MASS INDEX: 32.32 KG/M2 | RESPIRATION RATE: 14 BRPM | HEIGHT: 67 IN | TEMPERATURE: 97.8 F | DIASTOLIC BLOOD PRESSURE: 77 MMHG | HEART RATE: 61 BPM | SYSTOLIC BLOOD PRESSURE: 149 MMHG

## 2022-12-22 PROCEDURE — 96372 THER/PROPH/DIAG INJ SC/IM: CPT

## 2022-12-22 PROCEDURE — G0378 HOSPITAL OBSERVATION PER HR: HCPCS

## 2022-12-22 PROCEDURE — 6360000002 HC RX W HCPCS: Performed by: NURSE PRACTITIONER

## 2022-12-22 PROCEDURE — 6370000000 HC RX 637 (ALT 250 FOR IP): Performed by: NURSE PRACTITIONER

## 2022-12-22 PROCEDURE — 99232 SBSQ HOSP IP/OBS MODERATE 35: CPT | Performed by: NURSE PRACTITIONER

## 2022-12-22 PROCEDURE — 93306 TTE W/DOPPLER COMPLETE: CPT

## 2022-12-22 RX ORDER — LEVETIRACETAM 500 MG/1
500 TABLET ORAL 2 TIMES DAILY
Status: DISCONTINUED | OUTPATIENT
Start: 2022-12-22 | End: 2022-12-22 | Stop reason: HOSPADM

## 2022-12-22 RX ORDER — LEVETIRACETAM 500 MG/1
500 TABLET ORAL 2 TIMES DAILY
Qty: 60 TABLET | Refills: 3 | Status: SHIPPED | OUTPATIENT
Start: 2022-12-22

## 2022-12-22 RX ORDER — BUPROPION HYDROCHLORIDE 150 MG/1
TABLET, EXTENDED RELEASE ORAL
Qty: 60 TABLET | Refills: 2 | Status: SHIPPED | OUTPATIENT
Start: 2022-12-22

## 2022-12-22 RX ADMIN — GABAPENTIN 800 MG: 400 CAPSULE ORAL at 07:51

## 2022-12-22 RX ADMIN — BUPROPION HYDROCHLORIDE 150 MG: 150 TABLET, EXTENDED RELEASE ORAL at 07:51

## 2022-12-22 RX ADMIN — GABAPENTIN 800 MG: 400 CAPSULE ORAL at 15:03

## 2022-12-22 RX ADMIN — LISINOPRIL 20 MG: 20 TABLET ORAL at 07:51

## 2022-12-22 RX ADMIN — CLOPIDOGREL BISULFATE 75 MG: 75 TABLET ORAL at 07:51

## 2022-12-22 RX ADMIN — CARVEDILOL 6.25 MG: 6.25 TABLET, FILM COATED ORAL at 07:51

## 2022-12-22 RX ADMIN — LEVETIRACETAM 500 MG: 500 TABLET, FILM COATED ORAL at 15:03

## 2022-12-22 RX ADMIN — ASPIRIN 81 MG: 81 TABLET, COATED ORAL at 07:52

## 2022-12-22 RX ADMIN — ENOXAPARIN SODIUM 40 MG: 100 INJECTION SUBCUTANEOUS at 07:51

## 2022-12-22 NOTE — PROGRESS NOTES
Cirilo Fink  Neurology Follow-up  [facility] Neurology    Date of Service: 12/22/2022    Subjective:   CC: Follow up today regarding: Acute on chronic right-sided weakness    Events noted. Chart and lab reviewed. Feels well. Anxious to be discharged. ROS : A 10-12 system review obtained and updated today and is unremarkable except as mentioned  in my interval history. family history includes Diabetes in his mother.     Past Medical History:   Diagnosis Date    Abdominal pain 7/20/2021    Abnormal stress test     Acute CVA (cerebrovascular accident) ([address] 5/30/2020    Bowel incontinence     Cerebral infarction due to unspecified occlusion or stenosis of unspecified carotid artery ([address] 3/19/2015    Chest pain 3/11/2021    History of cerebral infarction 5/24/2022    Hyperlipidemia     Hypertension     Incisional hernia without obstruction or gangrene 7/9/2021    Incisional hernia, without obstruction or gangrene     Ischemic foot 6/22/2020    PAD (peripheral artery disease) (HCC)     Poor vision     left eye    Pre-diabetes     Sleep apnea     NO CPAP    Stroke ([address] 2016    x3 - memory deficit    Vascular occlusion     Wears partial dentures     upper & lower     Current Facility-Administered Medications   Medication Dose Route Frequency Provider Last Rate Last Admin    levETIRAcetam (KEPPRA) tablet 500 mg  500 mg Oral BID MAT Monson CNP        buPROPion (WELLBUTRIN XL) extended release tablet 150 mg  150 mg Oral BID MAT Larry CNP   150 mg at 12/22/22 0751    carvedilol (COREG) tablet 6.25 mg  6.25 mg Oral BID  MAT Larry CNP   6.25 mg at 12/22/22 0751    clopidogrel (PLAVIX) tablet 75 mg  75 mg Oral Daily MAT Larry CNP   75 mg at 12/22/22 0751    lisinopril (PRINIVIL;ZESTRIL) tablet 20 mg  20 mg Oral Daily MAT Larry CNP   20 mg at 12/22/22 0751    ondansetron (ZOFRAN-ODT) disintegrating tablet 4 mg  4 mg Oral Q8H PRN MAT Larry - CNP        Or    ondansetron (ZOFRAN) injection 4 mg  4 mg IntraVENous Q6H PRN Nicolas Chacon APRN - CNP        polyethylene glycol (GLYCOLAX) packet 17 g  17 g Oral Daily PRN Nicolas Chacon APRN - CNP        enoxaparin (LOVENOX) injection 40 mg  40 mg SubCUTAneous Daily KyMAT Shields - CNP   40 mg at 12/22/22 0751    aspirin EC tablet 81 mg  81 mg Oral Daily Kysusie Chacon APRN - CNP   81 mg at 12/22/22 5310    Or    aspirin suppository 300 mg  300 mg Rectal Daily MAT Grey - CNP        perflutren lipid microspheres (DEFINITY) injection 1.5 mL  1.5 mL IntraVENous ONCE PRN MAT Grey - ROBERTA        atorvastatin (LIPITOR) tablet 80 mg  80 mg Oral Nightly Kysusie Chacon APRN - CNP   80 mg at 12/21/22 2042    gabapentin (NEURONTIN) capsule 800 mg  800 mg Oral TID KyMAT Shields - CNP   800 mg at 12/22/22 0751     No Known Allergies   reports that he has been smoking cigarettes. He has a 20.00 pack-year smoking history. He has never used smokeless tobacco. He reports that he does not currently use alcohol. He reports that he does not use drugs. Objective:  Exam:   Constitutional:   Vitals:    12/21/22 2041 12/22/22 0004 12/22/22 0423 12/22/22 0750   BP: 107/69 (!) 99/52 (!) 125/59 (!) 149/77   Pulse: 62 66 65 61   Resp: 16 16 16 14   Temp: 97.7 °F (36.5 °C) 97.7 °F (36.5 °C) 97.8 °F (36.6 °C) 97.8 °F (36.6 °C)   TempSrc: Axillary Oral Oral Oral   SpO2: 98% 94% 91% 97%   Weight:       Height:         General appearance:  Normal development and appear in no acute distress. Mental Status:   Oriented to person, place, problem, and time. Memory: Good immediate recall. Intact remote memory  Normal attention span and concentration. Language: Chronic expressive aphasia. Good fund of Knowledge. Cranial Nerves:   II: Visual fields: Full. Pupils: equal, round, reactive to light  III,IV,VI: Extra Ocular Movements are intact.  No nystagmus  V: Facial sensation is intact  VII: Facial strength and movements: intact and symmetric  IX: Palate elevation is symmetric  XI: Shoulder shrug is intact  XII: Tongue movements are normal  Musculoskeletal: RUE 4/5, worse distally. Tone: Normal tone. Reflexes: Symmetric 2+ in both arms and legs. Coordination: no pronator drift, no dysmetria with FNF  Sensation: normal to all modalities in both arms and legs. Gait/Posture: steady gait        Data:  LABS:   Lab Results   Component Value Date/Time     12/21/2022 07:25 AM    K 4.4 12/21/2022 07:25 AM     12/21/2022 07:25 AM    CO2 25 12/21/2022 07:25 AM    BUN 18 12/21/2022 07:25 AM    CREATININE 0.9 12/21/2022 07:25 AM    GFRAA >60 03/08/2022 05:05 AM    GFRAA >60 07/11/2012 09:20 AM    LABGLOM >60 12/21/2022 07:25 AM    GLUCOSE 128 12/21/2022 07:25 AM    CALCIUM 9.6 12/21/2022 07:25 AM     Lab Results   Component Value Date/Time    WBC 8.0 12/21/2022 07:25 AM    RBC 4.07 12/21/2022 07:25 AM    HGB 13.1 12/21/2022 07:25 AM    HCT 39.4 12/21/2022 07:25 AM    MCV 96.6 12/21/2022 07:25 AM    RDW 14.5 12/21/2022 07:25 AM     12/21/2022 07:25 AM     Lab Results   Component Value Date    INR 1.35 (H) 10/26/2021    PROTIME 15.5 (H) 10/26/2021       Neuroimaging was independently reviewed by me and discussed results with the patient  I reviewed blood testing and other test results and discussed results with the patient      Impression:    Acute on chronic right-sided weakness -  MRI was negative for infarct. After further discussion with patient's wife, it sounds as though the patient may be having focal seizures. He complains of jerking of the right arm and then the arm goes flaccid. Intracranial stenosis   Occluded left common carotid  HTN  HLD       Recommendation    Will start Keppra 500 mg BID for focal seizure. EEG without evidence of seizure. Continue ASA, Plavix, statin. Continue home BP meds. PT/OT/SLP    Patient does not drive.    F/u in our office in 1 month to see if episodes have improved. Discussed with patient and his wife. Jia Jones CNP    This dictation was generated by voice recognition computer software. Although all attempts are made to edit the dictation for accuracy, there may be errors in the transcription that are not intended.

## 2022-12-22 NOTE — DISCHARGE SUMMARY
Hospital Medicine Discharge Summary    Patient ID: Kelly Servin      Patient's PCP: MAT Armstrong CNP    Admit Date: 12/20/2022     Discharge Date:   12/22/2022    Admitting Provider: Brady Perez MD     Discharge Provider: Molina Morales MD     Discharge Diagnoses: Active Hospital Problems    Diagnosis     New onset seizure ([facility] [address] [R56.9]      Priority: Medium    TIA (transient ischemic attack) [G45.9]      Priority: Medium    Tobacco use [Z72.0]     Coronary artery disease involving native coronary artery of native heart with unstable angina pectoris ([facility] [address] [I25.110]     Bilateral carotid artery stenosis [I65.23]     Primary hypertension [I10]        The patient was seen and examined on day of discharge and this discharge summary is in conjunction with any daily progress note from day of discharge. Hospital Course:     59 y.o. male, with past medical history of obesity, hypertension, CAD, hyperlipidemia, tobacco use who presented to [facility] with right arm and leg weakness and numbness. History obtained from the patient and review of EMR. The patient stated has been experiencing right arm weakness and numbness for the last 3 to 4 weeks. However, he stated over the last 3 days his symptoms have been getting more progressive and worse. The patient does have chronic left-sided deficits due to a previous CVA. Per EMR, the patient's daughter was at the bedside and stated that the patient started complaining of right leg numbness and weakness 2 days ago. She reported that the patient's balance has been off slightly and she is concerned he may be having another stroke. In the emergency room a CT head was obtained that revealed no acute intracranial abnormality. The patient's UA was negative for infection. CTA head and neck was also obtained in the emergency room that revealed revealed no large vessel occlusion of the intracranial arteries.   Chronic occlusion of the left internal carotid artery with retrograde reconstitution of this supraclinoid segment supplying the left ophthalmic artery. Age indeterminate occlusion of the proximal left common carotid artery. No patent right common internal carotid artery stent without significant stenosis. New age-indeterminate occlusion of the right vertebral artery origin with collateral reconstitution of the V3 segment. Severe stenosis of the left vertebral artery origin with collateral reconstitution of the cervical segments. Stable moderate stenosis of the proximal left subclavian artery. 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 The patient was admitted for further evaluation and treatment. An MRI of the brain without contrast and echocardiogram have been ordered for the a.m The patient denied any other associated symptoms as well as any aggravating and/or alleviating factors. At the time of this assessment, the patient was resting comfortably in bed. He currently denies any chest pain, back pain, abdominal pain, shortness of breath, numbness, tingling, N/V/C/D, fever and/or chills. Patient treated for:  1. TIA versus CVA. MRI did not demonstrate any new areas of ischemia. Patient did have some old areas of previous infarct. Patient was seen by neurology. Neurology thought that patient may possibly have seizure disorder. Patient had a EEG which was unremarkable. Neurology deciding on whether patient needs to be on low-dose antiepileptic drug at discharge. 2.  Hypertension with CAD. Patient will continue on his Coreg and lisinopril. Patient also on aspirin and Plavix for previous CVA and CAD. 3.  Hyperlipidemia continue patient on atorvastatin. 4.  Obesity -  With Body mass index is 32.25 kg/m². Complicating assessment and treatment. Placing patient at risk for multiple co-morbidities as well as early death and contributing to the patient's presentation.  Counseled on weight loss.      Physical Exam Performed:     BP (!) 149/77   Pulse 61   Temp 97.8 °F (36.6 °C) (Oral)   Resp 14   Ht 5' 7\" (1.702 m)   Wt 205 lb 14.6 oz (93.4 kg)   SpO2 97%   BMI 32.25 kg/m²       General appearance: No apparent distress, appears stated age and cooperative. HEENT: Pupils equal, round, and reactive to light. Conjunctivae/corneas clear. Neck: Supple, with full range of motion. No jugular venous distention. Trachea midline. Respiratory:  Normal respiratory effort. Clear to auscultation, bilaterally without Rales/Wheezes/Rhonchi. Cardiovascular: Regular rate and rhythm with normal S1/S2 without murmurs, rubs or gallops. Abdomen: Soft, non-tender, non-distended with normal bowel sounds. Musculoskeletal: No clubbing, cyanosis or edema bilaterally. Full range of motion without deformity. Skin: Skin color, texture, turgor normal.  No rashes or lesions. Neurologic: Patient with slight facial asymmetry on the right side of his face. Patient does have slightly reduced  strength in his right hand. Some slowing noted with cerebellar testing on the right side. Psychiatric: Alert and oriented, thought content appropriate, normal insight  Capillary Refill: Brisk, 3 seconds, normal   Peripheral Pulses: +2 palpable, equal bilaterally          Labs: For convenience and continuity at follow-up the following most recent labs are provided:      CBC:    Lab Results   Component Value Date/Time    WBC 8.0 12/21/2022 07:25 AM    HGB 13.1 12/21/2022 07:25 AM    HCT 39.4 12/21/2022 07:25 AM     12/21/2022 07:25 AM       Renal:    Lab Results   Component Value Date/Time     12/21/2022 07:25 AM    K 4.4 12/21/2022 07:25 AM     12/21/2022 07:25 AM    CO2 25 12/21/2022 07:25 AM    BUN 18 12/21/2022 07:25 AM    CREATININE 0.9 12/21/2022 07:25 AM    CALCIUM 9.6 12/21/2022 07:25 AM         Significant Diagnostic Studies    Radiology:   MRI brain without contrast   Final Result   1.  No acute intracranial abnormality. No acute infarct. 2. Large chronic infarct involving the left MCA territory. 3. Loss of the normal signal void within the left internal carotid artery   compatible with chronic occlusion/slow flow. 4. Smaller chronic infarcts within the right parietal lobe and right   cerebellum. 5. Mild global parenchymal volume loss with chronic microvascular ischemic   changes. CTA HEAD NECK W CONTRAST   Final Result   1. No large vessel occlusion of the intracranial arteries. 2. Chronic occlusion of the left internal carotid artery with retrograde   reconstitution of the supraclinoid segment supplying the left ophthalmic   artery. 3. New, age-indeterminate occlusion of the proximal left common carotid   artery. 4. New patent right common-internal carotid artery stent without significant   stenosis. 5. New, age-indeterminate occlusion of the right vertebral artery origin with   collateral reconstitution of the V3 segment. 6. Severe stenosis of the left vertebral artery origin with collateral   reconstitution of the cervical segments. 7. Stable moderate stenosis of the proximal left subclavian artery. 8. 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 CT HEAD WO CONTRAST   Final Result   Large old left MCA infarct which is unchanged. Small old right posterior occipital infarct which is more apparent. Mild atrophy and mild chronic microischemic disease scattered in the deep   white matter which is unchanged with no acute intracranial abnormality.          XR CHEST (2 VW)   Final Result   No acute abnormality                Consults:     IP CONSULT TO NEUROLOGY  IP CONSULT TO PHARMACY    Disposition: Home    Condition at Discharge: Stable    Discharge Instructions/Follow-up:     Follow up with MAT Bailey CNP  Specialty: [facility] [address] [place] [address] Street   [address] an appointment with Leora Sever, APRN - CNP as soon as possible for a visit in 2 week(s)  Specialty: Family Medicine  Post hospital follow up [facility]          Schedule an appointment with Dr. Kamar Duggan MD as soon as possible for a visit in 1 month(s)  Specialty: Neurology [address] [facility] 200   [address]   [id number] Office Visit with Dr. Ameena Goldberg MD  Thursday Jan 5, 2023 1:30 PM [employer] [facility]) Vascular & Endovascular Surgery  [address] [place] [address]   [address] [place] [address] [address] [facility] [address] [facility] [address]  [phone]          Go to Dr. Ameena Goldberg MD  Thursday Jan 5, 2023  Specialty: Vascular Surgery  Post hospital follow up jethro De Rika [facility]   [address] [facility]  [phone]   [id number] Office Visit with Leora Sever, APRN - CNP  Friday Mar 24, 2023 9:40 AM  Please complete digital registration via the [facility]) cailin. If unable to complete the visit pre-check, arrive 15 minutes early. Bring photo ID, insurance card(s), co-pay, medication bottles & completed forms. If you need to cancel/reschedule, please contact the practice at least 24 hours prior to the appointment.   [facility]  [address]       Code Status:  Full Code    Activity: activity as tolerated    Diet: Low-fat low-cholesterol diet      Discharge Medications:     Current Discharge Medication List             Details   levETIRAcetam (KEPPRA) 500 MG tablet Take 1 tablet by mouth 2 times daily  Qty: 60 tablet, Refills: 3                Details   buPROPion (ZYBAN) 150 MG extended release tablet 150 mg once daily for 7 days; increase to 150 mg twice daily  Qty: 60 tablet, Refills: 2    Associated Diagnoses: Tobacco abuse                Details   Multiple Vitamins-Minerals (OCUVITE ADULT FORMULA PO) Take 1 tablet by mouth daily      gabapentin (NEURONTIN) 800 MG tablet Take 1 tablet by mouth 3 times daily for 30 days. Qty: 90 tablet, Refills: 0    Associated Diagnoses: Chronic left-sided low back pain without sciatica      clopidogrel (PLAVIX) 75 MG tablet TAKE ONE TABLET BY MOUTH DAILY  Qty: 90 tablet, Refills: 1    Associated Diagnoses: Essential hypertension      carvedilol (COREG) 6.25 MG tablet Take 1 tablet by mouth 2 times daily (with meals)  Qty: 180 tablet, Refills: 0    Associated Diagnoses: Atherosclerotic heart disease of native coronary artery with other forms of angina pectoris (HCC)      atorvastatin (LIPITOR) 80 MG tablet TAKE ONE TABLET BY MOUTH DAILY  Qty: 90 tablet, Refills: 1      lisinopril (PRINIVIL;ZESTRIL) 20 MG tablet TAKE ONE TABLET BY MOUTH DAILY  Qty: 90 tablet, Refills: 1      Blood Pressure Monitoring (B-D ASSURE BPM/AUTO ARM CUFF) MISC 1 Device by Does not apply route 2 times daily  Qty: 1 each, Refills: 0    Associated Diagnoses: Coronary artery disease involving native heart without angina pectoris, unspecified vessel or lesion type      aspirin 81 MG chewable tablet Take 1 tablet by mouth daily  Qty: 30 tablet, Refills: 3             Time Spent on discharge: 35 minutes in the examination, evaluation, counseling and review of medications and discharge plan. Signed:    Saarh Cosme MD   12/22/2022      Thank you MAT Che CNP for the opportunity to be involved in this patient's care. If you have any questions or concerns, please feel free to contact me at [phone].

## 2022-12-22 NOTE — CARE COORDINATION
Case Management Assessment  Initial Evaluation    Date/Time of Evaluation: 12/22/2022 10:33 AM  Assessment Completed by: NORIS Thompson    If patient is discharged prior to next notation, then this note serves as note for discharge by case management. Patient Name: Loretta May                   YOB: 1965  Diagnosis: TIA (transient ischemic attack) [G45.9]  Balance problem [R26.89]  Numbness and tingling in right hand [R20.0, R20.2]  Stroke-like episode [R29.90]  History of multiple strokes [Z86.73]                   Date / Time: 12/20/2022  6:05 PM    Patient Admission Status: Observation   Readmission Risk (Low < 19, Mod (19-27), High > 27): Readmission Risk Score: 13.4 ( )    Current PCP: MAT Drew CNP  PCP verified by CM? (P) No    Chart Reviewed: Yes      History Provided by: (P) Patient  Patient Orientation: (P) Alert and Oriented    Patient Cognition: (P) Alert    Hospitalization in the last 30 days (Readmission):  No    If yes, Readmission Assessment in CM Navigator will be completed.     Advance Directives:      Code Status: Full Code   Patient's Primary Decision Maker is: (P) Legal Next of Kin    Primary Decision Maker: Danica Galeas - Spouse - [phone]    Secondary Decision Maker: Abdulkadir Quispe Child - [phone]    Discharge Planning:    Patient lives with: Spouse/Significant Other Type of Home: Apartment  Primary Care Giver: (P) Self  Patient Support Systems include: (P) Spouse/Significant Other   Current Financial resources:    Current community resources:    Current services prior to admission: None            Current DME:              Type of Home Care services:  None    ADLS  Prior functional level: (P) Independent in ADLs/IADLs  Current functional level: (P) Independent in ADLs/IADLs    PT AM-PAC: 24 /24  OT AM-PAC: 24 /24    Family can provide assistance at DC: (P) Yes  Would you like Case Management to discuss the discharge plan with any other family members/significant others, and if so, who? Plans to Return to Present Housing: (P) Yes  Other Identified Issues/Barriers to RETURNING to current housing: none noted  Potential Assistance needed at discharge: N/A            Potential DME:    Patient expects to discharge to: [address] [place] [address] for transportation at discharge:      Financial    Payor: Claire Philip / Plan: [employer] HMO / Product Type: Medicare /     Does insurance require precert for SNF: Yes    Potential assistance Purchasing Medications: (P) No  Meds-to-Beds [facility] [id number] [address], [address] [facility] [phone] - F [phone] [address] [facility] [address]  Phone: [phone] Fax: [phone]      Notes:    Factors facilitating achievement of predicted outcomes: Motivated, Cooperative, and Pleasant    Barriers to discharge: Medical complications    Additional Case Management Notes: Referred to patient for d/c planning. Spoke to patient. Patient is a 59year old male admitted for TIA. Patient usually lives at home with wife. Patient reports he is independent in [facility]. Patient currently denies d/c needs. The Plan for Transition of Care is related to the following treatment goals of TIA (transient ischemic attack) [G45.9]  Balance problem [R26.89]  Numbness and tingling in right hand [R20.0, R20.2]  Stroke-like episode [R29.90]  History of multiple strokes [W30.67]    IF APPLICABLE: The Patient and/or patient representative Irais Vaughn and his family were provided with a choice of provider and agrees with the discharge plan. Freedom of choice list with basic dialogue that supports the patient's individualized plan of care/goals and shares the quality data associated with the providers was provided to:     Patient Representative Name:       The Patient and/or Patient Representative Agree with the Discharge Plan?       NORIS Mclaughlin, CORY  Case Management Department  Ph: [phone] Fax: [phone]

## 2022-12-26 DIAGNOSIS — G89.29 CHRONIC LEFT-SIDED LOW BACK PAIN WITHOUT SCIATICA: ICD-10-CM

## 2022-12-26 DIAGNOSIS — M54.50 CHRONIC LEFT-SIDED LOW BACK PAIN WITHOUT SCIATICA: ICD-10-CM

## 2022-12-27 DIAGNOSIS — I25.118 ATHEROSCLEROTIC HEART DISEASE OF NATIVE CORONARY ARTERY WITH OTHER FORMS OF ANGINA PECTORIS (HCC): ICD-10-CM

## 2022-12-27 RX ORDER — GABAPENTIN 800 MG/1
TABLET ORAL
Qty: 90 TABLET | Refills: 2 | Status: SHIPPED | OUTPATIENT
Start: 2022-12-27 | End: 2023-03-27

## 2022-12-27 NOTE — TELEPHONE ENCOUNTER
Refill request for gabapentin  medication. Name of [facility]acy- [facility]      Last visit - 2-4-2022     Pending visit - 3-    Last refill -11-7-2022      Medication Contract signed -PDMP Monitoring:    Last PDMP Diamond Wan as Reviewed:  Review User Review Instant Review Result   Rosanne Wood 7/19/2021  8:51 AM Reviewed PDMP [1]     [unfilled]  Urine Drug Screenings (1 yr)    No resulted procedures found.        Medication Contract and Consent for Opioid Use Documents Filed        No documents found                     Last Emmy Viveros ran-         Additional Comments

## 2022-12-28 RX ORDER — CARVEDILOL 6.25 MG/1
TABLET ORAL
Qty: 180 TABLET | Refills: 1 | Status: SHIPPED | OUTPATIENT
Start: 2022-12-28

## 2022-12-28 NOTE — TELEPHONE ENCOUNTER
Refill request for carvidelol  medication.      Name of Pharmacy- [facility]      Last visit - 11-7-2022     Pending visit - 3-    Last refill -9-      Medication Contract signed -   [place]ers hicks-         Additional Comments

## 2023-01-05 ENCOUNTER — OFFICE VISIT (OUTPATIENT)
Dept: VASCULAR SURGERY | Age: 58
End: 2023-01-05
Payer: MEDICARE

## 2023-01-05 VITALS
DIASTOLIC BLOOD PRESSURE: 70 MMHG | SYSTOLIC BLOOD PRESSURE: 110 MMHG | WEIGHT: 207 LBS | HEIGHT: 67 IN | BODY MASS INDEX: 32.49 KG/M2

## 2023-01-05 DIAGNOSIS — I77.9 VERTEBRAL ARTERY DISEASE (HCC): ICD-10-CM

## 2023-01-05 DIAGNOSIS — Z95.828 PRESENCE OF INTERNAL CAROTID STENT: ICD-10-CM

## 2023-01-05 DIAGNOSIS — I77.9 BILATERAL CAROTID ARTERY DISEASE, UNSPECIFIED TYPE (HCC): Primary | ICD-10-CM

## 2023-01-05 DIAGNOSIS — I65.22 CAROTID OCCLUSION, LEFT: ICD-10-CM

## 2023-01-05 PROCEDURE — 99214 OFFICE O/P EST MOD 30 MIN: CPT | Performed by: SURGERY

## 2023-01-05 PROCEDURE — 3074F SYST BP LT 130 MM HG: CPT | Performed by: SURGERY

## 2023-01-05 PROCEDURE — 3078F DIAST BP <80 MM HG: CPT | Performed by: SURGERY

## 2023-01-05 NOTE — PROGRESS NOTES
Outpatient Follow Up Note    Soila Allen is 59 y.o. male who presents today for  follow up regarding:    Chief Complaint   Patient presents with    Other     Patient is here to discuss vertebral artery occlusion. pamlr        Patient with known L Carotid occlusion and history of R carotid intervention with Carotid stenting performed 5/2020. Remote L MCA CVA. Recent hospitalization for c/o Right arm weakness. MRI failed to show any new infarcts. CTA was performed showing new right Vertebral occlusion and Left Vertebral stenosis. He currently reports continue right arm weakness but improved. He specifically denies lightheadedness, dizziness, gait ataxia.       Past Medical History:   Diagnosis Date    Abdominal pain 7/20/2021    Abnormal stress test     Acute CVA (cerebrovascular accident) ([address] 5/30/2020    Bowel incontinence     Cerebral infarction due to unspecified occlusion or stenosis of unspecified carotid artery ([address] 3/19/2015    Chest pain 3/11/2021    History of cerebral infarction 5/24/2022    Hyperlipidemia     Hypertension     Incisional hernia without obstruction or gangrene 7/9/2021    Incisional hernia, without obstruction or gangrene     Ischemic foot 6/22/2020    PAD (peripheral artery disease) (HCC)     Poor vision     left eye    Pre-diabetes     Sleep apnea     NO CPAP    Stroke ([address] 2016    x3 - memory deficit    Vascular occlusion     Wears partial dentures     upper & lower       Past Surgical History:   Procedure Laterality Date    ABDOMEN SURGERY      colon polyps    CT VISCERAL PERCUTANEOUS DRAIN  7/21/2021    CT VISCERAL PERCUTANEOUS DRAIN 7/21/2021 Ottoniel Tian MD [facility] CT SCAN    FEMORAL BYPASS Right 3/7/2022    REVISION RIGHT LOWER EXTREMITY BYPASS WITH PERICARDIAL PATCH performed by Marleny Chance MD at [facility] Right 06/24/2020    RIGHT FEMORAL TO PERONEAL  BYPASS GRAFT performed by Marleny Chance MD at [place] [address] 7/9/2021    ROBOTIC INCISIONAL HERNIA REPAIR WITH MESH, eTAPP BLOCK performed by Suzon Denver, MD at [facility]     VASCULAR SURGERY       Social History:    Social History     Tobacco Use   Smoking Status Every Day    Packs/day: 0.50    Years: 40.00    Pack years: 20.00    Types: Cigarettes   Smokeless Tobacco Never     Current Medications:  Current Outpatient Medications   Medication Sig Dispense Refill    carvedilol (COREG) 6.25 MG tablet TAKE ONE TABLET BY MOUTH TWICE A DAY WITH A MEAL 180 tablet 1    gabapentin (NEURONTIN) 800 MG tablet TAKE ONE TABLET BY MOUTH THREE TIMES A DAY 90 tablet 2    buPROPion (ZYBAN) 150 MG extended release tablet 150 mg once daily for 7 days; increase to 150 mg twice daily 60 tablet 2    levETIRAcetam (KEPPRA) 500 MG tablet Take 1 tablet by mouth 2 times daily 60 tablet 3    Multiple Vitamins-Minerals (OCUVITE ADULT FORMULA PO) Take 1 tablet by mouth daily      clopidogrel (PLAVIX) 75 MG tablet TAKE ONE TABLET BY MOUTH DAILY 90 tablet 1    atorvastatin (LIPITOR) 80 MG tablet TAKE ONE TABLET BY MOUTH DAILY 90 tablet 1    lisinopril (PRINIVIL;ZESTRIL) 20 MG tablet TAKE ONE TABLET BY MOUTH DAILY 90 tablet 1    Blood Pressure Monitoring (B-D ASSURE BPM/AUTO ARM CUFF) MISC 1 Device by Does not apply route 2 times daily 1 each 0    aspirin 81 MG chewable tablet Take 1 tablet by mouth daily 30 tablet 3     No current facility-administered medications for this visit. Allergies:  Patient has no known allergies. REVIEW OF SYSTEMS:   A 14 point review of systems was completed. Pertinent positives identified in the HPI, all other review of systems negative.       Objective:   PHYSICAL EXAM:        VITALS:  /70 (Site: Right Upper Arm)   Ht 5' 7\" (1.702 m)   Wt 207 lb (93.9 kg)   BMI 32.42 kg/m²   CONSTITUTIONAL: Cooperative, no apparent distress, and appears well nourished / developed  NEUROLOGIC:  Awake and oriented to person, place and time. PSYCH: Calm affect. SKIN: Warm and dry. HEENT: Sclera non-icteric, normocephalic, neck supple, normal carotid pulses with no bruits and thyroid normal size. LUNGS:  No increased work of breathing and clear to auscultation, no crackles or wheezing  CARDIOVASCULAR:  Regular rate and rhythm with no murmurs, gallops, rubs, or abnormal heart sounds, normal PMI. Pulses:    radial   RIGHT 2   LEFT 2     ABDOMEN:  Normal bowel sounds, non-distended and non-tender to palpation  EXT: No edema, no calf tenderness. DATA:      CTA: images have been personally reviewed. Chronic L Carotid occlusion. Patent NICOLE stent. R VErtebral occlusion with high grade L Vertebral origin stenosis. Assessment:      Diagnosis Orders   1. Bilateral carotid artery disease, unspecified type ([address]        2. Vertebral artery disease ([address]          Only possible intervention would be left Vertebral reimplantation or bypass. However he has no symptoms of Vertebral basilar insufficiency. This may however improve cerebral flow as left brain perfusion from collateral right to left and posterior to anterior collateral flow. Plan:   As symptoms decreasing and with lack of VBI symptoms will hold off an any intervention. Will obtain f/u Carotid duplex in 3 months and reassess symptoms at that time.          Audra Son MD, FACS

## 2023-02-21 DIAGNOSIS — I10 ESSENTIAL HYPERTENSION: ICD-10-CM

## 2023-02-21 NOTE — TELEPHONE ENCOUNTER
Refill request for Plavix medication.      Name of [facility]      Last visit - 11/7/2022     Pending visit - 3/24/2023    Last refill -10/19/2022      Medication Contract signed -   Last Oarrs ran-         Additional Comments

## 2023-02-22 RX ORDER — CLOPIDOGREL BISULFATE 75 MG/1
TABLET ORAL
Qty: 90 TABLET | Refills: 1 | Status: SHIPPED | OUTPATIENT
Start: 2023-02-22

## 2023-03-24 ENCOUNTER — OFFICE VISIT (OUTPATIENT)
Dept: INTERNAL MEDICINE CLINIC | Age: 58
End: 2023-03-24

## 2023-03-24 ENCOUNTER — TELEPHONE (OUTPATIENT)
Dept: INTERNAL MEDICINE CLINIC | Age: 58
End: 2023-03-24

## 2023-03-24 VITALS
OXYGEN SATURATION: 97 % | WEIGHT: 206 LBS | HEIGHT: 68 IN | DIASTOLIC BLOOD PRESSURE: 74 MMHG | HEART RATE: 80 BPM | RESPIRATION RATE: 14 BRPM | BODY MASS INDEX: 31.22 KG/M2 | TEMPERATURE: 97 F | SYSTOLIC BLOOD PRESSURE: 128 MMHG

## 2023-03-24 DIAGNOSIS — E78.41 ELEVATED LIPOPROTEIN(A): ICD-10-CM

## 2023-03-24 DIAGNOSIS — F33.0 MAJOR DEPRESSIVE DISORDER, RECURRENT, MILD (HCC): ICD-10-CM

## 2023-03-24 DIAGNOSIS — I25.118 ATHEROSCLEROTIC HEART DISEASE OF NATIVE CORONARY ARTERY WITH OTHER FORMS OF ANGINA PECTORIS (HCC): ICD-10-CM

## 2023-03-24 DIAGNOSIS — I10 PRIMARY HYPERTENSION: ICD-10-CM

## 2023-03-24 DIAGNOSIS — Z72.0 TOBACCO ABUSE: ICD-10-CM

## 2023-03-24 DIAGNOSIS — M54.41 CHRONIC BILATERAL LOW BACK PAIN WITH RIGHT-SIDED SCIATICA: ICD-10-CM

## 2023-03-24 DIAGNOSIS — G89.29 CHRONIC BILATERAL LOW BACK PAIN WITH RIGHT-SIDED SCIATICA: ICD-10-CM

## 2023-03-24 DIAGNOSIS — T82.858D BYPASS GRAFT STENOSIS, SUBSEQUENT ENCOUNTER: ICD-10-CM

## 2023-03-24 DIAGNOSIS — Z72.0 TOBACCO USE: ICD-10-CM

## 2023-03-24 DIAGNOSIS — I65.23 BILATERAL CAROTID ARTERY STENOSIS: ICD-10-CM

## 2023-03-24 DIAGNOSIS — I10 ESSENTIAL HYPERTENSION: Primary | ICD-10-CM

## 2023-03-24 DIAGNOSIS — Z00.00 WELL ADULT EXAM: ICD-10-CM

## 2023-03-24 DIAGNOSIS — R56.9 CONVULSIONS, UNSPECIFIED CONVULSION TYPE (HCC): ICD-10-CM

## 2023-03-24 DIAGNOSIS — E11.65 TYPE 2 DIABETES MELLITUS WITH HYPERGLYCEMIA, WITHOUT LONG-TERM CURRENT USE OF INSULIN (HCC): ICD-10-CM

## 2023-03-24 DIAGNOSIS — Z12.5 SCREENING FOR PROSTATE CANCER: ICD-10-CM

## 2023-03-24 DIAGNOSIS — I70.202 ATHEROSCLEROSIS OF ARTERY OF LEFT LOWER EXTREMITY (HCC): ICD-10-CM

## 2023-03-24 LAB — HBA1C MFR BLD: 6.5 %

## 2023-03-24 RX ORDER — BUPROPION HYDROCHLORIDE 150 MG/1
TABLET, EXTENDED RELEASE ORAL
Qty: 60 TABLET | Refills: 2 | Status: SHIPPED | OUTPATIENT
Start: 2023-03-24

## 2023-03-24 SDOH — ECONOMIC STABILITY: HOUSING INSECURITY
IN THE LAST 12 MONTHS, WAS THERE A TIME WHEN YOU DID NOT HAVE A STEADY PLACE TO SLEEP OR SLEPT IN A SHELTER (INCLUDING NOW)?: NO

## 2023-03-24 SDOH — ECONOMIC STABILITY: FOOD INSECURITY: WITHIN THE PAST 12 MONTHS, THE FOOD YOU BOUGHT JUST DIDN'T LAST AND YOU DIDN'T HAVE MONEY TO GET MORE.: NEVER TRUE

## 2023-03-24 SDOH — ECONOMIC STABILITY: INCOME INSECURITY: HOW HARD IS IT FOR YOU TO PAY FOR THE VERY BASICS LIKE FOOD, HOUSING, MEDICAL CARE, AND HEATING?: NOT HARD AT ALL

## 2023-03-24 SDOH — ECONOMIC STABILITY: FOOD INSECURITY: WITHIN THE PAST 12 MONTHS, YOU WORRIED THAT YOUR FOOD WOULD RUN OUT BEFORE YOU GOT MONEY TO BUY MORE.: NEVER TRUE

## 2023-03-24 ASSESSMENT — ANXIETY QUESTIONNAIRES
1. FEELING NERVOUS, ANXIOUS, OR ON EDGE: 0
7. FEELING AFRAID AS IF SOMETHING AWFUL MIGHT HAPPEN: 0
IF YOU CHECKED OFF ANY PROBLEMS ON THIS QUESTIONNAIRE, HOW DIFFICULT HAVE THESE PROBLEMS MADE IT FOR YOU TO DO YOUR WORK, TAKE CARE OF THINGS AT HOME, OR GET ALONG WITH OTHER PEOPLE: NOT DIFFICULT AT ALL
GAD7 TOTAL SCORE: 0
6. BECOMING EASILY ANNOYED OR IRRITABLE: 0
2. NOT BEING ABLE TO STOP OR CONTROL WORRYING: 0
4. TROUBLE RELAXING: 0
5. BEING SO RESTLESS THAT IT IS HARD TO SIT STILL: 0
3. WORRYING TOO MUCH ABOUT DIFFERENT THINGS: 0

## 2023-03-24 ASSESSMENT — ENCOUNTER SYMPTOMS
SINUS PAIN: 0
BACK PAIN: 1
DIARRHEA: 0
CHEST TIGHTNESS: 0
CONSTIPATION: 0
SHORTNESS OF BREATH: 0
NAUSEA: 0
VOMITING: 0
SORE THROAT: 0
COUGH: 0

## 2023-03-24 ASSESSMENT — PATIENT HEALTH QUESTIONNAIRE - PHQ9
4. FEELING TIRED OR HAVING LITTLE ENERGY: 0
7. TROUBLE CONCENTRATING ON THINGS, SUCH AS READING THE NEWSPAPER OR WATCHING TELEVISION: 0
SUM OF ALL RESPONSES TO PHQ QUESTIONS 1-9: 0
SUM OF ALL RESPONSES TO PHQ QUESTIONS 1-9: 0
1. LITTLE INTEREST OR PLEASURE IN DOING THINGS: 0
8. MOVING OR SPEAKING SO SLOWLY THAT OTHER PEOPLE COULD HAVE NOTICED. OR THE OPPOSITE, BEING SO FIGETY OR RESTLESS THAT YOU HAVE BEEN MOVING AROUND A LOT MORE THAN USUAL: 0
SUM OF ALL RESPONSES TO PHQ QUESTIONS 1-9: 0
SUM OF ALL RESPONSES TO PHQ9 QUESTIONS 1 & 2: 0
6. FEELING BAD ABOUT YOURSELF - OR THAT YOU ARE A FAILURE OR HAVE LET YOURSELF OR YOUR FAMILY DOWN: 0
5. POOR APPETITE OR OVEREATING: 0
2. FEELING DOWN, DEPRESSED OR HOPELESS: 0
SUM OF ALL RESPONSES TO PHQ QUESTIONS 1-9: 0
3. TROUBLE FALLING OR STAYING ASLEEP: 0
9. THOUGHTS THAT YOU WOULD BE BETTER OFF DEAD, OR OF HURTING YOURSELF: 0
10. IF YOU CHECKED OFF ANY PROBLEMS, HOW DIFFICULT HAVE THESE PROBLEMS MADE IT FOR YOU TO DO YOUR WORK, TAKE CARE OF THINGS AT HOME, OR GET ALONG WITH OTHER PEOPLE: 0

## 2023-03-24 NOTE — TELEPHONE ENCOUNTER
The shot was in regard to [address] [place] [address] which is used to aggressively manage cholesterol. This is given 1 shot every two weeks. The goal with this medication is to minimize cholesterol completely as low as possible to reduce risk of heart attack/stroke.

## 2023-03-24 NOTE — ASSESSMENT & PLAN NOTE
At goal, continue current medications, continue current treatment plan, medication adherence emphasized, and lifestyle modifications recommended

## 2023-03-24 NOTE — TELEPHONE ENCOUNTER
Patient's wife, Alexis Meyer, (on HIPAA) called to ask for an overview of what went on at patient's appointment with [facility] today. She said patient said something about some shots that were going to be ordered for him. She would like a phone call to answer her questions.   Alexis Meyer can be reached at [phone]

## 2023-03-27 NOTE — TELEPHONE ENCOUNTER
Called and spoke with patient's wife and wants to know how long he has to take the shot for? If it will be called in at the pharmacy? Can he take it at home or does have to go some where and take it? Where does he take the shot at?  On what part of the body, arm, leg?

## 2023-03-29 ENCOUNTER — TELEPHONE (OUTPATIENT)
Dept: ADMINISTRATIVE | Age: 58
End: 2023-03-29

## 2023-03-29 NOTE — TELEPHONE ENCOUNTER
Submitted PA for [employer]  [employer] Key: [id number]) STATUS: DENIED FOR NOT FORMULARY UNDER MEDICARE PART D. If this requires a response please respond to the pool. [address]). Please advise patient thank you.

## 2023-03-30 ENCOUNTER — TELEMEDICINE (OUTPATIENT)
Dept: INTERNAL MEDICINE CLINIC | Age: 58
End: 2023-03-30

## 2023-03-30 DIAGNOSIS — E78.41 ELEVATED LIPOPROTEIN(A): ICD-10-CM

## 2023-03-30 DIAGNOSIS — I73.9 PAD (PERIPHERAL ARTERY DISEASE) (HCC): ICD-10-CM

## 2023-03-30 DIAGNOSIS — E11.9 TYPE 2 DIABETES MELLITUS WITHOUT COMPLICATION, WITHOUT LONG-TERM CURRENT USE OF INSULIN (HCC): ICD-10-CM

## 2023-03-30 DIAGNOSIS — Z87.891 PERSONAL HISTORY OF TOBACCO USE: ICD-10-CM

## 2023-03-30 DIAGNOSIS — Z87.891 PERSONAL HISTORY OF TOBACCO USE, PRESENTING HAZARDS TO HEALTH: ICD-10-CM

## 2023-03-30 DIAGNOSIS — Z71.89 MEDICATION CARE PLAN DISCUSSED WITH PATIENT: Primary | ICD-10-CM

## 2023-03-30 DIAGNOSIS — Z00.00 MEDICARE ANNUAL WELLNESS VISIT, SUBSEQUENT: ICD-10-CM

## 2023-03-30 RX ORDER — TRAZODONE HYDROCHLORIDE 50 MG/1
50-100 TABLET ORAL NIGHTLY PRN
Qty: 30 TABLET | Refills: 0 | Status: SHIPPED | OUTPATIENT
Start: 2023-03-30

## 2023-03-30 RX ORDER — EZETIMIBE 10 MG/1
10 TABLET ORAL DAILY
Qty: 90 TABLET | Refills: 1 | Status: SHIPPED | OUTPATIENT
Start: 2023-03-30

## 2023-03-30 SDOH — ECONOMIC STABILITY: FOOD INSECURITY: WITHIN THE PAST 12 MONTHS, YOU WORRIED THAT YOUR FOOD WOULD RUN OUT BEFORE YOU GOT MONEY TO BUY MORE.: NEVER TRUE

## 2023-03-30 SDOH — ECONOMIC STABILITY: FOOD INSECURITY: WITHIN THE PAST 12 MONTHS, THE FOOD YOU BOUGHT JUST DIDN'T LAST AND YOU DIDN'T HAVE MONEY TO GET MORE.: NEVER TRUE

## 2023-03-30 SDOH — ECONOMIC STABILITY: INCOME INSECURITY: HOW HARD IS IT FOR YOU TO PAY FOR THE VERY BASICS LIKE FOOD, HOUSING, MEDICAL CARE, AND HEATING?: NOT HARD AT ALL

## 2023-03-30 NOTE — PROGRESS NOTES
Medicare Annual Wellness Visit    Genene Cushing is here for Discuss Medications ([place] .. [phone]) and Medicare AWV    Assessment & Plan   Medication care plan discussed with patient  Personal history of tobacco use, presenting hazards to health  -     [place] Smoking and Tobacco Use Cessation (Intermediate): 3-10 MINUTES [91304]  Personal history of tobacco use  -     CT Lung Screening; Future  Medicare annual wellness visit, subsequent    Elevated lipoprotein(a)  PAD (peripheral artery disease) ([address]    Will trial adding zetia for maximization of oral lipid management. Encouraged daily physical activity to build strength and promote improved circulation of his extremities which is likely the cause of his myalgias. Consider follow up with Dr. Vee Bhat if persistent. Reiterated the importance of smoking cessation as well. Type 2 diabetes mellitus without complication, without long-term current use of insulin (HCC)     Discussed the pathophysiology of DM and its progressive downward trend  Reviewed the need to focus on portion control, eating across the food groups    Encouraged dietary improvement/monitoring to include less simple carbs (candies, desserts, breads/pastas) and emphasis on healthier carbs like fruits (berries) and sources of whole grain to prevent fluctuations in glucose. All of these should still be consumed in moderation, however. Exercise can help utilize excess glucose additionally and can help to lose excess weight. Encouraged updating visual screenings. Monitor for changes to feet (nonhealing wounds, sensation changes). Recommendations for Preventive Services Due: see orders and patient instructions/AVS.  Recommended screening schedule for the next 5-10 years is provided to the patient in written form: see Patient Instructions/AVS.     No follow-ups on file.      Subjective   The following acute and/or chronic problems were also addressed today:    Mr. Fernandes Giovanni presents to discuss

## 2023-03-30 NOTE — TELEPHONE ENCOUNTER
Patient is due to come in the office today to discuss the [address] [place] [address] Avenue.

## 2023-03-31 PROBLEM — E11.9 TYPE 2 DIABETES MELLITUS (HCC): Status: ACTIVE | Noted: 2023-03-31

## 2023-03-31 NOTE — PATIENT INSTRUCTIONS
Pain, pressure, or a strange feeling in the back, neck, jaw, or upper belly or in one or both shoulders or arms.     Lightheadedness or sudden weakness.     A fast or irregular heartbeat. After you call 911, the  may tell you to chew 1 adult-strength or 2 to 4 low-dose aspirin. Wait for an ambulance. Do not try to drive yourself. Watch closely for changes in your health, and be sure to contact your doctor if you have any problems. Where can you learn more? Go to http://www.wiseman.com/ and enter F075 to learn more about \"A Healthy Heart: Care Instructions. \"  Current as of: September 7, 2022               Content Version: 13.6  © 4798-5234 Healthwise, [employer]. Care instructions adapted under license by [employer] ([facility]). If you have questions about a medical condition or this instruction, always ask your healthcare professional. Lay Lantig[employer] any warranty or liability for your use of this information. Personalized Preventive Plan for Cordelia Peralta - 3/30/2023  Medicare offers a range of preventive health benefits. Some of the tests and screenings are paid in full while other may be subject to a deductible, co-insurance, and/or copay. Some of these benefits include a comprehensive review of your medical history including lifestyle, illnesses that may run in your family, and various assessments and screenings as appropriate. After reviewing your medical record and screening and assessments performed today your provider may have ordered immunizations, labs, imaging, and/or referrals for you. A list of these orders (if applicable) as well as your Preventive Care list are included within your After Visit Summary for your review. Other Preventive Recommendations:    A preventive eye exam performed by an eye specialist is recommended every 1-2 years to screen for glaucoma; cataracts, macular degeneration, and other eye disorders.   A preventive dental

## 2023-04-02 DIAGNOSIS — G89.29 CHRONIC LEFT-SIDED LOW BACK PAIN WITHOUT SCIATICA: ICD-10-CM

## 2023-04-02 DIAGNOSIS — M54.50 CHRONIC LEFT-SIDED LOW BACK PAIN WITHOUT SCIATICA: ICD-10-CM

## 2023-04-03 RX ORDER — GABAPENTIN 800 MG/1
TABLET ORAL
Qty: 90 TABLET | Refills: 2 | Status: SHIPPED | OUTPATIENT
Start: 2023-04-03 | End: 2023-07-02

## 2023-04-10 DIAGNOSIS — M54.40 CHRONIC LOW BACK PAIN WITH SCIATICA, SCIATICA LATERALITY UNSPECIFIED, UNSPECIFIED BACK PAIN LATERALITY: Primary | ICD-10-CM

## 2023-04-10 DIAGNOSIS — G89.29 CHRONIC LOW BACK PAIN WITH SCIATICA, SCIATICA LATERALITY UNSPECIFIED, UNSPECIFIED BACK PAIN LATERALITY: Primary | ICD-10-CM

## 2023-04-17 ENCOUNTER — HOSPITAL ENCOUNTER (OUTPATIENT)
Dept: CT IMAGING | Age: 58
Discharge: HOME | End: 2023-04-17
Payer: MEDICARE

## 2023-04-17 DIAGNOSIS — Z87.891 PERSONAL HISTORY OF TOBACCO USE: ICD-10-CM

## 2023-04-17 PROCEDURE — 71271 CT THORAX LUNG CANCER SCR C-: CPT

## 2023-04-19 ENCOUNTER — HOSPITAL ENCOUNTER (OUTPATIENT)
Age: 58
Discharge: HOME | End: 2023-04-19
Payer: MEDICARE

## 2023-04-19 DIAGNOSIS — I25.118 ATHEROSCLEROTIC HEART DISEASE OF NATIVE CORONARY ARTERY WITH OTHER FORMS OF ANGINA PECTORIS (HCC): ICD-10-CM

## 2023-04-19 DIAGNOSIS — I71.22: Primary | ICD-10-CM

## 2023-04-19 DIAGNOSIS — I10 ESSENTIAL HYPERTENSION: ICD-10-CM

## 2023-04-19 DIAGNOSIS — E11.65 TYPE 2 DIABETES MELLITUS WITH HYPERGLYCEMIA, WITHOUT LONG-TERM CURRENT USE OF INSULIN (HCC): ICD-10-CM

## 2023-04-19 DIAGNOSIS — Z00.00 WELL ADULT EXAM: ICD-10-CM

## 2023-04-19 DIAGNOSIS — Z12.5 SCREENING FOR PROSTATE CANCER: ICD-10-CM

## 2023-04-19 LAB
25(OH)D3 SERPL-MCNC: 26.9 NG/ML
ALBUMIN SERPL-MCNC: 4.2 G/DL (ref 3.4–5)
ALBUMIN/GLOB SERPL: 1.6 {RATIO} (ref 1.1–2.2)
ALP SERPL-CCNC: 137 U/L (ref 40–129)
ALT SERPL-CCNC: 11 U/L (ref 10–40)
ANION GAP SERPL CALCULATED.3IONS-SCNC: 13 MMOL/L (ref 3–16)
AST SERPL-CCNC: 15 U/L (ref 15–37)
BASOPHILS # BLD: 0 K/UL (ref 0–0.2)
BASOPHILS NFR BLD: 0.5 %
BILIRUB SERPL-MCNC: <0.2 MG/DL (ref 0–1)
BUN SERPL-MCNC: 12 MG/DL (ref 7–20)
CALCIUM SERPL-MCNC: 9.3 MG/DL (ref 8.3–10.6)
CHLORIDE SERPL-SCNC: 111 MMOL/L (ref 99–110)
CHOLEST SERPL-MCNC: 92 MG/DL (ref 0–199)
CO2 SERPL-SCNC: 22 MMOL/L (ref 21–32)
CREAT SERPL-MCNC: 1 MG/DL (ref 0.9–1.3)
DEPRECATED RDW RBC AUTO: 14.5 % (ref 12.4–15.4)
EOSINOPHIL # BLD: 0.1 K/UL (ref 0–0.6)
EOSINOPHIL NFR BLD: 1.2 %
FOLATE SERPL-MCNC: >20 NG/ML (ref 4.78–24.2)
GFR SERPLBLD CREATININE-BSD FMLA CKD-EPI: >60 ML/MIN/{1.73_M2}
GLUCOSE SERPL-MCNC: 89 MG/DL (ref 70–99)
HCT VFR BLD AUTO: 35.5 % (ref 40.5–52.5)
HDLC SERPL-MCNC: 43 MG/DL (ref 40–60)
HGB BLD-MCNC: 12.1 G/DL (ref 13.5–17.5)
LDLC SERPL CALC-MCNC: 18 MG/DL
LYMPHOCYTES # BLD: 2 K/UL (ref 1–5.1)
LYMPHOCYTES NFR BLD: 26.2 %
MCH RBC QN AUTO: 32.4 PG (ref 26–34)
MCHC RBC AUTO-ENTMCNC: 34 G/DL (ref 31–36)
MCV RBC AUTO: 95.3 FL (ref 80–100)
MONOCYTES # BLD: 0.5 K/UL (ref 0–1.3)
MONOCYTES NFR BLD: 6.9 %
NEUTROPHILS # BLD: 4.9 K/UL (ref 1.7–7.7)
NEUTROPHILS NFR BLD: 65.2 %
PLATELET # BLD AUTO: 252 K/UL (ref 135–450)
PMV BLD AUTO: 6.6 FL (ref 5–10.5)
POTASSIUM SERPL-SCNC: 3.9 MMOL/L (ref 3.5–5.1)
PROT SERPL-MCNC: 6.9 G/DL (ref 6.4–8.2)
PSA SERPL DL<=0.01 NG/ML-MCNC: 2.51 NG/ML (ref 0–4)
RBC # BLD AUTO: 3.73 M/UL (ref 4.2–5.9)
SODIUM SERPL-SCNC: 146 MMOL/L (ref 136–145)
TRIGL SERPL-MCNC: 153 MG/DL (ref 0–150)
TSH SERPL DL<=0.005 MIU/L-ACNC: 0.92 UIU/ML (ref 0.27–4.2)
VIT B12 SERPL-MCNC: 398 PG/ML (ref 211–911)
VLDLC SERPL CALC-MCNC: 31 MG/DL
WBC # BLD AUTO: 7.5 K/UL (ref 4–11)

## 2023-04-19 PROCEDURE — 80061 LIPID PANEL: CPT

## 2023-04-19 PROCEDURE — 82746 ASSAY OF FOLIC ACID SERUM: CPT

## 2023-04-19 PROCEDURE — 82607 VITAMIN B-12: CPT

## 2023-04-19 PROCEDURE — 82306 VITAMIN D 25 HYDROXY: CPT

## 2023-04-19 PROCEDURE — 85025 COMPLETE CBC W/AUTO DIFF WBC: CPT

## 2023-04-19 PROCEDURE — 36415 COLL VENOUS BLD VENIPUNCTURE: CPT

## 2023-04-19 PROCEDURE — 80053 COMPREHEN METABOLIC PANEL: CPT

## 2023-04-19 PROCEDURE — 84153 ASSAY OF PSA TOTAL: CPT

## 2023-04-19 PROCEDURE — 84443 ASSAY THYROID STIM HORMONE: CPT

## 2023-04-19 PROCEDURE — 83036 HEMOGLOBIN GLYCOSYLATED A1C: CPT

## 2023-04-20 LAB
EST. AVERAGE GLUCOSE BLD GHB EST-MCNC: 131.2 MG/DL
HBA1C MFR BLD: 6.2 %

## 2023-04-25 ENCOUNTER — OFFICE VISIT (OUTPATIENT)
Dept: NEUROLOGY | Age: 58
End: 2023-04-25
Payer: MEDICARE

## 2023-04-25 VITALS
SYSTOLIC BLOOD PRESSURE: 125 MMHG | WEIGHT: 202.6 LBS | HEIGHT: 68 IN | HEART RATE: 60 BPM | BODY MASS INDEX: 30.71 KG/M2 | DIASTOLIC BLOOD PRESSURE: 100 MMHG | OXYGEN SATURATION: 97 %

## 2023-04-25 DIAGNOSIS — R56.9 NEW ONSET SEIZURE (HCC): ICD-10-CM

## 2023-04-25 DIAGNOSIS — Z86.73 REMOTE HISTORY OF STROKE: Primary | ICD-10-CM

## 2023-04-25 DIAGNOSIS — I10 HTN (HYPERTENSION), BENIGN: ICD-10-CM

## 2023-04-25 PROCEDURE — 99213 OFFICE O/P EST LOW 20 MIN: CPT | Performed by: PSYCHIATRY & NEUROLOGY

## 2023-04-25 PROCEDURE — 3074F SYST BP LT 130 MM HG: CPT | Performed by: PSYCHIATRY & NEUROLOGY

## 2023-04-25 PROCEDURE — 3080F DIAST BP >= 90 MM HG: CPT | Performed by: PSYCHIATRY & NEUROLOGY

## 2023-04-25 NOTE — PROGRESS NOTES
The patient came today for follow up regarding: History of stroke    The patient came today by himself. He is not a good historian. The patient was last seen about 4 months ago at [facility] when he came in with acute confusion and aphasia. Further work-up revealed large left MCA stroke. He also had a total occlusion of his left CCA. He was discharged home on AP therapy including aspirin and Plavix. He came today for his follow-up. No new changes. Improved right-sided weakness but continues to have intermittent aphasia. Denies any headache or neck or back pain or falling recently. No bladder or bowel issues. He is on aspirin and Plavix. He is on the same blood pressure medications. He is currently on Keppra 500 mg twice daily for possible focal seizure given recurrent weakness. Denies any side effect from [address]. No suicide ideation or thoughts. Blood pressure is controlled on medications. Other review of system was unremarkable. Exam:   Constitutional:   Vitals:    04/25/23 1347   BP: (!) 125/100   Site: Right Wrist   Position: Sitting   Pulse: 60   SpO2: 97%   Weight: 202 lb 9.6 oz (91.9 kg)   Height: 5' 8\" (1.727 m)       General appearance:  Normal development and appear in no acute distress. Mental Status:   Oriented to person, place,   Poor fund of knowledge and immediate recall  Language is poor and slurred speech but able to follow direction. Poor vocabulary  Good attention  Cranial Nerves:   II: Pupils: equal, round, reactive to light  III,IV,VI: Extra Ocular Movements are intact. No nystagmus  V: Facial sensation is intact   VII: Facial strength and movements: intact and symmetric  XII: Tongue movements are normal  Musculoskeletal: 5/5 in left side. 4/5 weakness in the right side  Tone: Normal tone. Coordination: no tremors or drift  DTR: symmetric   Sensation: normal to all modalities in both arms and legs.   Gait/Posture: steady gait     ROS : A 10-14 system review of

## 2023-04-26 ENCOUNTER — HOSPITAL ENCOUNTER (OUTPATIENT)
Dept: VASCULAR LAB | Age: 58
Discharge: HOME | End: 2023-04-26
Payer: MEDICARE

## 2023-04-26 ENCOUNTER — HOSPITAL ENCOUNTER (OUTPATIENT)
Dept: CT IMAGING | Age: 58
Discharge: HOME | End: 2023-04-26
Payer: MEDICARE

## 2023-04-26 DIAGNOSIS — I71.22: ICD-10-CM

## 2023-04-26 DIAGNOSIS — I77.9 VERTEBRAL ARTERY DISEASE (HCC): ICD-10-CM

## 2023-04-26 DIAGNOSIS — Z95.828 PRESENCE OF INTERNAL CAROTID STENT: ICD-10-CM

## 2023-04-26 DIAGNOSIS — I77.9 BILATERAL CAROTID ARTERY DISEASE, UNSPECIFIED TYPE (HCC): ICD-10-CM

## 2023-04-26 DIAGNOSIS — I65.22 CAROTID OCCLUSION, LEFT: ICD-10-CM

## 2023-04-26 PROCEDURE — 6360000004 HC RX CONTRAST MEDICATION: Performed by: SURGERY

## 2023-04-26 PROCEDURE — 93880 EXTRACRANIAL BILAT STUDY: CPT

## 2023-04-26 PROCEDURE — 74175 CTA ABDOMEN W/CONTRAST: CPT

## 2023-04-26 RX ADMIN — IOPAMIDOL 75 ML: 755 INJECTION, SOLUTION INTRAVENOUS at 08:51

## 2023-04-27 ENCOUNTER — TELEPHONE (OUTPATIENT)
Dept: VASCULAR SURGERY | Age: 58
End: 2023-04-27

## 2023-04-27 DIAGNOSIS — I65.23 BILATERAL CAROTID ARTERY STENOSIS: Primary | ICD-10-CM

## 2023-04-27 NOTE — TELEPHONE ENCOUNTER
Called patient regarding carotid duplex scan results per Dr Deandre Gracia. No change and repeat in one year.  Pamlr

## 2023-04-28 ENCOUNTER — TELEPHONE (OUTPATIENT)
Dept: INTERNAL MEDICINE CLINIC | Age: 58
End: 2023-04-28

## 2023-04-28 NOTE — TELEPHONE ENCOUNTER
Wife calling for patient asking for specific stint information; make and specs regarding safety information for an MRI procedure. Information was found under 'procedures' and found in 3/21/21 'Cardiac Catheterization/'    Spoke witch Central scheduling to pass the information along on where they could find it or let them know it could be faxed. I also updated Prabhjot Pimentel, the patient's wife.

## 2023-05-03 RX ORDER — TRAZODONE HYDROCHLORIDE 50 MG/1
TABLET ORAL
Qty: 30 TABLET | Refills: 0 | Status: SHIPPED | OUTPATIENT
Start: 2023-05-03

## 2023-05-03 NOTE — TELEPHONE ENCOUNTER
Refill request for Trazodone medication.      Name of [facility]      Last visit - 3/30/23     Pending visit - 9/29/23    Last refill -3/30/23      Medication Contract signed -   Last Oarrs ran-         Additional Comments

## 2023-05-09 ENCOUNTER — HOSPITAL ENCOUNTER (OUTPATIENT)
Dept: MRI IMAGING | Age: 58
Discharge: HOME | End: 2023-05-09
Payer: MEDICARE

## 2023-05-09 DIAGNOSIS — M54.40 CHRONIC LOW BACK PAIN WITH SCIATICA, SCIATICA LATERALITY UNSPECIFIED, UNSPECIFIED BACK PAIN LATERALITY: ICD-10-CM

## 2023-05-09 DIAGNOSIS — G89.29 CHRONIC LOW BACK PAIN WITH SCIATICA, SCIATICA LATERALITY UNSPECIFIED, UNSPECIFIED BACK PAIN LATERALITY: ICD-10-CM

## 2023-05-09 PROCEDURE — 72148 MRI LUMBAR SPINE W/O DYE: CPT

## 2023-05-11 DIAGNOSIS — M54.50 CHRONIC LEFT-SIDED LOW BACK PAIN WITHOUT SCIATICA: Primary | ICD-10-CM

## 2023-05-11 DIAGNOSIS — G89.29 CHRONIC LEFT-SIDED LOW BACK PAIN WITHOUT SCIATICA: Primary | ICD-10-CM

## 2023-05-26 ENCOUNTER — TELEPHONE (OUTPATIENT)
Dept: INTERNAL MEDICINE CLINIC | Age: 58
End: 2023-05-26

## 2023-05-27 RX ORDER — LEVETIRACETAM 500 MG/1
500 TABLET ORAL 2 TIMES DAILY
Qty: 60 TABLET | Refills: 3 | Status: SHIPPED | OUTPATIENT
Start: 2023-05-27

## 2023-05-27 RX ORDER — TRAZODONE HYDROCHLORIDE 50 MG/1
TABLET ORAL
Qty: 30 TABLET | Refills: 0 | Status: SHIPPED | OUTPATIENT
Start: 2023-05-27

## 2023-05-30 NOTE — TELEPHONE ENCOUNTER
Called and Left message for patient to call back to get the numbers to Dr. Tiara Roy and Dr. Amelie Dai for Pain Management.          Dr. Tiara Roy    [phone]      Dr. Amelie Dai    [phone]

## 2023-05-31 ENCOUNTER — HOSPITAL ENCOUNTER (EMERGENCY)
Age: 58
Discharge: HOME | End: 2023-05-31
Payer: MEDICARE

## 2023-05-31 VITALS
HEART RATE: 72 BPM | BODY MASS INDEX: 29.7 KG/M2 | SYSTOLIC BLOOD PRESSURE: 158 MMHG | DIASTOLIC BLOOD PRESSURE: 83 MMHG | OXYGEN SATURATION: 97 % | TEMPERATURE: 97.4 F | RESPIRATION RATE: 18 BRPM | HEIGHT: 68 IN | WEIGHT: 196 LBS

## 2023-05-31 DIAGNOSIS — M54.42 ACUTE LEFT-SIDED BACK PAIN WITH SCIATICA: Primary | ICD-10-CM

## 2023-05-31 PROCEDURE — 96374 THER/PROPH/DIAG INJ IV PUSH: CPT

## 2023-05-31 PROCEDURE — 6370000000 HC RX 637 (ALT 250 FOR IP): Performed by: REGISTERED NURSE

## 2023-05-31 PROCEDURE — 99284 EMERGENCY DEPT VISIT MOD MDM: CPT

## 2023-05-31 PROCEDURE — 36415 COLL VENOUS BLD VENIPUNCTURE: CPT

## 2023-05-31 PROCEDURE — 6360000002 HC RX W HCPCS: Performed by: REGISTERED NURSE

## 2023-05-31 PROCEDURE — 96372 THER/PROPH/DIAG INJ SC/IM: CPT

## 2023-05-31 RX ORDER — LIDOCAINE 4 G/G
1 PATCH TOPICAL EVERY 24 HOURS
Qty: 30 PATCH | Refills: 0 | Status: SHIPPED | OUTPATIENT
Start: 2023-05-31 | End: 2023-06-30

## 2023-05-31 RX ORDER — METHOCARBAMOL 500 MG/1
500 TABLET, FILM COATED ORAL 4 TIMES DAILY PRN
Qty: 20 TABLET | Refills: 0 | Status: SHIPPED | OUTPATIENT
Start: 2023-05-31 | End: 2023-06-05

## 2023-05-31 RX ORDER — ORPHENADRINE CITRATE 30 MG/ML
60 INJECTION INTRAMUSCULAR; INTRAVENOUS EVERY 12 HOURS
Status: DISCONTINUED | OUTPATIENT
Start: 2023-05-31 | End: 2023-05-31 | Stop reason: HOSPADM

## 2023-05-31 RX ORDER — LIDOCAINE 4 G/G
1 PATCH TOPICAL ONCE
Status: DISCONTINUED | OUTPATIENT
Start: 2023-05-31 | End: 2023-05-31 | Stop reason: HOSPADM

## 2023-05-31 RX ORDER — METHYLPREDNISOLONE 4 MG/1
TABLET ORAL
Qty: 1 KIT | Refills: 0 | Status: SHIPPED | OUTPATIENT
Start: 2023-05-31 | End: 2023-06-06

## 2023-05-31 RX ORDER — KETOROLAC TROMETHAMINE 30 MG/ML
15 INJECTION, SOLUTION INTRAMUSCULAR; INTRAVENOUS ONCE
Status: COMPLETED | OUTPATIENT
Start: 2023-05-31 | End: 2023-05-31

## 2023-05-31 RX ADMIN — ORPHENADRINE CITRATE 60 MG: 60 INJECTION INTRAMUSCULAR; INTRAVENOUS at 18:09

## 2023-05-31 RX ADMIN — KETOROLAC TROMETHAMINE 15 MG: 30 INJECTION, SOLUTION INTRAMUSCULAR; INTRAVENOUS at 18:10

## 2023-05-31 SDOH — ECONOMIC STABILITY: TRANSPORTATION INSECURITY
IN THE PAST 12 MONTHS, HAS LACK OF TRANSPORTATION KEPT YOU FROM MEETINGS, WORK, OR FROM GETTING THINGS NEEDED FOR DAILY LIVING?: NO

## 2023-05-31 SDOH — ECONOMIC STABILITY: TRANSPORTATION INSECURITY
IN THE PAST 12 MONTHS, HAS THE LACK OF TRANSPORTATION KEPT YOU FROM MEDICAL APPOINTMENTS OR FROM GETTING MEDICATIONS?: NO

## 2023-05-31 SDOH — ECONOMIC STABILITY: INCOME INSECURITY: IN THE LAST 12 MONTHS, WAS THERE A TIME WHEN YOU WERE NOT ABLE TO PAY THE MORTGAGE OR RENT ON TIME?: NO

## 2023-05-31 SDOH — ECONOMIC STABILITY: HOUSING INSECURITY: IN THE LAST 12 MONTHS, HOW MANY PLACES HAVE YOU LIVED?: 1

## 2023-05-31 ASSESSMENT — ENCOUNTER SYMPTOMS
BACK PAIN: 1
SORE THROAT: 0
CHEST TIGHTNESS: 0
CONSTIPATION: 0
COLOR CHANGE: 0
SHORTNESS OF BREATH: 0
COUGH: 0
VOMITING: 0
DIARRHEA: 0
NAUSEA: 0
ABDOMINAL PAIN: 0
RHINORRHEA: 0

## 2023-05-31 ASSESSMENT — PAIN - FUNCTIONAL ASSESSMENT: PAIN_FUNCTIONAL_ASSESSMENT: 0-10

## 2023-05-31 ASSESSMENT — PAIN SCALES - GENERAL
PAINLEVEL_OUTOF10: 8
PAINLEVEL_OUTOF10: 7

## 2023-05-31 ASSESSMENT — PAIN DESCRIPTION - ORIENTATION: ORIENTATION: LEFT

## 2023-05-31 ASSESSMENT — PAIN DESCRIPTION - LOCATION: LOCATION: BACK;LEG

## 2023-05-31 NOTE — TELEPHONE ENCOUNTER
Patient's wife returned call and was given the message regarding the names and phone numbers for pain management.

## 2023-05-31 NOTE — ED PROVIDER NOTES
of new injuries or any new or worsening pain I do not feel it is necessary to obtain further imaging or repeat imaging at this time and they were in agreement with this. I discussed with him that his neurologic exam was normal without any abnormal findings. I discussed that with the radiculopathy we could treat with steroids to help with that inflammation and pain as well as muscle relaxants and a lidocaine patch. They were in agreement with this. They stated the only reason they came to the emergency department was they were worried because he was still not able to get into pain management. Wife states that she did forget to call today but plans to call tomorrow to schedule an appointment. I am able to see where the primary care physician gave him referral for  At [facility] office for pain management, wife stated she was calling that office tomorrow to schedule an appointment. Patient was medicated with IM Toradol in the emergency department and stated that his pain was much improved. I did discuss with him medicating with a lidocaine patch or muscle relaxants as well and he stated he preferred to take these at home. I think that this is reasonable. He was given prescriptions for lidocaine patches, Robaxin as well as instructed on use of over-the-counter Tylenol and ibuprofen as needed for pain. He was given strict return precautions for the emergency department including but not limited to worsening pain, changes in sensation such as numbness or tingling, loss of bowel or bladder control, urinary retention, perineal paresthesia or any other new or worsening symptoms. Patient did verbalized understanding of all discharge teaching and was ultimately discharged in a stable condition with all questions answered. Is this patient to be included in the SEP-1 Core Measure due to severe sepsis or septic shock?    No   Exclusion criteria - the patient is NOT to be included for SEP-1 Core Measure due Hydroxychloroquine Pregnancy And Lactation Text: This medication has been shown to cause fetal harm but it isn't assigned a Pregnancy Risk Category. There are small amounts excreted in breast milk.

## 2023-06-05 ENCOUNTER — TELEPHONE (OUTPATIENT)
Dept: INTERNAL MEDICINE CLINIC | Age: 58
End: 2023-06-05

## 2023-06-05 DIAGNOSIS — G89.29 CHRONIC RIGHT-SIDED LOW BACK PAIN WITHOUT SCIATICA: ICD-10-CM

## 2023-06-05 DIAGNOSIS — M54.50 LEFT-SIDED LOW BACK PAIN WITHOUT SCIATICA, UNSPECIFIED CHRONICITY: Primary | ICD-10-CM

## 2023-06-05 DIAGNOSIS — M54.50 CHRONIC RIGHT-SIDED LOW BACK PAIN WITHOUT SCIATICA: ICD-10-CM

## 2023-06-05 NOTE — TELEPHONE ENCOUNTER
Patient's wife is calling today asking for a referral to Dr. Mary Santizo office. Referral pended. Patient is also requesting pain medication d/t his back keeping him awake and it is constant. Please advise.

## 2023-06-09 ENCOUNTER — OFFICE VISIT (OUTPATIENT)
Dept: VASCULAR SURGERY | Age: 58
End: 2023-06-09
Payer: MEDICARE

## 2023-06-09 VITALS
HEIGHT: 68 IN | SYSTOLIC BLOOD PRESSURE: 118 MMHG | WEIGHT: 196 LBS | DIASTOLIC BLOOD PRESSURE: 70 MMHG | BODY MASS INDEX: 29.7 KG/M2

## 2023-06-09 DIAGNOSIS — I73.9 PVD (PERIPHERAL VASCULAR DISEASE) (HCC): ICD-10-CM

## 2023-06-09 DIAGNOSIS — Z95.828 HISTORY OF ARTERIAL BYPASS OF LOWER EXTREMITY: Primary | ICD-10-CM

## 2023-06-09 DIAGNOSIS — I71.43 INFRARENAL ABDOMINAL AORTIC ANEURYSM (AAA) WITHOUT RUPTURE (HCC): ICD-10-CM

## 2023-06-09 DIAGNOSIS — I65.23 BILATERAL CAROTID ARTERY STENOSIS: ICD-10-CM

## 2023-06-09 DIAGNOSIS — I71.22 ANEURYSM OF AORTIC ARCH WITHOUT RUPTURE (HCC): ICD-10-CM

## 2023-06-09 PROCEDURE — 3078F DIAST BP <80 MM HG: CPT | Performed by: SURGERY

## 2023-06-09 PROCEDURE — 99214 OFFICE O/P EST MOD 30 MIN: CPT | Performed by: SURGERY

## 2023-06-09 PROCEDURE — 3074F SYST BP LT 130 MM HG: CPT | Performed by: SURGERY

## 2023-06-09 NOTE — PROGRESS NOTES
Pertinent positives identified in the HPI, all other review of systems negative. Objective:   PHYSICAL EXAM:        VITALS:  /70 (Site: Right Upper Arm)   Ht 5' 8\" (1.727 m)   Wt 196 lb (88.9 kg)   BMI 29.80 kg/m²   CONSTITUTIONAL: Cooperative, no apparent distress, and appears well nourished / developed  NEUROLOGIC:  Awake and oriented to person, place and time. PSYCH: Calm affect. SKIN: Warm and dry. HEENT: Sclera non-icteric, normocephalic, neck supple, No bruits. LUNGS:  No increased work of breathing and clear to auscultation, no crackles or wheezing  CARDIOVASCULAR:  Regular rate and rhythm with no murmurs, gallops, rubs, or abnormal heart sounds, normal PMI. Pulses:    femoral   RIGHT 2   LEFT 2   Palpable bypass graft pulse at medial right knee. ABDOMEN:  Normal bowel sounds, non-distended and non-tender to palpation  EXT: No edema, no calf tenderness. DATA:      CTA:  images reviewed. Small arch aneurysm 2.4cm. Infrarenal AAA marginally increased in diameter to 3.3cm. Patent R common iliac stent. Assessment:      Diagnosis Orders   1. History of arterial bypass of lower extremity  VL DUP LOWER EXTREMITY ARTERIES BILATERAL      2. Infrarenal abdominal aortic aneurysm (AAA) without rupture (HCC)  CTA CHEST ABDOMEN PELVIS W CONTRAST      3. Bilateral carotid artery stenosis        4. 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 PVD (peripheral vascular disease) ([address]  VL DUP LOWER EXTREMITY ARTERIES BILATERAL          AAA marginally increased and does not require treatment at this size- will continue serial imaging surveillance. Plan:     Repeat CTA Chest/Abd/Pelvis in one year. Carotid duplex to be repeated 4/2024  Overdue for bypass graft imaging- will obtain bilateral lower extremity arterial duplex.         Ameena Goldberg MD, FACS

## 2023-06-12 ENCOUNTER — TELEPHONE (OUTPATIENT)
Dept: INTERNAL MEDICINE CLINIC | Age: 58
End: 2023-06-12

## 2023-06-12 NOTE — TELEPHONE ENCOUNTER
Patient's wife is calling today asking if patient could have something for his pain. He has an appointment with the pain management doctor in about 6 weeks.      Please advise

## 2023-06-14 NOTE — TELEPHONE ENCOUNTER
I am extremely limited with pain control supply as I can only provide less than a week and at very low doses.  If this is acceptable I can send

## 2023-06-19 NOTE — TELEPHONE ENCOUNTER
Refill request for traZODone 50 MG TABLET medication.      Name of [address] [place] [address]      Last visit - 6-     Pending visit - 9-9-2023    Last refill - 5-3-2023      Medication Contract signed -   Rogers hicks-         Additional Comments

## 2023-06-20 ENCOUNTER — HOSPITAL ENCOUNTER (OUTPATIENT)
Dept: CT IMAGING | Age: 58
Discharge: HOME | End: 2023-06-20
Attending: SURGERY
Payer: MEDICARE

## 2023-06-20 ENCOUNTER — TELEPHONE (OUTPATIENT)
Dept: VASCULAR SURGERY | Age: 58
End: 2023-06-20

## 2023-06-20 DIAGNOSIS — I70.0 AORTO-ILIAC ATHEROSCLEROSIS (HCC): ICD-10-CM

## 2023-06-20 DIAGNOSIS — I73.9 PVD (PERIPHERAL VASCULAR DISEASE) (HCC): Primary | ICD-10-CM

## 2023-06-20 DIAGNOSIS — I70.8 AORTO-ILIAC ATHEROSCLEROSIS (HCC): ICD-10-CM

## 2023-06-20 DIAGNOSIS — I73.9 PVD (PERIPHERAL VASCULAR DISEASE) (HCC): ICD-10-CM

## 2023-06-20 LAB
PERFORMED ON: NORMAL
POC CREATININE: 1 MG/DL (ref 0.9–1.3)
POC SAMPLE TYPE: NORMAL

## 2023-06-20 PROCEDURE — 75635 CT ANGIO ABDOMINAL ARTERIES: CPT

## 2023-06-20 PROCEDURE — 82565 ASSAY OF CREATININE: CPT

## 2023-06-20 PROCEDURE — 6360000004 HC RX CONTRAST MEDICATION: Performed by: SURGERY

## 2023-06-20 RX ORDER — TRAZODONE HYDROCHLORIDE 50 MG/1
TABLET ORAL
Qty: 30 TABLET | Refills: 1 | Status: SHIPPED | OUTPATIENT
Start: 2023-06-20 | End: 2023-08-16 | Stop reason: SDUPTHER

## 2023-06-20 RX ADMIN — IOPAMIDOL 110 ML: 755 INJECTION, SOLUTION INTRAVENOUS at 17:00

## 2023-06-20 NOTE — TELEPHONE ENCOUNTER
Called patient regarding date and time of cta abdominal aorta with bilateral runoff for at  [place] for today at 5:30pm and arrival of 4:30pm. Npo 4 hours prior. The Precert number is [id number].  Aristeo Detail Level: Zone

## 2023-06-21 DIAGNOSIS — I70.219 ATHEROSCLEROSIS OF ARTERY OF EXTREMITY WITH INTERMITTENT CLAUDICATION (HCC): ICD-10-CM

## 2023-06-21 DIAGNOSIS — Z01.818 PREOPERATIVE CLEARANCE: ICD-10-CM

## 2023-06-21 DIAGNOSIS — I70.202 ATHEROSCLEROSIS OF ARTERY OF LEFT LOWER EXTREMITY (HCC): Primary | ICD-10-CM

## 2023-06-21 DIAGNOSIS — I25.118 ATHEROSCLEROTIC HEART DISEASE OF NATIVE CORONARY ARTERY WITH OTHER FORMS OF ANGINA PECTORIS (HCC): ICD-10-CM

## 2023-06-22 DIAGNOSIS — I25.118 ATHEROSCLEROTIC HEART DISEASE OF NATIVE CORONARY ARTERY WITH OTHER FORMS OF ANGINA PECTORIS (HCC): ICD-10-CM

## 2023-06-23 ENCOUNTER — OFFICE VISIT (OUTPATIENT)
Dept: VASCULAR SURGERY | Age: 58
End: 2023-06-23
Payer: MEDICARE

## 2023-06-23 VITALS
WEIGHT: 194 LBS | BODY MASS INDEX: 29.4 KG/M2 | HEIGHT: 68 IN | SYSTOLIC BLOOD PRESSURE: 150 MMHG | DIASTOLIC BLOOD PRESSURE: 78 MMHG

## 2023-06-23 DIAGNOSIS — T82.898A OCCLUSION OF ARTERIAL BYPASS GRAFT, INITIAL ENCOUNTER (HCC): ICD-10-CM

## 2023-06-23 DIAGNOSIS — I73.9 CLAUDICATION IN PERIPHERAL VASCULAR DISEASE (HCC): Primary | ICD-10-CM

## 2023-06-23 PROCEDURE — 3078F DIAST BP <80 MM HG: CPT | Performed by: SURGERY

## 2023-06-23 PROCEDURE — 99214 OFFICE O/P EST MOD 30 MIN: CPT | Performed by: SURGERY

## 2023-06-23 PROCEDURE — 3077F SYST BP >= 140 MM HG: CPT | Performed by: SURGERY

## 2023-06-23 RX ORDER — ATORVASTATIN CALCIUM 80 MG/1
80 TABLET, FILM COATED ORAL DAILY
Qty: 100 TABLET | Refills: 1 | Status: SHIPPED | OUTPATIENT
Start: 2023-06-23

## 2023-06-23 RX ORDER — CARVEDILOL 6.25 MG/1
6.25 TABLET ORAL 2 TIMES DAILY WITH MEALS
Qty: 200 TABLET | Refills: 1 | Status: SHIPPED | OUTPATIENT
Start: 2023-06-23

## 2023-06-23 RX ORDER — LISINOPRIL 20 MG/1
20 TABLET ORAL DAILY
Qty: 100 TABLET | Refills: 1 | Status: SHIPPED | OUTPATIENT
Start: 2023-06-23

## 2023-06-23 NOTE — PROGRESS NOTES
OR    FEMORAL-TIBIAL BYPASS GRAFT Right 06/24/2020    RIGHT FEMORAL TO PERONEAL  BYPASS GRAFT performed by Eliezer Brennan MD at [address] N/A 7/9/2021    ROBOTIC INCISIONAL HERNIA REPAIR WITH MESH, eTAPP BLOCK performed by Vince Bone MD at [facility]     VASCULAR SURGERY       Social History:    Social History     Tobacco Use   Smoking Status Every Day    Packs/day: 0.50    Years: 40.00    Pack years: 20.00    Types: Cigarettes   Smokeless Tobacco Never     Current Medications:  Current Outpatient Medications   Medication Sig Dispense Refill    atorvastatin (LIPITOR) 80 MG tablet Take 1 tablet by mouth daily 100 tablet 1    carvedilol (COREG) 6.25 MG tablet Take 1 tablet by mouth 2 times daily (with meals) 200 tablet 1    lisinopril (PRINIVIL;ZESTRIL) 20 MG tablet Take 1 tablet by mouth daily 100 tablet 1    traZODone (DESYREL) 50 MG tablet TAKE ONE TO TWO TABLETS BY MOUTH EVERY NIGHT AT BEDTIME AS NEEDED FOR SLEEP 30 tablet 1    diclofenac sodium (VOLTAREN) 1 % GEL Apply 4 g topically 4 times daily 150 g 0    lidocaine 4 % external patch Place 1 patch onto the skin every 24 hours Place 1 patch onto the skin daily 12 hours on, 12 hours off.  30 patch 0    levETIRAcetam (KEPPRA) 500 MG tablet Take 1 tablet by mouth 2 times daily 60 tablet 3    gabapentin (NEURONTIN) 800 MG tablet TAKE ONE TABLET BY MOUTH THREE TIMES A DAY 90 tablet 2    ezetimibe (ZETIA) 10 MG tablet Take 1 tablet by mouth daily 90 tablet 1    buPROPion (ZYBAN) 150 MG extended release tablet 150 mg once daily for 7 days; increase to 150 mg twice daily 60 tablet 2    clopidogrel (PLAVIX) 75 MG tablet TAKE ONE TABLET BY MOUTH DAILY 90 tablet 1    Multiple Vitamins-Minerals (OCUVITE ADULT FORMULA PO) Take 1 tablet by mouth daily      Blood Pressure Monitoring (B-D ASSURE BPM/AUTO ARM CUFF) MISC 1 Device by Does not apply route 2 times daily 1 each 0    aspirin 81 MG chewable

## 2023-06-23 NOTE — TELEPHONE ENCOUNTER
Maria Skiff, APRN - CNP, patient out of refills and patient eligible for up to 100-day supply, if appropriate.  Rx(s) pended for your signature/modification as appropriate    Last Visit: 3/24/23  Next Visit: 9/29/23    Thank you,  Renetta Matthews, PharmD, [facility]  Department, toll free: [phone], option 1
Noted lisinopril, atorvastatin and carvedilol rx for 100ds approved, thank you!     MyChart message sent to patient.     =======================================================   For Pharmacy Admin Tracking Only    Program: [address] in place:  No  Recommendation Provided To: Provider: 3 via Note to Provider  Intervention Detail: New Rx: 3, reason: Improve Adherence  Intervention Accepted By: Provider: 3  Gap Closed?: No   Time Spent (min): 20
atorvastatin & carvedilol (refills appear due @now)  Patient eligible for 100 day supply of rxs    Attempting to reach patient to review. Left message asking for return call. Will pend 100 -day supply request to prescriber.     Last Visit: 3/24/23  Next Visit: 9/29/23    Taniya Tolbert, PharmD, [facility]  Department, toll free: [phone], option 1

## 2023-06-24 DIAGNOSIS — I70.223 REST PAIN OF BOTH LOWER EXTREMITIES DUE TO ATHEROSCLEROSIS (HCC): Primary | ICD-10-CM

## 2023-06-24 RX ORDER — OXYCODONE HYDROCHLORIDE 5 MG/1
5 TABLET ORAL EVERY 6 HOURS PRN
Qty: 8 TABLET | Refills: 0 | Status: SHIPPED | OUTPATIENT
Start: 2023-06-24 | End: 2023-06-26

## 2023-06-24 NOTE — PROGRESS NOTES
Requesting pain medication for rest pain  No change in pain but patient unable to tolerate discomfort at this time  Okay for short course through the weekend

## 2023-06-25 DIAGNOSIS — I25.118 ATHEROSCLEROTIC HEART DISEASE OF NATIVE CORONARY ARTERY WITH OTHER FORMS OF ANGINA PECTORIS (HCC): ICD-10-CM

## 2023-06-26 DIAGNOSIS — M79.606 ISCHEMIC REST PAIN OF LOWER EXTREMITY: Primary | ICD-10-CM

## 2023-06-26 DIAGNOSIS — I99.8 ISCHEMIC REST PAIN OF LOWER EXTREMITY: Primary | ICD-10-CM

## 2023-06-26 RX ORDER — CARVEDILOL 6.25 MG/1
TABLET ORAL
Qty: 180 TABLET | Refills: 3 | Status: SHIPPED | OUTPATIENT
Start: 2023-06-26

## 2023-06-26 RX ORDER — OXYCODONE HYDROCHLORIDE 5 MG/1
5 TABLET ORAL EVERY 6 HOURS PRN
Qty: 28 TABLET | Refills: 0 | Status: SHIPPED | OUTPATIENT
Start: 2023-06-26 | End: 2023-07-03

## 2023-06-30 ENCOUNTER — HOSPITAL ENCOUNTER (OUTPATIENT)
Dept: CARDIOLOGY | Age: 58
Discharge: HOME | End: 2023-06-30
Payer: MEDICARE

## 2023-06-30 DIAGNOSIS — I70.202 ATHEROSCLEROSIS OF ARTERY OF LEFT LOWER EXTREMITY (HCC): ICD-10-CM

## 2023-06-30 DIAGNOSIS — I70.219 ATHEROSCLEROSIS OF ARTERY OF EXTREMITY WITH INTERMITTENT CLAUDICATION (HCC): ICD-10-CM

## 2023-06-30 DIAGNOSIS — Z01.818 PREOPERATIVE CLEARANCE: ICD-10-CM

## 2023-06-30 DIAGNOSIS — I25.118 ATHEROSCLEROTIC HEART DISEASE OF NATIVE CORONARY ARTERY WITH OTHER FORMS OF ANGINA PECTORIS (HCC): ICD-10-CM

## 2023-06-30 LAB
LV EF: 61 %
LVEF MODALITY: NORMAL

## 2023-06-30 PROCEDURE — 3430000000 HC RX DIAGNOSTIC RADIOPHARMACEUTICAL: Performed by: SURGERY

## 2023-06-30 PROCEDURE — 93017 CV STRESS TEST TRACING ONLY: CPT

## 2023-06-30 PROCEDURE — 6360000002 HC RX W HCPCS: Performed by: SURGERY

## 2023-06-30 PROCEDURE — 78452 HT MUSCLE IMAGE SPECT MULT: CPT

## 2023-06-30 PROCEDURE — A9502 TC99M TETROFOSMIN: HCPCS | Performed by: SURGERY

## 2023-06-30 RX ORDER — REGADENOSON 0.08 MG/ML
0.4 INJECTION, SOLUTION INTRAVENOUS
Status: COMPLETED | OUTPATIENT
Start: 2023-06-30 | End: 2023-06-30

## 2023-06-30 RX ADMIN — TETROFOSMIN 11.5 MILLICURIE: 1.38 INJECTION, POWDER, LYOPHILIZED, FOR SOLUTION INTRAVENOUS at 11:55

## 2023-06-30 RX ADMIN — TETROFOSMIN 30.1 MILLICURIE: 1.38 INJECTION, POWDER, LYOPHILIZED, FOR SOLUTION INTRAVENOUS at 13:05

## 2023-06-30 RX ADMIN — REGADENOSON 0.4 MG: 0.08 INJECTION, SOLUTION INTRAVENOUS at 13:05

## 2023-07-05 ENCOUNTER — INITIAL CONSULT (OUTPATIENT)
Dept: SURGERY | Age: 58
End: 2023-07-05

## 2023-07-05 VITALS
HEIGHT: 68 IN | WEIGHT: 193 LBS | SYSTOLIC BLOOD PRESSURE: 127 MMHG | BODY MASS INDEX: 29.25 KG/M2 | DIASTOLIC BLOOD PRESSURE: 81 MMHG | HEART RATE: 77 BPM

## 2023-07-05 DIAGNOSIS — K43.2 INCISIONAL HERNIA, WITHOUT OBSTRUCTION OR GANGRENE: Primary | ICD-10-CM

## 2023-07-05 DIAGNOSIS — G89.29 CHRONIC LEFT-SIDED LOW BACK PAIN WITHOUT SCIATICA: ICD-10-CM

## 2023-07-05 DIAGNOSIS — M54.50 CHRONIC LEFT-SIDED LOW BACK PAIN WITHOUT SCIATICA: ICD-10-CM

## 2023-07-05 RX ORDER — GABAPENTIN 800 MG/1
TABLET ORAL
Qty: 90 TABLET | Refills: 2 | Status: SHIPPED | OUTPATIENT
Start: 2023-07-05 | End: 2023-10-03

## 2023-07-05 NOTE — TELEPHONE ENCOUNTER
Refill request for GABAPENTIN 800 MG TABLET medication.      Name of [address] [place] [address]      Last visit - 6-     Pending visit - 9-    Last refill - 6-8-2023      Medication Contract signed -   Last Jacinta hicks-         Additional Comments

## 2023-07-06 ENCOUNTER — TELEPHONE (OUTPATIENT)
Dept: INTERNAL MEDICINE CLINIC | Age: 58
End: 2023-07-06

## 2023-07-06 ENCOUNTER — TELEPHONE (OUTPATIENT)
Dept: VASCULAR SURGERY | Age: 58
End: 2023-07-06

## 2023-07-06 NOTE — TELEPHONE ENCOUNTER
Called and spoke with the patient's wife and they are still waiting on a place to go to a pain clinic. Gave the information to wife for [address]

## 2023-07-06 NOTE — TELEPHONE ENCOUNTER
----- Message from Abdoul Faizamilagro sent at 7/6/2023 10:38 AM EDT -----  Subject: Message to Provider    QUESTIONS  Information for Provider? Patient needs more info on the pain medicine   referral. Patient has not heard for scheduling and may need a closer   facility. Patient cannot drive on highways. PLEASE CALL. Spoke with   patient and Nubia Uriostegui.   ---------------------------------------------------------------------------  --------------  Chau COWART  [phone]; OK to leave message on voicemail  ---------------------------------------------------------------------------  --------------  SCRIPT ANSWERS  Relationship to Patient?  Self

## 2023-07-06 NOTE — TELEPHONE ENCOUNTER
Called patient with cardiac stress results as cleared for Aorto bifemoral bypass graft and to have his Ventral Hernia fixed by Dr Ann-Marie Ngo. Coordinated for July 19, 2023 at 7:00am and arrive at 5:30am. Npo after midnight and hold Lisinopril day before surgery as well as day of surgery.  Pambetty

## 2023-07-07 DIAGNOSIS — I99.8 ISCHEMIC REST PAIN OF LOWER EXTREMITY: ICD-10-CM

## 2023-07-07 DIAGNOSIS — M79.606 ISCHEMIC REST PAIN OF LOWER EXTREMITY: ICD-10-CM

## 2023-07-07 RX ORDER — OXYCODONE HYDROCHLORIDE 5 MG/1
5 TABLET ORAL EVERY 6 HOURS PRN
Qty: 28 TABLET | Refills: 0 | Status: SHIPPED | OUTPATIENT
Start: 2023-07-07 | End: 2023-07-14

## 2023-07-07 NOTE — PROGRESS NOTES
History:   Social History     Socioeconomic History    Marital status:      Spouse name: Not on file    Number of children: Not on file    Years of education: Not on file    Highest education level: Not on file   Occupational History    Not on file   Tobacco Use    Smoking status: Every Day     Packs/day: 0.50     Years: 40.00     Pack years: 20.00     Types: Cigarettes    Smokeless tobacco: Never   Vaping Use    Vaping Use: Never used   Substance and Sexual Activity    Alcohol use: Not Currently    Drug use: No    Sexual activity: Yes     Partners: Female   Other Topics Concern    Not on file   Social History Narrative    Not on file     Social Determinants of Health     Financial Resource Strain: Low Risk     Difficulty of Paying Living Expenses: Not hard at all   Food Insecurity: No Food Insecurity    Worried About Running Out of Food in the Last Year: Never true    [address] in the Last Year: Never true   Transportation Needs: No Transportation Needs    Lack of Transportation (Medical): No    Lack of Transportation (Non-Medical): No   Physical Activity: Inactive    Days of Exercise per Week: 0 days    Minutes of Exercise per Session: 0 min   Stress: Not on file   Social Connections: Not on file   Intimate Partner Violence: Not on file   Housing Stability: Low Risk     Unable to Pay for Housing in the Last Year: No    Number of Places Lived in the Last Year: 1    Unstable Housing in the Last Year: No         Family History:        Problem Relation Age of Onset    Diabetes Mother        REVIEW OF SYSTEMS:  CONSTITUTIONAL:  negative  HEENT:  negative  RESPIRATORY:  negative  CARDIOVASCULAR:  negative  GASTROINTESTINAL:  negative  GENITOURINARY:  negative  HEMATOLOGIC/LYMPHATIC:  negative  NEUROLOGICAL:  Negative  * All other ROS reviewed and negative.        PHYSICAL EXAM:  VITALS:  /81 (Site: Left Wrist, Position: Sitting, Cuff Size: Medium Adult)   Pulse 77   Ht 5' 8\" (1.727 m)   Wt 193 lb

## 2023-07-10 ENCOUNTER — TELEPHONE (OUTPATIENT)
Dept: VASCULAR SURGERY | Age: 58
End: 2023-07-10

## 2023-07-10 NOTE — TELEPHONE ENCOUNTER
Called patient and spoke with the spouse regarding his surgery for July 19, 2023. Patient to arrive at 5:30am for 7:00am surgery. Npo after midnight. He was told to stop Plavix already. No more. Also told to hold Lisinopril day before and day of surgery.  Aristeo

## 2023-07-10 NOTE — TELEPHONE ENCOUNTER
Per Dr Naomi Machado. Patient was told to stay off Plavix prior to his surgery of July 19, 2023. balaji

## 2023-07-17 ENCOUNTER — PREP FOR PROCEDURE (OUTPATIENT)
Dept: VASCULAR SURGERY | Age: 58
End: 2023-07-17

## 2023-07-17 DIAGNOSIS — Z01.818 PREOPERATIVE TESTING: Primary | ICD-10-CM

## 2023-07-17 RX ORDER — SODIUM CHLORIDE 0.9 % (FLUSH) 0.9 %
5-40 SYRINGE (ML) INJECTION EVERY 12 HOURS SCHEDULED
Status: CANCELLED | OUTPATIENT
Start: 2023-07-17

## 2023-07-17 RX ORDER — SODIUM CHLORIDE 0.9 % (FLUSH) 0.9 %
5-40 SYRINGE (ML) INJECTION PRN
Status: CANCELLED | OUTPATIENT
Start: 2023-07-17

## 2023-07-17 RX ORDER — SODIUM CHLORIDE 9 MG/ML
INJECTION, SOLUTION INTRAVENOUS PRN
Status: CANCELLED | OUTPATIENT
Start: 2023-07-17

## 2023-07-18 ENCOUNTER — ANESTHESIA EVENT (OUTPATIENT)
Dept: OPERATING ROOM | Age: 58
End: 2023-07-18
Payer: MEDICARE

## 2023-07-19 ENCOUNTER — APPOINTMENT (OUTPATIENT)
Dept: GENERAL RADIOLOGY | Age: 58
DRG: 270 | End: 2023-07-19
Attending: SURGERY
Payer: MEDICARE

## 2023-07-19 ENCOUNTER — HOSPITAL ENCOUNTER (INPATIENT)
Age: 58
LOS: 21 days | Discharge: HOME HEALTH | DRG: 270 | End: 2023-08-09
Attending: SURGERY | Admitting: SURGERY
Payer: MEDICARE

## 2023-07-19 ENCOUNTER — ANESTHESIA (OUTPATIENT)
Dept: OPERATING ROOM | Age: 58
End: 2023-07-19
Payer: MEDICARE

## 2023-07-19 PROBLEM — I74.5 ILIAC ARTERY OCCLUSION (HCC): Status: ACTIVE | Noted: 2023-07-19

## 2023-07-19 PROBLEM — T82.898A OCCLUSION OF ARTERIAL BYPASS GRAFT (HCC): Status: ACTIVE | Noted: 2023-07-19

## 2023-07-19 LAB
ABO + RH BLD: NORMAL
ANION GAP SERPL CALCULATED.3IONS-SCNC: 10 MMOL/L (ref 3–16)
ANION GAP SERPL CALCULATED.3IONS-SCNC: 12 MMOL/L (ref 3–16)
BASE EXCESS BLDA CALC-SCNC: -10.7 MMOL/L (ref -3–3)
BASOPHILS # BLD: 0.1 K/UL (ref 0–0.2)
BASOPHILS NFR BLD: 0.6 %
BLD GP AB SCN SERPL QL: NORMAL
BUN SERPL-MCNC: 18 MG/DL (ref 7–20)
BUN SERPL-MCNC: 20 MG/DL (ref 7–20)
CALCIUM SERPL-MCNC: 10 MG/DL (ref 8.3–10.6)
CALCIUM SERPL-MCNC: 7.5 MG/DL (ref 8.3–10.6)
CHLORIDE SERPL-SCNC: 104 MMOL/L (ref 99–110)
CHLORIDE SERPL-SCNC: 109 MMOL/L (ref 99–110)
CO2 BLDA-SCNC: 17.8 MMOL/L
CO2 SERPL-SCNC: 15 MMOL/L (ref 21–32)
CO2 SERPL-SCNC: 24 MMOL/L (ref 21–32)
COHGB MFR BLDA: 0.8 % (ref 0–1.5)
CREAT SERPL-MCNC: 1 MG/DL (ref 0.9–1.3)
CREAT SERPL-MCNC: 1 MG/DL (ref 0.9–1.3)
DEPRECATED RDW RBC AUTO: 15.3 % (ref 12.4–15.4)
DEPRECATED RDW RBC AUTO: 15.3 % (ref 12.4–15.4)
EOSINOPHIL # BLD: 0.3 K/UL (ref 0–0.6)
EOSINOPHIL NFR BLD: 2.4 %
GFR SERPLBLD CREATININE-BSD FMLA CKD-EPI: >60 ML/MIN/{1.73_M2}
GFR SERPLBLD CREATININE-BSD FMLA CKD-EPI: >60 ML/MIN/{1.73_M2}
GLUCOSE BLD-MCNC: 205 MG/DL (ref 70–99)
GLUCOSE SERPL-MCNC: 144 MG/DL (ref 70–99)
GLUCOSE SERPL-MCNC: 282 MG/DL (ref 70–99)
HCO3 BLDA-SCNC: 16.5 MMOL/L (ref 21–29)
HCT VFR BLD AUTO: 39.1 % (ref 40.5–52.5)
HCT VFR BLD AUTO: 40.7 % (ref 40.5–52.5)
HGB BLD-MCNC: 13.1 G/DL (ref 13.5–17.5)
HGB BLD-MCNC: 13.8 G/DL (ref 13.5–17.5)
HGB BLDA-MCNC: 13.1 G/DL (ref 13.5–17.5)
LYMPHOCYTES # BLD: 2.2 K/UL (ref 1–5.1)
LYMPHOCYTES NFR BLD: 19.5 %
MCH RBC QN AUTO: 32.9 PG (ref 26–34)
MCH RBC QN AUTO: 33 PG (ref 26–34)
MCHC RBC AUTO-ENTMCNC: 33.4 G/DL (ref 31–36)
MCHC RBC AUTO-ENTMCNC: 34 G/DL (ref 31–36)
MCV RBC AUTO: 96.7 FL (ref 80–100)
MCV RBC AUTO: 98.7 FL (ref 80–100)
METHGB MFR BLDA: 0.4 %
MONOCYTES # BLD: 0.8 K/UL (ref 0–1.3)
MONOCYTES NFR BLD: 7.3 %
NEUTROPHILS # BLD: 7.8 K/UL (ref 1.7–7.7)
NEUTROPHILS NFR BLD: 70.2 %
O2 THERAPY: ABNORMAL
PCO2 BLDA: 41.5 MMHG (ref 35–45)
PERFORMED ON: ABNORMAL
PH BLDA: 7.22 [PH] (ref 7.35–7.45)
PLATELET # BLD AUTO: 245 K/UL (ref 135–450)
PLATELET # BLD AUTO: 249 K/UL (ref 135–450)
PMV BLD AUTO: 6.3 FL (ref 5–10.5)
PMV BLD AUTO: 6.7 FL (ref 5–10.5)
PO2 BLDA: 65.1 MMHG (ref 75–108)
POTASSIUM SERPL-SCNC: 4.3 MMOL/L (ref 3.5–5.1)
POTASSIUM SERPL-SCNC: 5.6 MMOL/L (ref 3.5–5.1)
RBC # BLD AUTO: 3.96 M/UL (ref 4.2–5.9)
RBC # BLD AUTO: 4.21 M/UL (ref 4.2–5.9)
SAO2 % BLDA: 88.7 %
SODIUM SERPL-SCNC: 134 MMOL/L (ref 136–145)
SODIUM SERPL-SCNC: 140 MMOL/L (ref 136–145)
WBC # BLD AUTO: 11.2 K/UL (ref 4–11)
WBC # BLD AUTO: 16.9 K/UL (ref 4–11)

## 2023-07-19 PROCEDURE — 86901 BLOOD TYPING SEROLOGIC RH(D): CPT

## 2023-07-19 PROCEDURE — A4217 STERILE WATER/SALINE, 500 ML: HCPCS | Performed by: SURGERY

## 2023-07-19 PROCEDURE — 37799 UNLISTED PX VASCULAR SURGERY: CPT

## 2023-07-19 PROCEDURE — 94761 N-INVAS EAR/PLS OXIMETRY MLT: CPT

## 2023-07-19 PROCEDURE — 04100AJ BYPASS ABDOMINAL AORTA TO LEFT FEMORAL ARTERY WITH AUTOLOGOUS ARTERIAL TISSUE, OPEN APPROACH: ICD-10-PCS | Performed by: SURGERY

## 2023-07-19 PROCEDURE — 49617 RPR AA HRN RCR > 10 RDC: CPT | Performed by: SURGERY

## 2023-07-19 PROCEDURE — 71045 X-RAY EXAM CHEST 1 VIEW: CPT

## 2023-07-19 PROCEDURE — 3600000017 HC SURGERY HYBRID ADDL 15MIN: Performed by: SURGERY

## 2023-07-19 PROCEDURE — C9399 UNCLASSIFIED DRUGS OR BIOLOG: HCPCS | Performed by: NURSE ANESTHETIST, CERTIFIED REGISTERED

## 2023-07-19 PROCEDURE — 93005 ELECTROCARDIOGRAM TRACING: CPT | Performed by: ANESTHESIOLOGY

## 2023-07-19 PROCEDURE — 2000000000 HC ICU R&B

## 2023-07-19 PROCEDURE — 82803 BLOOD GASES ANY COMBINATION: CPT

## 2023-07-19 PROCEDURE — 2500000003 HC RX 250 WO HCPCS: Performed by: NURSE ANESTHETIST, CERTIFIED REGISTERED

## 2023-07-19 PROCEDURE — 86850 RBC ANTIBODY SCREEN: CPT

## 2023-07-19 PROCEDURE — 3E0T3BZ INTRODUCTION OF ANESTHETIC AGENT INTO PERIPHERAL NERVES AND PLEXI, PERCUTANEOUS APPROACH: ICD-10-PCS | Performed by: ANESTHESIOLOGY

## 2023-07-19 PROCEDURE — 2580000003 HC RX 258: Performed by: SURGERY

## 2023-07-19 PROCEDURE — 6360000002 HC RX W HCPCS: Performed by: SURGERY

## 2023-07-19 PROCEDURE — 35646 BPG AORTOBIFEMORAL: CPT | Performed by: SURGERY

## 2023-07-19 PROCEDURE — 80048 BASIC METABOLIC PNL TOTAL CA: CPT

## 2023-07-19 PROCEDURE — 3700000001 HC ADD 15 MINUTES (ANESTHESIA): Performed by: SURGERY

## 2023-07-19 PROCEDURE — 85025 COMPLETE CBC W/AUTO DIFF WBC: CPT

## 2023-07-19 PROCEDURE — 6370000000 HC RX 637 (ALT 250 FOR IP): Performed by: SURGERY

## 2023-07-19 PROCEDURE — 86900 BLOOD TYPING SEROLOGIC ABO: CPT

## 2023-07-19 PROCEDURE — C1781 MESH (IMPLANTABLE): HCPCS | Performed by: SURGERY

## 2023-07-19 PROCEDURE — 2709999900 HC NON-CHARGEABLE SUPPLY: Performed by: SURGERY

## 2023-07-19 PROCEDURE — 02HV33Z INSERTION OF INFUSION DEVICE INTO SUPERIOR VENA CAVA, PERCUTANEOUS APPROACH: ICD-10-PCS | Performed by: SURGERY

## 2023-07-19 PROCEDURE — 2580000003 HC RX 258: Performed by: NURSE ANESTHETIST, CERTIFIED REGISTERED

## 2023-07-19 PROCEDURE — 6360000002 HC RX W HCPCS: Performed by: NURSE ANESTHETIST, CERTIFIED REGISTERED

## 2023-07-19 PROCEDURE — C1768 GRAFT, VASCULAR: HCPCS | Performed by: SURGERY

## 2023-07-19 PROCEDURE — 2700000000 HC OXYGEN THERAPY PER DAY

## 2023-07-19 PROCEDURE — 3600000007 HC SURGERY HYBRID BASE: Performed by: SURGERY

## 2023-07-19 PROCEDURE — 2500000003 HC RX 250 WO HCPCS

## 2023-07-19 PROCEDURE — 3700000000 HC ANESTHESIA ATTENDED CARE: Performed by: SURGERY

## 2023-07-19 PROCEDURE — 0WUF0JZ SUPPLEMENT ABDOMINAL WALL WITH SYNTHETIC SUBSTITUTE, OPEN APPROACH: ICD-10-PCS | Performed by: SURGERY

## 2023-07-19 PROCEDURE — 64488 TAP BLOCK BI INJECTION: CPT | Performed by: ANESTHESIOLOGY

## 2023-07-19 PROCEDURE — 35697 REIMPLANT ARTERY EACH: CPT | Performed by: SURGERY

## 2023-07-19 PROCEDURE — 2500000003 HC RX 250 WO HCPCS: Performed by: SURGERY

## 2023-07-19 PROCEDURE — C2628 CATHETER, OCCLUSION: HCPCS | Performed by: SURGERY

## 2023-07-19 PROCEDURE — 85027 COMPLETE CBC AUTOMATED: CPT

## 2023-07-19 PROCEDURE — 03HB33Z INSERTION OF INFUSION DEVICE INTO RIGHT RADIAL ARTERY, PERCUTANEOUS APPROACH: ICD-10-PCS | Performed by: ANESTHESIOLOGY

## 2023-07-19 DEVICE — VENTRIO ST HERNIA PATCH, 15.5 CM X 25.7 CM (6.1" X 10.1"), OVAL
Type: IMPLANTABLE DEVICE | Site: ABDOMEN | Status: FUNCTIONAL
Brand: VENTRIO

## 2023-07-19 DEVICE — HEMASHIELD GOLD KNITTED MICROVEL BIFURCATED DOUBLE VELOUR VASCULAR GRAFT WITH GRAFT SIZER ACCESSORY
Type: IMPLANTABLE DEVICE | Site: ABDOMEN | Status: FUNCTIONAL
Brand: HEMASHIELD

## 2023-07-19 RX ORDER — POTASSIUM CHLORIDE 29.8 MG/ML
20 INJECTION INTRAVENOUS PRN
Status: DISCONTINUED | OUTPATIENT
Start: 2023-07-19 | End: 2023-07-23

## 2023-07-19 RX ORDER — FENTANYL CITRATE 50 UG/ML
INJECTION, SOLUTION INTRAMUSCULAR; INTRAVENOUS PRN
Status: DISCONTINUED | OUTPATIENT
Start: 2023-07-19 | End: 2023-07-19 | Stop reason: SDUPTHER

## 2023-07-19 RX ORDER — INSULIN LISPRO 100 [IU]/ML
0-4 INJECTION, SOLUTION INTRAVENOUS; SUBCUTANEOUS NIGHTLY
Status: DISCONTINUED | OUTPATIENT
Start: 2023-07-19 | End: 2023-07-19 | Stop reason: DRUGHIGH

## 2023-07-19 RX ORDER — SODIUM CHLORIDE 9 MG/ML
INJECTION, SOLUTION INTRAVENOUS PRN
Status: DISCONTINUED | OUTPATIENT
Start: 2023-07-19 | End: 2023-08-09 | Stop reason: HOSPADM

## 2023-07-19 RX ORDER — INSULIN LISPRO 100 [IU]/ML
0-8 INJECTION, SOLUTION INTRAVENOUS; SUBCUTANEOUS
Status: DISCONTINUED | OUTPATIENT
Start: 2023-07-19 | End: 2023-07-19

## 2023-07-19 RX ORDER — ONDANSETRON 2 MG/ML
4 INJECTION INTRAMUSCULAR; INTRAVENOUS EVERY 6 HOURS PRN
Status: DISCONTINUED | OUTPATIENT
Start: 2023-07-19 | End: 2023-08-09 | Stop reason: HOSPADM

## 2023-07-19 RX ORDER — ONDANSETRON 2 MG/ML
4 INJECTION INTRAMUSCULAR; INTRAVENOUS
Status: DISCONTINUED | OUTPATIENT
Start: 2023-07-19 | End: 2023-07-19

## 2023-07-19 RX ORDER — PROPOFOL 10 MG/ML
INJECTION, EMULSION INTRAVENOUS PRN
Status: DISCONTINUED | OUTPATIENT
Start: 2023-07-19 | End: 2023-07-19 | Stop reason: SDUPTHER

## 2023-07-19 RX ORDER — SODIUM CHLORIDE 0.9 % (FLUSH) 0.9 %
5-40 SYRINGE (ML) INJECTION EVERY 12 HOURS SCHEDULED
Status: DISCONTINUED | OUTPATIENT
Start: 2023-07-19 | End: 2023-08-09 | Stop reason: HOSPADM

## 2023-07-19 RX ORDER — HYDROMORPHONE HCL 110MG/55ML
PATIENT CONTROLLED ANALGESIA SYRINGE INTRAVENOUS PRN
Status: DISCONTINUED | OUTPATIENT
Start: 2023-07-19 | End: 2023-07-19 | Stop reason: SDUPTHER

## 2023-07-19 RX ORDER — OXYCODONE HYDROCHLORIDE 5 MG/1
10 TABLET ORAL PRN
Status: DISCONTINUED | OUTPATIENT
Start: 2023-07-19 | End: 2023-07-19

## 2023-07-19 RX ORDER — PHENYLEPHRINE HCL IN 0.9% NACL 1 MG/10 ML
SYRINGE (ML) INTRAVENOUS PRN
Status: DISCONTINUED | OUTPATIENT
Start: 2023-07-19 | End: 2023-07-19 | Stop reason: SDUPTHER

## 2023-07-19 RX ORDER — HEPARIN SODIUM 1000 [USP'U]/ML
INJECTION, SOLUTION INTRAVENOUS; SUBCUTANEOUS PRN
Status: DISCONTINUED | OUTPATIENT
Start: 2023-07-19 | End: 2023-07-19 | Stop reason: SDUPTHER

## 2023-07-19 RX ORDER — SODIUM CHLORIDE, SODIUM LACTATE, POTASSIUM CHLORIDE, CALCIUM CHLORIDE 600; 310; 30; 20 MG/100ML; MG/100ML; MG/100ML; MG/100ML
INJECTION, SOLUTION INTRAVENOUS CONTINUOUS
Status: DISCONTINUED | OUTPATIENT
Start: 2023-07-19 | End: 2023-07-19

## 2023-07-19 RX ORDER — MEPERIDINE HYDROCHLORIDE 50 MG/ML
12.5 INJECTION INTRAMUSCULAR; INTRAVENOUS; SUBCUTANEOUS EVERY 5 MIN PRN
Status: DISCONTINUED | OUTPATIENT
Start: 2023-07-19 | End: 2023-07-19

## 2023-07-19 RX ORDER — SODIUM CHLORIDE 9 MG/ML
INJECTION, SOLUTION INTRAVENOUS PRN
Status: DISCONTINUED | OUTPATIENT
Start: 2023-07-19 | End: 2023-07-19 | Stop reason: HOSPADM

## 2023-07-19 RX ORDER — HYDRALAZINE HYDROCHLORIDE 20 MG/ML
10 INJECTION INTRAMUSCULAR; INTRAVENOUS
Status: DISCONTINUED | OUTPATIENT
Start: 2023-07-19 | End: 2023-08-09 | Stop reason: HOSPADM

## 2023-07-19 RX ORDER — SODIUM CHLORIDE 0.9 % (FLUSH) 0.9 %
5-40 SYRINGE (ML) INJECTION EVERY 12 HOURS SCHEDULED
Status: DISCONTINUED | OUTPATIENT
Start: 2023-07-19 | End: 2023-07-19 | Stop reason: HOSPADM

## 2023-07-19 RX ORDER — MIDAZOLAM HYDROCHLORIDE 1 MG/ML
INJECTION INTRAMUSCULAR; INTRAVENOUS PRN
Status: DISCONTINUED | OUTPATIENT
Start: 2023-07-19 | End: 2023-07-19 | Stop reason: SDUPTHER

## 2023-07-19 RX ORDER — MANNITOL 250 MG/ML
INJECTION, SOLUTION INTRAVENOUS PRN
Status: DISCONTINUED | OUTPATIENT
Start: 2023-07-19 | End: 2023-07-19 | Stop reason: SDUPTHER

## 2023-07-19 RX ORDER — MAGNESIUM SULFATE 1 G/100ML
1000 INJECTION INTRAVENOUS PRN
Status: DISCONTINUED | OUTPATIENT
Start: 2023-07-19 | End: 2023-08-09 | Stop reason: HOSPADM

## 2023-07-19 RX ORDER — NITROGLYCERIN 5 MG/ML
INJECTION, SOLUTION INTRAVENOUS PRN
Status: DISCONTINUED | OUTPATIENT
Start: 2023-07-19 | End: 2023-07-19 | Stop reason: SDUPTHER

## 2023-07-19 RX ORDER — MORPHINE SULFATE/0.9% NACL/PF 1 MG/ML
SYRINGE (ML) INJECTION CONTINUOUS
Status: DISCONTINUED | OUTPATIENT
Start: 2023-07-19 | End: 2023-07-23

## 2023-07-19 RX ORDER — INSULIN LISPRO 100 [IU]/ML
0-8 INJECTION, SOLUTION INTRAVENOUS; SUBCUTANEOUS EVERY 4 HOURS
Status: DISCONTINUED | OUTPATIENT
Start: 2023-07-19 | End: 2023-08-09 | Stop reason: HOSPADM

## 2023-07-19 RX ORDER — SODIUM CHLORIDE 0.9 % (FLUSH) 0.9 %
5-40 SYRINGE (ML) INJECTION PRN
Status: DISCONTINUED | OUTPATIENT
Start: 2023-07-19 | End: 2023-08-09 | Stop reason: HOSPADM

## 2023-07-19 RX ORDER — ONDANSETRON 2 MG/ML
INJECTION INTRAMUSCULAR; INTRAVENOUS PRN
Status: DISCONTINUED | OUTPATIENT
Start: 2023-07-19 | End: 2023-07-19 | Stop reason: SDUPTHER

## 2023-07-19 RX ORDER — METOPROLOL TARTRATE 5 MG/5ML
5 INJECTION INTRAVENOUS EVERY 6 HOURS
Status: DISCONTINUED | OUTPATIENT
Start: 2023-07-19 | End: 2023-07-28

## 2023-07-19 RX ORDER — SODIUM CHLORIDE 0.9 % (FLUSH) 0.9 %
5-40 SYRINGE (ML) INJECTION EVERY 12 HOURS SCHEDULED
Status: DISCONTINUED | OUTPATIENT
Start: 2023-07-19 | End: 2023-07-19

## 2023-07-19 RX ORDER — SODIUM CHLORIDE 0.9 % (FLUSH) 0.9 %
5-40 SYRINGE (ML) INJECTION PRN
Status: DISCONTINUED | OUTPATIENT
Start: 2023-07-19 | End: 2023-07-19

## 2023-07-19 RX ORDER — LABETALOL HYDROCHLORIDE 5 MG/ML
5 INJECTION, SOLUTION INTRAVENOUS EVERY 10 MIN PRN
Status: DISCONTINUED | OUTPATIENT
Start: 2023-07-19 | End: 2023-07-19

## 2023-07-19 RX ORDER — CEFAZOLIN SODIUM IN 0.9 % NACL 2 G/100 ML
2000 PLASTIC BAG, INJECTION (ML) INTRAVENOUS
Status: COMPLETED | OUTPATIENT
Start: 2023-07-19 | End: 2023-07-19

## 2023-07-19 RX ORDER — HYDROMORPHONE HYDROCHLORIDE 1 MG/ML
0.5 INJECTION, SOLUTION INTRAMUSCULAR; INTRAVENOUS; SUBCUTANEOUS EVERY 10 MIN PRN
Status: DISCONTINUED | OUTPATIENT
Start: 2023-07-19 | End: 2023-07-19

## 2023-07-19 RX ORDER — NITROGLYCERIN 20 MG/100ML
5 INJECTION INTRAVENOUS CONTINUOUS PRN
Status: DISCONTINUED | OUTPATIENT
Start: 2023-07-19 | End: 2023-07-23

## 2023-07-19 RX ORDER — PROMETHAZINE HYDROCHLORIDE 25 MG/1
12.5 TABLET ORAL EVERY 6 HOURS PRN
Status: DISCONTINUED | OUTPATIENT
Start: 2023-07-19 | End: 2023-08-09 | Stop reason: HOSPADM

## 2023-07-19 RX ORDER — LEVETIRACETAM 500 MG/1
500 TABLET ORAL 2 TIMES DAILY
Status: DISCONTINUED | OUTPATIENT
Start: 2023-07-19 | End: 2023-08-09 | Stop reason: HOSPADM

## 2023-07-19 RX ORDER — OXYCODONE HYDROCHLORIDE 5 MG/1
5 TABLET ORAL PRN
Status: DISCONTINUED | OUTPATIENT
Start: 2023-07-19 | End: 2023-07-19

## 2023-07-19 RX ORDER — NITROGLYCERIN 20 MG/100ML
INJECTION INTRAVENOUS CONTINUOUS PRN
Status: DISCONTINUED | OUTPATIENT
Start: 2023-07-19 | End: 2023-07-19 | Stop reason: SDUPTHER

## 2023-07-19 RX ORDER — HYDROMORPHONE HYDROCHLORIDE 1 MG/ML
0.25 INJECTION, SOLUTION INTRAMUSCULAR; INTRAVENOUS; SUBCUTANEOUS EVERY 10 MIN PRN
Status: DISCONTINUED | OUTPATIENT
Start: 2023-07-19 | End: 2023-07-19

## 2023-07-19 RX ORDER — SODIUM CHLORIDE 9 MG/ML
INJECTION, SOLUTION INTRAVENOUS CONTINUOUS
Status: DISCONTINUED | OUTPATIENT
Start: 2023-07-19 | End: 2023-07-21

## 2023-07-19 RX ORDER — SODIUM CHLORIDE 0.9 % (FLUSH) 0.9 %
5-40 SYRINGE (ML) INJECTION PRN
Status: DISCONTINUED | OUTPATIENT
Start: 2023-07-19 | End: 2023-07-19 | Stop reason: HOSPADM

## 2023-07-19 RX ORDER — DIPHENHYDRAMINE HYDROCHLORIDE 50 MG/ML
12.5 INJECTION INTRAMUSCULAR; INTRAVENOUS
Status: DISCONTINUED | OUTPATIENT
Start: 2023-07-19 | End: 2023-07-19

## 2023-07-19 RX ORDER — NALOXONE HYDROCHLORIDE 0.4 MG/ML
INJECTION, SOLUTION INTRAMUSCULAR; INTRAVENOUS; SUBCUTANEOUS PRN
Status: DISCONTINUED | OUTPATIENT
Start: 2023-07-19 | End: 2023-07-23

## 2023-07-19 RX ORDER — DEXAMETHASONE SODIUM PHOSPHATE 4 MG/ML
INJECTION, SOLUTION INTRA-ARTICULAR; INTRALESIONAL; INTRAMUSCULAR; INTRAVENOUS; SOFT TISSUE PRN
Status: DISCONTINUED | OUTPATIENT
Start: 2023-07-19 | End: 2023-07-19 | Stop reason: SDUPTHER

## 2023-07-19 RX ORDER — DEXTROSE MONOHYDRATE 100 MG/ML
INJECTION, SOLUTION INTRAVENOUS CONTINUOUS PRN
Status: DISCONTINUED | OUTPATIENT
Start: 2023-07-19 | End: 2023-08-09 | Stop reason: HOSPADM

## 2023-07-19 RX ORDER — ROCURONIUM BROMIDE 10 MG/ML
INJECTION, SOLUTION INTRAVENOUS PRN
Status: DISCONTINUED | OUTPATIENT
Start: 2023-07-19 | End: 2023-07-19 | Stop reason: SDUPTHER

## 2023-07-19 RX ORDER — LIDOCAINE HYDROCHLORIDE 20 MG/ML
INJECTION, SOLUTION INFILTRATION; PERINEURAL PRN
Status: DISCONTINUED | OUTPATIENT
Start: 2023-07-19 | End: 2023-07-19 | Stop reason: SDUPTHER

## 2023-07-19 RX ORDER — POLYETHYLENE GLYCOL 3350 17 G/17G
17 POWDER, FOR SOLUTION ORAL DAILY PRN
Status: DISCONTINUED | OUTPATIENT
Start: 2023-07-19 | End: 2023-08-09 | Stop reason: HOSPADM

## 2023-07-19 RX ORDER — SODIUM CHLORIDE, SODIUM LACTATE, POTASSIUM CHLORIDE, CALCIUM CHLORIDE 600; 310; 30; 20 MG/100ML; MG/100ML; MG/100ML; MG/100ML
INJECTION, SOLUTION INTRAVENOUS CONTINUOUS PRN
Status: DISCONTINUED | OUTPATIENT
Start: 2023-07-19 | End: 2023-07-19 | Stop reason: SDUPTHER

## 2023-07-19 RX ORDER — SODIUM CHLORIDE 9 MG/ML
INJECTION, SOLUTION INTRAVENOUS PRN
Status: DISCONTINUED | OUTPATIENT
Start: 2023-07-19 | End: 2023-07-19

## 2023-07-19 RX ADMIN — Medication 1000 MG: at 10:52

## 2023-07-19 RX ADMIN — METOPROLOL TARTRATE 5 MG: 1 INJECTION, SOLUTION INTRAVENOUS at 20:07

## 2023-07-19 RX ADMIN — NITROGLYCERIN 50 MCG: 5 INJECTION, SOLUTION INTRAVENOUS at 10:14

## 2023-07-19 RX ADMIN — PHENYLEPHRINE HYDROCHLORIDE 20 MCG/MIN: 10 INJECTION INTRAVENOUS at 08:31

## 2023-07-19 RX ADMIN — Medication 100 MCG: at 08:34

## 2023-07-19 RX ADMIN — Medication 100 MCG: at 10:16

## 2023-07-19 RX ADMIN — NITROGLYCERIN 100 MCG: 5 INJECTION, SOLUTION INTRAVENOUS at 09:30

## 2023-07-19 RX ADMIN — Medication 100 MCG: at 11:18

## 2023-07-19 RX ADMIN — NITROGLYCERIN 100 MCG: 5 INJECTION, SOLUTION INTRAVENOUS at 09:45

## 2023-07-19 RX ADMIN — SODIUM CHLORIDE: 9 INJECTION, SOLUTION INTRAVENOUS at 10:00

## 2023-07-19 RX ADMIN — NITROGLYCERIN 50 MCG: 5 INJECTION, SOLUTION INTRAVENOUS at 12:33

## 2023-07-19 RX ADMIN — SODIUM CHLORIDE, POTASSIUM CHLORIDE, SODIUM LACTATE AND CALCIUM CHLORIDE: 600; 310; 30; 20 INJECTION, SOLUTION INTRAVENOUS at 14:49

## 2023-07-19 RX ADMIN — NITROGLYCERIN 50 MCG: 5 INJECTION, SOLUTION INTRAVENOUS at 10:15

## 2023-07-19 RX ADMIN — Medication 100 MCG: at 08:30

## 2023-07-19 RX ADMIN — SODIUM CHLORIDE: 9 INJECTION, SOLUTION INTRAVENOUS at 23:46

## 2023-07-19 RX ADMIN — Medication 200 MCG: at 10:05

## 2023-07-19 RX ADMIN — HYDROMORPHONE HYDROCHLORIDE 0.5 MG: 2 INJECTION, SOLUTION INTRAMUSCULAR; INTRAVENOUS; SUBCUTANEOUS at 12:30

## 2023-07-19 RX ADMIN — FENTANYL CITRATE 50 MCG: 50 INJECTION, SOLUTION INTRAMUSCULAR; INTRAVENOUS at 08:05

## 2023-07-19 RX ADMIN — INSULIN LISPRO 4 UNITS: 100 INJECTION, SOLUTION INTRAVENOUS; SUBCUTANEOUS at 17:01

## 2023-07-19 RX ADMIN — NITROGLYCERIN 150 MCG: 5 INJECTION, SOLUTION INTRAVENOUS at 08:59

## 2023-07-19 RX ADMIN — NITROGLYCERIN 100 MCG: 5 INJECTION, SOLUTION INTRAVENOUS at 13:26

## 2023-07-19 RX ADMIN — SODIUM CHLORIDE: 9 INJECTION, SOLUTION INTRAVENOUS at 17:00

## 2023-07-19 RX ADMIN — FENTANYL CITRATE 100 MCG: 50 INJECTION, SOLUTION INTRAMUSCULAR; INTRAVENOUS at 07:08

## 2023-07-19 RX ADMIN — NITROGLYCERIN 150 MCG: 5 INJECTION, SOLUTION INTRAVENOUS at 08:07

## 2023-07-19 RX ADMIN — SODIUM CHLORIDE: 9 INJECTION, SOLUTION INTRAVENOUS at 07:35

## 2023-07-19 RX ADMIN — ONDANSETRON 4 MG: 2 INJECTION INTRAMUSCULAR; INTRAVENOUS at 11:51

## 2023-07-19 RX ADMIN — HYDROMORPHONE HYDROCHLORIDE 0.5 MG: 2 INJECTION, SOLUTION INTRAMUSCULAR; INTRAVENOUS; SUBCUTANEOUS at 09:01

## 2023-07-19 RX ADMIN — Medication 200 MCG: at 10:04

## 2023-07-19 RX ADMIN — SODIUM CHLORIDE: 9 INJECTION, SOLUTION INTRAVENOUS at 09:04

## 2023-07-19 RX ADMIN — METHOCARBAMOL 400 MG: 100 INJECTION INTRAMUSCULAR; INTRAVENOUS at 09:56

## 2023-07-19 RX ADMIN — LIDOCAINE HYDROCHLORIDE 60 MG: 20 INJECTION, SOLUTION INFILTRATION; PERINEURAL at 07:08

## 2023-07-19 RX ADMIN — Medication 100 MCG: at 11:39

## 2023-07-19 RX ADMIN — NITROGLYCERIN 100 MCG: 5 INJECTION, SOLUTION INTRAVENOUS at 12:25

## 2023-07-19 RX ADMIN — Medication 100 MCG: at 08:17

## 2023-07-19 RX ADMIN — NITROGLYCERIN 50 MCG: 5 INJECTION, SOLUTION INTRAVENOUS at 09:37

## 2023-07-19 RX ADMIN — Medication 200 MCG: at 11:03

## 2023-07-19 RX ADMIN — NITROGLYCERIN 200 MCG: 5 INJECTION, SOLUTION INTRAVENOUS at 13:19

## 2023-07-19 RX ADMIN — NITROGLYCERIN 100 MCG: 5 INJECTION, SOLUTION INTRAVENOUS at 09:42

## 2023-07-19 RX ADMIN — METHOCARBAMOL 300 MG: 100 INJECTION INTRAMUSCULAR; INTRAVENOUS at 12:05

## 2023-07-19 RX ADMIN — Medication 100 MCG: at 07:23

## 2023-07-19 RX ADMIN — NITROGLYCERIN 150 MCG: 5 INJECTION, SOLUTION INTRAVENOUS at 09:46

## 2023-07-19 RX ADMIN — MORPHINE SULFATE: 1 INJECTION INTRAVENOUS at 14:18

## 2023-07-19 RX ADMIN — NITROGLYCERIN 150 MCG: 5 INJECTION, SOLUTION INTRAVENOUS at 08:08

## 2023-07-19 RX ADMIN — HYDRALAZINE HYDROCHLORIDE 10 MG: 20 INJECTION INTRAMUSCULAR; INTRAVENOUS at 14:39

## 2023-07-19 RX ADMIN — MIDAZOLAM 2 MG: 1 INJECTION INTRAMUSCULAR; INTRAVENOUS at 06:59

## 2023-07-19 RX ADMIN — NITROGLYCERIN 100 MCG: 5 INJECTION, SOLUTION INTRAVENOUS at 10:45

## 2023-07-19 RX ADMIN — Medication 100 MCG: at 10:37

## 2023-07-19 RX ADMIN — Medication 100 MCG: at 08:26

## 2023-07-19 RX ADMIN — METOPROLOL TARTRATE 5 MG: 1 INJECTION, SOLUTION INTRAVENOUS at 14:07

## 2023-07-19 RX ADMIN — HYDROMORPHONE HYDROCHLORIDE 0.5 MG: 2 INJECTION, SOLUTION INTRAMUSCULAR; INTRAVENOUS; SUBCUTANEOUS at 09:45

## 2023-07-19 RX ADMIN — NITROGLYCERIN 100 MCG: 5 INJECTION, SOLUTION INTRAVENOUS at 08:05

## 2023-07-19 RX ADMIN — HEPARIN SODIUM 5000 UNITS: 1000 INJECTION, SOLUTION INTRAVENOUS; SUBCUTANEOUS at 09:40

## 2023-07-19 RX ADMIN — SODIUM CHLORIDE, SODIUM LACTATE, POTASSIUM CHLORIDE, AND CALCIUM CHLORIDE: .6; .31; .03; .02 INJECTION, SOLUTION INTRAVENOUS at 06:59

## 2023-07-19 RX ADMIN — FENTANYL CITRATE 50 MCG: 50 INJECTION, SOLUTION INTRAMUSCULAR; INTRAVENOUS at 07:30

## 2023-07-19 RX ADMIN — SODIUM CHLORIDE, SODIUM LACTATE, POTASSIUM CHLORIDE, AND CALCIUM CHLORIDE: .6; .31; .03; .02 INJECTION, SOLUTION INTRAVENOUS at 08:05

## 2023-07-19 RX ADMIN — DEXAMETHASONE SODIUM PHOSPHATE 8 MG: 4 INJECTION, SOLUTION INTRAMUSCULAR; INTRAVENOUS at 08:13

## 2023-07-19 RX ADMIN — Medication 100 MCG: at 09:05

## 2023-07-19 RX ADMIN — INSULIN LISPRO 2 UNITS: 100 INJECTION, SOLUTION INTRAVENOUS; SUBCUTANEOUS at 20:08

## 2023-07-19 RX ADMIN — Medication 100 MCG: at 10:17

## 2023-07-19 RX ADMIN — NITROGLYCERIN 150 MCG: 5 INJECTION, SOLUTION INTRAVENOUS at 08:58

## 2023-07-19 RX ADMIN — NITROGLYCERIN 50 MCG: 5 INJECTION, SOLUTION INTRAVENOUS at 12:44

## 2023-07-19 RX ADMIN — Medication 100 MCG: at 07:17

## 2023-07-19 RX ADMIN — HYDROMORPHONE HYDROCHLORIDE 0.5 MG: 2 INJECTION, SOLUTION INTRAMUSCULAR; INTRAVENOUS; SUBCUTANEOUS at 13:27

## 2023-07-19 RX ADMIN — Medication 100 MCG: at 10:28

## 2023-07-19 RX ADMIN — NITROGLYCERIN 150 MCG: 5 INJECTION, SOLUTION INTRAVENOUS at 09:44

## 2023-07-19 RX ADMIN — Medication 100 MCG: at 11:52

## 2023-07-19 RX ADMIN — SUGAMMADEX 200 MG: 100 INJECTION, SOLUTION INTRAVENOUS at 13:12

## 2023-07-19 RX ADMIN — ROCURONIUM BROMIDE 30 MG: 50 INJECTION, SOLUTION INTRAVENOUS at 09:30

## 2023-07-19 RX ADMIN — Medication 100 MCG: at 13:28

## 2023-07-19 RX ADMIN — SODIUM CHLORIDE: 9 INJECTION, SOLUTION INTRAVENOUS at 11:39

## 2023-07-19 RX ADMIN — ROCURONIUM BROMIDE 50 MG: 50 INJECTION, SOLUTION INTRAVENOUS at 07:08

## 2023-07-19 RX ADMIN — FENTANYL CITRATE 50 MCG: 50 INJECTION, SOLUTION INTRAMUSCULAR; INTRAVENOUS at 08:08

## 2023-07-19 RX ADMIN — PROPOFOL 100 MG: 10 INJECTION, EMULSION INTRAVENOUS at 07:08

## 2023-07-19 RX ADMIN — SODIUM CHLORIDE, PRESERVATIVE FREE 10 ML: 5 INJECTION INTRAVENOUS at 20:08

## 2023-07-19 RX ADMIN — Medication 100 MCG: at 11:22

## 2023-07-19 RX ADMIN — NITROGLYCERIN 10 MCG/MIN: 20 INJECTION INTRAVENOUS at 08:59

## 2023-07-19 RX ADMIN — NITROGLYCERIN 50 MCG: 5 INJECTION, SOLUTION INTRAVENOUS at 10:08

## 2023-07-19 RX ADMIN — Medication 2000 MG: at 06:59

## 2023-07-19 RX ADMIN — NITROGLYCERIN 50 MCG: 5 INJECTION, SOLUTION INTRAVENOUS at 09:38

## 2023-07-19 RX ADMIN — NITROGLYCERIN 50 MCG: 5 INJECTION, SOLUTION INTRAVENOUS at 12:38

## 2023-07-19 RX ADMIN — METHOCARBAMOL 300 MG: 100 INJECTION INTRAMUSCULAR; INTRAVENOUS at 11:49

## 2023-07-19 RX ADMIN — ROCURONIUM BROMIDE 5 MG: 50 INJECTION, SOLUTION INTRAVENOUS at 12:25

## 2023-07-19 RX ADMIN — NITROGLYCERIN 50 MCG: 5 INJECTION, SOLUTION INTRAVENOUS at 09:24

## 2023-07-19 RX ADMIN — Medication 200 MCG: at 10:41

## 2023-07-19 RX ADMIN — ROCURONIUM BROMIDE 20 MG: 50 INJECTION, SOLUTION INTRAVENOUS at 08:22

## 2023-07-19 RX ADMIN — NITROGLYCERIN 150 MCG: 5 INJECTION, SOLUTION INTRAVENOUS at 09:43

## 2023-07-19 RX ADMIN — HEPARIN SODIUM 1000 UNITS: 1000 INJECTION, SOLUTION INTRAVENOUS; SUBCUTANEOUS at 10:41

## 2023-07-19 RX ADMIN — Medication 100 MCG: at 08:13

## 2023-07-19 RX ADMIN — PROPOFOL 50 MG: 10 INJECTION, EMULSION INTRAVENOUS at 07:30

## 2023-07-19 RX ADMIN — MANNITOL 12.5 G: 12.5 INJECTION, SOLUTION INTRAVENOUS at 09:41

## 2023-07-19 RX ADMIN — Medication 200 MCG: at 10:42

## 2023-07-19 RX ADMIN — PROPOFOL 50 MG: 10 INJECTION, EMULSION INTRAVENOUS at 09:45

## 2023-07-19 RX ADMIN — NITROGLYCERIN 100 MCG: 5 INJECTION, SOLUTION INTRAVENOUS at 08:04

## 2023-07-19 ASSESSMENT — LIFESTYLE VARIABLES: SMOKING_STATUS: 1

## 2023-07-19 ASSESSMENT — PAIN SCALES - GENERAL
PAINLEVEL_OUTOF10: 8
PAINLEVEL_OUTOF10: 5
PAINLEVEL_OUTOF10: 8

## 2023-07-19 ASSESSMENT — PAIN DESCRIPTION - DESCRIPTORS: DESCRIPTORS: BURNING;ACHING

## 2023-07-19 ASSESSMENT — PAIN - FUNCTIONAL ASSESSMENT: PAIN_FUNCTIONAL_ASSESSMENT: 0-10

## 2023-07-19 NOTE — H&P
I have reviewed the history and physical and examined the patient. I find no relevant changes. I have reviewed with the patient and/or family members, during the preoperative office visit the risks, benefits, and alternatives to the procedure.     Bradford Paget, MD

## 2023-07-19 NOTE — BRIEF OP NOTE
Brief Postoperative Note      Patient: Hayley Magallon  YOB: 1965  MRN: [id number]    Date of Procedure: 7/19/2023    Pre-Op Diagnosis Codes:     * Peripheral vascular disease, unspecified ([address]) [I73.9]     * Ventral hernia without obstruction or gangrene [K43.9]    Post-Op Diagnosis: Same       Procedure(s):  AORTOBIFEMORAL BYPASS GRAFT AND OPEN VENTRAL HERNIA REPAIR WITH MESH    Surgeon(s):  MD Marley Epstein MD    Assistant:  Surgical Assistant: Michele Chamberlain    Anesthesia: General    Estimated Blood Loss (mL): 1500, 760 returned via Cell saver    Complications: None    Specimens:   * No specimens in log *    Implants:  Implant Name Type Inv.  Item Serial No.  Lot No. LRB No. Used Action   GRAFT VASC L400MM [id number] BRANCH DIA8MM UNIV [facility] DBL RICHAR - [id number] Vascular grafts GRAFT VASC L400MM [id number] BRANCH DIA8MM UNIV [facility] DBL RICHAR [id number] [employer]-WD [id number] N/A 1 Implanted   PATCH DADA MIDLN [id number] UNCOATED MFIL PROPYLENE ABSRB - [id number]  PATCH DADA MIDLN D9.8JX89.8VA UNCOATED MFIL PROPYLENE ABSRB  BARD DAVOL-WD [id number] N/A 1 Implanted         Drains:   NG/OG/NJ/NE Tube Nasogastric 18 fr Left nostril (Active)   Surrounding Skin Clean, dry & intact 07/19/23 0937   Securement device Tape 07/19/23 0937   Status Suction-low intermittent 07/19/23 0937   Placement Verified Other (comment) 07/19/23 0937   NG/OG/NJ/NE External Measurement (cm) 60 cm 07/19/23 0937   Drainage Appearance Brown;Clear 07/19/23 6070       Urinary Catheter 07/19/23 2 Way (Active)             Electronically signed by Marley Martin MD on 7/19/2023 at 12:22 PM

## 2023-07-19 NOTE — ANESTHESIA PRE PROCEDURE
Department of Anesthesiology  Preprocedure Note       Name:  Shala Carvajal   Age:  59 y.o.  :  1965                                          MRN:  [id number]         Date:  2023      Surgeon: Heidi Sal):  Abhishek Dorsey MD    Procedure: Procedure(s):  AORTOBIFEMORAL BYPASS GRAFT AND OPEN VENTRAL HERNIA REPAIR WITH MESH    Medications prior to admission:   Prior to Admission medications    Medication Sig Start Date End Date Taking?  Authorizing Provider   vitamin B-12 (CYANOCOBALAMIN) 1000 MCG tablet Take 1 tablet by mouth daily 2 tablets every am   Yes Historical Provider, MD   gabapentin (NEURONTIN) 800 MG tablet TAKE ONE TABLET BY MOUTH THREE TIMES A DAY 7/5/23 10/3/23  MAT Puentes CNP   carvedilol (COREG) 6.25 MG tablet TAKE ONE TABLET BY MOUTH TWICE A DAY WITH MEAL 23   MAT Puentes CNP   atorvastatin (LIPITOR) 80 MG tablet Take 1 tablet by mouth daily  Patient taking differently: Take 1 tablet by mouth daily At [facility] 23   MAT Puentes CNP   lisinopril (PRINIVIL;ZESTRIL) 20 MG tablet Take 1 tablet by mouth daily  Patient taking differently: Take 1 tablet by mouth daily In am 23   MAT Puentes CNP   traZODone (DESYREL) 50 MG tablet TAKE ONE TO TWO TABLETS BY MOUTH EVERY NIGHT AT BEDTIME AS NEEDED FOR SLEEP 23   MAT Puentes CNP   diclofenac sodium (VOLTAREN) 1 % GEL Apply 4 g topically 4 times daily 23   MAT Puentes CNP   levETIRAcetam (KEPPRA) 500 MG tablet Take 1 tablet by mouth 2 times daily 23   Deann Heardan, APRN - CNP   ezetimibe (ZETIA) 10 MG tablet Take 1 tablet by mouth daily  Patient taking differently: Take 1 tablet by mouth daily In am 3/30/23   MAT Puentes CNP   buPROPion (ZYBAN) 150 MG extended release tablet 150 mg once daily for 7 days; increase to 150 mg twice daily 3/24/23   MAT Puentes CNP   clopidogrel (PLAVIX) 75 MG tablet TAKE ONE TABLET BY MOUTH DAILY 23   MAT Puentes - CNP

## 2023-07-19 NOTE — ANESTHESIA PROCEDURE NOTES
Peripheral Block    Patient location during procedure: OR  Reason for block: post-op pain management and at surgeon's request  Start time: 7/19/2023 1:04 PM  End time: 7/19/2023 1:13 PM  Staffing  Performed: anesthesiologist   Anesthesiologist: Bobby Tellez MD  Preanesthetic Checklist  Completed: patient identified, IV checked, site marked, risks and benefits discussed, surgical/procedural consents, equipment checked, pre-op evaluation, timeout performed, anesthesia consent given, oxygen available, monitors applied/VS acknowledged, fire risk safety assessment completed and verbalized and blood product R/B/A discussed and consented  Peripheral Block   Patient position: supine  Prep: ChloraPrep  Provider prep: sterile gloves  Patient monitoring: continuous pulse ox, cardiac monitor, continuous capnometry, frequent blood pressure checks, IV access and oxygen  Block type: TAP  Laterality: bilateral  Injection technique: single-shot  Guidance: ultrasound guided    Needle   Needle type: insulated echogenic nerve stimulator needle   Needle gauge: 21 G  Needle localization: ultrasound guidance  Test dose: negative  Needle length: 10 cm  Assessment   Injection assessment: negative aspiration for heme, no paresthesia on injection, local visualized surrounding nerve on ultrasound and no intravascular symptoms  Slow fractionated injection: yes  Hemodynamics: stable  Real-time US image taken/store: yes  Outcomes: uncomplicated    Additional Notes  BPV 0.25% 20cc in divided doses at each site    Done under GA

## 2023-07-19 NOTE — ANESTHESIA PROCEDURE NOTES
Arterial Line:    An arterial line was placed using surface landmarks, in the OR for the following indication(s): continuous blood pressure monitoring and blood sampling needed. A 20 gauge (size), 4.45 cm (length), Arrow (type) catheter was placed, Seldinger technique not used, into the right radial artery, secured by tape and Tegaderm. Anesthesia type: General    Events:  patient tolerated procedure well with no complications. 7/19/2023 7:15 AM7/19/2023 7:18 AM  Anesthesiologist: Dilcia Fritz MD  Resident/CRNA: MAT Barrett CRNA  Performed: Resident/CRNA   Preanesthetic Checklist  Completed: patient identified, IV checked, site marked, risks and benefits discussed, surgical/procedural consents, equipment checked, pre-op evaluation, timeout performed, anesthesia consent given, oxygen available, monitors applied/VS acknowledged, fire risk safety assessment completed and verbalized and blood product R/B/A discussed and consented

## 2023-07-19 NOTE — ANESTHESIA PROCEDURE NOTES
Central Venous Line:    A central venous line was placed using ultrasound guidance, in the OR for the following indication(s): central venous access and CVP monitoring. 7/19/2023 7:17 AM7/19/2023 7:25 AM    Sterility preparation included the following: hand hygiene performed prior to procedure, maximum sterile barriers used and sterile technique used to drape from head to toe. The patient was placed in Trendelenburg position. The right internal jugular vein was prepped. The site was prepped with Chloraprep. A 7 Fr (size), 20 (length), triple lumen was placed. During the procedure, the following specific steps were taken: target vein identified, needle advanced into vein and blood aspirated and guidewire advanced into vein. Intravenous verification was obtained by ultrasound, venous blood return and manometry. Post insertion care included: all ports aspirated, all ports flushed easily, guidewire removed intact, Biopatch applied, line sutured in place and dressing applied. During the procedure the patient experienced: EBL < 5mL.       Insertion site scrubbed per usage guidelines?: Yes  Skin prep agent dried for 3 minutes prior to procedure?:yes  Outcomes: uncomplicatedno  Anesthesia type: general..No  Staffing  Performed: Anesthesiologist   Anesthesiologist: Loy Real MD  Preanesthetic Checklist  Completed: patient identified, IV checked, site marked, risks and benefits discussed, surgical/procedural consents, equipment checked, pre-op evaluation, timeout performed, anesthesia consent given, oxygen available, monitors applied/VS acknowledged, fire risk safety assessment completed and verbalized and blood product R/B/A discussed and consented

## 2023-07-20 LAB
ANION GAP SERPL CALCULATED.3IONS-SCNC: 13 MMOL/L (ref 3–16)
BUN SERPL-MCNC: 17 MG/DL (ref 7–20)
CALCIUM SERPL-MCNC: 7.3 MG/DL (ref 8.3–10.6)
CHLORIDE SERPL-SCNC: 111 MMOL/L (ref 99–110)
CO2 SERPL-SCNC: 15 MMOL/L (ref 21–32)
CREAT SERPL-MCNC: 0.9 MG/DL (ref 0.9–1.3)
DEPRECATED RDW RBC AUTO: 15 % (ref 12.4–15.4)
EKG ATRIAL RATE: 60 BPM
EKG DIAGNOSIS: NORMAL
EKG P AXIS: 38 DEGREES
EKG P-R INTERVAL: 150 MS
EKG Q-T INTERVAL: 426 MS
EKG QRS DURATION: 108 MS
EKG QTC CALCULATION (BAZETT): 426 MS
EKG R AXIS: 44 DEGREES
EKG T AXIS: 46 DEGREES
EKG VENTRICULAR RATE: 60 BPM
GFR SERPLBLD CREATININE-BSD FMLA CKD-EPI: >60 ML/MIN/{1.73_M2}
GLUCOSE BLD-MCNC: 154 MG/DL (ref 70–99)
GLUCOSE BLD-MCNC: 158 MG/DL (ref 70–99)
GLUCOSE BLD-MCNC: 174 MG/DL (ref 70–99)
GLUCOSE BLD-MCNC: 174 MG/DL (ref 70–99)
GLUCOSE BLD-MCNC: 188 MG/DL (ref 70–99)
GLUCOSE BLD-MCNC: 190 MG/DL (ref 70–99)
GLUCOSE SERPL-MCNC: 198 MG/DL (ref 70–99)
HCT VFR BLD AUTO: 35.6 % (ref 40.5–52.5)
HGB BLD-MCNC: 11.9 G/DL (ref 13.5–17.5)
MCH RBC QN AUTO: 32.6 PG (ref 26–34)
MCHC RBC AUTO-ENTMCNC: 33.4 G/DL (ref 31–36)
MCV RBC AUTO: 97.6 FL (ref 80–100)
PERFORMED ON: ABNORMAL
PLATELET # BLD AUTO: 194 K/UL (ref 135–450)
PMV BLD AUTO: 6.4 FL (ref 5–10.5)
POTASSIUM SERPL-SCNC: 4.6 MMOL/L (ref 3.5–5.1)
RBC # BLD AUTO: 3.65 M/UL (ref 4.2–5.9)
SODIUM SERPL-SCNC: 139 MMOL/L (ref 136–145)
WBC # BLD AUTO: 13.8 K/UL (ref 4–11)

## 2023-07-20 PROCEDURE — 2000000000 HC ICU R&B

## 2023-07-20 PROCEDURE — 6370000000 HC RX 637 (ALT 250 FOR IP): Performed by: SURGERY

## 2023-07-20 PROCEDURE — 2500000003 HC RX 250 WO HCPCS: Performed by: SURGERY

## 2023-07-20 PROCEDURE — 2580000003 HC RX 258: Performed by: SURGERY

## 2023-07-20 PROCEDURE — 94761 N-INVAS EAR/PLS OXIMETRY MLT: CPT

## 2023-07-20 PROCEDURE — 37799 UNLISTED PX VASCULAR SURGERY: CPT

## 2023-07-20 PROCEDURE — 2700000000 HC OXYGEN THERAPY PER DAY

## 2023-07-20 PROCEDURE — 6360000002 HC RX W HCPCS: Performed by: SURGERY

## 2023-07-20 PROCEDURE — 93010 ELECTROCARDIOGRAM REPORT: CPT | Performed by: INTERNAL MEDICINE

## 2023-07-20 PROCEDURE — 80048 BASIC METABOLIC PNL TOTAL CA: CPT

## 2023-07-20 PROCEDURE — 85027 COMPLETE CBC AUTOMATED: CPT

## 2023-07-20 RX ORDER — NICOTINE 21 MG/24HR
1 PATCH, TRANSDERMAL 24 HOURS TRANSDERMAL DAILY
Status: DISCONTINUED | OUTPATIENT
Start: 2023-07-20 | End: 2023-08-09 | Stop reason: HOSPADM

## 2023-07-20 RX ADMIN — METOPROLOL TARTRATE 5 MG: 1 INJECTION, SOLUTION INTRAVENOUS at 14:46

## 2023-07-20 RX ADMIN — METOPROLOL TARTRATE 5 MG: 1 INJECTION, SOLUTION INTRAVENOUS at 02:03

## 2023-07-20 RX ADMIN — MORPHINE SULFATE: 1 INJECTION INTRAVENOUS at 00:43

## 2023-07-20 RX ADMIN — MUPIROCIN: 20 OINTMENT TOPICAL at 20:42

## 2023-07-20 RX ADMIN — METOPROLOL TARTRATE 5 MG: 1 INJECTION, SOLUTION INTRAVENOUS at 09:14

## 2023-07-20 RX ADMIN — SODIUM CHLORIDE: 9 INJECTION, SOLUTION INTRAVENOUS at 06:03

## 2023-07-20 RX ADMIN — LEVETIRACETAM 500 MG: 500 TABLET, FILM COATED ORAL at 09:41

## 2023-07-20 RX ADMIN — MUPIROCIN: 20 OINTMENT TOPICAL at 09:17

## 2023-07-20 RX ADMIN — METOPROLOL TARTRATE 5 MG: 1 INJECTION, SOLUTION INTRAVENOUS at 20:14

## 2023-07-20 RX ADMIN — LEVETIRACETAM 500 MG: 500 TABLET, FILM COATED ORAL at 20:42

## 2023-07-20 RX ADMIN — NITROGLYCERIN 5 MCG/MIN: 20 INJECTION INTRAVENOUS at 19:51

## 2023-07-20 RX ADMIN — NITROGLYCERIN 50 MCG/MIN: 20 INJECTION INTRAVENOUS at 02:06

## 2023-07-20 RX ADMIN — SODIUM CHLORIDE, PRESERVATIVE FREE 10 ML: 5 INJECTION INTRAVENOUS at 09:13

## 2023-07-20 RX ADMIN — SODIUM CHLORIDE: 9 INJECTION, SOLUTION INTRAVENOUS at 15:41

## 2023-07-20 RX ADMIN — MORPHINE SULFATE: 1 INJECTION INTRAVENOUS at 14:37

## 2023-07-20 RX ADMIN — SODIUM CHLORIDE, PRESERVATIVE FREE 10 ML: 5 INJECTION INTRAVENOUS at 20:42

## 2023-07-20 ASSESSMENT — PAIN SCALES - GENERAL
PAINLEVEL_OUTOF10: 5

## 2023-07-20 NOTE — OP NOTE
Date of Surgery: 7/19/23    Preop Dx: Incisional Hernia, Recurrent    Postop Dx:  Same    Procedure: Incisional Hernia Repair with Mesh    Surgeon:  Pito Roy    Assistant:      Anesthesia:  GETA    EBL:   <50ml    Specimen:  none    Complications: none    Drains/Lines:  none    Indications:  58 yo with recurrent incisional hernia and need for aortobifemoral bypass    Description:  Patient was given adequate description of the risks and rewards of the procedure, including bleeding, infection, injury to surrounding structures, possible bowel resection and freely consented. Pt was given appropriate antibiotics and brought to the OR where GETA anesthesia was induced. Pt was placed in supine position. Prepped and draped in usual sterile fashion. Please see Dr. Mikayla Brown dictation for specifics of the aortobifemoral bypass. After I scrubbed in the incision was measured at 18cm in length. The fascia was mobilized from the overlying tissue with cautery for about an inch circumferentially for tension free closure. Prior mesh near and below umbilicus was removed. A 25x15 cm ventrio st mesh was chosen and inserted and appropriately positioned. It was then secured to the fascia in an underlay technique using 0-0 ethibond suture. Valsalva maneuver was performed without issue. Wound was irrigated with normal saline and fluid suctioned out. Fascia was closed in the midline over the mesh with looped 0-0 PDS suture. Subcutaneous tissue was closed with interrupted 3-0 vicryl suture. staples used to close epidermis. Sterile dressing placed. All suture, sponge and instrument count correct times two at end of case. Transferred to PACU in stable condition.     Kyle Bliss MD
artery as it was  occluded at its origin. It was however doubly clipped to prevent any  backbleeding. The anastomoses were inspected. It did appear to be  hemostatic. The retroperitoneal tissue overlying the aortic graft was  then closed, was then reapproximated walling off the graft using running  2-0 Vicryl suture. Bilateral groin incisions were irrigated with  antibiotic saline solution. Then closed using multiple layers of  running 2-0 Vicryl suture, 3-0 Vicryl suture, then running 4-0 Monocryl  subcuticular sutures. The incisions were dressed with Steri-Strips and  a PREVENA negative pressure wound dressings were applied. At this  point, Dr. Luisito Garrett scrub into the case to perform ventral hernia  repair with mesh, to be dictated under his operative report. There were  no complications to this procedure. Estimated blood loss was  approximately 1500 mL of which 762 mL were given back through the Cell  Saver system. All sponge, needle and instrument counts were correct. The patient was extubated in the operating room after abdominal wall  closure and transported to the intensive care unit in stable condition.             Inna Keith MD    D: 07/19/2023 20:47:21       T: 07/19/2023 20:53:39     [email or web address]  Job#: [id number]     Doc#: [id number]    CC:

## 2023-07-20 NOTE — CARE COORDINATION
Case Management Assessment  Initial Evaluation    Date/Time of Evaluation: 7/20/2023 10:55 AM  Assessment Completed by: Shirley Fishman RN    If patient is discharged prior to next notation, then this note serves as note for discharge by case management. Patient Name: Angelika Kaba                   YOB: 1965  Diagnosis: Peripheral vascular disease, unspecified ([address]) [I73.9]  Ventral hernia without obstruction or gangrene [K43.9]  PVD (peripheral vascular disease) ([address]) [I73.9]                   Date / Time: 7/19/2023  5:19 AM    Patient Admission Status: Inpatient   Readmission Risk (Low < 19, Mod (19-27), High > 27): Readmission Risk Score: 17.1    Current PCP: MAT Morton CNP  PCP verified by CM? Yes    Chart Reviewed: Yes      History Provided by: Patient  Patient Orientation: Alert and Oriented    Patient Cognition: Alert    Hospitalization in the last 30 days (Readmission):  No    If yes, Readmission Assessment in CM Navigator will be completed. Advance Directives:      Code Status: Full Code   Patient's Primary Decision Maker is: Legal Next of Kin    Primary Decision Maker: Danica Galeas - Spouse - [phone]    Secondary Decision Maker: DO NOT CONTACT Justyn Ness  Child - [phone]    Discharge Planning:    Patient lives with: Spouse/Significant Other Type of Home: House  Primary Care Giver: Self  Patient Support Systems include: Spouse/Significant Other   Current Financial resources: Medicare  Current community resources: None  Current services prior to admission: None            Current DME:              Type of Home Care services:       ADLS  Prior functional level: Independent in ADLs/IADLs  Current functional level: Assistance with the following: (POD #! )    PT AM-PAC:   /24  OT AM-PAC:   /24    Family can provide assistance at DC: Yes (wife)  Would you like Case Management to discuss the discharge plan with any other family members/significant others, and if so,

## 2023-07-21 LAB
ANION GAP SERPL CALCULATED.3IONS-SCNC: 14 MMOL/L (ref 3–16)
BUN SERPL-MCNC: 11 MG/DL (ref 7–20)
CALCIUM SERPL-MCNC: 8.1 MG/DL (ref 8.3–10.6)
CHLORIDE SERPL-SCNC: 106 MMOL/L (ref 99–110)
CO2 SERPL-SCNC: 20 MMOL/L (ref 21–32)
CREAT SERPL-MCNC: 0.7 MG/DL (ref 0.9–1.3)
DEPRECATED RDW RBC AUTO: 15.1 % (ref 12.4–15.4)
GFR SERPLBLD CREATININE-BSD FMLA CKD-EPI: >60 ML/MIN/{1.73_M2}
GLUCOSE BLD-MCNC: 133 MG/DL (ref 70–99)
GLUCOSE BLD-MCNC: 142 MG/DL (ref 70–99)
GLUCOSE BLD-MCNC: 155 MG/DL (ref 70–99)
GLUCOSE BLD-MCNC: 184 MG/DL (ref 70–99)
GLUCOSE BLD-MCNC: 195 MG/DL (ref 70–99)
GLUCOSE BLD-MCNC: 215 MG/DL (ref 70–99)
GLUCOSE SERPL-MCNC: 203 MG/DL (ref 70–99)
HCT VFR BLD AUTO: 30.4 % (ref 40.5–52.5)
HGB BLD-MCNC: 10.7 G/DL (ref 13.5–17.5)
MCH RBC QN AUTO: 33.8 PG (ref 26–34)
MCHC RBC AUTO-ENTMCNC: 35.2 G/DL (ref 31–36)
MCV RBC AUTO: 95.9 FL (ref 80–100)
PERFORMED ON: ABNORMAL
PLATELET # BLD AUTO: 147 K/UL (ref 135–450)
PMV BLD AUTO: 6.7 FL (ref 5–10.5)
POTASSIUM SERPL-SCNC: 3.6 MMOL/L (ref 3.5–5.1)
RBC # BLD AUTO: 3.17 M/UL (ref 4.2–5.9)
SODIUM SERPL-SCNC: 140 MMOL/L (ref 136–145)
WBC # BLD AUTO: 12.8 K/UL (ref 4–11)

## 2023-07-21 PROCEDURE — 85027 COMPLETE CBC AUTOMATED: CPT

## 2023-07-21 PROCEDURE — 2580000003 HC RX 258: Performed by: SURGERY

## 2023-07-21 PROCEDURE — 6370000000 HC RX 637 (ALT 250 FOR IP): Performed by: SURGERY

## 2023-07-21 PROCEDURE — 6360000002 HC RX W HCPCS: Performed by: SURGERY

## 2023-07-21 PROCEDURE — 99231 SBSQ HOSP IP/OBS SF/LOW 25: CPT | Performed by: SURGERY

## 2023-07-21 PROCEDURE — 80048 BASIC METABOLIC PNL TOTAL CA: CPT

## 2023-07-21 PROCEDURE — 97530 THERAPEUTIC ACTIVITIES: CPT

## 2023-07-21 PROCEDURE — 97162 PT EVAL MOD COMPLEX 30 MIN: CPT

## 2023-07-21 PROCEDURE — 2500000003 HC RX 250 WO HCPCS: Performed by: SURGERY

## 2023-07-21 PROCEDURE — 97166 OT EVAL MOD COMPLEX 45 MIN: CPT

## 2023-07-21 PROCEDURE — 2000000000 HC ICU R&B

## 2023-07-21 RX ORDER — ASPIRIN 81 MG/1
81 TABLET, CHEWABLE ORAL DAILY
Status: DISCONTINUED | OUTPATIENT
Start: 2023-07-21 | End: 2023-08-09 | Stop reason: HOSPADM

## 2023-07-21 RX ORDER — ATORVASTATIN CALCIUM 80 MG/1
80 TABLET, FILM COATED ORAL NIGHTLY
Status: DISCONTINUED | OUTPATIENT
Start: 2023-07-21 | End: 2023-08-09 | Stop reason: HOSPADM

## 2023-07-21 RX ORDER — CARVEDILOL 6.25 MG/1
12.5 TABLET ORAL 2 TIMES DAILY
Status: DISCONTINUED | OUTPATIENT
Start: 2023-07-21 | End: 2023-07-28

## 2023-07-21 RX ORDER — LISINOPRIL 20 MG/1
20 TABLET ORAL DAILY
Status: DISCONTINUED | OUTPATIENT
Start: 2023-07-21 | End: 2023-07-28

## 2023-07-21 RX ADMIN — SODIUM CHLORIDE, PRESERVATIVE FREE 10 ML: 5 INJECTION INTRAVENOUS at 21:01

## 2023-07-21 RX ADMIN — METOPROLOL TARTRATE 5 MG: 1 INJECTION, SOLUTION INTRAVENOUS at 01:48

## 2023-07-21 RX ADMIN — SODIUM CHLORIDE: 9 INJECTION, SOLUTION INTRAVENOUS at 01:50

## 2023-07-21 RX ADMIN — ASPIRIN 81 MG 81 MG: 81 TABLET ORAL at 09:10

## 2023-07-21 RX ADMIN — CARVEDILOL 12.5 MG: 6.25 TABLET, FILM COATED ORAL at 09:11

## 2023-07-21 RX ADMIN — ATORVASTATIN CALCIUM 80 MG: 80 TABLET, FILM COATED ORAL at 20:59

## 2023-07-21 RX ADMIN — NITROGLYCERIN 75 MCG/MIN: 20 INJECTION INTRAVENOUS at 01:52

## 2023-07-21 RX ADMIN — MUPIROCIN: 20 OINTMENT TOPICAL at 09:12

## 2023-07-21 RX ADMIN — SODIUM CHLORIDE, PRESERVATIVE FREE 10 ML: 5 INJECTION INTRAVENOUS at 09:11

## 2023-07-21 RX ADMIN — METOPROLOL TARTRATE 5 MG: 1 INJECTION, SOLUTION INTRAVENOUS at 09:17

## 2023-07-21 RX ADMIN — INSULIN LISPRO 2 UNITS: 100 INJECTION, SOLUTION INTRAVENOUS; SUBCUTANEOUS at 06:19

## 2023-07-21 RX ADMIN — MUPIROCIN: 20 OINTMENT TOPICAL at 21:00

## 2023-07-21 RX ADMIN — LISINOPRIL 20 MG: 20 TABLET ORAL at 09:10

## 2023-07-21 RX ADMIN — HYDRALAZINE HYDROCHLORIDE 10 MG: 20 INJECTION INTRAMUSCULAR; INTRAVENOUS at 08:40

## 2023-07-21 RX ADMIN — ONDANSETRON 4 MG: 2 INJECTION INTRAMUSCULAR; INTRAVENOUS at 08:40

## 2023-07-21 RX ADMIN — LEVETIRACETAM 500 MG: 500 TABLET, FILM COATED ORAL at 20:59

## 2023-07-21 RX ADMIN — LEVETIRACETAM 500 MG: 500 TABLET, FILM COATED ORAL at 09:10

## 2023-07-21 RX ADMIN — CARVEDILOL 12.5 MG: 6.25 TABLET, FILM COATED ORAL at 20:59

## 2023-07-21 RX ADMIN — METOPROLOL TARTRATE 5 MG: 1 INJECTION, SOLUTION INTRAVENOUS at 14:16

## 2023-07-21 RX ADMIN — METOPROLOL TARTRATE 5 MG: 1 INJECTION, SOLUTION INTRAVENOUS at 19:59

## 2023-07-21 RX ADMIN — ONDANSETRON 4 MG: 2 INJECTION INTRAMUSCULAR; INTRAVENOUS at 19:01

## 2023-07-21 ASSESSMENT — PAIN SCALES - GENERAL
PAINLEVEL_OUTOF10: 5
PAINLEVEL_OUTOF10: 5

## 2023-07-21 ASSESSMENT — PAIN DESCRIPTION - LOCATION
LOCATION: ABDOMEN
LOCATION: ABDOMEN
LOCATION: BACK
LOCATION: ABDOMEN

## 2023-07-21 ASSESSMENT — PAIN DESCRIPTION - DESCRIPTORS: DESCRIPTORS: ACHING

## 2023-07-21 ASSESSMENT — PAIN DESCRIPTION - ORIENTATION: ORIENTATION: MID

## 2023-07-21 ASSESSMENT — PAIN DESCRIPTION - PAIN TYPE
TYPE: ACUTE PAIN
TYPE: ACUTE PAIN

## 2023-07-21 NOTE — CARE COORDINATION
POD#  2 S/P AortoBifem bypass . Patient refusing to get OOB or work with therapy. From home with wife and hopes to return. Will continue  to follow for discharge needs.     Edmundo Lamb, RN

## 2023-07-22 ENCOUNTER — APPOINTMENT (OUTPATIENT)
Dept: GENERAL RADIOLOGY | Age: 58
DRG: 270 | End: 2023-07-22
Attending: SURGERY
Payer: MEDICARE

## 2023-07-22 LAB
ANION GAP SERPL CALCULATED.3IONS-SCNC: 13 MMOL/L (ref 3–16)
BUN SERPL-MCNC: 15 MG/DL (ref 7–20)
CALCIUM SERPL-MCNC: 8.7 MG/DL (ref 8.3–10.6)
CHLORIDE SERPL-SCNC: 105 MMOL/L (ref 99–110)
CO2 SERPL-SCNC: 22 MMOL/L (ref 21–32)
CREAT SERPL-MCNC: 0.6 MG/DL (ref 0.9–1.3)
DEPRECATED RDW RBC AUTO: 15.1 % (ref 12.4–15.4)
GFR SERPLBLD CREATININE-BSD FMLA CKD-EPI: >60 ML/MIN/{1.73_M2}
GLUCOSE BLD-MCNC: 140 MG/DL (ref 70–99)
GLUCOSE BLD-MCNC: 144 MG/DL (ref 70–99)
GLUCOSE BLD-MCNC: 153 MG/DL (ref 70–99)
GLUCOSE BLD-MCNC: 154 MG/DL (ref 70–99)
GLUCOSE BLD-MCNC: 180 MG/DL (ref 70–99)
GLUCOSE SERPL-MCNC: 178 MG/DL (ref 70–99)
HCT VFR BLD AUTO: 33.6 % (ref 40.5–52.5)
HGB BLD-MCNC: 11.4 G/DL (ref 13.5–17.5)
LIPASE SERPL-CCNC: 11 U/L (ref 13–60)
MCH RBC QN AUTO: 32.8 PG (ref 26–34)
MCHC RBC AUTO-ENTMCNC: 33.9 G/DL (ref 31–36)
MCV RBC AUTO: 96.6 FL (ref 80–100)
PERFORMED ON: ABNORMAL
PLATELET # BLD AUTO: 166 K/UL (ref 135–450)
PMV BLD AUTO: 7 FL (ref 5–10.5)
POTASSIUM SERPL-SCNC: 3.7 MMOL/L (ref 3.5–5.1)
RBC # BLD AUTO: 3.48 M/UL (ref 4.2–5.9)
SODIUM SERPL-SCNC: 140 MMOL/L (ref 136–145)
WBC # BLD AUTO: 12.4 K/UL (ref 4–11)

## 2023-07-22 PROCEDURE — 2580000003 HC RX 258: Performed by: SURGERY

## 2023-07-22 PROCEDURE — 36415 COLL VENOUS BLD VENIPUNCTURE: CPT

## 2023-07-22 PROCEDURE — 2500000003 HC RX 250 WO HCPCS: Performed by: SURGERY

## 2023-07-22 PROCEDURE — 6370000000 HC RX 637 (ALT 250 FOR IP): Performed by: SURGERY

## 2023-07-22 PROCEDURE — 74018 RADEX ABDOMEN 1 VIEW: CPT

## 2023-07-22 PROCEDURE — 83690 ASSAY OF LIPASE: CPT

## 2023-07-22 PROCEDURE — 2000000000 HC ICU R&B

## 2023-07-22 PROCEDURE — 6360000002 HC RX W HCPCS: Performed by: SURGERY

## 2023-07-22 PROCEDURE — 80048 BASIC METABOLIC PNL TOTAL CA: CPT

## 2023-07-22 PROCEDURE — 31720 CLEARANCE OF AIRWAYS: CPT

## 2023-07-22 PROCEDURE — 85027 COMPLETE CBC AUTOMATED: CPT

## 2023-07-22 PROCEDURE — 94761 N-INVAS EAR/PLS OXIMETRY MLT: CPT

## 2023-07-22 PROCEDURE — 2700000000 HC OXYGEN THERAPY PER DAY

## 2023-07-22 PROCEDURE — 99024 POSTOP FOLLOW-UP VISIT: CPT | Performed by: SURGERY

## 2023-07-22 RX ORDER — SODIUM CHLORIDE 9 MG/ML
INJECTION, SOLUTION INTRAVENOUS CONTINUOUS
Status: DISCONTINUED | OUTPATIENT
Start: 2023-07-22 | End: 2023-07-24

## 2023-07-22 RX ADMIN — METOPROLOL TARTRATE 5 MG: 1 INJECTION, SOLUTION INTRAVENOUS at 02:23

## 2023-07-22 RX ADMIN — METOPROLOL TARTRATE 5 MG: 1 INJECTION, SOLUTION INTRAVENOUS at 19:53

## 2023-07-22 RX ADMIN — LISINOPRIL 20 MG: 20 TABLET ORAL at 08:34

## 2023-07-22 RX ADMIN — MUPIROCIN: 20 OINTMENT TOPICAL at 20:52

## 2023-07-22 RX ADMIN — SODIUM CHLORIDE, PRESERVATIVE FREE 10 ML: 5 INJECTION INTRAVENOUS at 08:35

## 2023-07-22 RX ADMIN — MUPIROCIN: 20 OINTMENT TOPICAL at 08:38

## 2023-07-22 RX ADMIN — METOPROLOL TARTRATE 5 MG: 1 INJECTION, SOLUTION INTRAVENOUS at 13:42

## 2023-07-22 RX ADMIN — SODIUM CHLORIDE: 9 INJECTION, SOLUTION INTRAVENOUS at 16:05

## 2023-07-22 RX ADMIN — SODIUM CHLORIDE: 9 INJECTION, SOLUTION INTRAVENOUS at 13:36

## 2023-07-22 RX ADMIN — SODIUM CHLORIDE, PRESERVATIVE FREE 10 ML: 5 INJECTION INTRAVENOUS at 19:54

## 2023-07-22 RX ADMIN — LEVETIRACETAM 500 MG: 500 TABLET, FILM COATED ORAL at 08:34

## 2023-07-22 RX ADMIN — METOPROLOL TARTRATE 5 MG: 1 INJECTION, SOLUTION INTRAVENOUS at 08:34

## 2023-07-22 RX ADMIN — SODIUM CHLORIDE: 9 INJECTION, SOLUTION INTRAVENOUS at 23:57

## 2023-07-22 RX ADMIN — ONDANSETRON 4 MG: 2 INJECTION INTRAMUSCULAR; INTRAVENOUS at 11:35

## 2023-07-22 RX ADMIN — CARVEDILOL 12.5 MG: 6.25 TABLET, FILM COATED ORAL at 08:34

## 2023-07-22 RX ADMIN — ASPIRIN 81 MG 81 MG: 81 TABLET ORAL at 08:34

## 2023-07-23 LAB
ANION GAP SERPL CALCULATED.3IONS-SCNC: 11 MMOL/L (ref 3–16)
BUN SERPL-MCNC: 22 MG/DL (ref 7–20)
CALCIUM SERPL-MCNC: 8.6 MG/DL (ref 8.3–10.6)
CHLORIDE SERPL-SCNC: 109 MMOL/L (ref 99–110)
CO2 SERPL-SCNC: 28 MMOL/L (ref 21–32)
CREAT SERPL-MCNC: 0.6 MG/DL (ref 0.9–1.3)
DEPRECATED RDW RBC AUTO: 15.1 % (ref 12.4–15.4)
GFR SERPLBLD CREATININE-BSD FMLA CKD-EPI: >60 ML/MIN/{1.73_M2}
GLUCOSE BLD-MCNC: 129 MG/DL (ref 70–99)
GLUCOSE BLD-MCNC: 131 MG/DL (ref 70–99)
GLUCOSE BLD-MCNC: 132 MG/DL (ref 70–99)
GLUCOSE BLD-MCNC: 135 MG/DL (ref 70–99)
GLUCOSE SERPL-MCNC: 155 MG/DL (ref 70–99)
HCT VFR BLD AUTO: 32.4 % (ref 40.5–52.5)
HGB BLD-MCNC: 11.2 G/DL (ref 13.5–17.5)
MAGNESIUM SERPL-MCNC: 2.7 MG/DL (ref 1.8–2.4)
MCH RBC QN AUTO: 33.1 PG (ref 26–34)
MCHC RBC AUTO-ENTMCNC: 34.4 G/DL (ref 31–36)
MCV RBC AUTO: 96.2 FL (ref 80–100)
PERFORMED ON: ABNORMAL
PLATELET # BLD AUTO: 178 K/UL (ref 135–450)
PMV BLD AUTO: 7.2 FL (ref 5–10.5)
POTASSIUM SERPL-SCNC: 3.1 MMOL/L (ref 3.5–5.1)
POTASSIUM SERPL-SCNC: 3.2 MMOL/L (ref 3.5–5.1)
POTASSIUM SERPL-SCNC: 3.2 MMOL/L (ref 3.5–5.1)
RBC # BLD AUTO: 3.37 M/UL (ref 4.2–5.9)
SODIUM SERPL-SCNC: 148 MMOL/L (ref 136–145)
WBC # BLD AUTO: 6.6 K/UL (ref 4–11)

## 2023-07-23 PROCEDURE — 2580000003 HC RX 258: Performed by: SURGERY

## 2023-07-23 PROCEDURE — 6370000000 HC RX 637 (ALT 250 FOR IP): Performed by: SURGERY

## 2023-07-23 PROCEDURE — 84132 ASSAY OF SERUM POTASSIUM: CPT

## 2023-07-23 PROCEDURE — 36415 COLL VENOUS BLD VENIPUNCTURE: CPT

## 2023-07-23 PROCEDURE — 2500000003 HC RX 250 WO HCPCS: Performed by: SURGERY

## 2023-07-23 PROCEDURE — 80048 BASIC METABOLIC PNL TOTAL CA: CPT

## 2023-07-23 PROCEDURE — 99024 POSTOP FOLLOW-UP VISIT: CPT | Performed by: SURGERY

## 2023-07-23 PROCEDURE — 85027 COMPLETE CBC AUTOMATED: CPT

## 2023-07-23 PROCEDURE — 83735 ASSAY OF MAGNESIUM: CPT

## 2023-07-23 PROCEDURE — 6360000002 HC RX W HCPCS: Performed by: SURGERY

## 2023-07-23 PROCEDURE — 2000000000 HC ICU R&B

## 2023-07-23 RX ORDER — POTASSIUM CHLORIDE 29.8 MG/ML
20 INJECTION INTRAVENOUS PRN
Status: DISCONTINUED | OUTPATIENT
Start: 2023-07-23 | End: 2023-08-09 | Stop reason: HOSPADM

## 2023-07-23 RX ORDER — MORPHINE SULFATE 2 MG/ML
2 INJECTION, SOLUTION INTRAMUSCULAR; INTRAVENOUS
Status: DISCONTINUED | OUTPATIENT
Start: 2023-07-23 | End: 2023-08-09 | Stop reason: HOSPADM

## 2023-07-23 RX ORDER — MORPHINE SULFATE 4 MG/ML
4 INJECTION, SOLUTION INTRAMUSCULAR; INTRAVENOUS
Status: DISCONTINUED | OUTPATIENT
Start: 2023-07-23 | End: 2023-08-09 | Stop reason: HOSPADM

## 2023-07-23 RX ORDER — POTASSIUM CHLORIDE 7.45 MG/ML
10 INJECTION INTRAVENOUS PRN
Status: DISCONTINUED | OUTPATIENT
Start: 2023-07-23 | End: 2023-08-09 | Stop reason: HOSPADM

## 2023-07-23 RX ORDER — POTASSIUM CHLORIDE 7.45 MG/ML
10 INJECTION INTRAVENOUS PRN
Status: DISCONTINUED | OUTPATIENT
Start: 2023-07-23 | End: 2023-07-23

## 2023-07-23 RX ADMIN — POTASSIUM CHLORIDE 10 MEQ: 7.46 INJECTION, SOLUTION INTRAVENOUS at 12:53

## 2023-07-23 RX ADMIN — METOPROLOL TARTRATE 5 MG: 1 INJECTION, SOLUTION INTRAVENOUS at 21:44

## 2023-07-23 RX ADMIN — SODIUM CHLORIDE, PRESERVATIVE FREE 10 ML: 5 INJECTION INTRAVENOUS at 19:53

## 2023-07-23 RX ADMIN — SODIUM CHLORIDE: 9 INJECTION, SOLUTION INTRAVENOUS at 15:46

## 2023-07-23 RX ADMIN — POTASSIUM CHLORIDE 10 MEQ: 7.46 INJECTION, SOLUTION INTRAVENOUS at 18:30

## 2023-07-23 RX ADMIN — SODIUM CHLORIDE, PRESERVATIVE FREE 10 ML: 5 INJECTION INTRAVENOUS at 08:54

## 2023-07-23 RX ADMIN — POTASSIUM CHLORIDE 10 MEQ: 7.46 INJECTION, SOLUTION INTRAVENOUS at 17:07

## 2023-07-23 RX ADMIN — POTASSIUM CHLORIDE 10 MEQ: 7.46 INJECTION, SOLUTION INTRAVENOUS at 22:56

## 2023-07-23 RX ADMIN — METOPROLOL TARTRATE 5 MG: 1 INJECTION, SOLUTION INTRAVENOUS at 03:34

## 2023-07-23 RX ADMIN — MORPHINE SULFATE 4 MG: 4 INJECTION, SOLUTION INTRAMUSCULAR; INTRAVENOUS at 23:22

## 2023-07-23 RX ADMIN — MORPHINE SULFATE 4 MG: 4 INJECTION, SOLUTION INTRAMUSCULAR; INTRAVENOUS at 19:52

## 2023-07-23 RX ADMIN — POTASSIUM CHLORIDE 10 MEQ: 7.46 INJECTION, SOLUTION INTRAVENOUS at 08:46

## 2023-07-23 RX ADMIN — POTASSIUM CHLORIDE 10 MEQ: 7.46 INJECTION, SOLUTION INTRAVENOUS at 09:53

## 2023-07-23 RX ADMIN — POTASSIUM CHLORIDE 10 MEQ: 7.46 INJECTION, SOLUTION INTRAVENOUS at 21:54

## 2023-07-23 RX ADMIN — POTASSIUM CHLORIDE 10 MEQ: 7.46 INJECTION, SOLUTION INTRAVENOUS at 15:57

## 2023-07-23 RX ADMIN — MUPIROCIN: 20 OINTMENT TOPICAL at 20:05

## 2023-07-23 RX ADMIN — METOPROLOL TARTRATE 5 MG: 1 INJECTION, SOLUTION INTRAVENOUS at 08:40

## 2023-07-23 RX ADMIN — ONDANSETRON 4 MG: 2 INJECTION INTRAMUSCULAR; INTRAVENOUS at 17:37

## 2023-07-23 RX ADMIN — POTASSIUM CHLORIDE 10 MEQ: 7.46 INJECTION, SOLUTION INTRAVENOUS at 11:41

## 2023-07-23 RX ADMIN — MUPIROCIN: 20 OINTMENT TOPICAL at 08:53

## 2023-07-23 RX ADMIN — METOPROLOL TARTRATE 5 MG: 1 INJECTION, SOLUTION INTRAVENOUS at 13:41

## 2023-07-23 RX ADMIN — SODIUM CHLORIDE: 9 INJECTION, SOLUTION INTRAVENOUS at 19:59

## 2023-07-23 ASSESSMENT — PAIN DESCRIPTION - ONSET
ONSET: ON-GOING
ONSET: ON-GOING

## 2023-07-23 ASSESSMENT — PAIN - FUNCTIONAL ASSESSMENT
PAIN_FUNCTIONAL_ASSESSMENT: PREVENTS OR INTERFERES SOME ACTIVE ACTIVITIES AND ADLS
PAIN_FUNCTIONAL_ASSESSMENT: PREVENTS OR INTERFERES SOME ACTIVE ACTIVITIES AND ADLS

## 2023-07-23 ASSESSMENT — PAIN DESCRIPTION - PAIN TYPE
TYPE: SURGICAL PAIN
TYPE: SURGICAL PAIN

## 2023-07-23 ASSESSMENT — PAIN SCALES - WONG BAKER
WONGBAKER_NUMERICALRESPONSE: 0
WONGBAKER_NUMERICALRESPONSE: 0

## 2023-07-23 ASSESSMENT — PAIN SCALES - GENERAL
PAINLEVEL_OUTOF10: 8
PAINLEVEL_OUTOF10: 10

## 2023-07-23 ASSESSMENT — PAIN DESCRIPTION - ORIENTATION
ORIENTATION: MID;LOWER
ORIENTATION: MID;LOWER

## 2023-07-23 ASSESSMENT — PAIN DESCRIPTION - LOCATION
LOCATION: ABDOMEN
LOCATION: ABDOMEN

## 2023-07-23 ASSESSMENT — PAIN DESCRIPTION - DESCRIPTORS
DESCRIPTORS: ACHING
DESCRIPTORS: ACHING

## 2023-07-23 ASSESSMENT — PAIN DESCRIPTION - FREQUENCY
FREQUENCY: CONTINUOUS
FREQUENCY: CONTINUOUS

## 2023-07-24 ENCOUNTER — APPOINTMENT (OUTPATIENT)
Dept: GENERAL RADIOLOGY | Age: 58
DRG: 270 | End: 2023-07-24
Attending: SURGERY
Payer: MEDICARE

## 2023-07-24 PROBLEM — E43 SEVERE MALNUTRITION (HCC): Status: ACTIVE | Noted: 2023-07-24

## 2023-07-24 LAB
ANION GAP SERPL CALCULATED.3IONS-SCNC: 9 MMOL/L (ref 3–16)
BUN SERPL-MCNC: 22 MG/DL (ref 7–20)
CALCIUM SERPL-MCNC: 8.3 MG/DL (ref 8.3–10.6)
CHLORIDE SERPL-SCNC: 113 MMOL/L (ref 99–110)
CO2 SERPL-SCNC: 34 MMOL/L (ref 21–32)
CREAT SERPL-MCNC: 0.7 MG/DL (ref 0.9–1.3)
DEPRECATED RDW RBC AUTO: 15.2 % (ref 12.4–15.4)
GFR SERPLBLD CREATININE-BSD FMLA CKD-EPI: >60 ML/MIN/{1.73_M2}
GLUCOSE BLD-MCNC: 109 MG/DL (ref 70–99)
GLUCOSE BLD-MCNC: 109 MG/DL (ref 70–99)
GLUCOSE BLD-MCNC: 125 MG/DL (ref 70–99)
GLUCOSE BLD-MCNC: 127 MG/DL (ref 70–99)
GLUCOSE SERPL-MCNC: 135 MG/DL (ref 70–99)
HCT VFR BLD AUTO: 33 % (ref 40.5–52.5)
HGB BLD-MCNC: 11.3 G/DL (ref 13.5–17.5)
INR PPP: 1.24 (ref 0.84–1.16)
MAGNESIUM SERPL-MCNC: 2.9 MG/DL (ref 1.8–2.4)
MCH RBC QN AUTO: 33.6 PG (ref 26–34)
MCHC RBC AUTO-ENTMCNC: 34.2 G/DL (ref 31–36)
MCV RBC AUTO: 98.1 FL (ref 80–100)
PERFORMED ON: ABNORMAL
PLATELET # BLD AUTO: 187 K/UL (ref 135–450)
PMV BLD AUTO: 7.1 FL (ref 5–10.5)
POTASSIUM SERPL-SCNC: 3.4 MMOL/L (ref 3.5–5.1)
POTASSIUM SERPL-SCNC: 3.8 MMOL/L (ref 3.5–5.1)
PROTHROMBIN TIME: 15.6 SEC (ref 11.5–14.8)
RBC # BLD AUTO: 3.36 M/UL (ref 4.2–5.9)
SODIUM SERPL-SCNC: 156 MMOL/L (ref 136–145)
WBC # BLD AUTO: 6.9 K/UL (ref 4–11)

## 2023-07-24 PROCEDURE — 2580000003 HC RX 258: Performed by: SURGERY

## 2023-07-24 PROCEDURE — 2700000000 HC OXYGEN THERAPY PER DAY

## 2023-07-24 PROCEDURE — 99024 POSTOP FOLLOW-UP VISIT: CPT | Performed by: SURGERY

## 2023-07-24 PROCEDURE — 74019 RADEX ABDOMEN 2 VIEWS: CPT

## 2023-07-24 PROCEDURE — 83735 ASSAY OF MAGNESIUM: CPT

## 2023-07-24 PROCEDURE — 02HV33Z INSERTION OF INFUSION DEVICE INTO SUPERIOR VENA CAVA, PERCUTANEOUS APPROACH: ICD-10-PCS | Performed by: SURGERY

## 2023-07-24 PROCEDURE — 6360000002 HC RX W HCPCS: Performed by: SURGERY

## 2023-07-24 PROCEDURE — 6370000000 HC RX 637 (ALT 250 FOR IP): Performed by: SURGERY

## 2023-07-24 PROCEDURE — 84132 ASSAY OF SERUM POTASSIUM: CPT

## 2023-07-24 PROCEDURE — 85610 PROTHROMBIN TIME: CPT

## 2023-07-24 PROCEDURE — 85027 COMPLETE CBC AUTOMATED: CPT

## 2023-07-24 PROCEDURE — 80048 BASIC METABOLIC PNL TOTAL CA: CPT

## 2023-07-24 PROCEDURE — 97530 THERAPEUTIC ACTIVITIES: CPT

## 2023-07-24 PROCEDURE — 36415 COLL VENOUS BLD VENIPUNCTURE: CPT

## 2023-07-24 PROCEDURE — 36569 INSJ PICC 5 YR+ W/O IMAGING: CPT

## 2023-07-24 PROCEDURE — 2709999900 HC NON-CHARGEABLE SUPPLY

## 2023-07-24 PROCEDURE — 2000000000 HC ICU R&B

## 2023-07-24 PROCEDURE — 2500000003 HC RX 250 WO HCPCS: Performed by: SURGERY

## 2023-07-24 PROCEDURE — 94761 N-INVAS EAR/PLS OXIMETRY MLT: CPT

## 2023-07-24 PROCEDURE — C1751 CATH, INF, PER/CENT/MIDLINE: HCPCS

## 2023-07-24 RX ORDER — LIDOCAINE HYDROCHLORIDE 10 MG/ML
5 INJECTION, SOLUTION INFILTRATION; PERINEURAL ONCE
Status: DISCONTINUED | OUTPATIENT
Start: 2023-07-24 | End: 2023-07-28

## 2023-07-24 RX ORDER — SODIUM CHLORIDE 0.9 % (FLUSH) 0.9 %
5-40 SYRINGE (ML) INJECTION EVERY 12 HOURS SCHEDULED
Status: DISCONTINUED | OUTPATIENT
Start: 2023-07-24 | End: 2023-08-01 | Stop reason: SDUPTHER

## 2023-07-24 RX ORDER — SODIUM CHLORIDE 0.9 % (FLUSH) 0.9 %
5-40 SYRINGE (ML) INJECTION PRN
Status: DISCONTINUED | OUTPATIENT
Start: 2023-07-24 | End: 2023-08-01 | Stop reason: SDUPTHER

## 2023-07-24 RX ORDER — SODIUM CHLORIDE 9 MG/ML
25 INJECTION, SOLUTION INTRAVENOUS PRN
Status: DISCONTINUED | OUTPATIENT
Start: 2023-07-24 | End: 2023-08-09 | Stop reason: HOSPADM

## 2023-07-24 RX ORDER — SODIUM CHLORIDE 450 MG/100ML
INJECTION, SOLUTION INTRAVENOUS CONTINUOUS
Status: DISCONTINUED | OUTPATIENT
Start: 2023-07-24 | End: 2023-07-25

## 2023-07-24 RX ADMIN — POTASSIUM CHLORIDE 10 MEQ: 7.46 INJECTION, SOLUTION INTRAVENOUS at 01:54

## 2023-07-24 RX ADMIN — SODIUM CHLORIDE, PRESERVATIVE FREE 10 ML: 5 INJECTION INTRAVENOUS at 07:58

## 2023-07-24 RX ADMIN — SODIUM CHLORIDE, PRESERVATIVE FREE 10 ML: 5 INJECTION INTRAVENOUS at 21:31

## 2023-07-24 RX ADMIN — POTASSIUM CHLORIDE 10 MEQ: 7.46 INJECTION, SOLUTION INTRAVENOUS at 06:36

## 2023-07-24 RX ADMIN — POTASSIUM CHLORIDE 10 MEQ: 7.46 INJECTION, SOLUTION INTRAVENOUS at 00:43

## 2023-07-24 RX ADMIN — METOPROLOL TARTRATE 5 MG: 1 INJECTION, SOLUTION INTRAVENOUS at 07:55

## 2023-07-24 RX ADMIN — ONDANSETRON 4 MG: 2 INJECTION INTRAMUSCULAR; INTRAVENOUS at 06:59

## 2023-07-24 RX ADMIN — MORPHINE SULFATE 4 MG: 4 INJECTION, SOLUTION INTRAMUSCULAR; INTRAVENOUS at 23:22

## 2023-07-24 RX ADMIN — POTASSIUM CHLORIDE 10 MEQ: 7.46 INJECTION, SOLUTION INTRAVENOUS at 09:23

## 2023-07-24 RX ADMIN — MORPHINE SULFATE 4 MG: 4 INJECTION, SOLUTION INTRAMUSCULAR; INTRAVENOUS at 06:46

## 2023-07-24 RX ADMIN — MORPHINE SULFATE 4 MG: 4 INJECTION, SOLUTION INTRAMUSCULAR; INTRAVENOUS at 11:35

## 2023-07-24 RX ADMIN — SODIUM CHLORIDE: 9 INJECTION, SOLUTION INTRAVENOUS at 01:58

## 2023-07-24 RX ADMIN — MORPHINE SULFATE 4 MG: 4 INJECTION, SOLUTION INTRAMUSCULAR; INTRAVENOUS at 04:04

## 2023-07-24 RX ADMIN — MORPHINE SULFATE 4 MG: 4 INJECTION, SOLUTION INTRAMUSCULAR; INTRAVENOUS at 18:28

## 2023-07-24 RX ADMIN — SODIUM CHLORIDE: 4.5 INJECTION, SOLUTION INTRAVENOUS at 15:33

## 2023-07-24 RX ADMIN — METOPROLOL TARTRATE 5 MG: 1 INJECTION, SOLUTION INTRAVENOUS at 21:12

## 2023-07-24 RX ADMIN — METOPROLOL TARTRATE 5 MG: 1 INJECTION, SOLUTION INTRAVENOUS at 01:53

## 2023-07-24 RX ADMIN — MUPIROCIN: 20 OINTMENT TOPICAL at 07:58

## 2023-07-24 RX ADMIN — MUPIROCIN: 20 OINTMENT TOPICAL at 21:34

## 2023-07-24 RX ADMIN — METOPROLOL TARTRATE 5 MG: 1 INJECTION, SOLUTION INTRAVENOUS at 13:56

## 2023-07-24 RX ADMIN — MORPHINE SULFATE 4 MG: 4 INJECTION, SOLUTION INTRAMUSCULAR; INTRAVENOUS at 09:18

## 2023-07-24 RX ADMIN — SODIUM CHLORIDE: 4.5 INJECTION, SOLUTION INTRAVENOUS at 07:34

## 2023-07-24 RX ADMIN — POTASSIUM CHLORIDE 10 MEQ: 7.46 INJECTION, SOLUTION INTRAVENOUS at 07:55

## 2023-07-24 RX ADMIN — POTASSIUM CHLORIDE 10 MEQ: 7.46 INJECTION, SOLUTION INTRAVENOUS at 05:27

## 2023-07-24 RX ADMIN — SODIUM CHLORIDE, PRESERVATIVE FREE 10 ML: 5 INJECTION INTRAVENOUS at 23:22

## 2023-07-24 RX ADMIN — SODIUM CHLORIDE, PRESERVATIVE FREE 10 ML: 5 INJECTION INTRAVENOUS at 11:30

## 2023-07-24 RX ADMIN — SODIUM CHLORIDE: 4.5 INJECTION, SOLUTION INTRAVENOUS at 23:55

## 2023-07-24 ASSESSMENT — PAIN DESCRIPTION - LOCATION
LOCATION: ABDOMEN
LOCATION: CHEST
LOCATION: ABDOMEN

## 2023-07-24 ASSESSMENT — PAIN SCALES - WONG BAKER
WONGBAKER_NUMERICALRESPONSE: 0

## 2023-07-24 ASSESSMENT — PAIN SCALES - GENERAL
PAINLEVEL_OUTOF10: 0
PAINLEVEL_OUTOF10: 10
PAINLEVEL_OUTOF10: 4
PAINLEVEL_OUTOF10: 10
PAINLEVEL_OUTOF10: 8
PAINLEVEL_OUTOF10: 6
PAINLEVEL_OUTOF10: 10
PAINLEVEL_OUTOF10: 10

## 2023-07-24 NOTE — CARE COORDINATION
POD#5 Aortobifemoral bypass and VHR. Now has post op ileus - NG to suction with large output. Hyponatremia -treating with increases Na IV. Has refused to work with therapy thus far. From home with family and hopes to return. Following.     Sanjuanita Casey RN

## 2023-07-25 LAB
ANION GAP SERPL CALCULATED.3IONS-SCNC: 10 MMOL/L (ref 3–16)
BUN SERPL-MCNC: 15 MG/DL (ref 7–20)
CALCIUM SERPL-MCNC: 7.2 MG/DL (ref 8.3–10.6)
CHLORIDE SERPL-SCNC: 105 MMOL/L (ref 99–110)
CO2 SERPL-SCNC: 26 MMOL/L (ref 21–32)
CREAT SERPL-MCNC: <0.5 MG/DL (ref 0.9–1.3)
DEPRECATED RDW RBC AUTO: 15.4 % (ref 12.4–15.4)
GFR SERPLBLD CREATININE-BSD FMLA CKD-EPI: >60 ML/MIN/{1.73_M2}
GLUCOSE BLD-MCNC: 106 MG/DL (ref 70–99)
GLUCOSE BLD-MCNC: 108 MG/DL (ref 70–99)
GLUCOSE BLD-MCNC: 108 MG/DL (ref 70–99)
GLUCOSE BLD-MCNC: 113 MG/DL (ref 70–99)
GLUCOSE BLD-MCNC: 113 MG/DL (ref 70–99)
GLUCOSE BLD-MCNC: 118 MG/DL (ref 70–99)
GLUCOSE SERPL-MCNC: 111 MG/DL (ref 70–99)
HCT VFR BLD AUTO: 28.4 % (ref 40.5–52.5)
HGB BLD-MCNC: 9.7 G/DL (ref 13.5–17.5)
MAGNESIUM SERPL-MCNC: 2.2 MG/DL (ref 1.8–2.4)
MCH RBC QN AUTO: 33.2 PG (ref 26–34)
MCHC RBC AUTO-ENTMCNC: 34 G/DL (ref 31–36)
MCV RBC AUTO: 97.7 FL (ref 80–100)
PERFORMED ON: ABNORMAL
PLATELET # BLD AUTO: 166 K/UL (ref 135–450)
PMV BLD AUTO: 7 FL (ref 5–10.5)
POTASSIUM SERPL-SCNC: 2.9 MMOL/L (ref 3.5–5.1)
POTASSIUM SERPL-SCNC: 3.6 MMOL/L (ref 3.5–5.1)
RBC # BLD AUTO: 2.91 M/UL (ref 4.2–5.9)
SODIUM SERPL-SCNC: 141 MMOL/L (ref 136–145)
WBC # BLD AUTO: 7.6 K/UL (ref 4–11)

## 2023-07-25 PROCEDURE — 85027 COMPLETE CBC AUTOMATED: CPT

## 2023-07-25 PROCEDURE — 80048 BASIC METABOLIC PNL TOTAL CA: CPT

## 2023-07-25 PROCEDURE — 6360000002 HC RX W HCPCS: Performed by: SURGERY

## 2023-07-25 PROCEDURE — APPSS30 APP SPLIT SHARED TIME 16-30 MINUTES: Performed by: CLINICAL NURSE SPECIALIST

## 2023-07-25 PROCEDURE — 83735 ASSAY OF MAGNESIUM: CPT

## 2023-07-25 PROCEDURE — 2580000003 HC RX 258: Performed by: SURGERY

## 2023-07-25 PROCEDURE — 94761 N-INVAS EAR/PLS OXIMETRY MLT: CPT

## 2023-07-25 PROCEDURE — 99024 POSTOP FOLLOW-UP VISIT: CPT | Performed by: SURGERY

## 2023-07-25 PROCEDURE — 2000000000 HC ICU R&B

## 2023-07-25 PROCEDURE — 6370000000 HC RX 637 (ALT 250 FOR IP): Performed by: SURGERY

## 2023-07-25 PROCEDURE — 84132 ASSAY OF SERUM POTASSIUM: CPT

## 2023-07-25 PROCEDURE — 2500000003 HC RX 250 WO HCPCS: Performed by: SURGERY

## 2023-07-25 PROCEDURE — 36592 COLLECT BLOOD FROM PICC: CPT

## 2023-07-25 PROCEDURE — 2700000000 HC OXYGEN THERAPY PER DAY

## 2023-07-25 RX ORDER — SODIUM CHLORIDE, SODIUM LACTATE, POTASSIUM CHLORIDE, CALCIUM CHLORIDE 600; 310; 30; 20 MG/100ML; MG/100ML; MG/100ML; MG/100ML
INJECTION, SOLUTION INTRAVENOUS CONTINUOUS
Status: DISCONTINUED | OUTPATIENT
Start: 2023-07-25 | End: 2023-07-26

## 2023-07-25 RX ADMIN — HYDRALAZINE HYDROCHLORIDE 10 MG: 20 INJECTION INTRAMUSCULAR; INTRAVENOUS at 12:58

## 2023-07-25 RX ADMIN — POTASSIUM CHLORIDE 20 MEQ: 29.8 INJECTION, SOLUTION INTRAVENOUS at 17:10

## 2023-07-25 RX ADMIN — SODIUM CHLORIDE, PRESERVATIVE FREE 10 ML: 5 INJECTION INTRAVENOUS at 20:14

## 2023-07-25 RX ADMIN — SODIUM CHLORIDE, PRESERVATIVE FREE 10 ML: 5 INJECTION INTRAVENOUS at 08:00

## 2023-07-25 RX ADMIN — METOPROLOL TARTRATE 5 MG: 1 INJECTION, SOLUTION INTRAVENOUS at 01:33

## 2023-07-25 RX ADMIN — SODIUM CHLORIDE, POTASSIUM CHLORIDE, SODIUM LACTATE AND CALCIUM CHLORIDE: 600; 310; 30; 20 INJECTION, SOLUTION INTRAVENOUS at 17:09

## 2023-07-25 RX ADMIN — METOPROLOL TARTRATE 5 MG: 1 INJECTION, SOLUTION INTRAVENOUS at 20:13

## 2023-07-25 RX ADMIN — MORPHINE SULFATE 4 MG: 4 INJECTION, SOLUTION INTRAMUSCULAR; INTRAVENOUS at 01:41

## 2023-07-25 RX ADMIN — HYDRALAZINE HYDROCHLORIDE 10 MG: 20 INJECTION INTRAMUSCULAR; INTRAVENOUS at 07:40

## 2023-07-25 RX ADMIN — MORPHINE SULFATE 4 MG: 4 INJECTION, SOLUTION INTRAMUSCULAR; INTRAVENOUS at 20:13

## 2023-07-25 RX ADMIN — METOPROLOL TARTRATE 5 MG: 1 INJECTION, SOLUTION INTRAVENOUS at 07:59

## 2023-07-25 RX ADMIN — POTASSIUM CHLORIDE 20 MEQ: 29.8 INJECTION, SOLUTION INTRAVENOUS at 11:04

## 2023-07-25 RX ADMIN — POTASSIUM CHLORIDE 20 MEQ: 29.8 INJECTION, SOLUTION INTRAVENOUS at 09:26

## 2023-07-25 RX ADMIN — POTASSIUM CHLORIDE 10 MEQ: 7.46 INJECTION, SOLUTION INTRAVENOUS at 06:22

## 2023-07-25 RX ADMIN — METOPROLOL TARTRATE 5 MG: 1 INJECTION, SOLUTION INTRAVENOUS at 13:52

## 2023-07-25 RX ADMIN — SODIUM CHLORIDE: 4.5 INJECTION, SOLUTION INTRAVENOUS at 07:59

## 2023-07-25 RX ADMIN — SODIUM CHLORIDE, POTASSIUM CHLORIDE, SODIUM LACTATE AND CALCIUM CHLORIDE: 600; 310; 30; 20 INJECTION, SOLUTION INTRAVENOUS at 09:25

## 2023-07-25 RX ADMIN — MORPHINE SULFATE 4 MG: 4 INJECTION, SOLUTION INTRAMUSCULAR; INTRAVENOUS at 18:15

## 2023-07-25 RX ADMIN — SODIUM CHLORIDE, PRESERVATIVE FREE 10 ML: 5 INJECTION INTRAVENOUS at 20:13

## 2023-07-25 RX ADMIN — POTASSIUM CHLORIDE 20 MEQ: 29.8 INJECTION, SOLUTION INTRAVENOUS at 18:12

## 2023-07-25 RX ADMIN — POTASSIUM CHLORIDE 10 MEQ: 7.46 INJECTION, SOLUTION INTRAVENOUS at 07:31

## 2023-07-25 RX ADMIN — HYDRALAZINE HYDROCHLORIDE 10 MG: 20 INJECTION INTRAMUSCULAR; INTRAVENOUS at 03:03

## 2023-07-25 ASSESSMENT — PAIN SCALES - WONG BAKER
WONGBAKER_NUMERICALRESPONSE: 0
WONGBAKER_NUMERICALRESPONSE: 0

## 2023-07-25 ASSESSMENT — PAIN SCALES - GENERAL
PAINLEVEL_OUTOF10: 8
PAINLEVEL_OUTOF10: 7
PAINLEVEL_OUTOF10: 10

## 2023-07-25 ASSESSMENT — PAIN DESCRIPTION - LOCATION
LOCATION: ABDOMEN
LOCATION: ABDOMEN

## 2023-07-25 NOTE — CARE COORDINATION
Cm met with pt at bedside. Cm discussed PT/OT recs. At this time, pt is not interested in ARU. Pt hopes to dc home. Cm will continue to follow.

## 2023-07-26 ENCOUNTER — APPOINTMENT (OUTPATIENT)
Dept: GENERAL RADIOLOGY | Age: 58
DRG: 270 | End: 2023-07-26
Attending: SURGERY
Payer: MEDICARE

## 2023-07-26 LAB
ANION GAP SERPL CALCULATED.3IONS-SCNC: 12 MMOL/L (ref 3–16)
BASOPHILS # BLD: 0.1 K/UL (ref 0–0.2)
BASOPHILS NFR BLD: 0.7 %
BUN SERPL-MCNC: 17 MG/DL (ref 7–20)
CALCIUM SERPL-MCNC: 9 MG/DL (ref 8.3–10.6)
CHLORIDE SERPL-SCNC: 110 MMOL/L (ref 99–110)
CO2 SERPL-SCNC: 33 MMOL/L (ref 21–32)
CREAT SERPL-MCNC: 0.6 MG/DL (ref 0.9–1.3)
DEPRECATED RDW RBC AUTO: 14.9 % (ref 12.4–15.4)
EOSINOPHIL # BLD: 0.2 K/UL (ref 0–0.6)
EOSINOPHIL NFR BLD: 1.5 %
GFR SERPLBLD CREATININE-BSD FMLA CKD-EPI: >60 ML/MIN/{1.73_M2}
GLUCOSE BLD-MCNC: 128 MG/DL (ref 70–99)
GLUCOSE BLD-MCNC: 133 MG/DL (ref 70–99)
GLUCOSE BLD-MCNC: 140 MG/DL (ref 70–99)
GLUCOSE BLD-MCNC: 141 MG/DL (ref 70–99)
GLUCOSE BLD-MCNC: 93 MG/DL (ref 70–99)
GLUCOSE SERPL-MCNC: 149 MG/DL (ref 70–99)
HCT VFR BLD AUTO: 33.5 % (ref 40.5–52.5)
HGB BLD-MCNC: 11.3 G/DL (ref 13.5–17.5)
LYMPHOCYTES # BLD: 1.2 K/UL (ref 1–5.1)
LYMPHOCYTES NFR BLD: 10.7 %
MAGNESIUM SERPL-MCNC: 2.5 MG/DL (ref 1.8–2.4)
MCH RBC QN AUTO: 33.2 PG (ref 26–34)
MCHC RBC AUTO-ENTMCNC: 33.9 G/DL (ref 31–36)
MCV RBC AUTO: 98.1 FL (ref 80–100)
MONOCYTES # BLD: 0.7 K/UL (ref 0–1.3)
MONOCYTES NFR BLD: 6.6 %
NEUTROPHILS # BLD: 8.9 K/UL (ref 1.7–7.7)
NEUTROPHILS NFR BLD: 80.5 %
PERFORMED ON: ABNORMAL
PERFORMED ON: NORMAL
PLATELET # BLD AUTO: 205 K/UL (ref 135–450)
PMV BLD AUTO: 7.4 FL (ref 5–10.5)
POTASSIUM SERPL-SCNC: 3.4 MMOL/L (ref 3.5–5.1)
POTASSIUM SERPL-SCNC: 3.8 MMOL/L (ref 3.5–5.1)
RBC # BLD AUTO: 3.42 M/UL (ref 4.2–5.9)
SODIUM SERPL-SCNC: 155 MMOL/L (ref 136–145)
WBC # BLD AUTO: 11 K/UL (ref 4–11)

## 2023-07-26 PROCEDURE — 85025 COMPLETE CBC W/AUTO DIFF WBC: CPT

## 2023-07-26 PROCEDURE — 6370000000 HC RX 637 (ALT 250 FOR IP): Performed by: SURGERY

## 2023-07-26 PROCEDURE — 2500000003 HC RX 250 WO HCPCS: Performed by: SURGERY

## 2023-07-26 PROCEDURE — 80048 BASIC METABOLIC PNL TOTAL CA: CPT

## 2023-07-26 PROCEDURE — APPSS30 APP SPLIT SHARED TIME 16-30 MINUTES: Performed by: CLINICAL NURSE SPECIALIST

## 2023-07-26 PROCEDURE — 74019 RADEX ABDOMEN 2 VIEWS: CPT

## 2023-07-26 PROCEDURE — 97530 THERAPEUTIC ACTIVITIES: CPT

## 2023-07-26 PROCEDURE — 97535 SELF CARE MNGMENT TRAINING: CPT

## 2023-07-26 PROCEDURE — 99024 POSTOP FOLLOW-UP VISIT: CPT | Performed by: SURGERY

## 2023-07-26 PROCEDURE — 2000000000 HC ICU R&B

## 2023-07-26 PROCEDURE — 2580000003 HC RX 258: Performed by: SURGERY

## 2023-07-26 PROCEDURE — 94761 N-INVAS EAR/PLS OXIMETRY MLT: CPT

## 2023-07-26 PROCEDURE — 84132 ASSAY OF SERUM POTASSIUM: CPT

## 2023-07-26 PROCEDURE — 37799 UNLISTED PX VASCULAR SURGERY: CPT

## 2023-07-26 PROCEDURE — 83735 ASSAY OF MAGNESIUM: CPT

## 2023-07-26 PROCEDURE — 6360000002 HC RX W HCPCS: Performed by: SURGERY

## 2023-07-26 PROCEDURE — 2700000000 HC OXYGEN THERAPY PER DAY

## 2023-07-26 RX ORDER — METOCLOPRAMIDE HYDROCHLORIDE 5 MG/ML
10 INJECTION INTRAMUSCULAR; INTRAVENOUS EVERY 6 HOURS
Status: DISCONTINUED | OUTPATIENT
Start: 2023-07-26 | End: 2023-08-03

## 2023-07-26 RX ORDER — SODIUM CHLORIDE 450 MG/100ML
INJECTION, SOLUTION INTRAVENOUS CONTINUOUS
Status: DISCONTINUED | OUTPATIENT
Start: 2023-07-26 | End: 2023-07-27

## 2023-07-26 RX ORDER — ACETAMINOPHEN 650 MG/1
650 SUPPOSITORY RECTAL EVERY 4 HOURS PRN
Status: DISCONTINUED | OUTPATIENT
Start: 2023-07-26 | End: 2023-08-09 | Stop reason: HOSPADM

## 2023-07-26 RX ORDER — ENOXAPARIN SODIUM 100 MG/ML
40 INJECTION SUBCUTANEOUS 2 TIMES DAILY
Status: DISCONTINUED | OUTPATIENT
Start: 2023-07-26 | End: 2023-08-09 | Stop reason: HOSPADM

## 2023-07-26 RX ADMIN — SODIUM CHLORIDE: 4.5 INJECTION, SOLUTION INTRAVENOUS at 17:24

## 2023-07-26 RX ADMIN — METOPROLOL TARTRATE 5 MG: 1 INJECTION, SOLUTION INTRAVENOUS at 02:32

## 2023-07-26 RX ADMIN — ENOXAPARIN SODIUM 40 MG: 100 INJECTION SUBCUTANEOUS at 21:03

## 2023-07-26 RX ADMIN — POTASSIUM CHLORIDE 20 MEQ: 29.8 INJECTION, SOLUTION INTRAVENOUS at 06:59

## 2023-07-26 RX ADMIN — SODIUM CHLORIDE, PRESERVATIVE FREE 10 ML: 5 INJECTION INTRAVENOUS at 08:23

## 2023-07-26 RX ADMIN — MORPHINE SULFATE 4 MG: 4 INJECTION, SOLUTION INTRAMUSCULAR; INTRAVENOUS at 04:23

## 2023-07-26 RX ADMIN — METOCLOPRAMIDE HYDROCHLORIDE 10 MG: 5 INJECTION INTRAMUSCULAR; INTRAVENOUS at 21:03

## 2023-07-26 RX ADMIN — MORPHINE SULFATE 2 MG: 2 INJECTION, SOLUTION INTRAMUSCULAR; INTRAVENOUS at 21:04

## 2023-07-26 RX ADMIN — METOPROLOL TARTRATE 5 MG: 1 INJECTION, SOLUTION INTRAVENOUS at 08:21

## 2023-07-26 RX ADMIN — SODIUM CHLORIDE: 4.5 INJECTION, SOLUTION INTRAVENOUS at 08:33

## 2023-07-26 RX ADMIN — ONDANSETRON 4 MG: 2 INJECTION INTRAMUSCULAR; INTRAVENOUS at 21:03

## 2023-07-26 RX ADMIN — METOPROLOL TARTRATE 5 MG: 1 INJECTION, SOLUTION INTRAVENOUS at 14:00

## 2023-07-26 RX ADMIN — SODIUM CHLORIDE, POTASSIUM CHLORIDE, SODIUM LACTATE AND CALCIUM CHLORIDE: 600; 310; 30; 20 INJECTION, SOLUTION INTRAVENOUS at 00:50

## 2023-07-26 RX ADMIN — METOCLOPRAMIDE HYDROCHLORIDE 10 MG: 5 INJECTION INTRAMUSCULAR; INTRAVENOUS at 14:31

## 2023-07-26 RX ADMIN — SODIUM CHLORIDE, PRESERVATIVE FREE 10 ML: 5 INJECTION INTRAVENOUS at 21:04

## 2023-07-26 RX ADMIN — ACETAMINOPHEN 650 MG: 650 SUPPOSITORY RECTAL at 14:31

## 2023-07-26 RX ADMIN — METOPROLOL TARTRATE 5 MG: 1 INJECTION, SOLUTION INTRAVENOUS at 19:00

## 2023-07-26 RX ADMIN — ACETAMINOPHEN 650 MG: 650 SUPPOSITORY RECTAL at 18:53

## 2023-07-26 RX ADMIN — SODIUM CHLORIDE, PRESERVATIVE FREE 10 ML: 5 INJECTION INTRAVENOUS at 21:05

## 2023-07-26 RX ADMIN — POTASSIUM CHLORIDE 20 MEQ: 29.8 INJECTION, SOLUTION INTRAVENOUS at 08:25

## 2023-07-26 ASSESSMENT — PAIN DESCRIPTION - PAIN TYPE: TYPE: SURGICAL PAIN

## 2023-07-26 ASSESSMENT — PAIN DESCRIPTION - DESCRIPTORS
DESCRIPTORS: ACHING
DESCRIPTORS: ACHING

## 2023-07-26 ASSESSMENT — PAIN DESCRIPTION - ORIENTATION
ORIENTATION: RIGHT;LEFT;MID
ORIENTATION: RIGHT;LEFT;MID

## 2023-07-26 ASSESSMENT — PAIN DESCRIPTION - FREQUENCY: FREQUENCY: CONTINUOUS

## 2023-07-26 ASSESSMENT — PAIN DESCRIPTION - LOCATION
LOCATION: ABDOMEN;GROIN
LOCATION: ABDOMEN;GROIN
LOCATION: ABDOMEN

## 2023-07-26 ASSESSMENT — PAIN SCALES - GENERAL
PAINLEVEL_OUTOF10: 8
PAINLEVEL_OUTOF10: 5
PAINLEVEL_OUTOF10: 3
PAINLEVEL_OUTOF10: 3
PAINLEVEL_OUTOF10: 5
PAINLEVEL_OUTOF10: 3

## 2023-07-26 ASSESSMENT — PAIN - FUNCTIONAL ASSESSMENT: PAIN_FUNCTIONAL_ASSESSMENT: PREVENTS OR INTERFERES SOME ACTIVE ACTIVITIES AND ADLS

## 2023-07-27 LAB
ANION GAP SERPL CALCULATED.3IONS-SCNC: 10 MMOL/L (ref 3–16)
ANION GAP SERPL CALCULATED.3IONS-SCNC: 15 MMOL/L (ref 3–16)
ANION GAP SERPL CALCULATED.3IONS-SCNC: 9 MMOL/L (ref 3–16)
BASOPHILS # BLD: 0.1 K/UL (ref 0–0.2)
BASOPHILS NFR BLD: 0.7 %
BILIRUB UR QL STRIP.AUTO: NEGATIVE
BUN SERPL-MCNC: 25 MG/DL (ref 7–20)
BUN SERPL-MCNC: 27 MG/DL (ref 7–20)
BUN SERPL-MCNC: 29 MG/DL (ref 7–20)
CALCIUM SERPL-MCNC: 8.2 MG/DL (ref 8.3–10.6)
CALCIUM SERPL-MCNC: 8.2 MG/DL (ref 8.3–10.6)
CALCIUM SERPL-MCNC: 8.5 MG/DL (ref 8.3–10.6)
CHLORIDE SERPL-SCNC: 103 MMOL/L (ref 99–110)
CHLORIDE SERPL-SCNC: 105 MMOL/L (ref 99–110)
CHLORIDE SERPL-SCNC: 110 MMOL/L (ref 99–110)
CLARITY UR: CLEAR
CO2 SERPL-SCNC: 36 MMOL/L (ref 21–32)
CO2 SERPL-SCNC: 39 MMOL/L (ref 21–32)
CO2 SERPL-SCNC: 41 MMOL/L (ref 21–32)
COLOR UR: YELLOW
CREAT SERPL-MCNC: 0.8 MG/DL (ref 0.9–1.3)
CREAT SERPL-MCNC: 0.9 MG/DL (ref 0.9–1.3)
CREAT SERPL-MCNC: 1 MG/DL (ref 0.9–1.3)
DEPRECATED RDW RBC AUTO: 15 % (ref 12.4–15.4)
EOSINOPHIL # BLD: 0.1 K/UL (ref 0–0.6)
EOSINOPHIL NFR BLD: 1 %
EPI CELLS #/AREA URNS HPF: NORMAL /HPF (ref 0–5)
GFR SERPLBLD CREATININE-BSD FMLA CKD-EPI: >60 ML/MIN/{1.73_M2}
GLUCOSE BLD-MCNC: 136 MG/DL (ref 70–99)
GLUCOSE BLD-MCNC: 138 MG/DL (ref 70–99)
GLUCOSE BLD-MCNC: 155 MG/DL (ref 70–99)
GLUCOSE BLD-MCNC: 185 MG/DL (ref 70–99)
GLUCOSE BLD-MCNC: 198 MG/DL (ref 70–99)
GLUCOSE SERPL-MCNC: 152 MG/DL (ref 70–99)
GLUCOSE SERPL-MCNC: 156 MG/DL (ref 70–99)
GLUCOSE SERPL-MCNC: 223 MG/DL (ref 70–99)
GLUCOSE UR STRIP.AUTO-MCNC: NEGATIVE MG/DL
HCT VFR BLD AUTO: 31.4 % (ref 40.5–52.5)
HGB BLD-MCNC: 10.8 G/DL (ref 13.5–17.5)
HGB UR QL STRIP.AUTO: NEGATIVE
KETONES UR STRIP.AUTO-MCNC: NEGATIVE MG/DL
LEUKOCYTE ESTERASE UR QL STRIP.AUTO: NEGATIVE
LYMPHOCYTES # BLD: 1.7 K/UL (ref 1–5.1)
LYMPHOCYTES NFR BLD: 12 %
MAGNESIUM SERPL-MCNC: 2.4 MG/DL (ref 1.8–2.4)
MAGNESIUM SERPL-MCNC: 2.5 MG/DL (ref 1.8–2.4)
MAGNESIUM SERPL-MCNC: 2.6 MG/DL (ref 1.8–2.4)
MCH RBC QN AUTO: 33.4 PG (ref 26–34)
MCHC RBC AUTO-ENTMCNC: 34.4 G/DL (ref 31–36)
MCV RBC AUTO: 96.9 FL (ref 80–100)
MONOCYTES # BLD: 0.9 K/UL (ref 0–1.3)
MONOCYTES NFR BLD: 6.1 %
NEUTROPHILS # BLD: 11.3 K/UL (ref 1.7–7.7)
NEUTROPHILS NFR BLD: 80.2 %
NITRITE UR QL STRIP.AUTO: NEGATIVE
OSMOLALITY SERPL: 338 MOSM/KG (ref 275–295)
OSMOLALITY UR: 689 MOSM/KG (ref 390–1070)
PERFORMED ON: ABNORMAL
PH UR STRIP.AUTO: >=9 [PH] (ref 5–8)
PLATELET # BLD AUTO: 192 K/UL (ref 135–450)
PMV BLD AUTO: 7.8 FL (ref 5–10.5)
POTASSIUM SERPL-SCNC: 2.7 MMOL/L (ref 3.5–5.1)
POTASSIUM SERPL-SCNC: 2.7 MMOL/L (ref 3.5–5.1)
POTASSIUM SERPL-SCNC: 2.9 MMOL/L (ref 3.5–5.1)
POTASSIUM SERPL-SCNC: 3.5 MMOL/L (ref 3.5–5.1)
PROT UR STRIP.AUTO-MCNC: ABNORMAL MG/DL
RBC # BLD AUTO: 3.24 M/UL (ref 4.2–5.9)
RBC #/AREA URNS HPF: NORMAL /HPF (ref 0–4)
SODIUM SERPL-SCNC: 154 MMOL/L (ref 136–145)
SODIUM SERPL-SCNC: 156 MMOL/L (ref 136–145)
SODIUM SERPL-SCNC: 158 MMOL/L (ref 136–145)
SP GR UR STRIP.AUTO: 1.01 (ref 1–1.03)
UA DIPSTICK W REFLEX MICRO PNL UR: YES
URN SPEC COLLECT METH UR: ABNORMAL
UROBILINOGEN UR STRIP-ACNC: 2 E.U./DL
WBC # BLD AUTO: 14.1 K/UL (ref 4–11)
WBC #/AREA URNS HPF: NORMAL /HPF (ref 0–5)

## 2023-07-27 PROCEDURE — 84132 ASSAY OF SERUM POTASSIUM: CPT

## 2023-07-27 PROCEDURE — 2700000000 HC OXYGEN THERAPY PER DAY

## 2023-07-27 PROCEDURE — 83930 ASSAY OF BLOOD OSMOLALITY: CPT

## 2023-07-27 PROCEDURE — 2580000003 HC RX 258: Performed by: SURGERY

## 2023-07-27 PROCEDURE — 51798 US URINE CAPACITY MEASURE: CPT

## 2023-07-27 PROCEDURE — 83735 ASSAY OF MAGNESIUM: CPT

## 2023-07-27 PROCEDURE — 36415 COLL VENOUS BLD VENIPUNCTURE: CPT

## 2023-07-27 PROCEDURE — 99024 POSTOP FOLLOW-UP VISIT: CPT | Performed by: SURGERY

## 2023-07-27 PROCEDURE — 2500000003 HC RX 250 WO HCPCS: Performed by: SURGERY

## 2023-07-27 PROCEDURE — 85025 COMPLETE CBC W/AUTO DIFF WBC: CPT

## 2023-07-27 PROCEDURE — 2000000000 HC ICU R&B

## 2023-07-27 PROCEDURE — 83935 ASSAY OF URINE OSMOLALITY: CPT

## 2023-07-27 PROCEDURE — 6370000000 HC RX 637 (ALT 250 FOR IP): Performed by: SURGERY

## 2023-07-27 PROCEDURE — 80048 BASIC METABOLIC PNL TOTAL CA: CPT

## 2023-07-27 PROCEDURE — 94761 N-INVAS EAR/PLS OXIMETRY MLT: CPT

## 2023-07-27 PROCEDURE — 37799 UNLISTED PX VASCULAR SURGERY: CPT

## 2023-07-27 PROCEDURE — 6360000002 HC RX W HCPCS: Performed by: SURGERY

## 2023-07-27 PROCEDURE — 81001 URINALYSIS AUTO W/SCOPE: CPT

## 2023-07-27 RX ORDER — DEXTROSE AND SODIUM CHLORIDE 5; .45 G/100ML; G/100ML
INJECTION, SOLUTION INTRAVENOUS CONTINUOUS
Status: DISCONTINUED | OUTPATIENT
Start: 2023-07-27 | End: 2023-07-28

## 2023-07-27 RX ORDER — 0.9 % SODIUM CHLORIDE 0.9 %
1000 INTRAVENOUS SOLUTION INTRAVENOUS ONCE
Status: COMPLETED | OUTPATIENT
Start: 2023-07-27 | End: 2023-07-27

## 2023-07-27 RX ORDER — SODIUM CHLORIDE 450 MG/100ML
INJECTION, SOLUTION INTRAVENOUS CONTINUOUS
Status: ACTIVE | OUTPATIENT
Start: 2023-07-27 | End: 2023-07-27

## 2023-07-27 RX ORDER — SODIUM CHLORIDE 450 MG/100ML
INJECTION, SOLUTION INTRAVENOUS ONCE
Status: COMPLETED | OUTPATIENT
Start: 2023-07-27 | End: 2023-07-27

## 2023-07-27 RX ADMIN — MORPHINE SULFATE 4 MG: 4 INJECTION, SOLUTION INTRAMUSCULAR; INTRAVENOUS at 20:00

## 2023-07-27 RX ADMIN — MORPHINE SULFATE 4 MG: 4 INJECTION, SOLUTION INTRAMUSCULAR; INTRAVENOUS at 01:24

## 2023-07-27 RX ADMIN — POTASSIUM CHLORIDE 20 MEQ: 29.8 INJECTION, SOLUTION INTRAVENOUS at 18:04

## 2023-07-27 RX ADMIN — SODIUM CHLORIDE: 4.5 INJECTION, SOLUTION INTRAVENOUS at 22:56

## 2023-07-27 RX ADMIN — METOCLOPRAMIDE HYDROCHLORIDE 10 MG: 5 INJECTION INTRAMUSCULAR; INTRAVENOUS at 13:10

## 2023-07-27 RX ADMIN — POTASSIUM CHLORIDE 20 MEQ: 29.8 INJECTION, SOLUTION INTRAVENOUS at 19:09

## 2023-07-27 RX ADMIN — SODIUM CHLORIDE, PRESERVATIVE FREE 10 ML: 5 INJECTION INTRAVENOUS at 08:29

## 2023-07-27 RX ADMIN — SODIUM CHLORIDE, PRESERVATIVE FREE 10 ML: 5 INJECTION INTRAVENOUS at 20:03

## 2023-07-27 RX ADMIN — METOCLOPRAMIDE HYDROCHLORIDE 10 MG: 5 INJECTION INTRAMUSCULAR; INTRAVENOUS at 01:24

## 2023-07-27 RX ADMIN — METOCLOPRAMIDE HYDROCHLORIDE 10 MG: 5 INJECTION INTRAMUSCULAR; INTRAVENOUS at 08:29

## 2023-07-27 RX ADMIN — INSULIN LISPRO 2 UNITS: 100 INJECTION, SOLUTION INTRAVENOUS; SUBCUTANEOUS at 18:10

## 2023-07-27 RX ADMIN — MORPHINE SULFATE 4 MG: 4 INJECTION, SOLUTION INTRAMUSCULAR; INTRAVENOUS at 05:35

## 2023-07-27 RX ADMIN — DEXTROSE AND SODIUM CHLORIDE: 5; 450 INJECTION, SOLUTION INTRAVENOUS at 17:01

## 2023-07-27 RX ADMIN — SODIUM CHLORIDE: 4.5 INJECTION, SOLUTION INTRAVENOUS at 07:51

## 2023-07-27 RX ADMIN — METOCLOPRAMIDE HYDROCHLORIDE 10 MG: 5 INJECTION INTRAMUSCULAR; INTRAVENOUS at 20:03

## 2023-07-27 RX ADMIN — POTASSIUM CHLORIDE 20 MEQ: 29.8 INJECTION, SOLUTION INTRAVENOUS at 23:57

## 2023-07-27 RX ADMIN — METOPROLOL TARTRATE 5 MG: 1 INJECTION, SOLUTION INTRAVENOUS at 08:29

## 2023-07-27 RX ADMIN — MORPHINE SULFATE 4 MG: 4 INJECTION, SOLUTION INTRAMUSCULAR; INTRAVENOUS at 15:12

## 2023-07-27 RX ADMIN — POTASSIUM CHLORIDE 20 MEQ: 29.8 INJECTION, SOLUTION INTRAVENOUS at 06:56

## 2023-07-27 RX ADMIN — POTASSIUM CHLORIDE 20 MEQ: 29.8 INJECTION, SOLUTION INTRAVENOUS at 17:04

## 2023-07-27 RX ADMIN — METOPROLOL TARTRATE 5 MG: 1 INJECTION, SOLUTION INTRAVENOUS at 13:10

## 2023-07-27 RX ADMIN — DEXTROSE AND SODIUM CHLORIDE: 5; 450 INJECTION, SOLUTION INTRAVENOUS at 23:51

## 2023-07-27 RX ADMIN — POTASSIUM CHLORIDE 20 MEQ: 29.8 INJECTION, SOLUTION INTRAVENOUS at 08:32

## 2023-07-27 RX ADMIN — SODIUM CHLORIDE, PRESERVATIVE FREE 10 ML: 5 INJECTION INTRAVENOUS at 20:04

## 2023-07-27 RX ADMIN — SODIUM CHLORIDE 1000 ML: 9 INJECTION, SOLUTION INTRAVENOUS at 13:12

## 2023-07-27 RX ADMIN — SODIUM CHLORIDE: 4.5 INJECTION, SOLUTION INTRAVENOUS at 01:34

## 2023-07-27 RX ADMIN — METOPROLOL TARTRATE 5 MG: 1 INJECTION, SOLUTION INTRAVENOUS at 20:03

## 2023-07-27 RX ADMIN — POTASSIUM CHLORIDE 20 MEQ: 29.8 INJECTION, SOLUTION INTRAVENOUS at 09:53

## 2023-07-27 RX ADMIN — DEXTROSE AND SODIUM CHLORIDE: 5; 450 INJECTION, SOLUTION INTRAVENOUS at 09:54

## 2023-07-27 RX ADMIN — METOPROLOL TARTRATE 5 MG: 1 INJECTION, SOLUTION INTRAVENOUS at 01:24

## 2023-07-27 RX ADMIN — ENOXAPARIN SODIUM 40 MG: 100 INJECTION SUBCUTANEOUS at 20:03

## 2023-07-27 RX ADMIN — ENOXAPARIN SODIUM 40 MG: 100 INJECTION SUBCUTANEOUS at 08:29

## 2023-07-27 ASSESSMENT — PAIN DESCRIPTION - LOCATION
LOCATION: ABDOMEN
LOCATION: ABDOMEN;GROIN
LOCATION: ABDOMEN
LOCATION: ABDOMEN;GROIN

## 2023-07-27 ASSESSMENT — PAIN - FUNCTIONAL ASSESSMENT
PAIN_FUNCTIONAL_ASSESSMENT: PREVENTS OR INTERFERES SOME ACTIVE ACTIVITIES AND ADLS

## 2023-07-27 ASSESSMENT — PAIN SCALES - GENERAL
PAINLEVEL_OUTOF10: 2
PAINLEVEL_OUTOF10: 9
PAINLEVEL_OUTOF10: 3
PAINLEVEL_OUTOF10: 8
PAINLEVEL_OUTOF10: 4
PAINLEVEL_OUTOF10: 8
PAINLEVEL_OUTOF10: 8
PAINLEVEL_OUTOF10: 4
PAINLEVEL_OUTOF10: 3

## 2023-07-27 ASSESSMENT — PAIN DESCRIPTION - PAIN TYPE: TYPE: ACUTE PAIN

## 2023-07-27 ASSESSMENT — PAIN DESCRIPTION - ORIENTATION
ORIENTATION: RIGHT;LEFT;MID
ORIENTATION: RIGHT;LEFT;MID
ORIENTATION: MID

## 2023-07-27 ASSESSMENT — PAIN DESCRIPTION - ONSET: ONSET: ON-GOING

## 2023-07-27 ASSESSMENT — PAIN DESCRIPTION - DESCRIPTORS
DESCRIPTORS: ACHING

## 2023-07-27 ASSESSMENT — PAIN DESCRIPTION - FREQUENCY: FREQUENCY: CONTINUOUS

## 2023-07-27 ASSESSMENT — PAIN SCALES - WONG BAKER: WONGBAKER_NUMERICALRESPONSE: 0

## 2023-07-27 NOTE — CARE COORDINATION
CM update note. POD #8 Aortobifem bypass with ventral hernia repair with mesh. Continues with significant NG output and abdominal distension. Hypokalemia has been resolved with replacements. Patient has continued to refuse IPR - insists on eventual discharge home. Will continue to follow.       Jorge Art RN

## 2023-07-28 ENCOUNTER — APPOINTMENT (OUTPATIENT)
Dept: GENERAL RADIOLOGY | Age: 58
DRG: 270 | End: 2023-07-28
Attending: SURGERY
Payer: MEDICARE

## 2023-07-28 ENCOUNTER — TELEPHONE (OUTPATIENT)
Dept: SURGERY | Age: 58
End: 2023-07-28

## 2023-07-28 LAB
ANION GAP SERPL CALCULATED.3IONS-SCNC: 10 MMOL/L (ref 3–16)
ANION GAP SERPL CALCULATED.3IONS-SCNC: 12 MMOL/L (ref 3–16)
ANION GAP SERPL CALCULATED.3IONS-SCNC: 12 MMOL/L (ref 3–16)
BASOPHILS # BLD: 0 K/UL (ref 0–0.2)
BASOPHILS NFR BLD: 0.3 %
BUN SERPL-MCNC: 22 MG/DL (ref 7–20)
BUN SERPL-MCNC: 22 MG/DL (ref 7–20)
BUN SERPL-MCNC: 24 MG/DL (ref 7–20)
CALCIUM SERPL-MCNC: 8.3 MG/DL (ref 8.3–10.6)
CALCIUM SERPL-MCNC: 8.4 MG/DL (ref 8.3–10.6)
CALCIUM SERPL-MCNC: 8.4 MG/DL (ref 8.3–10.6)
CHLORIDE SERPL-SCNC: 107 MMOL/L (ref 99–110)
CHLORIDE SERPL-SCNC: 110 MMOL/L (ref 99–110)
CHLORIDE SERPL-SCNC: 112 MMOL/L (ref 99–110)
CO2 SERPL-SCNC: 30 MMOL/L (ref 21–32)
CO2 SERPL-SCNC: 34 MMOL/L (ref 21–32)
CO2 SERPL-SCNC: 37 MMOL/L (ref 21–32)
CREAT SERPL-MCNC: 0.9 MG/DL (ref 0.9–1.3)
CREAT SERPL-MCNC: 0.9 MG/DL (ref 0.9–1.3)
CREAT SERPL-MCNC: 1 MG/DL (ref 0.9–1.3)
DEPRECATED RDW RBC AUTO: 15 % (ref 12.4–15.4)
EOSINOPHIL # BLD: 0.3 K/UL (ref 0–0.6)
EOSINOPHIL NFR BLD: 1.8 %
GFR SERPLBLD CREATININE-BSD FMLA CKD-EPI: >60 ML/MIN/{1.73_M2}
GLUCOSE BLD-MCNC: 131 MG/DL (ref 70–99)
GLUCOSE BLD-MCNC: 139 MG/DL (ref 70–99)
GLUCOSE BLD-MCNC: 140 MG/DL (ref 70–99)
GLUCOSE BLD-MCNC: 161 MG/DL (ref 70–99)
GLUCOSE BLD-MCNC: 166 MG/DL (ref 70–99)
GLUCOSE BLD-MCNC: 215 MG/DL (ref 70–99)
GLUCOSE BLD-MCNC: >600 MG/DL (ref 70–99)
GLUCOSE SERPL-MCNC: 153 MG/DL (ref 70–99)
GLUCOSE SERPL-MCNC: 227 MG/DL (ref 70–99)
GLUCOSE SERPL-MCNC: 250 MG/DL (ref 70–99)
HCT VFR BLD AUTO: 28.3 % (ref 40.5–52.5)
HGB BLD-MCNC: 9.4 G/DL (ref 13.5–17.5)
LYMPHOCYTES # BLD: 1.6 K/UL (ref 1–5.1)
LYMPHOCYTES NFR BLD: 10.4 %
MAGNESIUM SERPL-MCNC: 2.5 MG/DL (ref 1.8–2.4)
MAGNESIUM SERPL-MCNC: 2.5 MG/DL (ref 1.8–2.4)
MCH RBC QN AUTO: 32.8 PG (ref 26–34)
MCHC RBC AUTO-ENTMCNC: 33.4 G/DL (ref 31–36)
MCV RBC AUTO: 98.2 FL (ref 80–100)
MONOCYTES # BLD: 0.6 K/UL (ref 0–1.3)
MONOCYTES NFR BLD: 4.1 %
NEUTROPHILS # BLD: 12.5 K/UL (ref 1.7–7.7)
NEUTROPHILS NFR BLD: 83.4 %
PERFORMED ON: ABNORMAL
PLATELET # BLD AUTO: 190 K/UL (ref 135–450)
PMV BLD AUTO: 8 FL (ref 5–10.5)
POTASSIUM SERPL-SCNC: 2.9 MMOL/L (ref 3.5–5.1)
POTASSIUM SERPL-SCNC: 3 MMOL/L (ref 3.5–5.1)
RBC # BLD AUTO: 2.88 M/UL (ref 4.2–5.9)
SODIUM SERPL-SCNC: 154 MMOL/L (ref 136–145)
SODIUM SERPL-SCNC: 154 MMOL/L (ref 136–145)
SODIUM SERPL-SCNC: 156 MMOL/L (ref 136–145)
WBC # BLD AUTO: 15 K/UL (ref 4–11)

## 2023-07-28 PROCEDURE — 80048 BASIC METABOLIC PNL TOTAL CA: CPT

## 2023-07-28 PROCEDURE — 2500000003 HC RX 250 WO HCPCS: Performed by: SURGERY

## 2023-07-28 PROCEDURE — 2000000000 HC ICU R&B

## 2023-07-28 PROCEDURE — 6360000002 HC RX W HCPCS: Performed by: SURGERY

## 2023-07-28 PROCEDURE — 2580000003 HC RX 258: Performed by: INTERNAL MEDICINE

## 2023-07-28 PROCEDURE — 94761 N-INVAS EAR/PLS OXIMETRY MLT: CPT

## 2023-07-28 PROCEDURE — C9113 INJ PANTOPRAZOLE SODIUM, VIA: HCPCS | Performed by: SURGERY

## 2023-07-28 PROCEDURE — 74019 RADEX ABDOMEN 2 VIEWS: CPT

## 2023-07-28 PROCEDURE — 83735 ASSAY OF MAGNESIUM: CPT

## 2023-07-28 PROCEDURE — 6370000000 HC RX 637 (ALT 250 FOR IP): Performed by: SURGERY

## 2023-07-28 PROCEDURE — 2700000000 HC OXYGEN THERAPY PER DAY

## 2023-07-28 PROCEDURE — 84132 ASSAY OF SERUM POTASSIUM: CPT

## 2023-07-28 PROCEDURE — 97530 THERAPEUTIC ACTIVITIES: CPT

## 2023-07-28 PROCEDURE — 2580000003 HC RX 258: Performed by: SURGERY

## 2023-07-28 PROCEDURE — 85025 COMPLETE CBC W/AUTO DIFF WBC: CPT

## 2023-07-28 PROCEDURE — 99024 POSTOP FOLLOW-UP VISIT: CPT | Performed by: SURGERY

## 2023-07-28 RX ORDER — SODIUM CHLORIDE 450 MG/100ML
INJECTION, SOLUTION INTRAVENOUS CONTINUOUS
Status: DISCONTINUED | OUTPATIENT
Start: 2023-07-28 | End: 2023-07-28

## 2023-07-28 RX ORDER — DEXTROSE MONOHYDRATE 50 MG/ML
INJECTION, SOLUTION INTRAVENOUS CONTINUOUS
Status: DISCONTINUED | OUTPATIENT
Start: 2023-07-28 | End: 2023-07-30

## 2023-07-28 RX ORDER — METOPROLOL TARTRATE 5 MG/5ML
5 INJECTION INTRAVENOUS EVERY 8 HOURS
Status: DISCONTINUED | OUTPATIENT
Start: 2023-07-28 | End: 2023-08-03

## 2023-07-28 RX ORDER — PANTOPRAZOLE SODIUM 40 MG/10ML
40 INJECTION, POWDER, LYOPHILIZED, FOR SOLUTION INTRAVENOUS DAILY
Status: DISCONTINUED | OUTPATIENT
Start: 2023-07-28 | End: 2023-07-30

## 2023-07-28 RX ADMIN — SODIUM CHLORIDE, PRESERVATIVE FREE 10 ML: 5 INJECTION INTRAVENOUS at 09:21

## 2023-07-28 RX ADMIN — SODIUM CHLORIDE: 4.5 INJECTION, SOLUTION INTRAVENOUS at 10:47

## 2023-07-28 RX ADMIN — POTASSIUM CHLORIDE 20 MEQ: 29.8 INJECTION, SOLUTION INTRAVENOUS at 07:35

## 2023-07-28 RX ADMIN — MORPHINE SULFATE 4 MG: 4 INJECTION, SOLUTION INTRAMUSCULAR; INTRAVENOUS at 13:49

## 2023-07-28 RX ADMIN — MORPHINE SULFATE 4 MG: 4 INJECTION, SOLUTION INTRAMUSCULAR; INTRAVENOUS at 02:27

## 2023-07-28 RX ADMIN — POTASSIUM CHLORIDE 20 MEQ: 29.8 INJECTION, SOLUTION INTRAVENOUS at 16:12

## 2023-07-28 RX ADMIN — METOPROLOL TARTRATE 5 MG: 1 INJECTION, SOLUTION INTRAVENOUS at 08:52

## 2023-07-28 RX ADMIN — METOCLOPRAMIDE HYDROCHLORIDE 10 MG: 5 INJECTION INTRAMUSCULAR; INTRAVENOUS at 02:27

## 2023-07-28 RX ADMIN — ENOXAPARIN SODIUM 40 MG: 100 INJECTION SUBCUTANEOUS at 21:41

## 2023-07-28 RX ADMIN — DEXTROSE MONOHYDRATE: 50 INJECTION, SOLUTION INTRAVENOUS at 15:48

## 2023-07-28 RX ADMIN — METOCLOPRAMIDE HYDROCHLORIDE 10 MG: 5 INJECTION INTRAMUSCULAR; INTRAVENOUS at 14:45

## 2023-07-28 RX ADMIN — ENOXAPARIN SODIUM 40 MG: 100 INJECTION SUBCUTANEOUS at 09:00

## 2023-07-28 RX ADMIN — POTASSIUM CHLORIDE 20 MEQ: 29.8 INJECTION, SOLUTION INTRAVENOUS at 17:24

## 2023-07-28 RX ADMIN — POTASSIUM CHLORIDE 20 MEQ: 29.8 INJECTION, SOLUTION INTRAVENOUS at 00:59

## 2023-07-28 RX ADMIN — DEXTROSE AND SODIUM CHLORIDE: 5; 450 INJECTION, SOLUTION INTRAVENOUS at 06:44

## 2023-07-28 RX ADMIN — ASCORBIC ACID, VITAMIN A PALMITATE, CHOLECALCIFEROL, THIAMINE HYDROCHLORIDE, RIBOFLAVIN-5 PHOSPHATE SODIUM, PYRIDOXINE HYDROCHLORIDE, NIACINAMIDE, DEXPANTHENOL, ALPHA-TOCOPHEROL ACETATE, VITAMIN K1, FOLIC ACID, BIOTIN, CYANOCOBALAMIN: 200; 3300; 200; 6; 3.6; 6; 40; 15; 10; 150; 600; 60; 5 INJECTION, SOLUTION INTRAVENOUS at 21:43

## 2023-07-28 RX ADMIN — METOPROLOL TARTRATE 5 MG: 1 INJECTION, SOLUTION INTRAVENOUS at 14:45

## 2023-07-28 RX ADMIN — POTASSIUM CHLORIDE 20 MEQ: 29.8 INJECTION, SOLUTION INTRAVENOUS at 15:00

## 2023-07-28 RX ADMIN — METOCLOPRAMIDE HYDROCHLORIDE 10 MG: 5 INJECTION INTRAMUSCULAR; INTRAVENOUS at 21:41

## 2023-07-28 RX ADMIN — SODIUM CHLORIDE: 4.5 INJECTION, SOLUTION INTRAVENOUS at 14:59

## 2023-07-28 RX ADMIN — I.V. FAT EMULSION 250 ML: 20 EMULSION INTRAVENOUS at 21:43

## 2023-07-28 RX ADMIN — POTASSIUM CHLORIDE 20 MEQ: 29.8 INJECTION, SOLUTION INTRAVENOUS at 02:34

## 2023-07-28 RX ADMIN — PANTOPRAZOLE SODIUM 40 MG: 40 INJECTION, POWDER, FOR SOLUTION INTRAVENOUS at 11:52

## 2023-07-28 RX ADMIN — INSULIN LISPRO 2 UNITS: 100 INJECTION, SOLUTION INTRAVENOUS; SUBCUTANEOUS at 06:01

## 2023-07-28 RX ADMIN — POTASSIUM CHLORIDE 20 MEQ: 29.8 INJECTION, SOLUTION INTRAVENOUS at 10:48

## 2023-07-28 RX ADMIN — POTASSIUM CHLORIDE 20 MEQ: 29.8 INJECTION, SOLUTION INTRAVENOUS at 08:52

## 2023-07-28 RX ADMIN — METOPROLOL TARTRATE 5 MG: 1 INJECTION, SOLUTION INTRAVENOUS at 02:27

## 2023-07-28 RX ADMIN — METOCLOPRAMIDE HYDROCHLORIDE 10 MG: 5 INJECTION INTRAMUSCULAR; INTRAVENOUS at 08:52

## 2023-07-28 ASSESSMENT — PAIN DESCRIPTION - LOCATION
LOCATION: ABDOMEN

## 2023-07-28 ASSESSMENT — PAIN DESCRIPTION - ORIENTATION
ORIENTATION: MID
ORIENTATION: MID

## 2023-07-28 ASSESSMENT — PAIN DESCRIPTION - DESCRIPTORS
DESCRIPTORS: ACHING
DESCRIPTORS: ACHING

## 2023-07-28 ASSESSMENT — PAIN SCALES - GENERAL
PAINLEVEL_OUTOF10: 8
PAINLEVEL_OUTOF10: 9
PAINLEVEL_OUTOF10: 8

## 2023-07-28 NOTE — CARE COORDINATION
Chart review day 9- Pt from home POD #9 Aortofemoral bypass; post-op ileus; NG remains-per general surgery start clamping trials today. May need TPN if unable to advance diet in 24hrs. Pt refusing to work with therapy on a regular basis. Pt refuses therapy IPR rec's. Plans to return home with wife.

## 2023-07-28 NOTE — TELEPHONE ENCOUNTER
Pt's wife 'Chiki Veras' called and said he is still in the hospital from having the 2nd hernia sx by Dr. Carlitos Murry. She realizes Dr. Carlitos Murry will not be back in office until Monday 7/31, but she would like for him to call her. She wants to know why this 2nd hernia sx is being so much harder on him? Please call her and thank you! # [phone].

## 2023-07-28 NOTE — CONSULTS
Patient seen and examined, consult note dictated. Assessment and Plan:    1- Hypernatremia: Likely secondary to decreased free water intake in the setting of NG tube placement. - Discontinue 0.45% NaCl.  - Start D5% at 75 ml/h and monitor.  - Check serial labs to avoid overcorrection. 2- PAD: S/p Aortobifemoral bypass graft per vascular surgery. 3- S/p open ventral hernia repair per general surgery .

## 2023-07-29 LAB
ALBUMIN SERPL-MCNC: 2.8 G/DL (ref 3.4–5)
ALP SERPL-CCNC: 130 U/L (ref 40–129)
ALT SERPL-CCNC: 39 U/L (ref 10–40)
ANION GAP SERPL CALCULATED.3IONS-SCNC: 10 MMOL/L (ref 3–16)
ANION GAP SERPL CALCULATED.3IONS-SCNC: 11 MMOL/L (ref 3–16)
ANION GAP SERPL CALCULATED.3IONS-SCNC: 12 MMOL/L (ref 3–16)
ANION GAP SERPL CALCULATED.3IONS-SCNC: 13 MMOL/L (ref 3–16)
AST SERPL-CCNC: 34 U/L (ref 15–37)
BILIRUB DIRECT SERPL-MCNC: <0.2 MG/DL (ref 0–0.3)
BILIRUB INDIRECT SERPL-MCNC: ABNORMAL MG/DL (ref 0–1)
BILIRUB SERPL-MCNC: 0.5 MG/DL (ref 0–1)
BUN SERPL-MCNC: 20 MG/DL (ref 7–20)
BUN SERPL-MCNC: 22 MG/DL (ref 7–20)
CALCIUM SERPL-MCNC: 8.1 MG/DL (ref 8.3–10.6)
CALCIUM SERPL-MCNC: 8.2 MG/DL (ref 8.3–10.6)
CALCIUM SERPL-MCNC: 8.3 MG/DL (ref 8.3–10.6)
CALCIUM SERPL-MCNC: 8.4 MG/DL (ref 8.3–10.6)
CHLORIDE SERPL-SCNC: 108 MMOL/L (ref 99–110)
CHLORIDE SERPL-SCNC: 109 MMOL/L (ref 99–110)
CHLORIDE SERPL-SCNC: 109 MMOL/L (ref 99–110)
CHLORIDE SERPL-SCNC: 110 MMOL/L (ref 99–110)
CO2 SERPL-SCNC: 24 MMOL/L (ref 21–32)
CO2 SERPL-SCNC: 27 MMOL/L (ref 21–32)
CO2 SERPL-SCNC: 29 MMOL/L (ref 21–32)
CO2 SERPL-SCNC: 29 MMOL/L (ref 21–32)
CREAT SERPL-MCNC: 0.8 MG/DL (ref 0.9–1.3)
CREAT SERPL-MCNC: 0.8 MG/DL (ref 0.9–1.3)
CREAT SERPL-MCNC: 0.9 MG/DL (ref 0.9–1.3)
CREAT SERPL-MCNC: 0.9 MG/DL (ref 0.9–1.3)
GFR SERPLBLD CREATININE-BSD FMLA CKD-EPI: >60 ML/MIN/{1.73_M2}
GLUCOSE BLD-MCNC: 119 MG/DL (ref 70–99)
GLUCOSE BLD-MCNC: 187 MG/DL (ref 70–99)
GLUCOSE BLD-MCNC: 189 MG/DL (ref 70–99)
GLUCOSE BLD-MCNC: 206 MG/DL (ref 70–99)
GLUCOSE BLD-MCNC: 214 MG/DL (ref 70–99)
GLUCOSE SERPL-MCNC: 221 MG/DL (ref 70–99)
GLUCOSE SERPL-MCNC: 230 MG/DL (ref 70–99)
GLUCOSE SERPL-MCNC: 248 MG/DL (ref 70–99)
GLUCOSE SERPL-MCNC: 253 MG/DL (ref 70–99)
MAGNESIUM SERPL-MCNC: 2.3 MG/DL (ref 1.8–2.4)
PERFORMED ON: ABNORMAL
PHOSPHATE SERPL-MCNC: 2.8 MG/DL (ref 2.5–4.9)
POTASSIUM SERPL-SCNC: 2.9 MMOL/L (ref 3.5–5.1)
POTASSIUM SERPL-SCNC: 3 MMOL/L (ref 3.5–5.1)
POTASSIUM SERPL-SCNC: 3.1 MMOL/L (ref 3.5–5.1)
POTASSIUM SERPL-SCNC: 3.2 MMOL/L (ref 3.5–5.1)
PREALB SERPL-MCNC: 10 MG/DL (ref 20–40)
PROT SERPL-MCNC: 5.2 G/DL (ref 6.4–8.2)
SODIUM SERPL-SCNC: 145 MMOL/L (ref 136–145)
SODIUM SERPL-SCNC: 146 MMOL/L (ref 136–145)
SODIUM SERPL-SCNC: 149 MMOL/L (ref 136–145)
SODIUM SERPL-SCNC: 151 MMOL/L (ref 136–145)

## 2023-07-29 PROCEDURE — 2580000003 HC RX 258: Performed by: SURGERY

## 2023-07-29 PROCEDURE — C9113 INJ PANTOPRAZOLE SODIUM, VIA: HCPCS | Performed by: SURGERY

## 2023-07-29 PROCEDURE — 2500000003 HC RX 250 WO HCPCS: Performed by: SURGERY

## 2023-07-29 PROCEDURE — 99024 POSTOP FOLLOW-UP VISIT: CPT | Performed by: SURGERY

## 2023-07-29 PROCEDURE — 6360000002 HC RX W HCPCS: Performed by: SURGERY

## 2023-07-29 PROCEDURE — 2000000000 HC ICU R&B

## 2023-07-29 PROCEDURE — 92526 ORAL FUNCTION THERAPY: CPT

## 2023-07-29 PROCEDURE — 84134 ASSAY OF PREALBUMIN: CPT

## 2023-07-29 PROCEDURE — 2500000003 HC RX 250 WO HCPCS: Performed by: INTERNAL MEDICINE

## 2023-07-29 PROCEDURE — 83735 ASSAY OF MAGNESIUM: CPT

## 2023-07-29 PROCEDURE — 6370000000 HC RX 637 (ALT 250 FOR IP): Performed by: SURGERY

## 2023-07-29 PROCEDURE — 84478 ASSAY OF TRIGLYCERIDES: CPT

## 2023-07-29 PROCEDURE — 80076 HEPATIC FUNCTION PANEL: CPT

## 2023-07-29 PROCEDURE — 36415 COLL VENOUS BLD VENIPUNCTURE: CPT

## 2023-07-29 PROCEDURE — 84100 ASSAY OF PHOSPHORUS: CPT

## 2023-07-29 PROCEDURE — 92610 EVALUATE SWALLOWING FUNCTION: CPT

## 2023-07-29 PROCEDURE — 80048 BASIC METABOLIC PNL TOTAL CA: CPT

## 2023-07-29 RX ADMIN — SODIUM CHLORIDE, PRESERVATIVE FREE 10 ML: 5 INJECTION INTRAVENOUS at 21:32

## 2023-07-29 RX ADMIN — POTASSIUM CHLORIDE 20 MEQ: 29.8 INJECTION, SOLUTION INTRAVENOUS at 15:49

## 2023-07-29 RX ADMIN — INSULIN LISPRO 2 UNITS: 100 INJECTION, SOLUTION INTRAVENOUS; SUBCUTANEOUS at 15:52

## 2023-07-29 RX ADMIN — METOCLOPRAMIDE HYDROCHLORIDE 10 MG: 5 INJECTION INTRAMUSCULAR; INTRAVENOUS at 01:53

## 2023-07-29 RX ADMIN — MORPHINE SULFATE 4 MG: 4 INJECTION, SOLUTION INTRAMUSCULAR; INTRAVENOUS at 01:54

## 2023-07-29 RX ADMIN — POTASSIUM CHLORIDE 20 MEQ: 29.8 INJECTION, SOLUTION INTRAVENOUS at 11:45

## 2023-07-29 RX ADMIN — METOCLOPRAMIDE HYDROCHLORIDE 10 MG: 5 INJECTION INTRAMUSCULAR; INTRAVENOUS at 15:40

## 2023-07-29 RX ADMIN — ENOXAPARIN SODIUM 40 MG: 100 INJECTION SUBCUTANEOUS at 08:07

## 2023-07-29 RX ADMIN — ASCORBIC ACID, VITAMIN A PALMITATE, CHOLECALCIFEROL, THIAMINE HYDROCHLORIDE, RIBOFLAVIN-5 PHOSPHATE SODIUM, PYRIDOXINE HYDROCHLORIDE, NIACINAMIDE, DEXPANTHENOL, ALPHA-TOCOPHEROL ACETATE, VITAMIN K1, FOLIC ACID, BIOTIN, CYANOCOBALAMIN: 200; 3300; 200; 6; 3.6; 6; 40; 15; 10; 150; 600; 60; 5 INJECTION, SOLUTION INTRAVENOUS at 17:55

## 2023-07-29 RX ADMIN — METOCLOPRAMIDE HYDROCHLORIDE 10 MG: 5 INJECTION INTRAMUSCULAR; INTRAVENOUS at 21:31

## 2023-07-29 RX ADMIN — POTASSIUM CHLORIDE 20 MEQ: 29.8 INJECTION, SOLUTION INTRAVENOUS at 13:14

## 2023-07-29 RX ADMIN — METOCLOPRAMIDE HYDROCHLORIDE 10 MG: 5 INJECTION INTRAMUSCULAR; INTRAVENOUS at 08:08

## 2023-07-29 RX ADMIN — INSULIN LISPRO 2 UNITS: 100 INJECTION, SOLUTION INTRAVENOUS; SUBCUTANEOUS at 04:56

## 2023-07-29 RX ADMIN — METOPROLOL TARTRATE 5 MG: 5 INJECTION INTRAVENOUS at 15:40

## 2023-07-29 RX ADMIN — ENOXAPARIN SODIUM 40 MG: 100 INJECTION SUBCUTANEOUS at 21:31

## 2023-07-29 RX ADMIN — PANTOPRAZOLE SODIUM 40 MG: 40 INJECTION, POWDER, FOR SOLUTION INTRAVENOUS at 08:08

## 2023-07-29 NOTE — CONSULTS
[address] [place] [address]                                  CONSULTATION    PATIENT NAME: Angelika Kaba                  :        1965  MED REC NO:   [id number]                          ROOM:       [id number]  ACCOUNT NO:   [de-identified]                           ADMIT DATE: 2023  PROVIDER:     Jose Antonio Falcon MD    CONSULT DATE:  2023    REASON FOR CONSULTATION:  Hypernatremia. HISTORY OF PRESENT ILLNESS:  The patient is a 57-year-old  male  patient who presented to [facility] for a concomitant aorta  bifemoral bypass graft along with an open ventral hernia repair. Postoperatively, the patient had an NG tube placed and was kept n.p.o. He slowly developed hypernatremia that was resistant to hypotonic fluid  therapy and prompted a Nephrology consultation. PAST MEDICAL HISTORY:  1. Peripheral vascular disease. 2.  Cerebrovascular accident. 3.  Hyperlipidemia. 4.  Hypertension. 5.  Peripheral artery disease. 6.  Seizure. 7.  Sleep apnea. PAST SURGICAL HISTORY:  1. Aortobifemoral bypass graft. 2.  Ventral hernia repair. ALLERGIES:  The patient has no known drug allergies. SOCIAL HISTORY:  The patient smokes half a pack of cigarettes per day  and does not drink alcohol. FAMILY HISTORY:  Negative for kidney disease. REVIEW OF SYSTEMS:  The patient denied any fever, chills, cough or  expectorations. Otherwise, a 10-point review of systems was relatively  unremarkable. PHYSICAL EXAMINATION:  VITAL SIGNS:  Blood pressure 100/74, heart rate 95, respiration 19,  temperature 97.7 Fahrenheit. The patient is satting 96% on 5 liter  nasal cannula. GENERAL APPEARANCE:  The patient is alert and oriented x3, not in acute  distress. HEENT:  Eyes revealed normal conjunctivae and reactive pupils. NECK:  Revealed midline trachea and nonpalpable thyroid.   LUNGS:  Clear to

## 2023-07-30 LAB
ALBUMIN SERPL-MCNC: 2.8 G/DL (ref 3.4–5)
ALP SERPL-CCNC: 147 U/L (ref 40–129)
ALT SERPL-CCNC: 60 U/L (ref 10–40)
ANION GAP SERPL CALCULATED.3IONS-SCNC: 10 MMOL/L (ref 3–16)
ANION GAP SERPL CALCULATED.3IONS-SCNC: 12 MMOL/L (ref 3–16)
ANION GAP SERPL CALCULATED.3IONS-SCNC: 9 MMOL/L (ref 3–16)
AST SERPL-CCNC: 41 U/L (ref 15–37)
BASOPHILS # BLD: 0.1 K/UL (ref 0–0.2)
BASOPHILS NFR BLD: 0.4 %
BILIRUB DIRECT SERPL-MCNC: <0.2 MG/DL (ref 0–0.3)
BILIRUB INDIRECT SERPL-MCNC: ABNORMAL MG/DL (ref 0–1)
BILIRUB SERPL-MCNC: 0.5 MG/DL (ref 0–1)
BUN SERPL-MCNC: 20 MG/DL (ref 7–20)
BUN SERPL-MCNC: 21 MG/DL (ref 7–20)
BUN SERPL-MCNC: 22 MG/DL (ref 7–20)
CALCIUM SERPL-MCNC: 7.7 MG/DL (ref 8.3–10.6)
CALCIUM SERPL-MCNC: 8.2 MG/DL (ref 8.3–10.6)
CALCIUM SERPL-MCNC: 8.3 MG/DL (ref 8.3–10.6)
CHLORIDE SERPL-SCNC: 100 MMOL/L (ref 99–110)
CHLORIDE SERPL-SCNC: 107 MMOL/L (ref 99–110)
CHLORIDE SERPL-SCNC: 109 MMOL/L (ref 99–110)
CO2 SERPL-SCNC: 22 MMOL/L (ref 21–32)
CO2 SERPL-SCNC: 24 MMOL/L (ref 21–32)
CO2 SERPL-SCNC: 25 MMOL/L (ref 21–32)
CREAT SERPL-MCNC: 0.7 MG/DL (ref 0.9–1.3)
CREAT SERPL-MCNC: 0.8 MG/DL (ref 0.9–1.3)
CREAT SERPL-MCNC: 0.9 MG/DL (ref 0.9–1.3)
DEPRECATED RDW RBC AUTO: 14.9 % (ref 12.4–15.4)
EOSINOPHIL # BLD: 0.2 K/UL (ref 0–0.6)
EOSINOPHIL NFR BLD: 1.8 %
GFR SERPLBLD CREATININE-BSD FMLA CKD-EPI: >60 ML/MIN/{1.73_M2}
GLUCOSE BLD-MCNC: 188 MG/DL (ref 70–99)
GLUCOSE BLD-MCNC: 189 MG/DL (ref 70–99)
GLUCOSE BLD-MCNC: 194 MG/DL (ref 70–99)
GLUCOSE BLD-MCNC: 199 MG/DL (ref 70–99)
GLUCOSE BLD-MCNC: 212 MG/DL (ref 70–99)
GLUCOSE BLD-MCNC: 329 MG/DL (ref 70–99)
GLUCOSE SERPL-MCNC: 201 MG/DL (ref 70–99)
GLUCOSE SERPL-MCNC: 206 MG/DL (ref 70–99)
GLUCOSE SERPL-MCNC: 433 MG/DL (ref 70–99)
HCT VFR BLD AUTO: 23.1 % (ref 40.5–52.5)
HGB BLD-MCNC: 7.7 G/DL (ref 13.5–17.5)
LYMPHOCYTES # BLD: 1.5 K/UL (ref 1–5.1)
LYMPHOCYTES NFR BLD: 11 %
MAGNESIUM SERPL-MCNC: 1.9 MG/DL (ref 1.8–2.4)
MAGNESIUM SERPL-MCNC: 2.1 MG/DL (ref 1.8–2.4)
MCH RBC QN AUTO: 32.9 PG (ref 26–34)
MCHC RBC AUTO-ENTMCNC: 33.4 G/DL (ref 31–36)
MCV RBC AUTO: 98.6 FL (ref 80–100)
MONOCYTES # BLD: 0.4 K/UL (ref 0–1.3)
MONOCYTES NFR BLD: 2.9 %
NEUTROPHILS # BLD: 11.2 K/UL (ref 1.7–7.7)
NEUTROPHILS NFR BLD: 83.9 %
PERFORMED ON: ABNORMAL
PHOSPHATE SERPL-MCNC: 3.2 MG/DL (ref 2.5–4.9)
PLATELET # BLD AUTO: 158 K/UL (ref 135–450)
PMV BLD AUTO: 8 FL (ref 5–10.5)
POTASSIUM SERPL-SCNC: 3.1 MMOL/L (ref 3.5–5.1)
POTASSIUM SERPL-SCNC: 3.3 MMOL/L (ref 3.5–5.1)
POTASSIUM SERPL-SCNC: 3.5 MMOL/L (ref 3.5–5.1)
PROT SERPL-MCNC: 5.3 G/DL (ref 6.4–8.2)
RBC # BLD AUTO: 2.34 M/UL (ref 4.2–5.9)
SODIUM SERPL-SCNC: 131 MMOL/L (ref 136–145)
SODIUM SERPL-SCNC: 143 MMOL/L (ref 136–145)
SODIUM SERPL-SCNC: 144 MMOL/L (ref 136–145)
WBC # BLD AUTO: 13.4 K/UL (ref 4–11)

## 2023-07-30 PROCEDURE — 2000000000 HC ICU R&B

## 2023-07-30 PROCEDURE — 2500000003 HC RX 250 WO HCPCS: Performed by: INTERNAL MEDICINE

## 2023-07-30 PROCEDURE — 6370000000 HC RX 637 (ALT 250 FOR IP): Performed by: SURGERY

## 2023-07-30 PROCEDURE — 80076 HEPATIC FUNCTION PANEL: CPT

## 2023-07-30 PROCEDURE — C9113 INJ PANTOPRAZOLE SODIUM, VIA: HCPCS | Performed by: SURGERY

## 2023-07-30 PROCEDURE — 84100 ASSAY OF PHOSPHORUS: CPT

## 2023-07-30 PROCEDURE — 83735 ASSAY OF MAGNESIUM: CPT

## 2023-07-30 PROCEDURE — 6360000002 HC RX W HCPCS: Performed by: SURGERY

## 2023-07-30 PROCEDURE — 85025 COMPLETE CBC W/AUTO DIFF WBC: CPT

## 2023-07-30 PROCEDURE — 80048 BASIC METABOLIC PNL TOTAL CA: CPT

## 2023-07-30 PROCEDURE — 2580000003 HC RX 258: Performed by: SURGERY

## 2023-07-30 PROCEDURE — 2500000003 HC RX 250 WO HCPCS: Performed by: SURGERY

## 2023-07-30 PROCEDURE — 36415 COLL VENOUS BLD VENIPUNCTURE: CPT

## 2023-07-30 RX ORDER — PANTOPRAZOLE SODIUM 40 MG/10ML
40 INJECTION, POWDER, LYOPHILIZED, FOR SOLUTION INTRAVENOUS 2 TIMES DAILY
Status: DISCONTINUED | OUTPATIENT
Start: 2023-07-30 | End: 2023-08-07

## 2023-07-30 RX ADMIN — POTASSIUM CHLORIDE 20 MEQ: 29.8 INJECTION, SOLUTION INTRAVENOUS at 09:36

## 2023-07-30 RX ADMIN — METOPROLOL TARTRATE 5 MG: 5 INJECTION INTRAVENOUS at 13:59

## 2023-07-30 RX ADMIN — POTASSIUM CHLORIDE 20 MEQ: 29.8 INJECTION, SOLUTION INTRAVENOUS at 21:19

## 2023-07-30 RX ADMIN — ASCORBIC ACID, VITAMIN A PALMITATE, CHOLECALCIFEROL, THIAMINE HYDROCHLORIDE, RIBOFLAVIN-5 PHOSPHATE SODIUM, PYRIDOXINE HYDROCHLORIDE, NIACINAMIDE, DEXPANTHENOL, ALPHA-TOCOPHEROL ACETATE, VITAMIN K1, FOLIC ACID, BIOTIN, CYANOCOBALAMIN: 200; 3300; 200; 6; 3.6; 6; 40; 15; 10; 150; 600; 60; 5 INJECTION, SOLUTION INTRAVENOUS at 18:35

## 2023-07-30 RX ADMIN — METOCLOPRAMIDE HYDROCHLORIDE 10 MG: 5 INJECTION INTRAMUSCULAR; INTRAVENOUS at 02:21

## 2023-07-30 RX ADMIN — SODIUM CHLORIDE, PRESERVATIVE FREE 10 ML: 5 INJECTION INTRAVENOUS at 09:45

## 2023-07-30 RX ADMIN — SODIUM CHLORIDE, PRESERVATIVE FREE 10 ML: 5 INJECTION INTRAVENOUS at 09:30

## 2023-07-30 RX ADMIN — METOCLOPRAMIDE HYDROCHLORIDE 10 MG: 5 INJECTION INTRAMUSCULAR; INTRAVENOUS at 13:58

## 2023-07-30 RX ADMIN — POTASSIUM CHLORIDE 20 MEQ: 29.8 INJECTION, SOLUTION INTRAVENOUS at 20:14

## 2023-07-30 RX ADMIN — SODIUM CHLORIDE, PRESERVATIVE FREE 10 ML: 5 INJECTION INTRAVENOUS at 19:48

## 2023-07-30 RX ADMIN — INSULIN LISPRO 2 UNITS: 100 INJECTION, SOLUTION INTRAVENOUS; SUBCUTANEOUS at 10:26

## 2023-07-30 RX ADMIN — ENOXAPARIN SODIUM 40 MG: 100 INJECTION SUBCUTANEOUS at 19:48

## 2023-07-30 RX ADMIN — PANTOPRAZOLE SODIUM 40 MG: 40 INJECTION, POWDER, FOR SOLUTION INTRAVENOUS at 09:44

## 2023-07-30 RX ADMIN — POTASSIUM CHLORIDE 20 MEQ: 29.8 INJECTION, SOLUTION INTRAVENOUS at 07:16

## 2023-07-30 RX ADMIN — POTASSIUM CHLORIDE 20 MEQ: 29.8 INJECTION, SOLUTION INTRAVENOUS at 11:23

## 2023-07-30 RX ADMIN — METOPROLOL TARTRATE 5 MG: 5 INJECTION INTRAVENOUS at 23:02

## 2023-07-30 RX ADMIN — PANTOPRAZOLE SODIUM 40 MG: 40 INJECTION, POWDER, FOR SOLUTION INTRAVENOUS at 19:48

## 2023-07-30 RX ADMIN — SODIUM CHLORIDE: 9 INJECTION, SOLUTION INTRAVENOUS at 20:11

## 2023-07-30 RX ADMIN — METOCLOPRAMIDE HYDROCHLORIDE 10 MG: 5 INJECTION INTRAMUSCULAR; INTRAVENOUS at 09:30

## 2023-07-30 RX ADMIN — METOCLOPRAMIDE HYDROCHLORIDE 10 MG: 5 INJECTION INTRAMUSCULAR; INTRAVENOUS at 19:48

## 2023-07-30 RX ADMIN — ENOXAPARIN SODIUM 40 MG: 100 INJECTION SUBCUTANEOUS at 09:44

## 2023-07-31 ENCOUNTER — APPOINTMENT (OUTPATIENT)
Dept: CT IMAGING | Age: 58
DRG: 270 | End: 2023-07-31
Attending: SURGERY
Payer: MEDICARE

## 2023-07-31 LAB
ALBUMIN SERPL-MCNC: 2.6 G/DL (ref 3.4–5)
ALP SERPL-CCNC: 154 U/L (ref 40–129)
ALT SERPL-CCNC: 67 U/L (ref 10–40)
ANION GAP SERPL CALCULATED.3IONS-SCNC: 9 MMOL/L (ref 3–16)
AST SERPL-CCNC: 43 U/L (ref 15–37)
BASOPHILS # BLD: 0.1 K/UL (ref 0–0.2)
BASOPHILS NFR BLD: 0.4 %
BILIRUB DIRECT SERPL-MCNC: <0.2 MG/DL (ref 0–0.3)
BILIRUB INDIRECT SERPL-MCNC: ABNORMAL MG/DL (ref 0–1)
BILIRUB SERPL-MCNC: 0.4 MG/DL (ref 0–1)
BUN SERPL-MCNC: 21 MG/DL (ref 7–20)
CALCIUM SERPL-MCNC: 8.4 MG/DL (ref 8.3–10.6)
CHLORIDE SERPL-SCNC: 111 MMOL/L (ref 99–110)
CO2 SERPL-SCNC: 23 MMOL/L (ref 21–32)
CREAT SERPL-MCNC: 0.8 MG/DL (ref 0.9–1.3)
DEPRECATED RDW RBC AUTO: 15.3 % (ref 12.4–15.4)
EOSINOPHIL # BLD: 0.2 K/UL (ref 0–0.6)
EOSINOPHIL NFR BLD: 1.7 %
GFR SERPLBLD CREATININE-BSD FMLA CKD-EPI: >60 ML/MIN/{1.73_M2}
GLUCOSE BLD-MCNC: 141 MG/DL (ref 70–99)
GLUCOSE BLD-MCNC: 150 MG/DL (ref 70–99)
GLUCOSE BLD-MCNC: 152 MG/DL (ref 70–99)
GLUCOSE BLD-MCNC: 166 MG/DL (ref 70–99)
GLUCOSE BLD-MCNC: 194 MG/DL (ref 70–99)
GLUCOSE BLD-MCNC: 216 MG/DL (ref 70–99)
GLUCOSE SERPL-MCNC: 159 MG/DL (ref 70–99)
HCT VFR BLD AUTO: 21.8 % (ref 40.5–52.5)
HCT VFR BLD AUTO: 22.6 % (ref 40.5–52.5)
HGB BLD-MCNC: 7.1 G/DL (ref 13.5–17.5)
HGB BLD-MCNC: 7.6 G/DL (ref 13.5–17.5)
LYMPHOCYTES # BLD: 1.6 K/UL (ref 1–5.1)
LYMPHOCYTES NFR BLD: 12.8 %
MAGNESIUM SERPL-MCNC: 2.1 MG/DL (ref 1.8–2.4)
MCH RBC QN AUTO: 32.8 PG (ref 26–34)
MCHC RBC AUTO-ENTMCNC: 33.6 G/DL (ref 31–36)
MCV RBC AUTO: 97.6 FL (ref 80–100)
MONOCYTES # BLD: 0.6 K/UL (ref 0–1.3)
MONOCYTES NFR BLD: 4.5 %
NEUTROPHILS # BLD: 9.7 K/UL (ref 1.7–7.7)
NEUTROPHILS NFR BLD: 80.6 %
PERFORMED ON: ABNORMAL
PHOSPHATE SERPL-MCNC: 3.1 MG/DL (ref 2.5–4.9)
PLATELET # BLD AUTO: 172 K/UL (ref 135–450)
PMV BLD AUTO: 8 FL (ref 5–10.5)
POTASSIUM SERPL-SCNC: 3.7 MMOL/L (ref 3.5–5.1)
PROT SERPL-MCNC: 5.5 G/DL (ref 6.4–8.2)
RBC # BLD AUTO: 2.32 M/UL (ref 4.2–5.9)
SODIUM SERPL-SCNC: 143 MMOL/L (ref 136–145)
TRIGL SERPL-MCNC: 375 MG/DL (ref 0–150)
WBC # BLD AUTO: 12.1 K/UL (ref 4–11)

## 2023-07-31 PROCEDURE — 2500000003 HC RX 250 WO HCPCS: Performed by: SURGERY

## 2023-07-31 PROCEDURE — 2580000003 HC RX 258: Performed by: SURGERY

## 2023-07-31 PROCEDURE — 83735 ASSAY OF MAGNESIUM: CPT

## 2023-07-31 PROCEDURE — 97110 THERAPEUTIC EXERCISES: CPT

## 2023-07-31 PROCEDURE — 85014 HEMATOCRIT: CPT

## 2023-07-31 PROCEDURE — 2000000000 HC ICU R&B

## 2023-07-31 PROCEDURE — 6360000002 HC RX W HCPCS: Performed by: SURGERY

## 2023-07-31 PROCEDURE — 80076 HEPATIC FUNCTION PANEL: CPT

## 2023-07-31 PROCEDURE — 6360000004 HC RX CONTRAST MEDICATION: Performed by: SURGERY

## 2023-07-31 PROCEDURE — 84100 ASSAY OF PHOSPHORUS: CPT

## 2023-07-31 PROCEDURE — C9113 INJ PANTOPRAZOLE SODIUM, VIA: HCPCS | Performed by: SURGERY

## 2023-07-31 PROCEDURE — 6370000000 HC RX 637 (ALT 250 FOR IP): Performed by: SURGERY

## 2023-07-31 PROCEDURE — 80048 BASIC METABOLIC PNL TOTAL CA: CPT

## 2023-07-31 PROCEDURE — 99024 POSTOP FOLLOW-UP VISIT: CPT | Performed by: SURGERY

## 2023-07-31 PROCEDURE — 85018 HEMOGLOBIN: CPT

## 2023-07-31 PROCEDURE — 97530 THERAPEUTIC ACTIVITIES: CPT

## 2023-07-31 PROCEDURE — 2500000003 HC RX 250 WO HCPCS: Performed by: INTERNAL MEDICINE

## 2023-07-31 PROCEDURE — 74177 CT ABD & PELVIS W/CONTRAST: CPT

## 2023-07-31 PROCEDURE — 85025 COMPLETE CBC W/AUTO DIFF WBC: CPT

## 2023-07-31 PROCEDURE — 97535 SELF CARE MNGMENT TRAINING: CPT

## 2023-07-31 RX ADMIN — ENOXAPARIN SODIUM 40 MG: 100 INJECTION SUBCUTANEOUS at 21:08

## 2023-07-31 RX ADMIN — METOPROLOL TARTRATE 5 MG: 5 INJECTION INTRAVENOUS at 06:34

## 2023-07-31 RX ADMIN — METOCLOPRAMIDE HYDROCHLORIDE 10 MG: 5 INJECTION INTRAMUSCULAR; INTRAVENOUS at 07:44

## 2023-07-31 RX ADMIN — METOPROLOL TARTRATE 5 MG: 5 INJECTION INTRAVENOUS at 15:30

## 2023-07-31 RX ADMIN — METOCLOPRAMIDE HYDROCHLORIDE 10 MG: 5 INJECTION INTRAMUSCULAR; INTRAVENOUS at 01:03

## 2023-07-31 RX ADMIN — IOHEXOL 50 ML: 240 INJECTION, SOLUTION INTRATHECAL; INTRAVASCULAR; INTRAVENOUS; ORAL at 11:55

## 2023-07-31 RX ADMIN — METOPROLOL TARTRATE 5 MG: 5 INJECTION INTRAVENOUS at 21:08

## 2023-07-31 RX ADMIN — INSULIN LISPRO 2 UNITS: 100 INJECTION, SOLUTION INTRAVENOUS; SUBCUTANEOUS at 12:31

## 2023-07-31 RX ADMIN — PANTOPRAZOLE SODIUM 40 MG: 40 INJECTION, POWDER, FOR SOLUTION INTRAVENOUS at 21:08

## 2023-07-31 RX ADMIN — I.V. FAT EMULSION 250 ML: 20 EMULSION INTRAVENOUS at 17:49

## 2023-07-31 RX ADMIN — SODIUM CHLORIDE, PRESERVATIVE FREE 10 ML: 5 INJECTION INTRAVENOUS at 21:00

## 2023-07-31 RX ADMIN — METOCLOPRAMIDE HYDROCHLORIDE 10 MG: 5 INJECTION INTRAMUSCULAR; INTRAVENOUS at 21:08

## 2023-07-31 RX ADMIN — ASCORBIC ACID, VITAMIN A PALMITATE, CHOLECALCIFEROL, THIAMINE HYDROCHLORIDE, RIBOFLAVIN-5 PHOSPHATE SODIUM, PYRIDOXINE HYDROCHLORIDE, NIACINAMIDE, DEXPANTHENOL, ALPHA-TOCOPHEROL ACETATE, VITAMIN K1, FOLIC ACID, BIOTIN, CYANOCOBALAMIN: 200; 3300; 200; 6; 3.6; 6; 40; 15; 10; 150; 600; 60; 5 INJECTION, SOLUTION INTRAVENOUS at 17:46

## 2023-07-31 RX ADMIN — PANTOPRAZOLE SODIUM 40 MG: 40 INJECTION, POWDER, FOR SOLUTION INTRAVENOUS at 07:45

## 2023-07-31 RX ADMIN — IOHEXOL 50 ML: 240 INJECTION, SOLUTION INTRATHECAL; INTRAVASCULAR; INTRAVENOUS; ORAL at 10:49

## 2023-07-31 RX ADMIN — ENOXAPARIN SODIUM 40 MG: 100 INJECTION SUBCUTANEOUS at 07:44

## 2023-07-31 RX ADMIN — METOCLOPRAMIDE HYDROCHLORIDE 10 MG: 5 INJECTION INTRAMUSCULAR; INTRAVENOUS at 15:30

## 2023-07-31 RX ADMIN — IOPAMIDOL 75 ML: 755 INJECTION, SOLUTION INTRAVENOUS at 14:03

## 2023-07-31 NOTE — CONSULTS
[facility] Continence Nurse  Consult Note       NAME:  Juan Kendall  MEDICAL RECORD NUMBER:  [id number]  AGE: 59 y.o.    GENDER: male  : 1965  TODAY'S DATE:  2023    Subjective; O.K.   Reason for WOCN Evaluation and Assessment: dehisced incision      Juan Kendall is a 59 y.o. male referred by:   [] Physician  [x] Nursing  [] Other:     Wound Identification:  Wound Type: non-healing surgical  Contributing Factors: edema and diabetes    Wound History: 2023 Dr. Ihsan Lopez  Current Wound Care Treatment:  moist to moist    Patient Goal of Care:  [x] Wound Healing  [] Odor Control  [] Palliative Care  [x] Pain Control   [] Other:         PAST MEDICAL HISTORY        Diagnosis Date    Abdominal pain 2021    Abnormal stress test     Acute CVA (cerebrovascular accident) ([address]) 2020    Bowel incontinence     CAD (coronary artery disease)     Cerebral infarction due to unspecified occlusion or stenosis of unspecified carotid artery ([address]) 2015    Chest pain 2021    History of cerebral infarction 2022    Hyperlipidemia     Hypertension     Incisional hernia without obstruction or gangrene 2021    Incisional hernia, without obstruction or gangrene     Ischemic foot 2020    New onset seizure ([address]) 2022    PAD (peripheral artery disease) ([address])     Poor vision     left eye    Pre-diabetes     Sleep apnea     NO CPAP    Stroke ([address]) 2016    x3 - memory deficit    Vascular occlusion     Wears partial dentures     upper & lower       PAST SURGICAL HISTORY    Past Surgical History:   Procedure Laterality Date    ABDOMEN SURGERY      colon polyps    CARDIAC SURGERY      coronary stent    CAROTID ENDARTERECTOMY Bilateral     5 years ago    CT VISCERAL PERCUTANEOUS DRAIN  2021    CT VISCERAL PERCUTANEOUS DRAIN 2021 Wili Trinidad MD [facility] CT SCAN    FEMORAL BYPASS Right 2022

## 2023-07-31 NOTE — CARE COORDINATION
LOS 12.  Care managed by Vasc, Gen Surg, Neph. S/P A Fem BP and hernia repair. Post op ileus. TPN, NG. From home w spouse- likely to need SNF when more stable. CM following.  Matteo Ruiz RN

## 2023-08-01 ENCOUNTER — APPOINTMENT (OUTPATIENT)
Dept: GENERAL RADIOLOGY | Age: 58
DRG: 270 | End: 2023-08-01
Attending: SURGERY
Payer: MEDICARE

## 2023-08-01 LAB
ABO + RH BLD: NORMAL
ANION GAP SERPL CALCULATED.3IONS-SCNC: 8 MMOL/L (ref 3–16)
BASOPHILS # BLD: 0.1 K/UL (ref 0–0.2)
BASOPHILS NFR BLD: 0.8 %
BLD GP AB SCN SERPL QL: NORMAL
BLOOD BANK DISPENSE STATUS: NORMAL
BLOOD BANK PRODUCT CODE: NORMAL
BPU ID: NORMAL
BUN SERPL-MCNC: 18 MG/DL (ref 7–20)
CALCIUM SERPL-MCNC: 8.6 MG/DL (ref 8.3–10.6)
CHLORIDE SERPL-SCNC: 106 MMOL/L (ref 99–110)
CO2 SERPL-SCNC: 23 MMOL/L (ref 21–32)
CREAT SERPL-MCNC: 0.8 MG/DL (ref 0.9–1.3)
DEPRECATED RDW RBC AUTO: 15.1 % (ref 12.4–15.4)
DESCRIPTION BLOOD BANK: NORMAL
EOSINOPHIL # BLD: 0.2 K/UL (ref 0–0.6)
EOSINOPHIL NFR BLD: 2.4 %
GFR SERPLBLD CREATININE-BSD FMLA CKD-EPI: >60 ML/MIN/{1.73_M2}
GLUCOSE BLD-MCNC: 157 MG/DL (ref 70–99)
GLUCOSE BLD-MCNC: 171 MG/DL (ref 70–99)
GLUCOSE BLD-MCNC: 177 MG/DL (ref 70–99)
GLUCOSE BLD-MCNC: 177 MG/DL (ref 70–99)
GLUCOSE BLD-MCNC: 184 MG/DL (ref 70–99)
GLUCOSE BLD-MCNC: 193 MG/DL (ref 70–99)
GLUCOSE SERPL-MCNC: 172 MG/DL (ref 70–99)
HCT VFR BLD AUTO: 21.3 % (ref 40.5–52.5)
HCT VFR BLD AUTO: 21.6 % (ref 40.5–52.5)
HCT VFR BLD AUTO: 23.9 % (ref 40.5–52.5)
HGB BLD-MCNC: 6.4 G/DL (ref 13.5–17.5)
HGB BLD-MCNC: 7.4 G/DL (ref 13.5–17.5)
HGB BLD-MCNC: 8.2 G/DL (ref 13.5–17.5)
LYMPHOCYTES # BLD: 1.7 K/UL (ref 1–5.1)
LYMPHOCYTES NFR BLD: 16.6 %
MAGNESIUM SERPL-MCNC: 2.2 MG/DL (ref 1.8–2.4)
MCH RBC QN AUTO: 33.9 PG (ref 26–34)
MCHC RBC AUTO-ENTMCNC: 34.7 G/DL (ref 31–36)
MCV RBC AUTO: 97.8 FL (ref 80–100)
MONOCYTES # BLD: 0.5 K/UL (ref 0–1.3)
MONOCYTES NFR BLD: 5.2 %
NEUTROPHILS # BLD: 7.5 K/UL (ref 1.7–7.7)
NEUTROPHILS NFR BLD: 75 %
PERFORMED ON: ABNORMAL
PHOSPHATE SERPL-MCNC: 3.7 MG/DL (ref 2.5–4.9)
PLATELET # BLD AUTO: 203 K/UL (ref 135–450)
PMV BLD AUTO: 7.9 FL (ref 5–10.5)
POTASSIUM SERPL-SCNC: 3.6 MMOL/L (ref 3.5–5.1)
RBC # BLD AUTO: 2.17 M/UL (ref 4.2–5.9)
SODIUM SERPL-SCNC: 137 MMOL/L (ref 136–145)
TRIGL SERPL-MCNC: 252 MG/DL (ref 0–150)
WBC # BLD AUTO: 10 K/UL (ref 4–11)

## 2023-08-01 PROCEDURE — 2000000000 HC ICU R&B

## 2023-08-01 PROCEDURE — 6360000002 HC RX W HCPCS: Performed by: SURGERY

## 2023-08-01 PROCEDURE — 92611 MOTION FLUOROSCOPY/SWALLOW: CPT

## 2023-08-01 PROCEDURE — 86850 RBC ANTIBODY SCREEN: CPT

## 2023-08-01 PROCEDURE — 74230 X-RAY XM SWLNG FUNCJ C+: CPT

## 2023-08-01 PROCEDURE — 80048 BASIC METABOLIC PNL TOTAL CA: CPT

## 2023-08-01 PROCEDURE — 2500000003 HC RX 250 WO HCPCS: Performed by: INTERNAL MEDICINE

## 2023-08-01 PROCEDURE — 97535 SELF CARE MNGMENT TRAINING: CPT

## 2023-08-01 PROCEDURE — 2580000003 HC RX 258: Performed by: SURGERY

## 2023-08-01 PROCEDURE — 36430 TRANSFUSION BLD/BLD COMPNT: CPT

## 2023-08-01 PROCEDURE — 84100 ASSAY OF PHOSPHORUS: CPT

## 2023-08-01 PROCEDURE — 99024 POSTOP FOLLOW-UP VISIT: CPT | Performed by: SURGERY

## 2023-08-01 PROCEDURE — 83735 ASSAY OF MAGNESIUM: CPT

## 2023-08-01 PROCEDURE — 2500000003 HC RX 250 WO HCPCS: Performed by: SURGERY

## 2023-08-01 PROCEDURE — 85014 HEMATOCRIT: CPT

## 2023-08-01 PROCEDURE — 86901 BLOOD TYPING SEROLOGIC RH(D): CPT

## 2023-08-01 PROCEDURE — 85018 HEMOGLOBIN: CPT

## 2023-08-01 PROCEDURE — 84478 ASSAY OF TRIGLYCERIDES: CPT

## 2023-08-01 PROCEDURE — C9113 INJ PANTOPRAZOLE SODIUM, VIA: HCPCS | Performed by: SURGERY

## 2023-08-01 PROCEDURE — 92526 ORAL FUNCTION THERAPY: CPT

## 2023-08-01 PROCEDURE — P9016 RBC LEUKOCYTES REDUCED: HCPCS

## 2023-08-01 PROCEDURE — 86923 COMPATIBILITY TEST ELECTRIC: CPT

## 2023-08-01 PROCEDURE — 97530 THERAPEUTIC ACTIVITIES: CPT

## 2023-08-01 PROCEDURE — 86900 BLOOD TYPING SEROLOGIC ABO: CPT

## 2023-08-01 PROCEDURE — 85025 COMPLETE CBC W/AUTO DIFF WBC: CPT

## 2023-08-01 PROCEDURE — 6370000000 HC RX 637 (ALT 250 FOR IP): Performed by: SURGERY

## 2023-08-01 RX ORDER — SODIUM CHLORIDE 9 MG/ML
INJECTION, SOLUTION INTRAVENOUS PRN
Status: DISCONTINUED | OUTPATIENT
Start: 2023-08-01 | End: 2023-08-09 | Stop reason: HOSPADM

## 2023-08-01 RX ADMIN — ENOXAPARIN SODIUM 40 MG: 100 INJECTION SUBCUTANEOUS at 19:44

## 2023-08-01 RX ADMIN — PANTOPRAZOLE SODIUM 40 MG: 40 INJECTION, POWDER, FOR SOLUTION INTRAVENOUS at 19:44

## 2023-08-01 RX ADMIN — METOCLOPRAMIDE HYDROCHLORIDE 10 MG: 5 INJECTION INTRAMUSCULAR; INTRAVENOUS at 08:44

## 2023-08-01 RX ADMIN — SODIUM CHLORIDE, PRESERVATIVE FREE 10 ML: 5 INJECTION INTRAVENOUS at 08:45

## 2023-08-01 RX ADMIN — METOPROLOL TARTRATE 5 MG: 5 INJECTION INTRAVENOUS at 06:23

## 2023-08-01 RX ADMIN — ENOXAPARIN SODIUM 40 MG: 100 INJECTION SUBCUTANEOUS at 08:44

## 2023-08-01 RX ADMIN — LEVETIRACETAM 500 MG: 100 INJECTION, SOLUTION INTRAVENOUS at 20:05

## 2023-08-01 RX ADMIN — METOCLOPRAMIDE HYDROCHLORIDE 10 MG: 5 INJECTION INTRAMUSCULAR; INTRAVENOUS at 13:20

## 2023-08-01 RX ADMIN — METOCLOPRAMIDE HYDROCHLORIDE 10 MG: 5 INJECTION INTRAMUSCULAR; INTRAVENOUS at 19:44

## 2023-08-01 RX ADMIN — METOPROLOL TARTRATE 5 MG: 5 INJECTION INTRAVENOUS at 13:20

## 2023-08-01 RX ADMIN — PANTOPRAZOLE SODIUM 40 MG: 40 INJECTION, POWDER, FOR SOLUTION INTRAVENOUS at 08:45

## 2023-08-01 RX ADMIN — ASCORBIC ACID, VITAMIN A PALMITATE, CHOLECALCIFEROL, THIAMINE HYDROCHLORIDE, RIBOFLAVIN-5 PHOSPHATE SODIUM, PYRIDOXINE HYDROCHLORIDE, NIACINAMIDE, DEXPANTHENOL, ALPHA-TOCOPHEROL ACETATE, VITAMIN K1, FOLIC ACID, BIOTIN, CYANOCOBALAMIN: 200; 3300; 200; 6; 3.6; 6; 40; 15; 10; 150; 600; 60; 5 INJECTION, SOLUTION INTRAVENOUS at 17:54

## 2023-08-01 RX ADMIN — LEVETIRACETAM 500 MG: 100 INJECTION, SOLUTION INTRAVENOUS at 08:40

## 2023-08-01 RX ADMIN — METOCLOPRAMIDE HYDROCHLORIDE 10 MG: 5 INJECTION INTRAMUSCULAR; INTRAVENOUS at 02:01

## 2023-08-01 ASSESSMENT — PAIN SCALES - GENERAL
PAINLEVEL_OUTOF10: 0

## 2023-08-01 ASSESSMENT — PAIN SCALES - WONG BAKER
WONGBAKER_NUMERICALRESPONSE: 0

## 2023-08-01 NOTE — CARE COORDINATION
LOS 13.  Care managed by Vasc, Gen Surg, Neph. S/P AFem BPG and Incis Ibis Repair. Post op Ileus- TPN, has NG. Has SNF recs- call to spouse to review- msg left. Will need to progress from TPN for SNF. Screening referral made to [facility]. CM following.  Darin Apley, RN

## 2023-08-02 ENCOUNTER — TELEPHONE (OUTPATIENT)
Dept: VASCULAR SURGERY | Age: 58
End: 2023-08-02

## 2023-08-02 ENCOUNTER — APPOINTMENT (OUTPATIENT)
Dept: GENERAL RADIOLOGY | Age: 58
DRG: 270 | End: 2023-08-02
Attending: SURGERY
Payer: MEDICARE

## 2023-08-02 LAB
ALBUMIN SERPL-MCNC: 2.6 G/DL (ref 3.4–5)
ALP SERPL-CCNC: 171 U/L (ref 40–129)
ALT SERPL-CCNC: 67 U/L (ref 10–40)
ANION GAP SERPL CALCULATED.3IONS-SCNC: 9 MMOL/L (ref 3–16)
AST SERPL-CCNC: 35 U/L (ref 15–37)
BASOPHILS # BLD: 0.1 K/UL (ref 0–0.2)
BASOPHILS NFR BLD: 0.7 %
BILIRUB DIRECT SERPL-MCNC: <0.2 MG/DL (ref 0–0.3)
BILIRUB INDIRECT SERPL-MCNC: ABNORMAL MG/DL (ref 0–1)
BILIRUB SERPL-MCNC: 0.3 MG/DL (ref 0–1)
BUN SERPL-MCNC: 19 MG/DL (ref 7–20)
CALCIUM SERPL-MCNC: 8.5 MG/DL (ref 8.3–10.6)
CHLORIDE SERPL-SCNC: 109 MMOL/L (ref 99–110)
CO2 SERPL-SCNC: 23 MMOL/L (ref 21–32)
CREAT SERPL-MCNC: 0.8 MG/DL (ref 0.9–1.3)
DEPRECATED RDW RBC AUTO: 17.4 % (ref 12.4–15.4)
EOSINOPHIL # BLD: 0.2 K/UL (ref 0–0.6)
EOSINOPHIL NFR BLD: 2.7 %
GFR SERPLBLD CREATININE-BSD FMLA CKD-EPI: >60 ML/MIN/{1.73_M2}
GLUCOSE BLD-MCNC: 100 MG/DL (ref 70–99)
GLUCOSE BLD-MCNC: 131 MG/DL (ref 70–99)
GLUCOSE BLD-MCNC: 171 MG/DL (ref 70–99)
GLUCOSE BLD-MCNC: 186 MG/DL (ref 70–99)
GLUCOSE BLD-MCNC: 204 MG/DL (ref 70–99)
GLUCOSE SERPL-MCNC: 174 MG/DL (ref 70–99)
HCT VFR BLD AUTO: 23.8 % (ref 40.5–52.5)
HGB BLD-MCNC: 8.3 G/DL (ref 13.5–17.5)
LYMPHOCYTES # BLD: 1.4 K/UL (ref 1–5.1)
LYMPHOCYTES NFR BLD: 18.2 %
MAGNESIUM SERPL-MCNC: 2.2 MG/DL (ref 1.8–2.4)
MCH RBC QN AUTO: 32.9 PG (ref 26–34)
MCHC RBC AUTO-ENTMCNC: 34.9 G/DL (ref 31–36)
MCV RBC AUTO: 94.4 FL (ref 80–100)
MONOCYTES # BLD: 0.6 K/UL (ref 0–1.3)
MONOCYTES NFR BLD: 7.8 %
NEUTROPHILS # BLD: 5.5 K/UL (ref 1.7–7.7)
NEUTROPHILS NFR BLD: 70.6 %
PERFORMED ON: ABNORMAL
PHOSPHATE SERPL-MCNC: 3.7 MG/DL (ref 2.5–4.9)
PLATELET # BLD AUTO: 229 K/UL (ref 135–450)
PMV BLD AUTO: 8.1 FL (ref 5–10.5)
POTASSIUM SERPL-SCNC: 3.6 MMOL/L (ref 3.5–5.1)
PROT SERPL-MCNC: 5.4 G/DL (ref 6.4–8.2)
RBC # BLD AUTO: 2.52 M/UL (ref 4.2–5.9)
SODIUM SERPL-SCNC: 141 MMOL/L (ref 136–145)
WBC # BLD AUTO: 7.8 K/UL (ref 4–11)

## 2023-08-02 PROCEDURE — 6360000002 HC RX W HCPCS: Performed by: SURGERY

## 2023-08-02 PROCEDURE — 99024 POSTOP FOLLOW-UP VISIT: CPT | Performed by: SURGERY

## 2023-08-02 PROCEDURE — 97530 THERAPEUTIC ACTIVITIES: CPT

## 2023-08-02 PROCEDURE — 2580000003 HC RX 258: Performed by: SURGERY

## 2023-08-02 PROCEDURE — 85025 COMPLETE CBC W/AUTO DIFF WBC: CPT

## 2023-08-02 PROCEDURE — 2500000003 HC RX 250 WO HCPCS: Performed by: INTERNAL MEDICINE

## 2023-08-02 PROCEDURE — 74018 RADEX ABDOMEN 1 VIEW: CPT

## 2023-08-02 PROCEDURE — 97116 GAIT TRAINING THERAPY: CPT

## 2023-08-02 PROCEDURE — 30233N1 TRANSFUSION OF NONAUTOLOGOUS RED BLOOD CELLS INTO PERIPHERAL VEIN, PERCUTANEOUS APPROACH: ICD-10-PCS | Performed by: SURGERY

## 2023-08-02 PROCEDURE — 97535 SELF CARE MNGMENT TRAINING: CPT

## 2023-08-02 PROCEDURE — 92526 ORAL FUNCTION THERAPY: CPT

## 2023-08-02 PROCEDURE — C9113 INJ PANTOPRAZOLE SODIUM, VIA: HCPCS | Performed by: SURGERY

## 2023-08-02 PROCEDURE — 84100 ASSAY OF PHOSPHORUS: CPT

## 2023-08-02 PROCEDURE — 80076 HEPATIC FUNCTION PANEL: CPT

## 2023-08-02 PROCEDURE — 83735 ASSAY OF MAGNESIUM: CPT

## 2023-08-02 PROCEDURE — 80048 BASIC METABOLIC PNL TOTAL CA: CPT

## 2023-08-02 PROCEDURE — 2000000000 HC ICU R&B

## 2023-08-02 PROCEDURE — 6370000000 HC RX 637 (ALT 250 FOR IP): Performed by: SURGERY

## 2023-08-02 RX ADMIN — SODIUM CHLORIDE, PRESERVATIVE FREE 10 ML: 5 INJECTION INTRAVENOUS at 09:06

## 2023-08-02 RX ADMIN — METOPROLOL TARTRATE 5 MG: 5 INJECTION INTRAVENOUS at 14:33

## 2023-08-02 RX ADMIN — METOCLOPRAMIDE HYDROCHLORIDE 10 MG: 5 INJECTION INTRAMUSCULAR; INTRAVENOUS at 09:06

## 2023-08-02 RX ADMIN — ENOXAPARIN SODIUM 40 MG: 100 INJECTION SUBCUTANEOUS at 20:35

## 2023-08-02 RX ADMIN — SODIUM CHLORIDE, PRESERVATIVE FREE 10 ML: 5 INJECTION INTRAVENOUS at 20:35

## 2023-08-02 RX ADMIN — METOPROLOL TARTRATE 5 MG: 5 INJECTION INTRAVENOUS at 22:10

## 2023-08-02 RX ADMIN — METOCLOPRAMIDE HYDROCHLORIDE 10 MG: 5 INJECTION INTRAMUSCULAR; INTRAVENOUS at 14:33

## 2023-08-02 RX ADMIN — ENOXAPARIN SODIUM 40 MG: 100 INJECTION SUBCUTANEOUS at 09:06

## 2023-08-02 RX ADMIN — PANTOPRAZOLE SODIUM 40 MG: 40 INJECTION, POWDER, FOR SOLUTION INTRAVENOUS at 20:35

## 2023-08-02 RX ADMIN — PANTOPRAZOLE SODIUM 40 MG: 40 INJECTION, POWDER, FOR SOLUTION INTRAVENOUS at 09:05

## 2023-08-02 RX ADMIN — LEVETIRACETAM 500 MG: 100 INJECTION, SOLUTION INTRAVENOUS at 09:19

## 2023-08-02 RX ADMIN — INSULIN LISPRO 2 UNITS: 100 INJECTION, SOLUTION INTRAVENOUS; SUBCUTANEOUS at 09:07

## 2023-08-02 RX ADMIN — METOCLOPRAMIDE HYDROCHLORIDE 10 MG: 5 INJECTION INTRAMUSCULAR; INTRAVENOUS at 01:53

## 2023-08-02 RX ADMIN — METOCLOPRAMIDE HYDROCHLORIDE 10 MG: 5 INJECTION INTRAMUSCULAR; INTRAVENOUS at 20:35

## 2023-08-02 RX ADMIN — LEVETIRACETAM 500 MG: 100 INJECTION, SOLUTION INTRAVENOUS at 20:45

## 2023-08-02 RX ADMIN — METOPROLOL TARTRATE 5 MG: 5 INJECTION INTRAVENOUS at 06:28

## 2023-08-02 ASSESSMENT — PAIN SCALES - GENERAL
PAINLEVEL_OUTOF10: 0

## 2023-08-02 ASSESSMENT — PAIN SCALES - WONG BAKER

## 2023-08-02 NOTE — CONSULTS
Patient: Tam Osborn  [id number]  Date: 8/2/2023      Chief Complaint: abdominal pain    History of Present Illness/Hospital Course:  Hasmukh Mckinney is a 59year old male with a past medical history significant for CAD s/p PCI, CVA, HTN, HLD, PAD s/p multiple revascularization surgeries who [facility] for planned aortobifemoral bypass graft and open ventral hernia repair with mesh. His post operative course has been notable for ileus, hypernatremia. Nephrology was consulted. Course also notable for dysphagia. He has been on TPN and is on NG tube feeds. Speech plans to re-evaluate his swallow in a few days. He continues to have functional deficits below his baseline. Today Arlen Osunahayley is seen with nursing present. He reports abdominal discomfort. He is hopeful to be able to discharge home. Advised patient to discuss possible rehab with his wife.      has a past medical history of Abdominal pain, Abnormal stress test, Acute CVA (cerebrovascular accident) ([address]), Bowel incontinence, CAD (coronary artery disease), Cerebral infarction due to unspecified occlusion or stenosis of unspecified carotid artery ([address]), Chest pain, History of cerebral infarction, Hyperlipidemia, Hypertension, Incisional hernia without obstruction or gangrene, Incisional hernia, without obstruction or gangrene, Ischemic foot, New onset seizure ([address]), PAD (peripheral artery disease) ([address]), Poor vision, Pre-diabetes, Sleep apnea, Stroke [facility]), Vascular occlusion, and Wears partial dentures. has a past surgical history that includes Leg Surgery (Right); vascular surgery; Abdomen surgery; Femoral-tibial Bypass Graft (Right, 06/24/2020); Incisional hernia repair; hernia repair (N/A, 07/09/2021); CT DRAINAGE VISCERAL PERCUTANEOUS (07/21/2021); femoral bypass (Right, 03/07/2022); Cardiac surgery (2022); Carotid endarterectomy (Bilateral); and vein bypass graft,aortobifemoral (N/A, 7/19/2023).      reports that he has been

## 2023-08-02 NOTE — TELEPHONE ENCOUNTER
Returned call to patients spouse as she had some concerns of tubes he has in but she spoke with the nurse at the hospital and has all her information.  Aristeo

## 2023-08-02 NOTE — CONSULTS
Brief Nutrition Assessment  RD received consult for TF order and management. Pending on feeding tube placement and timing of TF initiation, recommend cutting TPN in half and discontinuing when pt is tolerating TF at 30 ml/hr. Dietitians following with nutrition assessment and recommendations in RD progress notes. Will continue to monitor per nutrition standards of care. Nutrition Recommendations/Plan:   Plan to place nasoenteric tube for enteral nutrition, recommend Glucerna 1.5 (Diabetic formula) at 10 mL/hr and as tolerated, increase by 10 mL/hr q 4 hours until goal of 65 mL/hr is met via N/G  Recommend 60 mL H20 flush q 4 hours. Monitor IVF infusion, Na labs and need for adjustments in water flush  Monitor TF tolerance (abd distention, bowel habits, N/V, cramping)  Recommend discontinuing TPN (cut rate by 50% and let current bag run out), when pt is tolerating TF at rate of 30 ml/hr. Monitor nutrition adequacy, pertinent labs, bowel habits, wt changes, and clinical progress     Current Nutrition Intake & Therapies:    Average Meal Intake: NPO  Average Supplements Intake: NPO  Diet NPO Exceptions are: Ice Chips  PN-Adult Premix 5/20 - Standard Electrolytes - Central Line  Current Tube Feeding (TF) Orders:  Feeding Route: Nasogastric  Formula: Diabetic  Schedule: Continuous  Goal TF & Flush Orders Provides: GLucerna 1.5 x 20 hours at goal rate of 65 ml/hr to provide 1300 ml TV, 1950 kcals, 108 g protein and 987 ml free water. Free water flush 60 ml q 4 hours.     Estimated Daily Nutrient Needs:  Energy Requirements Based On: Kcal/kg (25-30 kcals/kg)  Weight Used for Energy Requirements: Ideal (70 kg)  Energy (kcal/day): 6152-9666  Weight Used for Protein Requirements: Ideal (1-1.2 g/kg)  Protein (g/day): 70-84 g  Method Used for Fluid Requirements: 1 ml/kcal  Fluid (ml/day):      Sun Porras, MS, RD, LD  Contact: Office: [phone]; [address] [place] [address] Road: [id number]

## 2023-08-02 NOTE — CARE COORDINATION
[facility] - Acute Rehab Unit   After review, this patient is felt to be:       []  Appropriate for Acute Inpatient Rehab    []  Appropriate for Acute Inpatient Rehab Pending Insurance Authorization    []  Not appropriate for Acute Inpatient Rehab    [x]  Referral received and ARU reviewing patient; Evaluation ongoing. Will follow for therapy progress and medical progress and feeding plan. Discussed with ANDREY Downs.   Thank you for referral.  Sameera Ruiz RN

## 2023-08-03 LAB
DEPRECATED RDW RBC AUTO: 17.5 % (ref 12.4–15.4)
GLUCOSE BLD-MCNC: 103 MG/DL (ref 70–99)
GLUCOSE BLD-MCNC: 117 MG/DL (ref 70–99)
GLUCOSE BLD-MCNC: 118 MG/DL (ref 70–99)
GLUCOSE BLD-MCNC: 137 MG/DL (ref 70–99)
GLUCOSE BLD-MCNC: 138 MG/DL (ref 70–99)
GLUCOSE BLD-MCNC: 151 MG/DL (ref 70–99)
GLUCOSE BLD-MCNC: 90 MG/DL (ref 70–99)
HCT VFR BLD AUTO: 25.3 % (ref 40.5–52.5)
HGB BLD-MCNC: 8.5 G/DL (ref 13.5–17.5)
MCH RBC QN AUTO: 32.4 PG (ref 26–34)
MCHC RBC AUTO-ENTMCNC: 33.6 G/DL (ref 31–36)
MCV RBC AUTO: 96.5 FL (ref 80–100)
PERFORMED ON: ABNORMAL
PERFORMED ON: NORMAL
PLATELET # BLD AUTO: 260 K/UL (ref 135–450)
PMV BLD AUTO: 7.8 FL (ref 5–10.5)
RBC # BLD AUTO: 2.63 M/UL (ref 4.2–5.9)
WBC # BLD AUTO: 8.6 K/UL (ref 4–11)

## 2023-08-03 PROCEDURE — 2000000000 HC ICU R&B

## 2023-08-03 PROCEDURE — C9113 INJ PANTOPRAZOLE SODIUM, VIA: HCPCS | Performed by: SURGERY

## 2023-08-03 PROCEDURE — 85027 COMPLETE CBC AUTOMATED: CPT

## 2023-08-03 PROCEDURE — 2500000003 HC RX 250 WO HCPCS: Performed by: INTERNAL MEDICINE

## 2023-08-03 PROCEDURE — 6360000002 HC RX W HCPCS: Performed by: SURGERY

## 2023-08-03 PROCEDURE — 6370000000 HC RX 637 (ALT 250 FOR IP): Performed by: SURGERY

## 2023-08-03 PROCEDURE — 92526 ORAL FUNCTION THERAPY: CPT

## 2023-08-03 PROCEDURE — 97116 GAIT TRAINING THERAPY: CPT

## 2023-08-03 PROCEDURE — 2580000003 HC RX 258: Performed by: SURGERY

## 2023-08-03 PROCEDURE — 99024 POSTOP FOLLOW-UP VISIT: CPT | Performed by: SURGERY

## 2023-08-03 PROCEDURE — 97110 THERAPEUTIC EXERCISES: CPT

## 2023-08-03 RX ADMIN — LEVETIRACETAM 500 MG: 100 INJECTION, SOLUTION INTRAVENOUS at 09:41

## 2023-08-03 RX ADMIN — ENOXAPARIN SODIUM 40 MG: 100 INJECTION SUBCUTANEOUS at 09:25

## 2023-08-03 RX ADMIN — MORPHINE SULFATE 4 MG: 4 INJECTION, SOLUTION INTRAMUSCULAR; INTRAVENOUS at 16:45

## 2023-08-03 RX ADMIN — ENOXAPARIN SODIUM 40 MG: 100 INJECTION SUBCUTANEOUS at 20:49

## 2023-08-03 RX ADMIN — METOPROLOL TARTRATE 5 MG: 5 INJECTION INTRAVENOUS at 06:23

## 2023-08-03 RX ADMIN — SODIUM CHLORIDE, PRESERVATIVE FREE 10 ML: 5 INJECTION INTRAVENOUS at 09:26

## 2023-08-03 RX ADMIN — METOCLOPRAMIDE HYDROCHLORIDE 10 MG: 5 INJECTION INTRAMUSCULAR; INTRAVENOUS at 01:31

## 2023-08-03 RX ADMIN — LEVETIRACETAM 500 MG: 100 INJECTION, SOLUTION INTRAVENOUS at 20:53

## 2023-08-03 RX ADMIN — MORPHINE SULFATE 4 MG: 4 INJECTION, SOLUTION INTRAMUSCULAR; INTRAVENOUS at 09:26

## 2023-08-03 RX ADMIN — SODIUM CHLORIDE, PRESERVATIVE FREE 10 ML: 5 INJECTION INTRAVENOUS at 20:49

## 2023-08-03 RX ADMIN — PANTOPRAZOLE SODIUM 40 MG: 40 INJECTION, POWDER, FOR SOLUTION INTRAVENOUS at 09:26

## 2023-08-03 RX ADMIN — PANTOPRAZOLE SODIUM 40 MG: 40 INJECTION, POWDER, FOR SOLUTION INTRAVENOUS at 20:49

## 2023-08-03 ASSESSMENT — PAIN SCALES - GENERAL: PAINLEVEL_OUTOF10: 0

## 2023-08-03 ASSESSMENT — PAIN SCALES - WONG BAKER
WONGBAKER_NUMERICALRESPONSE: 0

## 2023-08-03 NOTE — CARE COORDINATION
Conversation with patient who is currently up in chair. TF per NGT gradually being increased to goal. Has ARU following as well as possibility for SNF. Adriel Parekh still  prefers dc home, but realizes that he may need rehab first.    Will continue to follow.     Ang Hudson RN

## 2023-08-04 LAB
ALBUMIN SERPL-MCNC: 2.9 G/DL (ref 3.4–5)
ALP SERPL-CCNC: 189 U/L (ref 40–129)
ALT SERPL-CCNC: 56 U/L (ref 10–40)
AST SERPL-CCNC: 23 U/L (ref 15–37)
BILIRUB DIRECT SERPL-MCNC: <0.2 MG/DL (ref 0–0.3)
BILIRUB INDIRECT SERPL-MCNC: ABNORMAL MG/DL (ref 0–1)
BILIRUB SERPL-MCNC: <0.2 MG/DL (ref 0–1)
GLUCOSE BLD-MCNC: 142 MG/DL (ref 70–99)
GLUCOSE BLD-MCNC: 143 MG/DL (ref 70–99)
GLUCOSE BLD-MCNC: 167 MG/DL (ref 70–99)
GLUCOSE BLD-MCNC: 169 MG/DL (ref 70–99)
GLUCOSE BLD-MCNC: 171 MG/DL (ref 70–99)
GLUCOSE BLD-MCNC: 194 MG/DL (ref 70–99)
PERFORMED ON: ABNORMAL
PREALB SERPL-MCNC: 13.7 MG/DL (ref 20–40)
PROT SERPL-MCNC: 5.6 G/DL (ref 6.4–8.2)

## 2023-08-04 PROCEDURE — 6360000002 HC RX W HCPCS: Performed by: SURGERY

## 2023-08-04 PROCEDURE — 99024 POSTOP FOLLOW-UP VISIT: CPT | Performed by: SURGERY

## 2023-08-04 PROCEDURE — C9113 INJ PANTOPRAZOLE SODIUM, VIA: HCPCS | Performed by: SURGERY

## 2023-08-04 PROCEDURE — 80076 HEPATIC FUNCTION PANEL: CPT

## 2023-08-04 PROCEDURE — 2000000000 HC ICU R&B

## 2023-08-04 PROCEDURE — 6370000000 HC RX 637 (ALT 250 FOR IP): Performed by: SURGERY

## 2023-08-04 PROCEDURE — 92526 ORAL FUNCTION THERAPY: CPT

## 2023-08-04 PROCEDURE — 84134 ASSAY OF PREALBUMIN: CPT

## 2023-08-04 PROCEDURE — 2580000003 HC RX 258: Performed by: SURGERY

## 2023-08-04 RX ADMIN — PANTOPRAZOLE SODIUM 40 MG: 40 INJECTION, POWDER, FOR SOLUTION INTRAVENOUS at 09:32

## 2023-08-04 RX ADMIN — ENOXAPARIN SODIUM 40 MG: 100 INJECTION SUBCUTANEOUS at 09:32

## 2023-08-04 RX ADMIN — ENOXAPARIN SODIUM 40 MG: 100 INJECTION SUBCUTANEOUS at 20:40

## 2023-08-04 RX ADMIN — SODIUM CHLORIDE, PRESERVATIVE FREE 10 ML: 5 INJECTION INTRAVENOUS at 09:32

## 2023-08-04 RX ADMIN — PANTOPRAZOLE SODIUM 40 MG: 40 INJECTION, POWDER, FOR SOLUTION INTRAVENOUS at 20:40

## 2023-08-04 RX ADMIN — LEVETIRACETAM 500 MG: 100 INJECTION, SOLUTION INTRAVENOUS at 09:35

## 2023-08-04 RX ADMIN — SODIUM CHLORIDE, PRESERVATIVE FREE 10 ML: 5 INJECTION INTRAVENOUS at 20:40

## 2023-08-04 RX ADMIN — MORPHINE SULFATE 4 MG: 4 INJECTION, SOLUTION INTRAMUSCULAR; INTRAVENOUS at 14:07

## 2023-08-04 RX ADMIN — LEVETIRACETAM 500 MG: 100 INJECTION, SOLUTION INTRAVENOUS at 20:18

## 2023-08-04 ASSESSMENT — PAIN SCALES - WONG BAKER

## 2023-08-04 ASSESSMENT — PAIN SCALES - GENERAL
PAINLEVEL_OUTOF10: 8
PAINLEVEL_OUTOF10: 3

## 2023-08-04 NOTE — CARE COORDINATION
Aware that Dr Taylor Fox and Dr Damaris French are agreeable to ARU referral. This writer spoke with ARU liaison,Bonnie. Swallowing issues need to be resolved and NGT removed, before they can accept patient in ARU. Will continue to follow.       Young Flood RN

## 2023-08-05 LAB
GLUCOSE BLD-MCNC: 137 MG/DL (ref 70–99)
GLUCOSE BLD-MCNC: 138 MG/DL (ref 70–99)
GLUCOSE BLD-MCNC: 139 MG/DL (ref 70–99)
GLUCOSE BLD-MCNC: 150 MG/DL (ref 70–99)
GLUCOSE BLD-MCNC: 174 MG/DL (ref 70–99)
MAGNESIUM SERPL-MCNC: 2.3 MG/DL (ref 1.8–2.4)
PERFORMED ON: ABNORMAL
PHOSPHATE SERPL-MCNC: 3.5 MG/DL (ref 2.5–4.9)

## 2023-08-05 PROCEDURE — 6370000000 HC RX 637 (ALT 250 FOR IP): Performed by: SURGERY

## 2023-08-05 PROCEDURE — 2580000003 HC RX 258: Performed by: SURGERY

## 2023-08-05 PROCEDURE — C9113 INJ PANTOPRAZOLE SODIUM, VIA: HCPCS | Performed by: SURGERY

## 2023-08-05 PROCEDURE — 97110 THERAPEUTIC EXERCISES: CPT

## 2023-08-05 PROCEDURE — 6360000002 HC RX W HCPCS: Performed by: SURGERY

## 2023-08-05 PROCEDURE — 83735 ASSAY OF MAGNESIUM: CPT

## 2023-08-05 PROCEDURE — 84100 ASSAY OF PHOSPHORUS: CPT

## 2023-08-05 PROCEDURE — 2060000000 HC ICU INTERMEDIATE R&B

## 2023-08-05 PROCEDURE — 99024 POSTOP FOLLOW-UP VISIT: CPT | Performed by: SURGERY

## 2023-08-05 RX ADMIN — SODIUM CHLORIDE, PRESERVATIVE FREE 10 ML: 5 INJECTION INTRAVENOUS at 20:34

## 2023-08-05 RX ADMIN — PANTOPRAZOLE SODIUM 40 MG: 40 INJECTION, POWDER, FOR SOLUTION INTRAVENOUS at 20:34

## 2023-08-05 RX ADMIN — ENOXAPARIN SODIUM 40 MG: 100 INJECTION SUBCUTANEOUS at 09:30

## 2023-08-05 RX ADMIN — ONDANSETRON 4 MG: 2 INJECTION INTRAMUSCULAR; INTRAVENOUS at 15:51

## 2023-08-05 RX ADMIN — LEVETIRACETAM 500 MG: 100 INJECTION, SOLUTION INTRAVENOUS at 09:38

## 2023-08-05 RX ADMIN — SODIUM CHLORIDE, PRESERVATIVE FREE 10 ML: 5 INJECTION INTRAVENOUS at 09:30

## 2023-08-05 RX ADMIN — PANTOPRAZOLE SODIUM 40 MG: 40 INJECTION, POWDER, FOR SOLUTION INTRAVENOUS at 09:29

## 2023-08-05 RX ADMIN — ENOXAPARIN SODIUM 40 MG: 100 INJECTION SUBCUTANEOUS at 20:34

## 2023-08-05 RX ADMIN — LEVETIRACETAM 500 MG: 100 INJECTION, SOLUTION INTRAVENOUS at 20:33

## 2023-08-05 RX ADMIN — MORPHINE SULFATE 2 MG: 2 INJECTION, SOLUTION INTRAMUSCULAR; INTRAVENOUS at 15:50

## 2023-08-05 ASSESSMENT — PAIN - FUNCTIONAL ASSESSMENT: PAIN_FUNCTIONAL_ASSESSMENT: PREVENTS OR INTERFERES SOME ACTIVE ACTIVITIES AND ADLS

## 2023-08-05 ASSESSMENT — PAIN SCALES - GENERAL
PAINLEVEL_OUTOF10: 8
PAINLEVEL_OUTOF10: 0

## 2023-08-05 ASSESSMENT — PAIN SCALES - WONG BAKER: WONGBAKER_NUMERICALRESPONSE: 0

## 2023-08-05 ASSESSMENT — PAIN DESCRIPTION - DESCRIPTORS: DESCRIPTORS: ACHING

## 2023-08-05 ASSESSMENT — PAIN DESCRIPTION - LOCATION: LOCATION: ABDOMEN

## 2023-08-06 LAB
GLUCOSE BLD-MCNC: 129 MG/DL (ref 70–99)
GLUCOSE BLD-MCNC: 139 MG/DL (ref 70–99)
GLUCOSE BLD-MCNC: 141 MG/DL (ref 70–99)
GLUCOSE BLD-MCNC: 146 MG/DL (ref 70–99)
GLUCOSE BLD-MCNC: 150 MG/DL (ref 70–99)
PERFORMED ON: ABNORMAL

## 2023-08-06 PROCEDURE — 6370000000 HC RX 637 (ALT 250 FOR IP): Performed by: SURGERY

## 2023-08-06 PROCEDURE — 6360000002 HC RX W HCPCS: Performed by: SURGERY

## 2023-08-06 PROCEDURE — C9113 INJ PANTOPRAZOLE SODIUM, VIA: HCPCS | Performed by: SURGERY

## 2023-08-06 PROCEDURE — 2060000000 HC ICU INTERMEDIATE R&B

## 2023-08-06 PROCEDURE — 97535 SELF CARE MNGMENT TRAINING: CPT

## 2023-08-06 PROCEDURE — 2580000003 HC RX 258: Performed by: SURGERY

## 2023-08-06 PROCEDURE — 97530 THERAPEUTIC ACTIVITIES: CPT

## 2023-08-06 PROCEDURE — 97110 THERAPEUTIC EXERCISES: CPT

## 2023-08-06 RX ADMIN — LEVETIRACETAM 500 MG: 100 INJECTION, SOLUTION INTRAVENOUS at 21:22

## 2023-08-06 RX ADMIN — PANTOPRAZOLE SODIUM 40 MG: 40 INJECTION, POWDER, FOR SOLUTION INTRAVENOUS at 08:16

## 2023-08-06 RX ADMIN — ENOXAPARIN SODIUM 40 MG: 100 INJECTION SUBCUTANEOUS at 08:16

## 2023-08-06 RX ADMIN — MORPHINE SULFATE 4 MG: 4 INJECTION, SOLUTION INTRAMUSCULAR; INTRAVENOUS at 09:11

## 2023-08-06 RX ADMIN — MORPHINE SULFATE 2 MG: 2 INJECTION, SOLUTION INTRAMUSCULAR; INTRAVENOUS at 21:19

## 2023-08-06 RX ADMIN — SODIUM CHLORIDE, PRESERVATIVE FREE 10 ML: 5 INJECTION INTRAVENOUS at 08:17

## 2023-08-06 RX ADMIN — MORPHINE SULFATE 4 MG: 4 INJECTION, SOLUTION INTRAMUSCULAR; INTRAVENOUS at 12:35

## 2023-08-06 RX ADMIN — PANTOPRAZOLE SODIUM 40 MG: 40 INJECTION, POWDER, FOR SOLUTION INTRAVENOUS at 21:18

## 2023-08-06 RX ADMIN — SODIUM CHLORIDE, PRESERVATIVE FREE 10 ML: 5 INJECTION INTRAVENOUS at 21:31

## 2023-08-06 RX ADMIN — LEVETIRACETAM 500 MG: 100 INJECTION, SOLUTION INTRAVENOUS at 09:11

## 2023-08-06 RX ADMIN — ENOXAPARIN SODIUM 40 MG: 100 INJECTION SUBCUTANEOUS at 21:18

## 2023-08-06 ASSESSMENT — PAIN SCALES - GENERAL
PAINLEVEL_OUTOF10: 4
PAINLEVEL_OUTOF10: 6
PAINLEVEL_OUTOF10: 7
PAINLEVEL_OUTOF10: 6
PAINLEVEL_OUTOF10: 8

## 2023-08-06 ASSESSMENT — PAIN DESCRIPTION - ORIENTATION
ORIENTATION: RIGHT

## 2023-08-06 ASSESSMENT — PAIN SCALES - WONG BAKER
WONGBAKER_NUMERICALRESPONSE: 0

## 2023-08-06 ASSESSMENT — PAIN DESCRIPTION - PAIN TYPE: TYPE: ACUTE PAIN

## 2023-08-06 ASSESSMENT — PAIN DESCRIPTION - LOCATION
LOCATION: FLANK

## 2023-08-06 ASSESSMENT — PAIN DESCRIPTION - FREQUENCY: FREQUENCY: CONTINUOUS

## 2023-08-06 ASSESSMENT — PAIN DESCRIPTION - DESCRIPTORS
DESCRIPTORS: ACHING

## 2023-08-06 ASSESSMENT — PAIN - FUNCTIONAL ASSESSMENT: PAIN_FUNCTIONAL_ASSESSMENT: ACTIVITIES ARE NOT PREVENTED

## 2023-08-06 ASSESSMENT — PAIN DESCRIPTION - ONSET: ONSET: ON-GOING

## 2023-08-07 ENCOUNTER — APPOINTMENT (OUTPATIENT)
Dept: GENERAL RADIOLOGY | Age: 58
DRG: 270 | End: 2023-08-07
Attending: SURGERY
Payer: MEDICARE

## 2023-08-07 LAB
ALBUMIN SERPL-MCNC: 3.1 G/DL (ref 3.4–5)
ALP SERPL-CCNC: 152 U/L (ref 40–129)
ALT SERPL-CCNC: 38 U/L (ref 10–40)
AST SERPL-CCNC: 16 U/L (ref 15–37)
BILIRUB DIRECT SERPL-MCNC: <0.2 MG/DL (ref 0–0.3)
BILIRUB INDIRECT SERPL-MCNC: ABNORMAL MG/DL (ref 0–1)
BILIRUB SERPL-MCNC: <0.2 MG/DL (ref 0–1)
GLUCOSE BLD-MCNC: 108 MG/DL (ref 70–99)
GLUCOSE BLD-MCNC: 118 MG/DL (ref 70–99)
GLUCOSE BLD-MCNC: 146 MG/DL (ref 70–99)
GLUCOSE BLD-MCNC: 159 MG/DL (ref 70–99)
GLUCOSE BLD-MCNC: 162 MG/DL (ref 70–99)
GLUCOSE BLD-MCNC: 177 MG/DL (ref 70–99)
MAGNESIUM SERPL-MCNC: 2.1 MG/DL (ref 1.8–2.4)
PERFORMED ON: ABNORMAL
PHOSPHATE SERPL-MCNC: 3.8 MG/DL (ref 2.5–4.9)
PROT SERPL-MCNC: 6.2 G/DL (ref 6.4–8.2)

## 2023-08-07 PROCEDURE — 99024 POSTOP FOLLOW-UP VISIT: CPT | Performed by: SURGERY

## 2023-08-07 PROCEDURE — 6360000002 HC RX W HCPCS: Performed by: SURGERY

## 2023-08-07 PROCEDURE — 74230 X-RAY XM SWLNG FUNCJ C+: CPT

## 2023-08-07 PROCEDURE — 97116 GAIT TRAINING THERAPY: CPT

## 2023-08-07 PROCEDURE — 2060000000 HC ICU INTERMEDIATE R&B

## 2023-08-07 PROCEDURE — 6370000000 HC RX 637 (ALT 250 FOR IP): Performed by: SURGERY

## 2023-08-07 PROCEDURE — C9113 INJ PANTOPRAZOLE SODIUM, VIA: HCPCS | Performed by: SURGERY

## 2023-08-07 PROCEDURE — 84100 ASSAY OF PHOSPHORUS: CPT

## 2023-08-07 PROCEDURE — 92611 MOTION FLUOROSCOPY/SWALLOW: CPT

## 2023-08-07 PROCEDURE — 80076 HEPATIC FUNCTION PANEL: CPT

## 2023-08-07 PROCEDURE — 2580000003 HC RX 258: Performed by: SURGERY

## 2023-08-07 PROCEDURE — 92526 ORAL FUNCTION THERAPY: CPT

## 2023-08-07 PROCEDURE — 83735 ASSAY OF MAGNESIUM: CPT

## 2023-08-07 PROCEDURE — 97530 THERAPEUTIC ACTIVITIES: CPT

## 2023-08-07 RX ORDER — PANTOPRAZOLE SODIUM 40 MG/1
40 TABLET, DELAYED RELEASE ORAL
Status: DISCONTINUED | OUTPATIENT
Start: 2023-08-08 | End: 2023-08-09 | Stop reason: HOSPADM

## 2023-08-07 RX ADMIN — MORPHINE SULFATE 4 MG: 4 INJECTION, SOLUTION INTRAMUSCULAR; INTRAVENOUS at 22:43

## 2023-08-07 RX ADMIN — SODIUM CHLORIDE, PRESERVATIVE FREE 10 ML: 5 INJECTION INTRAVENOUS at 10:19

## 2023-08-07 RX ADMIN — PANTOPRAZOLE SODIUM 40 MG: 40 INJECTION, POWDER, FOR SOLUTION INTRAVENOUS at 10:03

## 2023-08-07 RX ADMIN — LEVETIRACETAM 500 MG: 500 TABLET, FILM COATED ORAL at 20:41

## 2023-08-07 RX ADMIN — LEVETIRACETAM 500 MG: 100 INJECTION, SOLUTION INTRAVENOUS at 10:10

## 2023-08-07 RX ADMIN — MORPHINE SULFATE 2 MG: 2 INJECTION, SOLUTION INTRAMUSCULAR; INTRAVENOUS at 15:42

## 2023-08-07 RX ADMIN — ATORVASTATIN CALCIUM 80 MG: 80 TABLET, FILM COATED ORAL at 20:41

## 2023-08-07 RX ADMIN — ENOXAPARIN SODIUM 40 MG: 100 INJECTION SUBCUTANEOUS at 20:41

## 2023-08-07 RX ADMIN — ENOXAPARIN SODIUM 40 MG: 100 INJECTION SUBCUTANEOUS at 10:04

## 2023-08-07 ASSESSMENT — PAIN SCALES - GENERAL
PAINLEVEL_OUTOF10: 6
PAINLEVEL_OUTOF10: 4
PAINLEVEL_OUTOF10: 0
PAINLEVEL_OUTOF10: 0
PAINLEVEL_OUTOF10: 8
PAINLEVEL_OUTOF10: 0
PAINLEVEL_OUTOF10: 0

## 2023-08-07 ASSESSMENT — PAIN DESCRIPTION - LOCATION
LOCATION: BACK
LOCATION: ABDOMEN

## 2023-08-07 ASSESSMENT — PAIN SCALES - WONG BAKER
WONGBAKER_NUMERICALRESPONSE: 0
WONGBAKER_NUMERICALRESPONSE: 0

## 2023-08-07 ASSESSMENT — PAIN DESCRIPTION - DESCRIPTORS
DESCRIPTORS: ACHING;DISCOMFORT;DULL
DESCRIPTORS: ACHING

## 2023-08-07 ASSESSMENT — PAIN DESCRIPTION - ORIENTATION: ORIENTATION: MID;LOWER

## 2023-08-07 NOTE — CARE COORDINATION
[facility]    Referral received from  to follow for home care services. I will follow for needs, and speak with patient to verify demos.     Stephen Franklin RN, BSN CTN  [facility] [phone]

## 2023-08-07 NOTE — CARE COORDINATION
LOS 19.  Care managed by Vasc, Gen Surg. ARU following- pt still w NG, will need removal prior to acceptance. Then will need cert. SLP saw today. CM following. Tl Jurado, RN     2239 ARU in to see pt- state pt declined ARU. CM spoke to pt at bedside- from list chose [facility]- call placed and referral made. Call to spouse to update and msg left. Will need cert. Pt amb 150 ft w assist in therapy yesterday- also placed tentative referral to [facility] in case SNF would be refused or declined by payer. CM following. Tl Jurado, SONIA     4117 Discussed w Dr Herb Tavera and RN. States per his conversation w pt- he would  like ARU. Call to ARU- they will consider for admission- and start pre  cert if accepted. Tl Jurado RN     0369 ARU has accepted- and is starting cert today.  Tl Jurado RN

## 2023-08-07 NOTE — CARE COORDINATION
[facility]   Patient refused IPR recommendations per conversation with Dr. Codi Christopher this afternoon. ARU to sign off. D/w Francisca BALDERAS. Patient spoke to CT surgery, ANDREY and Dr. Codi Christopher again and now has decided he would like to come rehab. Will initiate pre-cert. D/w Francisca BALDERAS. Thank you.    Galina Ann  Clinical Liaison

## 2023-08-07 NOTE — PROCEDURES
Speech Language Pathology  Modified Barium Swallow Study  Facility/Department: [facility] C2 CARD TELEMETRY        Recommendations:  Diet recommendation: NPO; Low volume pleasure feeds w/ SLP/RN only; Meds via alt means of nutrition  Risk management: 1:1 supervision with intake, oral care q4 hrs to reduce adverse affects in the event of aspiration, and STRICT aspiration precautions      Jennifer Wills  : 1965 (59 y.o.)   MRN: [id number]  ROOM: [address] [place] [address]  ADMISSION DATE: 2023  PATIENT DIAGNOSIS(ES): Peripheral vascular disease, unspecified ([address]) [I73.9]  Ventral hernia without obstruction or gangrene [K43.9]  PVD (peripheral vascular disease) ([address]) [I73.9]  No chief complaint on file.     Patient Active Problem List    Diagnosis Date Noted    TIA (transient ischemic attack) 2022    Atherosclerosis of artery of left lower extremity ([address]) 10/16/2018    Severe malnutrition ([address]) 2023    Iliac artery occlusion ([address]) 2023    Occlusion of arterial bypass graft ([address]) 2023    Infrarenal abdominal aortic aneurysm (AAA) without rupture ([address]) 2023    Remote history of stroke 2023    HTN (hypertension), benign 2023    New onset seizure ([address]) 2023    Type 2 diabetes mellitus 2023    Major depressive disorder, recurrent, mild ([address]) 2023    Bypass graft stenosis (HCC)     Tobacco use 2021    Incisional hernia, without obstruction or gangrene     Atherosclerotic heart disease of native coronary artery with other forms of angina pectoris (HCC)     PVD (peripheral vascular disease) ([address]) 2020    Bilateral carotid artery stenosis     Elevated lipoprotein(a)     Primary hypertension 2011     Past Medical History:   Diagnosis Date    Abdominal pain 2021    Abnormal stress test     Acute CVA (cerebrovascular accident) ([address]) 2020    Bowel incontinence     CAD (coronary artery disease)     Cerebral infarction due to unspecified
Prior Study: 08/01/2023  Type of Study: MBSS   Results of Prior Study: \"Assessment: Pt presents with moderate-severe oropharyngeal dysphagia. Timely bolus manipulation and A/P transit of all consistencies. Gross aspiration of thin, nectar and honey during the swallow d/t mistiming, incomplete laryngeal closure and delayed epiglottic retroflexion; reflexive coughs partially cleared material. Chin tuck prevented aspiration with thin and nectar but resulted in deep penetration that was not fully ejected. No penetration/aspiration observed with puree. Adequate pharyngeal clearance; no significant residue observed across all consistencies trialed. Recommend pt remain NPO w/ low volume pleasure feeds w/ SLP/RN only and have meds via alternative means. Recommend repeat MBSS in 3-5 days. ST to follow. \"    Recent CXR/CT of Chest: 07/19/2023  1. Support devices as above. 2. Minimal prominence of the interstitial markings bilaterally. 3. No focal consolidation. 4. No discrete pneumothorax. Patient Complaints/Reason for Referral:  Rj Rajput was referred for a MBS to assess the efficiency of his/her swallow function, assess for aspiration, and to make recommendations regarding safe dietary consistencies, effective compensatory strategies, and safe eating environment. Pain   Patient Currently in Pain: No    General Comments: Pt is a 59 y.o male with hx of dysphagia and aspiration since admission. Pt currently using NG for nutrition. Medical record review/interview:   Predisposing dysphagia risk factors: Hx of CVA, Hx of dysphagia, and Hx of aspiration  Clinical signs of possible chronic dysphagia: current use of PEG/TF, hx of dysphagia, and hx of aspiration  Precipitating dysphagia risk factors: N/A      Impressions:  Treatment Dx and ICD 10: Oropharyngeal dysphagia R13.12  Radiologist: Gabbi Meade MD   Referring MD: Agustina Bautista MD    Assessment: Pt alert, pleasant and cooperative for instrumental study.  Pt with

## 2023-08-08 LAB
GLUCOSE BLD-MCNC: 104 MG/DL (ref 70–99)
GLUCOSE BLD-MCNC: 110 MG/DL (ref 70–99)
GLUCOSE BLD-MCNC: 120 MG/DL (ref 70–99)
GLUCOSE BLD-MCNC: 128 MG/DL (ref 70–99)
GLUCOSE BLD-MCNC: 93 MG/DL (ref 70–99)
PERFORMED ON: ABNORMAL
PERFORMED ON: NORMAL

## 2023-08-08 PROCEDURE — 6370000000 HC RX 637 (ALT 250 FOR IP): Performed by: SURGERY

## 2023-08-08 PROCEDURE — 97535 SELF CARE MNGMENT TRAINING: CPT

## 2023-08-08 PROCEDURE — 99024 POSTOP FOLLOW-UP VISIT: CPT | Performed by: SURGERY

## 2023-08-08 PROCEDURE — 2060000000 HC ICU INTERMEDIATE R&B

## 2023-08-08 PROCEDURE — 97530 THERAPEUTIC ACTIVITIES: CPT

## 2023-08-08 PROCEDURE — 6360000002 HC RX W HCPCS: Performed by: SURGERY

## 2023-08-08 RX ADMIN — ATORVASTATIN CALCIUM 80 MG: 80 TABLET, FILM COATED ORAL at 20:15

## 2023-08-08 RX ADMIN — PANTOPRAZOLE SODIUM 40 MG: 40 TABLET, DELAYED RELEASE ORAL at 05:23

## 2023-08-08 RX ADMIN — LEVETIRACETAM 500 MG: 500 TABLET, FILM COATED ORAL at 20:15

## 2023-08-08 RX ADMIN — MORPHINE SULFATE 4 MG: 4 INJECTION, SOLUTION INTRAMUSCULAR; INTRAVENOUS at 08:23

## 2023-08-08 RX ADMIN — ASPIRIN 81 MG 81 MG: 81 TABLET ORAL at 07:47

## 2023-08-08 RX ADMIN — ENOXAPARIN SODIUM 40 MG: 100 INJECTION SUBCUTANEOUS at 20:15

## 2023-08-08 RX ADMIN — ENOXAPARIN SODIUM 40 MG: 100 INJECTION SUBCUTANEOUS at 07:47

## 2023-08-08 RX ADMIN — LEVETIRACETAM 500 MG: 500 TABLET, FILM COATED ORAL at 07:48

## 2023-08-08 ASSESSMENT — PAIN DESCRIPTION - LOCATION
LOCATION: ABDOMEN

## 2023-08-08 ASSESSMENT — PAIN DESCRIPTION - PAIN TYPE
TYPE: ACUTE PAIN;SURGICAL PAIN
TYPE: ACUTE PAIN;SURGICAL PAIN

## 2023-08-08 ASSESSMENT — PAIN DESCRIPTION - DESCRIPTORS
DESCRIPTORS: ACHING
DESCRIPTORS: ACHING

## 2023-08-08 ASSESSMENT — PAIN DESCRIPTION - ORIENTATION
ORIENTATION: RIGHT
ORIENTATION: MID;LOWER
ORIENTATION: RIGHT

## 2023-08-08 ASSESSMENT — PAIN SCALES - GENERAL
PAINLEVEL_OUTOF10: 8
PAINLEVEL_OUTOF10: 8
PAINLEVEL_OUTOF10: 3

## 2023-08-08 ASSESSMENT — PAIN DESCRIPTION - ONSET
ONSET: ON-GOING
ONSET: ON-GOING

## 2023-08-08 ASSESSMENT — PAIN DESCRIPTION - FREQUENCY
FREQUENCY: CONTINUOUS
FREQUENCY: CONTINUOUS

## 2023-08-08 NOTE — CARE COORDINATION
[address] [place] [address] called with intent to deny. Care can be met at a lower level. P2P to be scheduled by 4:30. Call [phone] opt #4. Appeal # [phone]. Fax # [phone]. Thank you.    Jordan Salter, [facility]  Clinical Liaison

## 2023-08-08 NOTE — DISCHARGE INSTRUCTIONS
the emergency department lobbies. Hospital of office staff may NOT accept any medications to drop off in the cabinet.

## 2023-08-08 NOTE — CARE COORDINATION
Insurance in not going to approve IPR and has issued an intent to deny. Dr. Anna Hurley does not feel she can argue a peer to peer since patient was hesitant to agree to ARU. Spoke with patient at bedside and he is not agreeable to go to SNF. Call placed to spouse to notify her and discuss SNF vs HHC . Offered HHC freedom of choice list. Patient has no preference. Referral initiated with Yara Le from [facility]. She will follow. Left spouse a VM to return call to discuss plan. Addendum: Wife spoke with patient and he adamantly refuses to go to a nursing home for skilled rehab. They are agreeable to home with [facility]. [facility] has accepted referral.     Addendum: [facility] does have speech therapy and if for some reason they cannot staff speech therapist to come to home [facility] can per [address]  liaison/ Patient will be set up for PT/OT/SN/Speech and possible aide. Wife wants transport home arranged . Transport arranged for home with [facility] at 1330 for 8/9.  Dr. Paige Montes plans on discharging in AM.

## 2023-08-09 VITALS
RESPIRATION RATE: 16 BRPM | WEIGHT: 172.18 LBS | HEART RATE: 120 BPM | BODY MASS INDEX: 26.1 KG/M2 | OXYGEN SATURATION: 100 % | DIASTOLIC BLOOD PRESSURE: 87 MMHG | TEMPERATURE: 98 F | SYSTOLIC BLOOD PRESSURE: 134 MMHG | HEIGHT: 68 IN

## 2023-08-09 LAB
GLUCOSE BLD-MCNC: 110 MG/DL (ref 70–99)
GLUCOSE BLD-MCNC: 146 MG/DL (ref 70–99)
PERFORMED ON: ABNORMAL
PERFORMED ON: ABNORMAL

## 2023-08-09 PROCEDURE — 97110 THERAPEUTIC EXERCISES: CPT

## 2023-08-09 PROCEDURE — 6370000000 HC RX 637 (ALT 250 FOR IP): Performed by: SURGERY

## 2023-08-09 PROCEDURE — 97530 THERAPEUTIC ACTIVITIES: CPT

## 2023-08-09 PROCEDURE — 97535 SELF CARE MNGMENT TRAINING: CPT

## 2023-08-09 PROCEDURE — 6360000002 HC RX W HCPCS: Performed by: SURGERY

## 2023-08-09 RX ADMIN — LEVETIRACETAM 500 MG: 500 TABLET, FILM COATED ORAL at 09:41

## 2023-08-09 RX ADMIN — ENOXAPARIN SODIUM 40 MG: 100 INJECTION SUBCUTANEOUS at 09:41

## 2023-08-09 RX ADMIN — ASPIRIN 81 MG 81 MG: 81 TABLET ORAL at 09:41

## 2023-08-09 RX ADMIN — PANTOPRAZOLE SODIUM 40 MG: 40 TABLET, DELAYED RELEASE ORAL at 06:00

## 2023-08-09 ASSESSMENT — PAIN SCALES - WONG BAKER: WONGBAKER_NUMERICALRESPONSE: 0

## 2023-08-09 ASSESSMENT — PAIN SCALES - GENERAL: PAINLEVEL_OUTOF10: 2

## 2023-08-09 NOTE — FLOWSHEET NOTE
07/21/23 0915   Vitals   BP (!) 191/98     Patient treated per [facility] for HTN, will continue to monitor.
RUQ Bowel Sounds Active   LUQ Bowel Sounds Active   RLQ Bowel Sounds Active   LLQ Bowel Sounds Active   Genitourinary   Genitourinary (WDL) WDL   Peripheral Vascular   Peripheral Vascular (WDL) WDL   RUE Neurovascular Assessment   Capillary Refill Less than/Equal to 3 seconds   Color Appropriate for Ethnicity   Temperature Warm   R Radial Pulse +2 (Moderate)   LUE Neurovascular Assessment   Capillary Refill Less than/Equal to 3 seconds   Color Appropriate for Ethnicity   Temperature Warm   L Radial Pulse +2 (Moderate)   RLE Neurovascular Assessment   Capillary Refill Less than/Equal to 3 seconds   Color Appropriate for Ethnicity   Temperature Warm   R Pedal Pulse +1   LLE Neurovascular Assessment   Capillary Refill Less than/Equal to 3 seconds   Color Appropriate for Ethnicity   Temperature Warm   L Pedal Pulse +1   Skin Integumentary    Skin Integumentary (WDL) X   Skin Color Pink   Skin Condition/Temp Warm   Skin Integrity Incision (see LDA)   Location mid abd   Skin Integrity Site 2   Skin Integrity Location 2 Ecchymosis   Location 2 scattered   Musculoskeletal   Musculoskeletal (WDL) X   RUE Weakness   LUE Weakness   RL Extremity Weakness   LL Extremity Weakness   Incision 07/19/23 Abdomen Medial;Anterior   Date First Assessed/Time First Assessed: 07/19/23 0802   Present on Hospital Admission: No  Location: Abdomen  Incision Location Orientation: Medial;Anterior   Dressing/Treatment Open to air   Closure Staples   Margins Approximated   Incision Assessment Dry   Odor None   Incision 07/19/23 Femoral Proximal;Right   Date First Assessed: 07/19/23   Present on Hospital Admission: No  Location: Femoral  Incision Location Orientation: Proximal;Right   Dressing/Treatment Open to air   Closure Sutures;Steri-Strips   Odor None   Incision 07/19/23 Femoral Left;Proximal   Date First Assessed: 07/19/23   Present on Hospital Admission: No  Location: Femoral  Incision Location Orientation: Left;Proximal

## 2023-08-09 NOTE — CARE COORDINATION
CASE MANAGEMENT DISCHARGE SUMMARY      Discharge to: Home with [facility] with speech,     Precertification completed: [facility] Exemption Notification (HENS) completed: NA    IMM given: (date) Today    New Durable Medical Equipment ordered/agency: none    Transportation:       Medical Transport explained to [employer]. Pt/family voice no agency preference.   None  Agency used:[facility]   time:1330   Ambulance form completed: Yes    Confirmed discharge plan with:MD/RN/[facility]     Patient: yes     Family:  yes    Name:Danica Galeas Contact number:[phone]     Facility/Agency, name:  Dorothy Denis from [facility] pulled orders from Joesph Cortes, name:Hayley

## 2023-08-09 NOTE — PLAN OF CARE
Increase function to baseline.
Increase patients ADLs/functional status to baseline.
Problem: Chronic Conditions and Co-morbidities  Goal: Patient's chronic conditions and co-morbidity symptoms are monitored and maintained or improved  7/24/2023 1005 by Jose Roberto Westbrook RN  Outcome: Progressing  Flowsheets (Taken 7/24/2023 0800)  Care Plan - Patient's Chronic Conditions and Co-Morbidity Symptoms are Monitored and Maintained or Improved: Monitor and assess patient's chronic conditions and comorbid symptoms for stability, deterioration, or improvement     Problem: Discharge Planning  Goal: Discharge to home or other facility with appropriate resources  7/24/2023 1005 by Jose Roberto Westbrook RN  Outcome: Progressing  Flowsheets (Taken 7/24/2023 0800)  Discharge to home or other facility with appropriate resources:   Identify barriers to discharge with patient and caregiver   Arrange for needed discharge resources and transportation as appropriate   Identify discharge learning needs (meds, wound care, etc)   Arrange for interpreters to assist at discharge as needed   Refer to discharge planning if patient needs post-hospital services based on physician order or complex needs related to functional status, cognitive ability or social support system     Problem: Pain  Goal: Verbalizes/displays adequate comfort level or baseline comfort level  7/24/2023 1005 by Jose Roberto Westbrook RN  Outcome: Progressing     Problem: Safety - Adult  Goal: Free from fall injury  7/24/2023 1005 by Jose Roberto Westbrook RN  Outcome: Progressing     Problem: Skin/Tissue Integrity  Goal: Absence of new skin breakdown  Description: 1. Monitor for areas of redness and/or skin breakdown  2. Assess vascular access sites hourly  3. Every 4-6 hours minimum:  Change oxygen saturation probe site  4. Every 4-6 hours:  If on nasal continuous positive airway pressure, respiratory therapy assess nares and determine need for appliance change or resting period.   7/24/2023 1005 by Jose Roberto Westbrook RN  Outcome: Progressing     Jose Roberto Westbrook RN
Problem: Chronic Conditions and Co-morbidities  Goal: Patient's chronic conditions and co-morbidity symptoms are monitored and maintained or improved  7/24/2023 2152 by Yeison Lazar RN  Outcome: Progressing  7/24/2023 1005 by Gillian Betancourt RN  Outcome: Progressing  Flowsheets (Taken 7/24/2023 0800)  Care Plan - Patient's Chronic Conditions and Co-Morbidity Symptoms are Monitored and Maintained or Improved: Monitor and assess patient's chronic conditions and comorbid symptoms for stability, deterioration, or improvement     Problem: Discharge Planning  Goal: Discharge to home or other facility with appropriate resources  7/24/2023 2152 by Yeison Lazar RN  Outcome: Progressing  7/24/2023 1005 by Gillian Betancourt RN  Outcome: Progressing  Flowsheets (Taken 7/24/2023 0800)  Discharge to home or other facility with appropriate resources:   Identify barriers to discharge with patient and caregiver   Arrange for needed discharge resources and transportation as appropriate   Identify discharge learning needs (meds, wound care, etc)   Arrange for interpreters to assist at discharge as needed   Refer to discharge planning if patient needs post-hospital services based on physician order or complex needs related to functional status, cognitive ability or social support system     Problem: Pain  Goal: Verbalizes/displays adequate comfort level or baseline comfort level  7/24/2023 2152 by Yeison Lazar RN  Outcome: Progressing  7/24/2023 1005 by Gillian Betancourt RN  Outcome: Progressing     Problem: Safety - Adult  Goal: Free from fall injury  7/24/2023 2152 by Yeison Lazar RN  Outcome: Progressing  7/24/2023 1005 by Gillian Betancourt RN  Outcome: Progressing     Problem: Skin/Tissue Integrity  Goal: Absence of new skin breakdown  Description: 1. Monitor for areas of redness and/or skin breakdown  2. Assess vascular access sites hourly  3.   Every 4-6 hours minimum:  Change
Problem: Chronic Conditions and Co-morbidities  Goal: Patient's chronic conditions and co-morbidity symptoms are monitored and maintained or improved  8/9/2023 1306 by Owen Dietz RN  Outcome: Adequate for Discharge  8/9/2023 1053 by Owen Dietz RN  Outcome: Progressing     Problem: Discharge Planning  Goal: Discharge to home or other facility with appropriate resources  8/9/2023 1306 by Owen Dietz RN  Outcome: Adequate for Discharge  8/9/2023 1053 by Owen Dietz RN  Outcome: Progressing     Problem: Pain  Goal: Verbalizes/displays adequate comfort level or baseline comfort level  8/9/2023 1306 by Owen Dietz RN  Outcome: Adequate for Discharge  8/9/2023 1053 by Owen Dietz RN  Outcome: Progressing     Problem: Safety - Adult  Goal: Free from fall injury  8/9/2023 1306 by Owen Dietz RN  Outcome: Adequate for Discharge  8/9/2023 1053 by Owen Dietz RN  Outcome: Progressing     Problem: Skin/Tissue Integrity  Goal: Absence of new skin breakdown  Description: 1. Monitor for areas of redness and/or skin breakdown  2. Assess vascular access sites hourly  3. Every 4-6 hours minimum:  Change oxygen saturation probe site  4. Every 4-6 hours:  If on nasal continuous positive airway pressure, respiratory therapy assess nares and determine need for appliance change or resting period.   8/9/2023 1306 by Owen Dietz RN  Outcome: Adequate for Discharge  8/9/2023 1053 by Owen Dietz RN  Outcome: Progressing     Problem: Nutrition Deficit:  Goal: Optimize nutritional status  8/9/2023 1306 by Owen Dietz RN  Outcome: Adequate for Discharge  8/9/2023 1053 by Owen Dietz RN  Outcome: Progressing     Problem: Respiratory - Adult  Goal: Achieves optimal ventilation and oxygenation  8/9/2023 1306 by Owen Dietz RN  Outcome: Adequate for Discharge  8/9/2023 1053 by Owen Dietz RN  Outcome: Progressing     Problem: Skin/Tissue Integrity - Adult  Goal: Skin
Problem: Chronic Conditions and Co-morbidities  Goal: Patient's chronic conditions and co-morbidity symptoms are monitored and maintained or improved  Outcome: Progressing
Problem: Chronic Conditions and Co-morbidities  Goal: Patient's chronic conditions and co-morbidity symptoms are monitored and maintained or improved  Outcome: Progressing     Problem: Discharge Planning  Goal: Discharge to home or other facility with appropriate resources  Outcome: Progressing     Problem: Pain  Goal: Verbalizes/displays adequate comfort level or baseline comfort level  Outcome: Progressing     Problem: Safety - Adult  Goal: Free from fall injury  Outcome: Progressing     Problem: Skin/Tissue Integrity  Goal: Absence of new skin breakdown  Description: 1. Monitor for areas of redness and/or skin breakdown  2. Assess vascular access sites hourly  3. Every 4-6 hours minimum:  Change oxygen saturation probe site  4. Every 4-6 hours:  If on nasal continuous positive airway pressure, respiratory therapy assess nares and determine need for appliance change or resting period.   Outcome: Progressing
Problem: Chronic Conditions and Co-morbidities  Goal: Patient's chronic conditions and co-morbidity symptoms are monitored and maintained or improved  Outcome: Progressing     Problem: Discharge Planning  Goal: Discharge to home or other facility with appropriate resources  Outcome: Progressing     Problem: Pain  Goal: Verbalizes/displays adequate comfort level or baseline comfort level  Outcome: Progressing     Problem: Safety - Adult  Goal: Free from fall injury  Outcome: Progressing     Problem: Skin/Tissue Integrity  Goal: Absence of new skin breakdown  Description: 1. Monitor for areas of redness and/or skin breakdown  2. Assess vascular access sites hourly  3. Every 4-6 hours minimum:  Change oxygen saturation probe site  4. Every 4-6 hours:  If on nasal continuous positive airway pressure, respiratory therapy assess nares and determine need for appliance change or resting period.   Outcome: Progressing
Problem: Chronic Conditions and Co-morbidities  Goal: Patient's chronic conditions and co-morbidity symptoms are monitored and maintained or improved  Outcome: Progressing     Problem: Discharge Planning  Goal: Discharge to home or other facility with appropriate resources  Outcome: Progressing     Problem: Pain  Goal: Verbalizes/displays adequate comfort level or baseline comfort level  Outcome: Progressing     Problem: Safety - Adult  Goal: Free from fall injury  Outcome: Progressing     Problem: Skin/Tissue Integrity  Goal: Absence of new skin breakdown  Description: 1. Monitor for areas of redness and/or skin breakdown  2. Assess vascular access sites hourly  3. Every 4-6 hours minimum:  Change oxygen saturation probe site  4. Every 4-6 hours:  If on nasal continuous positive airway pressure, respiratory therapy assess nares and determine need for appliance change or resting period.   Outcome: Progressing     Problem: Nutrition Deficit:  Goal: Optimize nutritional status  Outcome: Progressing     Problem: Respiratory - Adult  Goal: Achieves optimal ventilation and oxygenation  Outcome: Progressing     Problem: Skin/Tissue Integrity - Adult  Goal: Skin integrity remains intact  Outcome: Progressing  Goal: Incisions, wounds, or drain sites healing without S/S of infection  Outcome: Progressing  Goal: Oral mucous membranes remain intact  Outcome: Progressing     Problem: Gastrointestinal - Adult  Goal: Minimal or absence of nausea and vomiting  Outcome: Progressing  Flowsheets (Taken 8/1/2023 1945)  Minimal or absence of nausea and vomiting:   Maintain NPO status until nausea and vomiting are resolved   Administer ordered antiemetic medications as needed   Provide nonpharmacologic comfort measures as appropriate  Goal: Maintains or returns to baseline bowel function  Outcome: Progressing  Flowsheets (Taken 8/1/2023 1945)  Maintains or returns to baseline bowel function:   Assess bowel function   Administer IV
Problem: Chronic Conditions and Co-morbidities  Goal: Patient's chronic conditions and co-morbidity symptoms are monitored and maintained or improved  Outcome: Progressing     Problem: Discharge Planning  Goal: Discharge to home or other facility with appropriate resources  Outcome: Progressing     Problem: Pain  Goal: Verbalizes/displays adequate comfort level or baseline comfort level  Outcome: Progressing     Problem: Safety - Adult  Goal: Free from fall injury  Outcome: Progressing     Problem: Skin/Tissue Integrity  Goal: Absence of new skin breakdown  Description: 1. Monitor for areas of redness and/or skin breakdown  2. Assess vascular access sites hourly  3. Every 4-6 hours minimum:  Change oxygen saturation probe site  4. Every 4-6 hours:  If on nasal continuous positive airway pressure, respiratory therapy assess nares and determine need for appliance change or resting period.   Outcome: Progressing     Problem: Nutrition Deficit:  Goal: Optimize nutritional status  Outcome: Progressing     Problem: Respiratory - Adult  Goal: Achieves optimal ventilation and oxygenation  Outcome: Progressing     Problem: Skin/Tissue Integrity - Adult  Goal: Skin integrity remains intact  Outcome: Progressing  Goal: Incisions, wounds, or drain sites healing without S/S of infection  Outcome: Progressing  Goal: Oral mucous membranes remain intact  Outcome: Progressing     Problem: Gastrointestinal - Adult  Goal: Minimal or absence of nausea and vomiting  Outcome: Progressing  Goal: Maintains or returns to baseline bowel function  Outcome: Progressing  Goal: Maintains adequate nutritional intake  Outcome: Progressing  Goal: Establish and maintain optimal ostomy function  Outcome: Progressing     Problem: Infection - Adult  Goal: Absence of infection at discharge  Outcome: Progressing  Goal: Absence of infection during hospitalization  Outcome: Progressing  Goal: Absence of fever/infection during anticipated neutropenic
Problem: Chronic Conditions and Co-morbidities  Goal: Patient's chronic conditions and co-morbidity symptoms are monitored and maintained or improved  Outcome: Progressing     Problem: Discharge Planning  Goal: Discharge to home or other facility with appropriate resources  Outcome: Progressing     Problem: Pain  Goal: Verbalizes/displays adequate comfort level or baseline comfort level  Outcome: Progressing     Problem: Safety - Adult  Goal: Free from fall injury  Outcome: Progressing     Problem: Skin/Tissue Integrity  Goal: Absence of new skin breakdown  Description: 1. Monitor for areas of redness and/or skin breakdown  2. Assess vascular access sites hourly  3. Every 4-6 hours minimum:  Change oxygen saturation probe site  4. Every 4-6 hours:  If on nasal continuous positive airway pressure, respiratory therapy assess nares and determine need for appliance change or resting period.   Outcome: Progressing     Problem: Nutrition Deficit:  Goal: Optimize nutritional status  Outcome: Progressing  Flowsheets (Taken 7/28/2023 1005 by Alonos Aleman RD)  Nutrient intake appropriate for improving, restoring, or maintaining nutritional needs:   Assess nutritional status and recommend course of action   Recommend appropriate diets, oral nutritional supplements, and vitamin/mineral supplements   Recommend, monitor, and adjust tube feedings and TPN/PPN based on assessed needs   Monitor oral intake, labs, and treatment plans     Problem: Respiratory - Adult  Goal: Achieves optimal ventilation and oxygenation  Outcome: Progressing  Flowsheets (Taken 7/28/2023 0800)  Achieves optimal ventilation and oxygenation:   Assess for changes in respiratory status   Assess for changes in mentation and behavior   Initiate smoking cessation protocol as indicated   Encourage broncho-pulmonary hygiene including cough, deep breathe, incentive spirometry   Assess and instruct to report shortness of breath or any respiratory
Problem: Chronic Conditions and Co-morbidities  Goal: Patient's chronic conditions and co-morbidity symptoms are monitored and maintained or improved  Outcome: Progressing     Problem: Discharge Planning  Goal: Discharge to home or other facility with appropriate resources  Outcome: Progressing     Problem: Pain  Goal: Verbalizes/displays adequate comfort level or baseline comfort level  Outcome: Progressing     Problem: Safety - Adult  Goal: Free from fall injury  Outcome: Progressing     Problem: Skin/Tissue Integrity  Goal: Absence of new skin breakdown  Description: 1. Monitor for areas of redness and/or skin breakdown  2. Assess vascular access sites hourly  3. Every 4-6 hours minimum:  Change oxygen saturation probe site  4. Every 4-6 hours:  If on nasal continuous positive airway pressure, respiratory therapy assess nares and determine need for appliance change or resting period.   Outcome: Progressing     Sachin Kvein RN
Problem: Chronic Conditions and Co-morbidities  Goal: Patient's chronic conditions and co-morbidity symptoms are monitored and maintained or improved  Outcome: Progressing     Problem: Discharge Planning  Goal: Discharge to home or other facility with appropriate resources  Outcome: Progressing     Problem: Pain  Goal: Verbalizes/displays adequate comfort level or baseline comfort level  Outcome: Progressing  Providing pain management medication and nonpharmacologic therapies as needed     Problem: Safety - Adult  Goal: Free from fall injury  Outcome: Progressing  Non-skid socks on, reinforced to call before ambulation     Problem: Skin/Tissue Integrity  Goal: Absence of new skin breakdown  Description: 1. Monitor for areas of redness and/or skin breakdown  2. Assess vascular access sites hourly  3. Every 4-6 hours minimum:  Change oxygen saturation probe site  4. Every 4-6 hours:  If on nasal continuous positive airway pressure, respiratory therapy assess nares and determine need for appliance change or resting period.   Outcome: Progressing  Assessed skin integrity, assisted patient in turning as needed     Problem: Nutrition Deficit:  Goal: Optimize nutritional status  Outcome: Progressing  Tube feeds on per goal     Problem: Respiratory - Adult  Goal: Achieves optimal ventilation and oxygenation  Outcome: Progressing     Problem: Skin/Tissue Integrity - Adult  Goal: Skin integrity remains intact  Outcome: Progressing  Flowsheets (Taken 8/6/2023 2000)  Skin Integrity Remains Intact:   Monitor for areas of redness and/or skin breakdown   Assess vascular access sites hourly  Goal: Incisions, wounds, or drain sites healing without S/S of infection  Outcome: Progressing  Flowsheets (Taken 8/6/2023 2000)  Incisions, Wounds, or Drain Sites Healing Without Sign and Symptoms of Infection: ADMISSION and DAILY: Assess and document risk factors for pressure ulcer development  Surgical incisions cleaned, dry,
Problem: Chronic Conditions and Co-morbidities  Goal: Patient's chronic conditions and co-morbidity symptoms are monitored and maintained or improved  Outcome: Progressing  Flowsheets (Taken 7/20/2023 0800)  Care Plan - Patient's Chronic Conditions and Co-Morbidity Symptoms are Monitored and Maintained or Improved: Monitor and assess patient's chronic conditions and comorbid symptoms for stability, deterioration, or improvement     Problem: Discharge Planning  Goal: Discharge to home or other facility with appropriate resources  Outcome: Progressing  Flowsheets (Taken 7/20/2023 0800)  Discharge to home or other facility with appropriate resources: Identify barriers to discharge with patient and caregiver     Problem: Pain  Goal: Verbalizes/displays adequate comfort level or baseline comfort level  Outcome: Progressing  Flowsheets (Taken 7/20/2023 0800)  Verbalizes/displays adequate comfort level or baseline comfort level: Encourage patient to monitor pain and request assistance     Problem: Safety - Adult  Goal: Free from fall injury  Outcome: Progressing     Problem: Skin/Tissue Integrity  Goal: Absence of new skin breakdown  Description: 1. Monitor for areas of redness and/or skin breakdown  2. Assess vascular access sites hourly  3. Every 4-6 hours minimum:  Change oxygen saturation probe site  4. Every 4-6 hours:  If on nasal continuous positive airway pressure, respiratory therapy assess nares and determine need for appliance change or resting period.   Outcome: Progressing
Problem: Chronic Conditions and Co-morbidities  Goal: Patient's chronic conditions and co-morbidity symptoms are monitored and maintained or improved  Outcome: Progressing  Flowsheets (Taken 7/21/2023 0800 by Magali Rodriguez RN)  Care Plan - Patient's Chronic Conditions and Co-Morbidity Symptoms are Monitored and Maintained or Improved: Monitor and assess patient's chronic conditions and comorbid symptoms for stability, deterioration, or improvement     Problem: Discharge Planning  Goal: Discharge to home or other facility with appropriate resources  Outcome: Progressing  Flowsheets (Taken 7/21/2023 0800 by Magali Rodriguez RN)  Discharge to home or other facility with appropriate resources: Identify barriers to discharge with patient and caregiver     Problem: Pain  Goal: Verbalizes/displays adequate comfort level or baseline comfort level  Outcome: Progressing     Problem: Safety - Adult  Goal: Free from fall injury  Outcome: Progressing     Problem: Skin/Tissue Integrity  Goal: Absence of new skin breakdown  Description: 1. Monitor for areas of redness and/or skin breakdown  2. Assess vascular access sites hourly  3. Every 4-6 hours minimum:  Change oxygen saturation probe site  4. Every 4-6 hours:  If on nasal continuous positive airway pressure, respiratory therapy assess nares and determine need for appliance change or resting period.   Outcome: Progressing
Problem: Chronic Conditions and Co-morbidities  Goal: Patient's chronic conditions and co-morbidity symptoms are monitored and maintained or improved  Outcome: Progressing  Flowsheets (Taken 7/25/2023 0900 by Malvin Burns RN)  Care Plan - Patient's Chronic Conditions and Co-Morbidity Symptoms are Monitored and Maintained or Improved: Monitor and assess patient's chronic conditions and comorbid symptoms for stability, deterioration, or improvement     Problem: Discharge Planning  Goal: Discharge to home or other facility with appropriate resources  Outcome: Progressing  Flowsheets (Taken 7/25/2023 0900 by Malvin Burns RN)  Discharge to home or other facility with appropriate resources: Identify barriers to discharge with patient and caregiver     Problem: Pain  Goal: Verbalizes/displays adequate comfort level or baseline comfort level  Outcome: Progressing     Problem: Safety - Adult  Goal: Free from fall injury  Outcome: Progressing     Problem: Skin/Tissue Integrity  Goal: Absence of new skin breakdown  Description: 1. Monitor for areas of redness and/or skin breakdown  2. Assess vascular access sites hourly  3. Every 4-6 hours minimum:  Change oxygen saturation probe site  4. Every 4-6 hours:  If on nasal continuous positive airway pressure, respiratory therapy assess nares and determine need for appliance change or resting period.   Outcome: Progressing     Problem: Nutrition Deficit:  Goal: Optimize nutritional status  Outcome: Progressing
Problem: Chronic Conditions and Co-morbidities  Goal: Patient's chronic conditions and co-morbidity symptoms are monitored and maintained or improved  Outcome: Progressing  Flowsheets (Taken 7/30/2023 0800 by Bev Daniels RN)  Care Plan - Patient's Chronic Conditions and Co-Morbidity Symptoms are Monitored and Maintained or Improved:   Monitor and assess patient's chronic conditions and comorbid symptoms for stability, deterioration, or improvement   Update acute care plan with appropriate goals if chronic or comorbid symptoms are exacerbated and prevent overall improvement and discharge     Problem: Discharge Planning  Goal: Discharge to home or other facility with appropriate resources  Outcome: Progressing  Flowsheets (Taken 7/30/2023 0800 by Bev Daniels RN)  Discharge to home or other facility with appropriate resources:   Identify barriers to discharge with patient and caregiver   Identify discharge learning needs (meds, wound care, etc)   Arrange for needed discharge resources and transportation as appropriate   Refer to discharge planning if patient needs post-hospital services based on physician order or complex needs related to functional status, cognitive ability or social support system     Problem: Pain  Goal: Verbalizes/displays adequate comfort level or baseline comfort level  Outcome: Progressing     Problem: Safety - Adult  Goal: Free from fall injury  Outcome: Progressing     Problem: Skin/Tissue Integrity  Goal: Absence of new skin breakdown  Description: 1. Monitor for areas of redness and/or skin breakdown  2. Assess vascular access sites hourly  3. Every 4-6 hours minimum:  Change oxygen saturation probe site  4. Every 4-6 hours:  If on nasal continuous positive airway pressure, respiratory therapy assess nares and determine need for appliance change or resting period.   Outcome: Progressing     Problem: Nutrition Deficit:  Goal: Optimize nutritional status  Outcome: Progressing
Problem: Chronic Conditions and Co-morbidities  Goal: Patient's chronic conditions and co-morbidity symptoms are monitored and maintained or improved  Outcome: Progressing  Flowsheets (Taken 8/5/2023 2000)  Care Plan - Patient's Chronic Conditions and Co-Morbidity Symptoms are Monitored and Maintained or Improved: Monitor and assess patient's chronic conditions and comorbid symptoms for stability, deterioration, or improvement     Problem: Discharge Planning  Goal: Discharge to home or other facility with appropriate resources  Outcome: Progressing  Flowsheets (Taken 8/5/2023 2000)  Discharge to home or other facility with appropriate resources:   Identify barriers to discharge with patient and caregiver   Arrange for needed discharge resources and transportation as appropriate   Identify discharge learning needs (meds, wound care, etc)   Refer to discharge planning if patient needs post-hospital services based on physician order or complex needs related to functional status, cognitive ability or social support system     Problem: Safety - Adult  Goal: Free from fall injury  Outcome: Progressing     Problem: Skin/Tissue Integrity - Adult  Goal: Incisions, wounds, or drain sites healing without S/S of infection  Outcome: Progressing  Flowsheets  Taken 8/5/2023 2000 by Sirisha Kim RN  Incisions, Wounds, or Drain Sites Healing Without Sign and Symptoms of Infection: TWICE DAILY: Assess and document dressing/incision, wound bed, drain sites and surrounding tissue    Goal: Oral mucous membranes remain intact  Outcome: Progressing  Flowsheets  Taken 8/5/2023 2000 by Sirisha Kim RN  Oral Mucous Membranes Remain Intact:   Assess oral mucosa and hygiene practices   Implement preventative oral hygiene regimen   Implement oral medicated treatments as ordered     Problem: ABCDS Injury Assessment  Goal: Absence of physical injury  Outcome: Progressing
Problem: Chronic Conditions and Co-morbidities  Goal: Patient's chronic conditions and co-morbidity symptoms are monitored and maintained or improved  Recent Flowsheet Documentation  Taken 8/2/2023 0800 by Dipak Parnell [facility] - Patient's Chronic Conditions and Co-Morbidity Symptoms are Monitored and Maintained or Improved: Monitor and assess patient's chronic conditions and comorbid symptoms for stability, deterioration, or improvement  8/2/2023 0307 by Halima Leslie RN  Outcome: Progressing     Problem: Discharge Planning  Goal: Discharge to home or other facility with appropriate resources  Recent Flowsheet Documentation  Taken 8/2/2023 0800 by Keiko Key RN  Discharge to home or other facility with appropriate resources: Identify barriers to discharge with patient and caregiver  8/2/2023 0307 by Halima Leslie RN  Outcome: Progressing     Problem: Pain  Goal: Verbalizes/displays adequate comfort level or baseline comfort level  8/2/2023 0307 by Halima Leslie RN  Outcome: Progressing     Problem: Safety - Adult  Goal: Free from fall injury  Recent Flowsheet Documentation  Taken 8/2/2023 1056 by Keiko Key RN  Free From Fall Injury: Instruct family/caregiver on patient safety  8/2/2023 0307 by Halima Leslie RN  Outcome: Progressing     Problem: Skin/Tissue Integrity  Goal: Absence of new skin breakdown  Description: 1. Monitor for areas of redness and/or skin breakdown  2. Assess vascular access sites hourly  3. Every 4-6 hours minimum:  Change oxygen saturation probe site  4. Every 4-6 hours:  If on nasal continuous positive airway pressure, respiratory therapy assess nares and determine need for appliance change or resting period.   8/2/2023 0307 by Halima Leslie RN  Outcome: Progressing     Problem: Nutrition Deficit:  Goal: Optimize nutritional status  8/2/2023 0307 by Halima Leslie RN  Outcome: Progressing     Problem: Respiratory - Adult  Goal: Achieves optimal
Problem: Chronic Conditions and Co-morbidities  Goal: Patient's chronic conditions and co-morbidity symptoms are monitored and maintained or improved  Recent Flowsheet Documentation  Taken 8/7/2023 0800 by Dipak Breaux [facility] - Patient's Chronic Conditions and Co-Morbidity Symptoms are Monitored and Maintained or Improved: Monitor and assess patient's chronic conditions and comorbid symptoms for stability, deterioration, or improvement     Problem: Discharge Planning  Goal: Discharge to home or other facility with appropriate resources  Recent Flowsheet Documentation  Taken 8/7/2023 0800 by Cora Heart RN  Discharge to home or other facility with appropriate resources: Identify barriers to discharge with patient and caregiver     Problem: Pain  Goal: Verbalizes/displays adequate comfort level or baseline comfort level  Recent Flowsheet Documentation  Taken 8/7/2023 1200 by Cora Heart RN  Verbalizes/displays adequate comfort level or baseline comfort level: Encourage patient to monitor pain and request assistance     Problem: Safety - Adult  Goal: Free from fall injury  Recent Flowsheet Documentation  Taken 8/7/2023 1340 by Cora Heart RN  Free From Fall Injury: Instruct family/caregiver on patient safety     Problem: Respiratory - Adult  Goal: Achieves optimal ventilation and oxygenation  Recent Flowsheet Documentation  Taken 8/7/2023 0800 by Cora Heart RN  Achieves optimal ventilation and oxygenation:   Assess for changes in respiratory status   Assess for changes in mentation and behavior     Problem: Skin/Tissue Integrity - Adult  Goal: Skin integrity remains intact  Recent Flowsheet Documentation  Taken 8/7/2023 1340 by Cora Heart RN  Skin Integrity Remains Intact: Monitor for areas of redness and/or skin breakdown  Taken 8/7/2023 0800 by Cora Heart RN  Skin Integrity Remains Intact: Monitor for areas of redness and/or skin breakdown  Goal: Incisions, wounds,
Problem: Discharge Planning  Goal: Discharge to home or other facility with appropriate resources  Outcome: Progressing     Problem: Chronic Conditions and Co-morbidities  Goal: Patient's chronic conditions and co-morbidity symptoms are monitored and maintained or improved  Outcome: Progressing
Problem: Pain  Goal: Verbalizes/displays adequate comfort level or baseline comfort level  Outcome: Progressing    PCA with continuous & demand rates
Problem: Pain  Goal: Verbalizes/displays adequate comfort level or baseline comfort level  Outcome: Progressing  Flowsheets (Taken 8/5/2023 0411)  Verbalizes/displays adequate comfort level or baseline comfort level:   Encourage patient to monitor pain and request assistance   Assess pain using appropriate pain scale   Implement non-pharmacological measures as appropriate and evaluate response  Note: Patient has denied pain      Problem: Safety - Adult  Goal: Free from fall injury  Outcome: Progressing  Flowsheets (Taken 8/5/2023 0411)  Free From Fall Injury: Instruct family/caregiver on patient safety     Problem: Skin/Tissue Integrity  Goal: Absence of new skin breakdown  Description: 1. Monitor for areas of redness and/or skin breakdown  2. Assess vascular access sites hourly  3. Every 4-6 hours minimum:  Change oxygen saturation probe site  4. Every 4-6 hours:  If on nasal continuous positive airway pressure, respiratory therapy assess nares and determine need for appliance change or resting period. Outcome: Progressing     Problem: Nutrition Deficit:  Goal: Optimize nutritional status  Outcome: Progressing  Flowsheets (Taken 8/5/2023 0411)  Nutrient intake appropriate for improving, restoring, or maintaining nutritional needs:   Assess nutritional status and recommend course of action   Monitor oral intake, labs, and treatment plans  Note: Tolerating TF. TF at goal rate.      Problem: Respiratory - Adult  Goal: Achieves optimal ventilation and oxygenation  Outcome: Progressing  Flowsheets (Taken 8/5/2023 0411)  Achieves optimal ventilation and oxygenation:   Assess for changes in respiratory status   Assess for changes in mentation and behavior   Assess and instruct to report shortness of breath or any respiratory difficulty     Problem: Skin/Tissue Integrity - Adult  Goal: Skin integrity remains intact  Outcome: Progressing  Flowsheets (Taken 8/5/2023 0411)  Skin Integrity Remains Intact: Monitor for
SLP Evaluation Completed.  All note are found in EMR    Malick THAPA, CF-SLP
Progressing  Flowsheets (Taken 7/27/2023 0147)  Maintains adequate nutritional intake:   Monitor percentage of each meal consumed   Identify factors contributing to decreased intake, treat as appropriate     Problem: Infection - Adult  Goal: Absence of infection at discharge  Outcome: Progressing  Flowsheets (Taken 7/27/2023 0147)  Absence of infection at discharge:   Assess and monitor for signs and symptoms of infection   Monitor lab/diagnostic results   Monitor all insertion sites i.e., indwelling lines, tubes and drains  Goal: Absence of infection during hospitalization  Outcome: Progressing  Flowsheets (Taken 7/27/2023 0147)  Absence of infection during hospitalization:   Assess and monitor for signs and symptoms of infection   Monitor lab/diagnostic results   Monitor all insertion sites i.e., indwelling lines, tubes and drains  Goal: Absence of fever/infection during anticipated neutropenic period  Outcome: Progressing  Flowsheets (Taken 7/27/2023 0147)  Absence of fever/infection during anticipated neutropenic period: Monitor white blood cell count

## 2023-08-10 ENCOUNTER — APPOINTMENT (OUTPATIENT)
Dept: CT IMAGING | Age: 58
DRG: 065 | End: 2023-08-10
Payer: MEDICARE

## 2023-08-10 ENCOUNTER — HOSPITAL ENCOUNTER (INPATIENT)
Age: 58
LOS: 2 days | Discharge: HOME | DRG: 065 | End: 2023-08-12
Attending: STUDENT IN AN ORGANIZED HEALTH CARE EDUCATION/TRAINING PROGRAM | Admitting: INTERNAL MEDICINE
Payer: MEDICARE

## 2023-08-10 ENCOUNTER — APPOINTMENT (OUTPATIENT)
Dept: GENERAL RADIOLOGY | Age: 58
DRG: 065 | End: 2023-08-10
Payer: MEDICARE

## 2023-08-10 ENCOUNTER — CARE COORDINATION (OUTPATIENT)
Dept: CARE COORDINATION | Age: 58
End: 2023-08-10

## 2023-08-10 ENCOUNTER — CARE COORDINATION (OUTPATIENT)
Dept: CASE MANAGEMENT | Age: 58
End: 2023-08-10

## 2023-08-10 DIAGNOSIS — I73.9 PVD (PERIPHERAL VASCULAR DISEASE) (HCC): Primary | ICD-10-CM

## 2023-08-10 DIAGNOSIS — I10 HTN (HYPERTENSION), BENIGN: Primary | ICD-10-CM

## 2023-08-10 DIAGNOSIS — I63.9 CEREBROVASCULAR ACCIDENT (CVA), UNSPECIFIED MECHANISM (HCC): Primary | ICD-10-CM

## 2023-08-10 LAB
ALBUMIN SERPL-MCNC: 3.2 G/DL (ref 3.4–5)
ALBUMIN/GLOB SERPL: 0.9 {RATIO} (ref 1.1–2.2)
ALP SERPL-CCNC: 148 U/L (ref 40–129)
ALT SERPL-CCNC: 29 U/L (ref 10–40)
ANION GAP SERPL CALCULATED.3IONS-SCNC: 10 MMOL/L (ref 3–16)
AST SERPL-CCNC: 38 U/L (ref 15–37)
BASOPHILS # BLD: 0.1 K/UL (ref 0–0.2)
BASOPHILS NFR BLD: 0.9 %
BILIRUB SERPL-MCNC: 0.3 MG/DL (ref 0–1)
BUN SERPL-MCNC: 22 MG/DL (ref 7–20)
CALCIUM SERPL-MCNC: 9.2 MG/DL (ref 8.3–10.6)
CHLORIDE SERPL-SCNC: 103 MMOL/L (ref 99–110)
CO2 SERPL-SCNC: 23 MMOL/L (ref 21–32)
CREAT SERPL-MCNC: 1.3 MG/DL (ref 0.9–1.3)
DEPRECATED RDW RBC AUTO: 17.8 % (ref 12.4–15.4)
EKG ATRIAL RATE: 75 BPM
EKG DIAGNOSIS: NORMAL
EKG P AXIS: 53 DEGREES
EKG P-R INTERVAL: 134 MS
EKG Q-T INTERVAL: 404 MS
EKG QRS DURATION: 92 MS
EKG QTC CALCULATION (BAZETT): 451 MS
EKG R AXIS: 30 DEGREES
EKG T AXIS: 56 DEGREES
EKG VENTRICULAR RATE: 75 BPM
EOSINOPHIL # BLD: 0.1 K/UL (ref 0–0.6)
EOSINOPHIL NFR BLD: 1.6 %
GFR SERPLBLD CREATININE-BSD FMLA CKD-EPI: >60 ML/MIN/{1.73_M2}
GLUCOSE BLD-MCNC: 116 MG/DL (ref 70–99)
GLUCOSE BLD-MCNC: 176 MG/DL (ref 70–99)
GLUCOSE SERPL-MCNC: 121 MG/DL (ref 70–99)
HCT VFR BLD AUTO: 26.4 % (ref 40.5–52.5)
HGB BLD-MCNC: 8.7 G/DL (ref 13.5–17.5)
INR PPP: 1.11 (ref 0.84–1.16)
LYMPHOCYTES # BLD: 1.2 K/UL (ref 1–5.1)
LYMPHOCYTES NFR BLD: 16.1 %
MCH RBC QN AUTO: 32.3 PG (ref 26–34)
MCHC RBC AUTO-ENTMCNC: 33.1 G/DL (ref 31–36)
MCV RBC AUTO: 97.7 FL (ref 80–100)
MONOCYTES # BLD: 0.6 K/UL (ref 0–1.3)
MONOCYTES NFR BLD: 8.2 %
NEUTROPHILS # BLD: 5.3 K/UL (ref 1.7–7.7)
NEUTROPHILS NFR BLD: 73.2 %
PERFORMED ON: ABNORMAL
PERFORMED ON: ABNORMAL
PLATELET # BLD AUTO: 336 K/UL (ref 135–450)
PMV BLD AUTO: 7.6 FL (ref 5–10.5)
POTASSIUM SERPL-SCNC: 5.2 MMOL/L (ref 3.5–5.1)
PROT SERPL-MCNC: 6.8 G/DL (ref 6.4–8.2)
PROTHROMBIN TIME: 14.3 SEC (ref 11.5–14.8)
RBC # BLD AUTO: 2.7 M/UL (ref 4.2–5.9)
SODIUM SERPL-SCNC: 136 MMOL/L (ref 136–145)
TROPONIN, HIGH SENSITIVITY: 19 NG/L (ref 0–22)
WBC # BLD AUTO: 7.3 K/UL (ref 4–11)

## 2023-08-10 PROCEDURE — 2580000003 HC RX 258: Performed by: STUDENT IN AN ORGANIZED HEALTH CARE EDUCATION/TRAINING PROGRAM

## 2023-08-10 PROCEDURE — 6370000000 HC RX 637 (ALT 250 FOR IP): Performed by: INTERNAL MEDICINE

## 2023-08-10 PROCEDURE — 6360000002 HC RX W HCPCS: Performed by: INTERNAL MEDICINE

## 2023-08-10 PROCEDURE — 70498 CT ANGIOGRAPHY NECK: CPT

## 2023-08-10 PROCEDURE — 6360000004 HC RX CONTRAST MEDICATION: Performed by: STUDENT IN AN ORGANIZED HEALTH CARE EDUCATION/TRAINING PROGRAM

## 2023-08-10 PROCEDURE — 93005 ELECTROCARDIOGRAM TRACING: CPT | Performed by: STUDENT IN AN ORGANIZED HEALTH CARE EDUCATION/TRAINING PROGRAM

## 2023-08-10 PROCEDURE — 80053 COMPREHEN METABOLIC PANEL: CPT

## 2023-08-10 PROCEDURE — 2580000003 HC RX 258: Performed by: INTERNAL MEDICINE

## 2023-08-10 PROCEDURE — 85025 COMPLETE CBC W/AUTO DIFF WBC: CPT

## 2023-08-10 PROCEDURE — 2060000000 HC ICU INTERMEDIATE R&B

## 2023-08-10 PROCEDURE — 6370000000 HC RX 637 (ALT 250 FOR IP): Performed by: STUDENT IN AN ORGANIZED HEALTH CARE EDUCATION/TRAINING PROGRAM

## 2023-08-10 PROCEDURE — 1111F DSCHRG MED/CURRENT MED MERGE: CPT | Performed by: NURSE PRACTITIONER

## 2023-08-10 PROCEDURE — 93010 ELECTROCARDIOGRAM REPORT: CPT | Performed by: INTERNAL MEDICINE

## 2023-08-10 PROCEDURE — 84484 ASSAY OF TROPONIN QUANT: CPT

## 2023-08-10 PROCEDURE — 71045 X-RAY EXAM CHEST 1 VIEW: CPT

## 2023-08-10 PROCEDURE — 99285 EMERGENCY DEPT VISIT HI MDM: CPT

## 2023-08-10 PROCEDURE — 85610 PROTHROMBIN TIME: CPT

## 2023-08-10 PROCEDURE — 70450 CT HEAD/BRAIN W/O DYE: CPT

## 2023-08-10 RX ORDER — SODIUM CHLORIDE 9 MG/ML
INJECTION, SOLUTION INTRAVENOUS CONTINUOUS
Status: DISCONTINUED | OUTPATIENT
Start: 2023-08-10 | End: 2023-08-12 | Stop reason: HOSPADM

## 2023-08-10 RX ORDER — SODIUM CHLORIDE 0.9 % (FLUSH) 0.9 %
5-40 SYRINGE (ML) INJECTION PRN
Status: DISCONTINUED | OUTPATIENT
Start: 2023-08-10 | End: 2023-08-12 | Stop reason: HOSPADM

## 2023-08-10 RX ORDER — SODIUM CHLORIDE 0.9 % (FLUSH) 0.9 %
5-40 SYRINGE (ML) INJECTION EVERY 12 HOURS SCHEDULED
Status: DISCONTINUED | OUTPATIENT
Start: 2023-08-10 | End: 2023-08-12 | Stop reason: HOSPADM

## 2023-08-10 RX ORDER — GABAPENTIN 400 MG/1
800 CAPSULE ORAL 3 TIMES DAILY
Status: DISCONTINUED | OUTPATIENT
Start: 2023-08-10 | End: 2023-08-12 | Stop reason: HOSPADM

## 2023-08-10 RX ORDER — EZETIMIBE 10 MG/1
10 TABLET ORAL DAILY
Status: DISCONTINUED | OUTPATIENT
Start: 2023-08-11 | End: 2023-08-12 | Stop reason: HOSPADM

## 2023-08-10 RX ORDER — ASPIRIN 81 MG/1
324 TABLET, CHEWABLE ORAL ONCE
Status: COMPLETED | OUTPATIENT
Start: 2023-08-10 | End: 2023-08-10

## 2023-08-10 RX ORDER — CLOPIDOGREL BISULFATE 75 MG/1
75 TABLET ORAL DAILY
Status: CANCELLED | OUTPATIENT
Start: 2023-08-10

## 2023-08-10 RX ORDER — GABAPENTIN 600 MG/1
800 TABLET ORAL 3 TIMES DAILY
COMMUNITY
End: 2023-08-16

## 2023-08-10 RX ORDER — LABETALOL HYDROCHLORIDE 5 MG/ML
10 INJECTION, SOLUTION INTRAVENOUS EVERY 10 MIN PRN
Status: DISCONTINUED | OUTPATIENT
Start: 2023-08-10 | End: 2023-08-12 | Stop reason: HOSPADM

## 2023-08-10 RX ORDER — LEVETIRACETAM 500 MG/1
500 TABLET ORAL 2 TIMES DAILY
Status: DISCONTINUED | OUTPATIENT
Start: 2023-08-10 | End: 2023-08-12 | Stop reason: HOSPADM

## 2023-08-10 RX ORDER — CLOPIDOGREL BISULFATE 75 MG/1
75 TABLET ORAL DAILY
COMMUNITY

## 2023-08-10 RX ORDER — ENOXAPARIN SODIUM 100 MG/ML
40 INJECTION SUBCUTANEOUS DAILY
Status: DISCONTINUED | OUTPATIENT
Start: 2023-08-10 | End: 2023-08-12 | Stop reason: HOSPADM

## 2023-08-10 RX ORDER — POLYETHYLENE GLYCOL 3350 17 G/17G
17 POWDER, FOR SOLUTION ORAL DAILY PRN
Status: DISCONTINUED | OUTPATIENT
Start: 2023-08-10 | End: 2023-08-12 | Stop reason: HOSPADM

## 2023-08-10 RX ORDER — BUPROPION HYDROCHLORIDE 150 MG/1
150 TABLET, EXTENDED RELEASE ORAL 2 TIMES DAILY
Status: DISCONTINUED | OUTPATIENT
Start: 2023-08-10 | End: 2023-08-12 | Stop reason: HOSPADM

## 2023-08-10 RX ORDER — ASPIRIN 81 MG/1
81 TABLET, CHEWABLE ORAL DAILY
Status: CANCELLED | OUTPATIENT
Start: 2023-08-10

## 2023-08-10 RX ORDER — BUPROPION HYDROCHLORIDE 150 MG/1
150 TABLET, EXTENDED RELEASE ORAL 2 TIMES DAILY
COMMUNITY

## 2023-08-10 RX ORDER — SODIUM CHLORIDE 9 MG/ML
INJECTION, SOLUTION INTRAVENOUS PRN
Status: DISCONTINUED | OUTPATIENT
Start: 2023-08-10 | End: 2023-08-12 | Stop reason: HOSPADM

## 2023-08-10 RX ORDER — ATORVASTATIN CALCIUM 80 MG/1
80 TABLET, FILM COATED ORAL DAILY
Status: DISCONTINUED | OUTPATIENT
Start: 2023-08-10 | End: 2023-08-12 | Stop reason: HOSPADM

## 2023-08-10 RX ORDER — 0.9 % SODIUM CHLORIDE 0.9 %
1000 INTRAVENOUS SOLUTION INTRAVENOUS ONCE
Status: COMPLETED | OUTPATIENT
Start: 2023-08-10 | End: 2023-08-10

## 2023-08-10 RX ORDER — ONDANSETRON 2 MG/ML
4 INJECTION INTRAMUSCULAR; INTRAVENOUS EVERY 6 HOURS PRN
Status: DISCONTINUED | OUTPATIENT
Start: 2023-08-10 | End: 2023-08-12 | Stop reason: HOSPADM

## 2023-08-10 RX ORDER — BUPROPION HYDROCHLORIDE 150 MG/1
150 TABLET, EXTENDED RELEASE ORAL 2 TIMES DAILY
Status: DISCONTINUED | OUTPATIENT
Start: 2023-08-10 | End: 2023-08-10 | Stop reason: SDUPTHER

## 2023-08-10 RX ORDER — ONDANSETRON 4 MG/1
4 TABLET, ORALLY DISINTEGRATING ORAL EVERY 8 HOURS PRN
Status: DISCONTINUED | OUTPATIENT
Start: 2023-08-10 | End: 2023-08-12 | Stop reason: HOSPADM

## 2023-08-10 RX ADMIN — ATORVASTATIN CALCIUM 80 MG: 80 TABLET, FILM COATED ORAL at 20:36

## 2023-08-10 RX ADMIN — BUPROPION HYDROCHLORIDE 150 MG: 150 TABLET, FILM COATED, EXTENDED RELEASE ORAL at 20:20

## 2023-08-10 RX ADMIN — IOPAMIDOL 75 ML: 755 INJECTION, SOLUTION INTRAVENOUS at 14:46

## 2023-08-10 RX ADMIN — ENOXAPARIN SODIUM 40 MG: 100 INJECTION SUBCUTANEOUS at 20:37

## 2023-08-10 RX ADMIN — SODIUM CHLORIDE 1000 ML: 9 INJECTION, SOLUTION INTRAVENOUS at 16:56

## 2023-08-10 RX ADMIN — SODIUM CHLORIDE: 9 INJECTION, SOLUTION INTRAVENOUS at 20:20

## 2023-08-10 RX ADMIN — Medication 10 ML: at 20:26

## 2023-08-10 RX ADMIN — Medication 10 ML: at 20:37

## 2023-08-10 RX ADMIN — ASPIRIN 81 MG 324 MG: 81 TABLET ORAL at 16:56

## 2023-08-10 RX ADMIN — LEVETIRACETAM 500 MG: 500 TABLET, FILM COATED ORAL at 20:20

## 2023-08-10 ASSESSMENT — PAIN - FUNCTIONAL ASSESSMENT: PAIN_FUNCTIONAL_ASSESSMENT: NONE - DENIES PAIN

## 2023-08-10 ASSESSMENT — PAIN SCALES - GENERAL
PAINLEVEL_OUTOF10: 8
PAINLEVEL_OUTOF10: 0
PAINLEVEL_OUTOF10: 0

## 2023-08-10 ASSESSMENT — PAIN SCALES - WONG BAKER: WONGBAKER_NUMERICALRESPONSE: 0

## 2023-08-10 ASSESSMENT — PAIN DESCRIPTION - LOCATION: LOCATION: OTHER (COMMENT)

## 2023-08-10 NOTE — ED NOTES
CODE STROKE  @1427  called Code Stroke  @1427 Called CT  @6480 called  Stroke team  @8114 Called Lab  @4014  Stroke team called back and spoke to [facility]

## 2023-08-10 NOTE — ED NOTES
Mr. Arlette Whiting is a 59 y.o. male who had concerns including Cerebrovascular Accident (Patient to the ED via [address] [place] [address] for possible stroke. [facility] 5387 presenting with right sided weakness, facial droop, and seems to be getting better with time per EMS. ). Chief Complaint   Patient presents with    Cerebrovascular Accident     Patient to the ED via [facility] for possible stroke. DCT 6163 presenting with right sided weakness, facial droop, and seems to be getting better with time per EMS. He is being admitted for:    Acute CVA (cerebrovascular accident) [facility])    His ED problem list included:    No diagnosis found.     Past Medical History:   Diagnosis Date    Abdominal pain 07/20/2021    Abnormal stress test     Acute CVA (cerebrovascular accident) ([address]) 05/30/2020    Bowel incontinence     CAD (coronary artery disease)     Cerebral infarction due to unspecified occlusion or stenosis of unspecified carotid artery ([address]) 03/19/2015    Chest pain 03/11/2021    History of cerebral infarction 05/24/2022    Hyperlipidemia     Hypertension     Incisional hernia without obstruction or gangrene 07/09/2021    Incisional hernia, without obstruction or gangrene     Ischemic foot 06/22/2020    New onset seizure ([address]) 12/21/2022    PAD (peripheral artery disease) ([address])     Poor vision     left eye    Pre-diabetes     Sleep apnea     NO CPAP    Stroke ([address]) 2016    x3 - memory deficit    Vascular occlusion     Wears partial dentures     upper & lower       Past Surgical History:   Procedure Laterality Date    ABDOMEN SURGERY      colon polyps    CARDIAC SURGERY  2022    coronary stent    CAROTID ENDARTERECTOMY Bilateral     5 years ago    CT VISCERAL PERCUTANEOUS DRAIN  07/21/2021    CT VISCERAL PERCUTANEOUS DRAIN 7/21/2021 Anita Real [facility] CT SCAN    FEMORAL BYPASS Right 03/07/2022    REVISION RIGHT LOWER EXTREMITY BYPASS WITH PERICARDIAL PATCH performed by Keanu Bergeron MD at [address] [facility] Right atherosclerotic penetrating ulcer. FL MODIFIED BARIUM SWALLOW W VIDEO    Result Date: 8/7/2023  EXAMINATION: MODIFIED BARIUM SWALLOW WAS PERFORMED IN CONJUNCTION WITH SPEECH PATHOLOGY SERVICES TECHNIQUE: Under fluoroscopic evaluation cineradiography/videoradiography recordings were performed in conjunction with the speech-language pathologist (SLP). Various liquid, solid and/or semi-solid barium preparations were used to assess swallowing function. FLUOROSCOPY DOSE AND TYPE: Radiation Exposure Index: Kerma mGy, 1.37 mGy COMPARISON: None HISTORY: ORDERING SYSTEM PROVIDED HISTORY: swallowing difficulty TECHNOLOGIST PROVIDED HISTORY: Reason for exam:->swallowing difficulty Reason for Exam: trouble swallowing FINDINGS: No evidence of laryngeal penetration or aspiration. No evidence of aspiration or penetration. Please see separate speech pathology report for full discussion of findings and recommendations. FL MODIFIED BARIUM SWALLOW W VIDEO    Result Date: 8/1/2023  EXAMINATION: MODIFIED BARIUM SWALLOW WAS PERFORMED IN CONJUNCTION WITH SPEECH PATHOLOGY SERVICES TECHNIQUE: Under fluoroscopic evaluation cineradiography/videoradiography recordings were performed in conjunction with the speech-language pathologist (SLP). Various liquid, solid and/or semi-solid barium preparations were used to assess swallowing function. FLUOROSCOPY DOSE AND TYPE: Radiation Exposure Index: Air kerma 7.28 mGy, COMPARISON: None HISTORY: ORDERING SYSTEM PROVIDED HISTORY: dysphagia TECHNOLOGIST PROVIDED HISTORY: Reason for exam:->dysphagia Reason for Exam: dysphagia FINDINGS: Intermittent laryngeal penetration and aspiration were seen with multiple trials of various barium consistencies. Patient tolerated procedure without immediate complication. Multiple episodes of laryngeal penetration and aspiration with multiple barium consistencies.  Please see separate speech pathology report for full discussion of findings and

## 2023-08-10 NOTE — CARE COORDINATION
[facility] Care Transitions Initial Follow Up Call    Call within 2 business days of discharge: Yes    Patient Current Location: [address] [place] [address] Transition Nurse contacted the patient by telephone to perform post hospital discharge assessment. Verified name and  with patient as identifiers. Provided introduction to self, and explanation of the Care Transition Nurse role. Patient: Hilary Finch Patient : 1965   MRN: [id number]  Reason for Admission: PVD s/p aortobifemoral bypass   Discharge Date: 23 RARS: Readmission Risk Score: 14.6      Last Discharge [address] [facility]       Date Complaint Diagnosis Description Type Department Provider    23   Admission (Discharged) Mason Mcburney, MD            Was this an external facility discharge? No Discharge Facility: . Challenges to be reviewed by the provider   Additional needs identified to be addressed with provider: yes   FYI: Patient was not aware he was supposed to stop Plavix, gabapentin, and lisinopril. Patient took doses this morning. CTN instructed patient to stop as instructed per discharge instructions. Patient has apt with PCP . Patient had small amount of blood present in bowel movement today as well. Method of communication with provider: none. CTN spoke with patient's spouse Kareem Banda, verified HIPAA form. Kareem Banda reported patient is doing ok and getting around fairly well on his own. Kareem Banda reported incision sites are CD&I and denied any swelling or drainage. CTN reviewed all medications with Kareem Banda who reported she was unaware patient needed to stop his Plavix, Neurontin, and lisinopril and already took his doses this morning. CTN instructed to stop future doses and follow up with provider. Kareem Banda reported patient had a small amount of blood present in bowel movement today and CTN encouraged to discuss with provider. CTN scheduled HFU apt on  with PCP.  Kareem Banda reported patient is having regular bowel movements and managing

## 2023-08-10 NOTE — DISCHARGE SUMMARY
Physician Discharge Summary     Patient ID:  Hemanth Peterson  [id number]  36 y.o.  1965    Admit date: 7/19/2023    Discharge date and time: 8/9/2023  1:00 PM     Admitting Physician: Keanu Bergeron MD     Discharge Physician: same    Admission Diagnoses: Peripheral vascular disease, unspecified ([address] ) [I73.9]  Ventral hernia without obstruction or gangrene [K43.9]  PVD (peripheral vascular disease) ([address]) [I73.9]    Discharge Diagnoses: same    Admission Condition: good    Discharged Condition: good    Indication for Admission: Admitted to undergo Aortobifemoral bypass and Ventral hernia repair. Hospital Course: Admitted, taken to OR where above performed. Post-op course prolonged due to persistent ileus which did eventually resolve. He had pre-op swallowing dysfunction which worsened with prolonged NG tube, however this resolved as well. Disposition: home    In process/preliminary results:  Outstanding Order Results       No orders found from 6/20/2023 to 7/20/2023. Patient Instructions:   Cannot display discharge medications since this patient is expected but has not yet arrived. Activity: activity as tolerated  Diet: regular diet  Wound Care: as directed    Follow-up with Dr Fabiola Martins in 2 weeks. Signed:   Keanu Bergeron MD  8/10/2023  6:11 PM

## 2023-08-10 NOTE — CARE COORDINATION
Remote Patient Kit Ordering Note      Date/Time:  8/10/2023 1:07 PM      [x] [facility] confirmed patient shipping address  [x] Patient will receive package over the next 1-3 business days. Someone 21 years or older must be present to sign for UPS delivery. [x] HRS will contact patient within 24 hours, an [address] [place] [address] will call the patient directly: If the patient does not answer, HRS will follow up with the clinical team notifying them about the unsuccessful attempt to contact the patient. HRS will make three call attempts to the patient. Provide patient with [facility] Virtual install number is: [phone]. [x] CTN will contact patient once equipment is active to welcome them to the program.                                                         [x] Hours of RPM monitoring - Monday-Friday [id number]; encourage patient to get vitals entered by [facility] each day to have the alert addressed same day. [x][facility]SS mailed RPM Patient flyer to patient. All questions answered at this time. CTN made aware the RPM kit has been ordered. [facility] notified patient of RPM equipment order.

## 2023-08-10 NOTE — PROGRESS NOTES
Remote Patient Monitoring Treatment Plan    Received request from ACM/CTN Tiny Macedo RN  to order remote patient monitoring for in home monitoring of HTN and order completed. Patient will be monitoring   --blood pressure   --pulse ox   --temperature CTN has requested temp monitoring as pt is post-op  --weight. Please set alert for ONLY weight gain of 5# in 7 days. Pt has no documented hx of HF. Patient will engage in Remote Patient Monitoring each day to develop the skills necessary for self management. RPM Care Team Responsibilities:   Alerts will be reviewed daily and addressed within 2-4 hours during operational hours (Monday -Friday 9 am-4 pm)  Alert response and intervention documented in patient medical record  Alert response escalated to PCP per protocol and documented in patient medical record  Patient monitored over approximately  days  Discharge from program based on self-management readiness    See care coordination encounters for additional details.

## 2023-08-10 NOTE — ED PROVIDER NOTES
[facility]  ED      CHIEF COMPLAINT  Cerebrovascular Accident (Patient to the ED via [address] for possible stroke. GII 5806 presenting with right sided weakness, facial droop, and seems to be getting better with time per EMS. )       HISTORY OF PRESENT ILLNESS  Angela Trinh is a 59 y.o. male  who presents to the ED complaining of right-sided weakness and facial droop that started at 1350. EMS report that his symptoms were profound when they arrived at the home. He has some continued right-sided deficits, but they are actually significant improved, per EMS. Patient himself denies any pain, nausea, vomiting, shortness of breath. He does notice a decrease strength on his right side. No other complaints, modifying factors or associated symptoms. I have reviewed the following from the nursing documentation.     Past Medical History:   Diagnosis Date    Abdominal pain 07/20/2021    Abnormal stress test     Acute CVA (cerebrovascular accident) ([address]) 05/30/2020    Bowel incontinence     CAD (coronary artery disease)     Cerebral infarction due to unspecified occlusion or stenosis of unspecified carotid artery ([address]) 03/19/2015    Chest pain 03/11/2021    History of cerebral infarction 05/24/2022    Hyperlipidemia     Hypertension     Incisional hernia without obstruction or gangrene 07/09/2021    Incisional hernia, without obstruction or gangrene     Ischemic foot 06/22/2020    New onset seizure ([address]) 12/21/2022    PAD (peripheral artery disease) ([address])     Poor vision     left eye    Pre-diabetes     Sleep apnea     NO CPAP    Stroke ([address]) 2016    x3 - memory deficit    Vascular occlusion     Wears partial dentures     upper & lower     Past Surgical History:   Procedure Laterality Date    ABDOMEN SURGERY      colon polyps    CARDIAC SURGERY  2022    coronary stent    CAROTID ENDARTERECTOMY Bilateral     5 years ago    CT VISCERAL PERCUTANEOUS DRAIN  07/21/2021    CT VISCERAL PERCUTANEOUS DRAIN Basophils Absolute 0.1 0.0 - 0.2 K/uL   Protime-INR   Result Value Ref Range    Protime 14.3 11.5 - 14.8 sec    INR 1.11 0.84 - 1.16   POCT Glucose   Result Value Ref Range    POC Glucose 116 (H) 70 - 99 mg/dl    Performed on ACCU-CHEK        ECG  The Ekg interpreted by me shows  Sinus rhythm with a rate of 75 bpm.  Normal axis. No acute injury pattern. , QRS 92, QTc 451. No significant change from prior EKG dated 7/19/2023    RADIOLOGY  XR CHEST PORTABLE   Final Result   No radiographic evidence of an acute cardiopulmonary process. CTA HEAD NECK W CONTRAST   Final Result   Occlusion of the left common carotid artery starting from its origin. Occlusion of the left internal carotid artery with reconstitution or   retro-fill in the supraclinoid segment. Patent vascular stent across the right carotid bulb of with 30% stenosis. 2 mm wide-necked saccular aneurysm at the medial aspect of the cavernous   segment of the right internal carotid artery      Occlusion of V1 and V2 segments of the right vertebral artery. 70% stenosis at the origin of the left vertebral artery. 60% stenosis in the V4 segment of the left vertebral artery. 50% stenosis in the proximal left subclavian artery with heavy soft plaque   and 4.5 mm atherosclerotic penetrating ulcer. CT HEAD WO CONTRAST   Final Result   New hypodensity in the left caudate and putamen may represent acute infarct. Follow-up MRI brain is recommended for further evaluation. Old infarct in the left MCA territory. Critical results were called by Dr. Sumaya Gastelum to Dr. Keanu Rios On 8/10/2023 at   14:49. MRI brain without contrast    (Results Pending)     ED COURSE/MDM  Patient seen and evaluated. Old records reviewed. Labs and imaging reviewed and results discussed with patient.        DIFFERENTIAL DX  CVA, encephalopathy, ACS    On arrival patient is having right upper and lower extremity weakness, however

## 2023-08-10 NOTE — H&P
contrast. Multiplanar reformatted images are provided for review. Automated exposure control, iterative reconstruction, and/or weight based adjustment of the mA/kV was utilized to reduce the radiation dose to as low as reasonably achievable. COMPARISON: June 28, 2023 HISTORY: ORDERING SYSTEM PROVIDED HISTORY: abdominal pain TECHNOLOGIST PROVIDED HISTORY: With oral and IV contrast Reason for exam:->abdominal pain Reason for Exam: abdominal pain FINDINGS: Lower Chest: No acute abnormality. Organs: The liver and gallbladder, pancreas and spleen, adrenals, kidneys and IVC appear stable. An aortobifemoral bypass graft. The graft is patent. Inflammatory changes can be seen around the proximal anastomosis. No evidence of bleeding. GI/Bowel: No evidence of bowel obstruction or perforation. No evidence of bowel wall thickening. Stable nasogastric tube. Pelvis: A femorofemoral bypass graft appears occluded. Inflammatory changes and perigraft fluid can be seen in both groins. No definitive abscess. Urinary bladder, prostate and seminal vesicles appear stable. No evidence of pelvic lymphadenopathy. Peritoneum/Retroperitoneum: No evidence of retroperitoneal lymphadenopathy. Bones/Soft Tissues: Behind the anterior abdominal wall, a longitudinally oriented fluid collection can be seen which in the coronal plane measures 16 x 8 cm and in the axial plane measures 4.3 x 8.2 cm. This is associated with the anterior abdominal wall behind the incision line. An abscess is possible. Seroma or lymphocele should also be considered. 1. Large fluid collection behind the incision line and just behind the anterior abdominal wall. This measures 16 x 8 cm in largest coronal diameter and 4.3 x 8.2 cm in largest axial diameter. Abscess should be excluded. This collection can be drained. 2. Aortobifemoral bypass graft patent; however, femorofemoral bypass graft thrombosed.  3. Inflammatory changes in both groins raise suspicion for the internal carotid arteries measured using NASCET criteria. Automated exposure control, iterative reconstruction, and/or weight based adjustment of the mA/kV was utilized to reduce the radiation dose to as low as reasonably achievable. COMPARISON: CT head from earlier today HISTORY: ORDERING SYSTEM PROVIDED HISTORY: Stroke Symptoms TECHNOLOGIST PROVIDED HISTORY: Has a \"code stroke\" or \"stroke alert\" been called? ->Yes Reason for exam:->Stroke Symptoms Decision Support Exception - unselect if not a suspected or confirmed emergency medical condition->Emergency Medical Condition (MA) Reason for Exam: r/o CVA , sudden onset of RT sided weakness. pt has a (+) HX of  CVA with surgery Relevant Medical/Surgical History: (+)hx of CVA with surgery, CAD ,PAD. .... FINDINGS: CTA NECK: AORTIC ARCH/ARCH VESSELS: No dissection or arterial injury. There is 50% stenosis in the proximal left subclavian artery with heavy soft plaque and 4.5 mm atherosclerotic penetrating ulcer. No significant stenosis of the brachiocephalic or right subclavian arteries. CAROTID ARTERIES: There is occlusion of the left common carotid artery starting from its origin. There is occlusion of the left internal carotid artery with reconstitution or retro fill in the supraclinoid segment. There is patent vascular stent across the right carotid bulb of with 30% stenosis. No dissection, arterial injury, or hemodynamically significant stenosis in the remainder of the right common and right internal carotid arteries by NASCET criteria. VERTEBRAL ARTERIES: There is occlusion of V1 and V2 segments of the right vertebral artery. There is 70% stenosis at the origin of the left vertebral artery. No dissection, arterial injury, or significant stenosis along the remainder of the left vertebral artery. SOFT TISSUES: There is bronchial wall thickening, likely related to small airway disease or bronchiolitis. There is mild emphysema.   No cervical or superior

## 2023-08-11 ENCOUNTER — APPOINTMENT (OUTPATIENT)
Dept: MRI IMAGING | Age: 58
DRG: 065 | End: 2023-08-11
Payer: MEDICARE

## 2023-08-11 LAB
ANION GAP SERPL CALCULATED.3IONS-SCNC: 8 MMOL/L (ref 3–16)
BUN SERPL-MCNC: 19 MG/DL (ref 7–20)
CALCIUM SERPL-MCNC: 8.5 MG/DL (ref 8.3–10.6)
CHLORIDE SERPL-SCNC: 109 MMOL/L (ref 99–110)
CHOLEST SERPL-MCNC: 109 MG/DL (ref 0–199)
CO2 SERPL-SCNC: 22 MMOL/L (ref 21–32)
CREAT SERPL-MCNC: 0.8 MG/DL (ref 0.9–1.3)
DEPRECATED RDW RBC AUTO: 17.6 % (ref 12.4–15.4)
EST. AVERAGE GLUCOSE BLD GHB EST-MCNC: 119.8 MG/DL
GFR SERPLBLD CREATININE-BSD FMLA CKD-EPI: >60 ML/MIN/{1.73_M2}
GLUCOSE SERPL-MCNC: 106 MG/DL (ref 70–99)
HBA1C MFR BLD: 5.8 %
HCT VFR BLD AUTO: 25.3 % (ref 40.5–52.5)
HDLC SERPL-MCNC: 33 MG/DL (ref 40–60)
HGB BLD-MCNC: 8.3 G/DL (ref 13.5–17.5)
LDLC SERPL CALC-MCNC: 52 MG/DL
MCH RBC QN AUTO: 32.4 PG (ref 26–34)
MCHC RBC AUTO-ENTMCNC: 32.7 G/DL (ref 31–36)
MCV RBC AUTO: 98.9 FL (ref 80–100)
PLATELET # BLD AUTO: 294 K/UL (ref 135–450)
PMV BLD AUTO: 7.3 FL (ref 5–10.5)
POTASSIUM SERPL-SCNC: 4 MMOL/L (ref 3.5–5.1)
RBC # BLD AUTO: 2.55 M/UL (ref 4.2–5.9)
SODIUM SERPL-SCNC: 139 MMOL/L (ref 136–145)
TRIGL SERPL-MCNC: 121 MG/DL (ref 0–150)
VLDLC SERPL CALC-MCNC: 24 MG/DL
WBC # BLD AUTO: 5.8 K/UL (ref 4–11)

## 2023-08-11 PROCEDURE — 97166 OT EVAL MOD COMPLEX 45 MIN: CPT

## 2023-08-11 PROCEDURE — 6370000000 HC RX 637 (ALT 250 FOR IP): Performed by: INTERNAL MEDICINE

## 2023-08-11 PROCEDURE — 99223 1ST HOSP IP/OBS HIGH 75: CPT | Performed by: PSYCHIATRY & NEUROLOGY

## 2023-08-11 PROCEDURE — 80061 LIPID PANEL: CPT

## 2023-08-11 PROCEDURE — 36415 COLL VENOUS BLD VENIPUNCTURE: CPT

## 2023-08-11 PROCEDURE — 2580000003 HC RX 258: Performed by: INTERNAL MEDICINE

## 2023-08-11 PROCEDURE — 97162 PT EVAL MOD COMPLEX 30 MIN: CPT

## 2023-08-11 PROCEDURE — 83036 HEMOGLOBIN GLYCOSYLATED A1C: CPT

## 2023-08-11 PROCEDURE — 2060000000 HC ICU INTERMEDIATE R&B

## 2023-08-11 PROCEDURE — 6360000002 HC RX W HCPCS: Performed by: INTERNAL MEDICINE

## 2023-08-11 PROCEDURE — 70551 MRI BRAIN STEM W/O DYE: CPT

## 2023-08-11 PROCEDURE — 97116 GAIT TRAINING THERAPY: CPT

## 2023-08-11 PROCEDURE — 80048 BASIC METABOLIC PNL TOTAL CA: CPT

## 2023-08-11 PROCEDURE — 97530 THERAPEUTIC ACTIVITIES: CPT

## 2023-08-11 PROCEDURE — 85027 COMPLETE CBC AUTOMATED: CPT

## 2023-08-11 PROCEDURE — 92610 EVALUATE SWALLOWING FUNCTION: CPT

## 2023-08-11 RX ORDER — CALCIUM CARBONATE 500 MG/1
500 TABLET, CHEWABLE ORAL 3 TIMES DAILY PRN
Status: DISCONTINUED | OUTPATIENT
Start: 2023-08-11 | End: 2023-08-12 | Stop reason: HOSPADM

## 2023-08-11 RX ORDER — ASPIRIN 81 MG/1
81 TABLET, CHEWABLE ORAL DAILY
Status: DISCONTINUED | OUTPATIENT
Start: 2023-08-11 | End: 2023-08-12 | Stop reason: HOSPADM

## 2023-08-11 RX ORDER — CLOPIDOGREL BISULFATE 75 MG/1
75 TABLET ORAL DAILY
Status: DISCONTINUED | OUTPATIENT
Start: 2023-08-11 | End: 2023-08-12 | Stop reason: HOSPADM

## 2023-08-11 RX ADMIN — LEVETIRACETAM 500 MG: 500 TABLET, FILM COATED ORAL at 19:56

## 2023-08-11 RX ADMIN — GABAPENTIN 800 MG: 400 CAPSULE ORAL at 19:56

## 2023-08-11 RX ADMIN — BUPROPION HYDROCHLORIDE 150 MG: 150 TABLET, FILM COATED, EXTENDED RELEASE ORAL at 19:53

## 2023-08-11 RX ADMIN — LEVETIRACETAM 500 MG: 500 TABLET, FILM COATED ORAL at 07:30

## 2023-08-11 RX ADMIN — CLOPIDOGREL BISULFATE 75 MG: 75 TABLET ORAL at 08:11

## 2023-08-11 RX ADMIN — EZETIMIBE 10 MG: 10 TABLET ORAL at 07:30

## 2023-08-11 RX ADMIN — BUPROPION HYDROCHLORIDE 150 MG: 150 TABLET, FILM COATED, EXTENDED RELEASE ORAL at 07:30

## 2023-08-11 RX ADMIN — ENOXAPARIN SODIUM 40 MG: 100 INJECTION SUBCUTANEOUS at 07:29

## 2023-08-11 RX ADMIN — ASPIRIN 81 MG 81 MG: 81 TABLET ORAL at 08:11

## 2023-08-11 RX ADMIN — CALCIUM CARBONATE 500 MG: 500 TABLET, CHEWABLE ORAL at 19:55

## 2023-08-11 RX ADMIN — Medication 10 ML: at 19:57

## 2023-08-11 RX ADMIN — ATORVASTATIN CALCIUM 80 MG: 80 TABLET, FILM COATED ORAL at 07:30

## 2023-08-11 ASSESSMENT — PAIN SCALES - GENERAL
PAINLEVEL_OUTOF10: 0

## 2023-08-11 ASSESSMENT — PAIN SCALES - WONG BAKER
WONGBAKER_NUMERICALRESPONSE: 0

## 2023-08-11 NOTE — PROGRESS NOTES
Spoke to Danny in therapy, she Ok'd pt's wife taking him on walks throughout the unit. Pt and wife made aware. No

## 2023-08-11 NOTE — CONSULTS
Pre-diabetes     Sleep apnea     NO CPAP    Stroke ([address]) 2016    x3 - memory deficit    Vascular occlusion     Wears partial dentures     upper & lower       PAST SURGICAL HISTORY    Past Surgical History:   Procedure Laterality Date    ABDOMEN SURGERY      colon polyps    CARDIAC SURGERY  2022    coronary stent    CAROTID ENDARTERECTOMY Bilateral     5 years ago    CT VISCERAL PERCUTANEOUS DRAIN  07/21/2021    CT VISCERAL PERCUTANEOUS DRAIN 7/21/2021 Severo Busman, MD [facility] AT [place] CT SCAN    FEMORAL BYPASS Right 03/07/2022    REVISION RIGHT LOWER EXTREMITY BYPASS WITH PERICARDIAL PATCH performed by Chasidy Peng MD at [address] Right 06/24/2020    RIGHT FEMORAL TO PERONEAL  BYPASS GRAFT performed by Chasidy Peng MD at [address] [facility] N/A 07/09/2021    ROBOTIC [address] WITH MESH, eTAPP BLOCK performed by Lin Monet MD at [address] Right     VASCULAR SURGERY      VEIN BYPASS GRAFT,AORTOBIFEMORAL N/A 7/19/2023    AORTOBIFEMORAL BYPASS GRAFT AND OPEN VENTRAL HERNIA REPAIR WITH MESH performed by Chasidy Peng MD at [address]    Family History   Problem Relation Age of Onset    Diabetes Mother        SOCIAL HISTORY    Social History     Tobacco Use    Smoking status: Every Day     Packs/day: 1.00     Years: 40.00     Pack years: 40.00     Types: Cigarettes    Smokeless tobacco: Never   Vaping Use    Vaping Use: Never used   Substance Use Topics    Alcohol use: Not Currently    Drug use: No       ALLERGIES    No Known Allergies    MEDICATIONS    No current facility-administered medications on file prior to encounter. Current Outpatient Medications on File Prior to Encounter   Medication Sig Dispense Refill    clopidogrel (PLAVIX) 75 MG tablet Take 1 tablet by mouth daily      gabapentin (NEURONTIN) 600 MG tablet Take 800 mg by mouth 3 times daily.       buPROPion (WELLBUTRIN SR) 150 MG extended stenosis    Elevated lipoprotein(a)    Primary hypertension    PVD (peripheral vascular disease) (HCC)    Atherosclerotic heart disease of native coronary artery with other forms of angina pectoris (HCC)    Incisional hernia, without obstruction or gangrene    Tobacco use    Bypass graft stenosis (HCC)    Atherosclerosis of artery of left lower extremity (HCC)    TIA (transient ischemic attack)    Major depressive disorder, recurrent, mild (HCC)    Type 2 diabetes mellitus    Remote history of stroke    HTN (hypertension), benign    New onset seizure ([address])    Infrarenal abdominal aortic aneurysm (AAA) without rupture ([address])    Iliac artery occlusion (HCC)    Occlusion of arterial bypass graft (HCC)    Severe malnutrition ([address])    Acute CVA (cerebrovascular accident) ([address])       Measurements:     Incision 07/19/23 Abdomen Medial;Anterior (Active)   Wound Image   08/11/23 1315   Dressing Status New dressing applied 08/11/23 1315   Dressing Change Due 08/12/23 08/11/23 1315   Incision Cleansed Cleansed with saline 08/11/23 1315   Dressing/Treatment Alginate with Ag;Gauze dressing/dressing sponge 08/11/23 1315   Incision Length (cm) 2 08/11/23 1315   Incision Width (cm) 0.7 cm 08/11/23 1315   Incision Depth (cm) 0.1 cm 08/11/23 1315   Incision Volume (cm^3) 0.14 cm^3 08/11/23 1315   Closure Staples 08/11/23 1315   Margins Approximated 08/11/23 1315   Incision Assessment Dry 08/11/23 1315   Drainage Amount Scant (moist but unmeasurable) 08/11/23 1315   Drainage Description Green;Serous 08/11/23 1315   Odor None 08/11/23 1315   Iveth-incision Assessment Intact 08/11/23 1315   Number of days: 23         Incision 07/19/23 Femoral Proximal;Right (Active)   Dressing Status Clean;Dry; Intact 08/07/23 0800   Dressing/Treatment Open to air 08/09/23 0940   Closure Sutures;Steri-Strips 08/09/23 0940   Drainage Amount None 08/08/23 1015   Odor None 08/09/23 0940   Iveth-incision Assessment Intact 08/08/23 0433   Number of days: 23

## 2023-08-11 NOTE — PROGRESS NOTES
Speech Language Pathology  Clinical Bedside Swallow Assessment  Facility/Department: [place] [address] PCU        Recommendations:  Diet recommendation: IDDSI 7 Regular Solids; IDDSI 0 Thin Liquids; Meds PO as tolerated  Instrumentation: Not clinically indicated at this time  Risk management: upright for all intake, small sips, stay upright for at least 30 mins after intake, oral care 2-3x/day to reduce adverse affects in the event of aspiration, alternate bites/sips, slow rate of intake, general GERD precautions, and general aspiration precautions      Dyan Christianson  : 1965 (59 y.o.)   MRN: [id number]  ROOM: [id number]  ADMISSION DATE: 8/10/2023  PATIENT DIAGNOSIS(ES): Acute CVA (cerebrovascular accident) [facility]) [I63.9]  Chief Complaint   Patient presents with    Cerebrovascular Accident     Patient to the ED via [address] [place] [address] for possible stroke. CYL [id number] presenting with right sided weakness, facial droop, and seems to be getting better with time per EMS.       Patient Active Problem List    Diagnosis Date Noted    TIA (transient ischemic attack) 2022    Atherosclerosis of artery of left lower extremity ([address]) 10/16/2018    Acute CVA (cerebrovascular accident) ([address]) 08/10/2023    Severe malnutrition ([address]) 2023    Iliac artery occlusion ([address]) 2023    Occlusion of arterial bypass graft ([address]) 2023    Infrarenal abdominal aortic aneurysm (AAA) without rupture ([address]) 2023    Remote history of stroke 2023    HTN (hypertension), benign 2023    New onset seizure ([address]) 2023    Type 2 diabetes mellitus 2023    Major depressive disorder, recurrent, mild ([address]) 2023    Bypass graft stenosis (HCC)     Tobacco use 2021    Incisional hernia, without obstruction or gangrene     Atherosclerotic heart disease of native coronary artery with other forms of angina pectoris (HCC)     PVD (peripheral vascular disease) ([address]) 2020    Bilateral carotid artery stenosis Elevated lipoprotein(a)     Primary hypertension 05/06/2011     Past Medical History:   Diagnosis Date    Abdominal pain 07/20/2021    Abnormal stress test     Acute CVA (cerebrovascular accident) ([address]) 05/30/2020    Bowel incontinence     CAD (coronary artery disease)     Cerebral infarction due to unspecified occlusion or stenosis of unspecified carotid artery ([address]) 03/19/2015    Chest pain 03/11/2021    History of cerebral infarction 05/24/2022    Hyperlipidemia     Hypertension     Incisional hernia without obstruction or gangrene 07/09/2021    Incisional hernia, without obstruction or gangrene     Ischemic foot 06/22/2020    New onset seizure ([address]) 12/21/2022    PAD (peripheral artery disease) ([address])     Poor vision     left eye    Pre-diabetes     Sleep apnea     NO CPAP    Stroke ([address]) 2016    x3 - memory deficit    Vascular occlusion     Wears partial dentures     upper & lower     Past Surgical History:   Procedure Laterality Date    ABDOMEN SURGERY      colon polyps    CARDIAC SURGERY  2022    coronary stent    CAROTID ENDARTERECTOMY Bilateral     5 years ago    CT VISCERAL PERCUTANEOUS DRAIN  07/21/2021    CT VISCERAL PERCUTANEOUS DRAIN 7/21/2021 Lefty Dinero  [facility] CT SCAN    FEMORAL BYPASS Right 03/07/2022    REVISION RIGHT LOWER EXTREMITY BYPASS WITH PERICARDIAL PATCH performed by Adelina Tejeda MD at [address] [facility] Right 06/24/2020    RIGHT FEMORAL TO PERONEAL  BYPASS GRAFT performed by Adelina Tejeda MD at [address] N/A 07/09/2021    [address]OTIC [address], eTAPP BLOCK performed by Antonio Agudelo MD at [address] N/A 7/19/2023    AORTOBIFEMORAL BYPASS GRAFT AND OPEN VENTRAL HERNIA REPAIR WITH MESH performed by Adelina Tejeda MD at [address] [facility]     No Known Allergies    DATE ONSET: 8/10/23    Date of Evaluation: 8/11/2023

## 2023-08-11 NOTE — PROGRESS NOTES
Physical Therapy  Facility/Department: [facility] C4 PCU  Physical Therapy Initial Assessment    Name: Angela Trinh  : 1965  MRN: [id number]  Date of Service: 2023    Discharge Recommendations:  24 hour supervision or assist, Home with Home health PT   PT Equipment Recommendations  Equipment Needed: No      Patient Diagnosis(es): The encounter diagnosis was Cerebrovascular accident (CVA), unspecified mechanism ([address]). Past Medical History:  has a past medical history of Abdominal pain, Abnormal stress test, Acute CVA (cerebrovascular accident) ([address]), Bowel incontinence, CAD (coronary artery disease), Cerebral infarction due to unspecified occlusion or stenosis of unspecified carotid artery ([address]), Chest pain, History of cerebral infarction, Hyperlipidemia, Hypertension, Incisional hernia without obstruction or gangrene, Incisional hernia, without obstruction or gangrene, Ischemic foot, New onset seizure ([address]), PAD (peripheral artery disease) ([address]), Poor vision, Pre-diabetes, Sleep apnea, Stroke [facility]), Vascular occlusion, and Wears partial dentures. Past Surgical History:  has a past surgical history that includes Leg Surgery (Right); vascular surgery; Abdomen surgery; Femoral-tibial Bypass Graft (Right, 2020); Incisional hernia repair; hernia repair (N/A, 2021); CT DRAINAGE VISCERAL PERCUTANEOUS (2021); femoral bypass (Right, 2022); Cardiac surgery (); Carotid endarterectomy (Bilateral); and vein bypass graft,aortobifemoral (N/A, 2023). Assessment   Body Structures, Functions, Activity Limitations Requiring Skilled Therapeutic Intervention: Decreased functional mobility ; Decreased strength;Decreased endurance;Decreased balance  Assessment: Pt is a 59year old male admitted with R sided weakness, concerning for CVA. Pt able to complete bed mobility with supervision, functional transfers with SBA, and ambulate community distance with CGA without AD.  Pt mildly unsteady initially (although no overt LOB), however balance improves with increased distance. Pt is currently functioning below baseline as he was IND without AD prior to admission. At this time, anticipate pt will be safe to d/c home with 24 hr assist and HHPT. Treatment Diagnosis: impaired mobility and strength  Therapy Prognosis: Good  Decision Making: Medium Complexity  Requires PT Follow-Up: Yes  Activity Tolerance  Activity Tolerance: Patient tolerated evaluation without incident     Plan   Physcial Therapy Plan  General Plan: 3-5 times per week  Current Treatment Recommendations: Strengthening, Balance training, Functional mobility training, Transfer training, Endurance training, Gait training, Stair training, Neuromuscular re-education, Home exercise program, Safety education & training, Patient/Caregiver education & training, Equipment evaluation, education, & procurement, Therapeutic activities  Safety Devices  Type of Devices:  All fall risk precautions in place, Call light within reach, Bed alarm in place, Patient at risk for falls, Nurse notified, Left in bed     Restrictions  Restrictions/Precautions  Restrictions/Precautions: Fall Risk, General Precautions  Position Activity Restriction  Other position/activity restrictions: telemetry, up as tolerated     Subjective   Pain: Pt denies pain  General  Chart Reviewed: Yes  Patient assessed for rehabilitation services?: Yes  Additional Pertinent Hx: PMHx stroke, coronary artery, hypertension, hyperlipidemia, peripheral artery disease, seizure  Response To Previous Treatment: Not applicable  Family / Caregiver Present: No  Referring Practitioner: Eliza Carrasquillo MD  Referral Date : 08/10/23  Diagnosis: R sided weakness, concerning for CVA  General Comment  Comments: RN cleared pt for PT  Subjective  Subjective: Pt resting in bed upon arrival and agreeable to PT evaluation this morning           Social/Functional History  Social/Functional History  Lives With:

## 2023-08-11 NOTE — PROGRESS NOTES
Occupational Therapy  Facility/Department: [facility] C4 PCU  Occupational Therapy Initial Assessment    Name: Liz Perez  : 1965  MRN: [id number]  Date of Service: 2023    Discharge Recommendations:  24 hour supervision or assist, Home with Home health OT  OT Equipment Recommendations  Equipment Needed: No     If pt is unable to be seen after this session, please let this note serve as discharge summary. Please see case management note for discharge disposition. Thank you. Patient Diagnosis(es): The encounter diagnosis was Cerebrovascular accident (CVA), unspecified mechanism ([address]). Past Medical History:  has a past medical history of Abdominal pain, Abnormal stress test, Acute CVA (cerebrovascular accident) ([address]), Bowel incontinence, CAD (coronary artery disease), Cerebral infarction due to unspecified occlusion or stenosis of unspecified carotid artery ([address]), Chest pain, History of cerebral infarction, Hyperlipidemia, Hypertension, Incisional hernia without obstruction or gangrene, Incisional hernia, without obstruction or gangrene, Ischemic foot, New onset seizure ([address]), PAD (peripheral artery disease) ([address]), Poor vision, Pre-diabetes, Sleep apnea, Stroke [facility]), Vascular occlusion, and Wears partial dentures. Past Surgical History:  has a past surgical history that includes Leg Surgery (Right); vascular surgery; Abdomen surgery; Femoral-tibial Bypass Graft (Right, 2020); Incisional hernia repair; hernia repair (N/A, 2021); CT DRAINAGE VISCERAL PERCUTANEOUS (2021); femoral bypass (Right, 2022); Cardiac surgery (); Carotid endarterectomy (Bilateral); and vein bypass graft,aortobifemoral (N/A, 2023). Assessment   Performance deficits / Impairments: Decreased functional mobility ; Decreased ADL status; Decreased strength;Decreased endurance;Decreased balance  Assessment: Pt agreeable to OT eval. Pt was admitted to [facility] for acute CVA.  PTA, pt was IND with

## 2023-08-11 NOTE — CARE COORDINATION
Case Management Assessment  Initial Evaluation    Date/Time of Evaluation: 8/11/2023 1:32 PM  Assessment Completed by: Yair Nichols RN    If patient is discharged prior to next notation, then this note serves as note for discharge by case management. Patient Name: Spring Hudson                   YOB: 1965  Diagnosis: Acute CVA (cerebrovascular accident) [facility]) [I63.9]                   Date / Time: 8/10/2023  2:25 PM    Patient Admission Status: Inpatient   Readmission Risk (Low < 19, Mod (19-27), High > 27): Readmission Risk Score: 24    Current PCP: MAT Da Silva CNP  PCP verified by CM? Yes    Chart Reviewed: Yes      History Provided by: Patient, Spouse  Patient Orientation: Alert and Oriented    Patient Cognition: Alert    Hospitalization in the last 30 days (Readmission):  Yes    If yes, Readmission Assessment in CM Navigator will be completed. Advance Directives:      Code Status: Full Code   Patient's Primary Decision Maker is: Legal Next of Kin    Primary Decision MakerKnox Proper Spouse - [phone]    Discharge Planning:    Patient lives with: Spouse/Significant Other Type of Home: Apartment  Primary Care Giver: Spouse  Patient Support Systems include: Spouse/Significant Other   Current Financial resources: Medicare  Current community resources:    Current services prior to admission: (P) None            Current DME:              Type of Home Care services:  (P) PT, OT, Nursing Services (Speech)    ADLS  Prior functional level: Assistance with the following:, Mobility  Current functional level: Mobility    PT AM-PAC: 20 /24  OT AM-PAC:   /24    Family can provide assistance at DC: Yes  Would you like Case Management to discuss the discharge plan with any other family members/significant others, and if so, who?  Yes (wife Brittany Loco)  Plans to Return to Present Housing: Yes  Other Identified Issues/Barriers to RETURNING to current housing: no immediate barriers to returning home identified  Potential Assistance needed at discharge: (P) N/A            Potential DME:    Patient expects to discharge to: [address] for transportation at discharge:      Financial    Payor: [facility] [employer] / Plan: "[employer] HMO / Product Type: Medicare /     Does insurance require precert for SNF: Yes    Potential assistance Purchasing Medications:    [facility]-Beds [facility] [id number] [place], [place] - [address] [facility] [phone] - f [phone]  [address] [place] [address]  [address] [address]  Phone: [phone] Fax: [phone]      Notes:    Factors facilitating achievement of predicted outcomes: Family support and Caregiver support    Barriers to discharge: No barrier to returning home identified at present    Additional Case Management Notes: Patient readmitted after less than 48 hours being home. He came back in with facial droop and weakness and stroke symptoms. Per RN symptoms have since resolved. PT/OT recommending returning home with [address] [place] [address] [address] [facility] liaison following for resumption of services. The Plan for Transition of Care is related to the following treatment goals of Acute CVA (cerebrovascular accident) ([address]) [Y78.9]    IF APPLICABLE: The Patient and/or patient representative Jose Palmer and his family were provided with a choice of provider and agrees with the discharge plan. Freedom of choice list with basic dialogue that supports the patient's individualized plan of care/goals and shares the quality data associated with the providers was provided to:     Patient Representative Name:       The Patient and/or Patient Representative Agree with the Discharge Plan?  Yes    Gabriel Dominique RN  Case Management Department  Ph: [phone] Fax: [phone]

## 2023-08-12 VITALS
WEIGHT: 172.7 LBS | RESPIRATION RATE: 16 BRPM | SYSTOLIC BLOOD PRESSURE: 101 MMHG | HEIGHT: 68 IN | TEMPERATURE: 97.8 F | HEART RATE: 94 BPM | BODY MASS INDEX: 26.18 KG/M2 | OXYGEN SATURATION: 97 % | DIASTOLIC BLOOD PRESSURE: 69 MMHG

## 2023-08-12 LAB
ANION GAP SERPL CALCULATED.3IONS-SCNC: 8 MMOL/L (ref 3–16)
BUN SERPL-MCNC: 12 MG/DL (ref 7–20)
CALCIUM SERPL-MCNC: 8.9 MG/DL (ref 8.3–10.6)
CHLORIDE SERPL-SCNC: 110 MMOL/L (ref 99–110)
CO2 SERPL-SCNC: 22 MMOL/L (ref 21–32)
CREAT SERPL-MCNC: 0.8 MG/DL (ref 0.9–1.3)
GFR SERPLBLD CREATININE-BSD FMLA CKD-EPI: >60 ML/MIN/{1.73_M2}
GLUCOSE BLD-MCNC: 103 MG/DL (ref 70–99)
GLUCOSE SERPL-MCNC: 104 MG/DL (ref 70–99)
PERFORMED ON: ABNORMAL
POTASSIUM SERPL-SCNC: 3.7 MMOL/L (ref 3.5–5.1)
SODIUM SERPL-SCNC: 140 MMOL/L (ref 136–145)

## 2023-08-12 PROCEDURE — 99233 SBSQ HOSP IP/OBS HIGH 50: CPT | Performed by: PSYCHIATRY & NEUROLOGY

## 2023-08-12 PROCEDURE — 80048 BASIC METABOLIC PNL TOTAL CA: CPT

## 2023-08-12 PROCEDURE — 2580000003 HC RX 258: Performed by: INTERNAL MEDICINE

## 2023-08-12 PROCEDURE — 6370000000 HC RX 637 (ALT 250 FOR IP): Performed by: INTERNAL MEDICINE

## 2023-08-12 PROCEDURE — 36415 COLL VENOUS BLD VENIPUNCTURE: CPT

## 2023-08-12 RX ORDER — ASPIRIN 81 MG/1
81 TABLET, CHEWABLE ORAL DAILY
Qty: 30 TABLET | Refills: 3 | Status: SHIPPED | OUTPATIENT
Start: 2023-08-12 | End: 2023-11-10

## 2023-08-12 RX ORDER — ATORVASTATIN CALCIUM 80 MG/1
80 TABLET, FILM COATED ORAL DAILY
Qty: 30 TABLET | Refills: 0 | Status: SHIPPED | OUTPATIENT
Start: 2023-08-12 | End: 2023-09-11

## 2023-08-12 RX ADMIN — BUPROPION HYDROCHLORIDE 150 MG: 150 TABLET, FILM COATED, EXTENDED RELEASE ORAL at 09:02

## 2023-08-12 RX ADMIN — EZETIMIBE 10 MG: 10 TABLET ORAL at 09:02

## 2023-08-12 RX ADMIN — ATORVASTATIN CALCIUM 80 MG: 80 TABLET, FILM COATED ORAL at 09:02

## 2023-08-12 RX ADMIN — LEVETIRACETAM 500 MG: 500 TABLET, FILM COATED ORAL at 09:02

## 2023-08-12 RX ADMIN — GABAPENTIN 800 MG: 400 CAPSULE ORAL at 09:02

## 2023-08-12 RX ADMIN — Medication 10 ML: at 09:02

## 2023-08-12 RX ADMIN — CLOPIDOGREL BISULFATE 75 MG: 75 TABLET ORAL at 09:02

## 2023-08-12 RX ADMIN — ASPIRIN 81 MG 81 MG: 81 TABLET ORAL at 09:02

## 2023-08-12 ASSESSMENT — PAIN SCALES - GENERAL
PAINLEVEL_OUTOF10: 0
PAINLEVEL_OUTOF10: 0

## 2023-08-12 NOTE — PROGRESS NOTES
Neurology Progress Note    ID: Patricia Villasenor is a 59 y.o. male    : 1965     LOS: 2 days     ASSESSMENT    Principal Problem:    Acute CVA (cerebrovascular accident) ([address])  Resolved Problems:    * No resolved hospital problems. *    The patient remains to have minimal right-sided weakness. The CTA of head and neck showed left CCA occlusion, left subclavian and vertebral artery stenosis. MRI brain showed subacute infarction over the basal ganglia and old MCA infarction. MRI brain also shows chronic small vessel disease. The patient is also noted for underlying seizure disorder. Based on the MRI brain and CTA of head and neck, the etiology of the stroke is likely hypoperfusion from large vessels arthrosclerosis or artery to artery embolism. The patient also has underlying small vessel disease from previous CVA. Patient is getting close to back to his baseline. The patient agrees to avoid dehydration in the future. Plan:     1. Avoid hypotension. 2.  Target blood pressure below 140/90.  3.  Agree with aspirin and clopidogrel up to 3 months then clopidogrel alone. 4.  PT/OT/ST. 5.  Cardiac telemetry. 6.  Continue statin and ezetmibe. 7.  Continue levetiracetam.  8.  Seizure precaution, especially with bupropion in this case. The patient may DC home from neurology perspective.     Medications:  Scheduled Meds:    aspirin  81 mg Oral Daily    clopidogrel  75 mg Oral Daily    atorvastatin  80 mg Oral Daily    ezetimibe  10 mg Oral Daily    gabapentin  800 mg Oral TID    levETIRAcetam  500 mg Oral BID    sodium chloride flush  5-40 mL IntraVENous 2 times per day    enoxaparin  40 mg SubCUTAneous Daily    buPROPion  150 mg Oral BID     Continuous Infusions:    sodium chloride      sodium chloride 75 mL/hr at 23 0522     PRN Meds: calcium carbonate, sodium chloride flush, sodium chloride, ondansetron **OR** ondansetron, polyethylene glycol, labetalol    OBJECTIVE  Vital signs in

## 2023-08-12 NOTE — CARE COORDINATION
CASE MANAGEMENT DISCHARGE SUMMARY      Discharge to: [address] ordered/agency: na    Transportation:    Family/car: private    Confirmed discharge plan with:RN,[place]     Patient: yes     Family:  yes/no    Name: Contact number:     Facility/Agency, name:  [facility] faxed [phone]     RN, name: Gloria    Note: Discharging nurse to complete [facility], reconcile [facility], and place final copy with patient's discharge packet. RN to ensure that written prescriptions for  Level II medications are sent with patient to the facility as per protocol.

## 2023-08-12 NOTE — DISCHARGE SUMMARY
aortobifemoral bypass graft. The graft is patent. Inflammatory changes can be seen around the proximal anastomosis. No evidence of bleeding. GI/Bowel: No evidence of bowel obstruction or perforation. No evidence of bowel wall thickening. Stable nasogastric tube. Pelvis: A femorofemoral bypass graft appears occluded. Inflammatory changes and perigraft fluid can be seen in both groins. No definitive abscess. Urinary bladder, prostate and seminal vesicles appear stable. No evidence of pelvic lymphadenopathy. Peritoneum/Retroperitoneum: No evidence of retroperitoneal lymphadenopathy. Bones/Soft Tissues: Behind the anterior abdominal wall, a longitudinally oriented fluid collection can be seen which in the coronal plane measures 16 x 8 cm and in the axial plane measures 4.3 x 8.2 cm. This is associated with the anterior abdominal wall behind the incision line. An abscess is possible. Seroma or lymphocele should also be considered. 1. Large fluid collection behind the incision line and just behind the anterior abdominal wall. This measures 16 x 8 cm in largest coronal diameter and 4.3 x 8.2 cm in largest axial diameter. Abscess should be excluded. This collection can be drained. 2. Aortobifemoral bypass graft patent; however, femorofemoral bypass graft thrombosed. 3. Inflammatory changes in both groins raise suspicion for possible infection. No evidence of pseudoaneurysm. 4. No other acute abnormality. Gastrointestinal tract appears stable. Stable nasogastric tube. XR CHEST PORTABLE    Result Date: 8/10/2023  EXAMINATION: ONE XRAY VIEW OF THE CHEST 8/10/2023 2:05 pm COMPARISON: 07/19/2023. HISTORY: ORDERING SYSTEM PROVIDED HISTORY: stroke symptoms TECHNOLOGIST PROVIDED HISTORY: Reason for exam:->stroke symptoms Reason for Exam: stroke symptoms FINDINGS: The lungs and costophrenic angles are clear.  The cardiomediastinal silhouette and pulmonary vessels appear normal.     No radiographic evidence of an Right lower lobe atelectasis. 1.  No evidence of bowel obstruction. 2.  Enteric tube with tip and side-port in the distal stomach     XR ABDOMEN (2 VIEWS)    Result Date: 7/24/2023  EXAMINATION: TWO XRAY VIEWS OF THE ABDOMEN 7/24/2023 7:56 am COMPARISON: None. HISTORY: ORDERING SYSTEM PROVIDED HISTORY: ileus TECHNOLOGIST PROVIDED HISTORY: Reason for exam:->ileus Reason for Exam: ileus FINDINGS: Nonobstructive bowel gas pattern. Air is seen distally into the rectum. There is an enteric tube with the tip and side-port in the stomach. 1.  Nonobstructive bowel gas pattern.         Discharge Time of 40 minutes    Electronically signed by Mirela Theodore MD on 8/12/2023 at 12:47 PM

## 2023-08-12 NOTE — FLOWSHEET NOTE
08/12/23 0849   Psychosocial   Psychosocial (WDL) WDL   Neurological   Level of Consciousness 0   Orientation Level Oriented X4   Cognition Follows commands   Speech Clear   R Pupil Size (mm) 3   R Pupil Shape Round   R Pupil Reaction Brisk   L Pupil Size (mm) 3   L Pupil Shape Round   L Pupil Reaction Brisk   Gag Present   Cough Reflex Present   Mazin Coma Scale   Eye Opening 4   Best Verbal Response 5   Best Motor Response 6   Mazin Coma Scale Score 15   NIHSS Stroke Scale   Level of Consciousness (1a) 0   LOC Questions (1b) 0   LOC Commands (1c) 0   Best Gaze (2) 0   Visual (3) 0   Facial Palsy (4) 0   Motor Arm, Left (5a) 0   Motor Arm, Right (5b) 0   Motor Leg, Left (6a) 0   Motor Leg, Right (6b) 1   Limb Ataxia (7) 0   Sensory (8) 0   Best Language (9) 0   Dysarthria (10) 0   Extinction and Inattention (11) 0   Total 1   HEENT (Head, Ears, Eyes, Nose, & Throat)   HEENT (WDL) X   Right Eye Impaired vision;Glasses   Left Eye Impaired vision;Glasses   Right Ear Impaired hearing   Left Ear Impaired hearing   Respiratory   Respiratory Pattern Regular   Respiratory Depth Normal   Respiratory Quality/Effort Unlabored   Chest Assessment Chest expansion symmetrical   L Breath Sounds Bilateral;Clear   R Breath Sounds Bilateral;Clear   Cardiac   Cardiac Regularity Regular   Heart Sounds S1, S2   Cardiac Rhythm Sinus rhythm   Rhythm Interpretation   Pulse 94   Gastrointestinal   Abdominal (WDL) WDL   Urine Assessment   Urine Color Yellow/straw   Urine Appearance Clear   Urine Odor No odor   Peripheral Vascular   Peripheral Vascular (WDL) WDL   Edema None   Skin Integumentary    Skin Integumentary (WDL) WDL   Musculoskeletal   Musculoskeletal (WDL) WDL

## 2023-08-12 NOTE — DISCHARGE INSTR - COC
QUADRIVALENT, PRESERVATIVE FREE 10/13/2016, 09/11/2018, 02/11/2020    Influenza Virus Vaccine 10/03/2011, 03/06/2013, 01/10/2014, 09/26/2014, 09/26/2014, 10/13/2016, 09/11/2018, 02/11/2020, 01/18/2022    Influenza Whole 02/17/2008    Influenza, FLUARIX, FLULAVAL, Carolina Simental (age 10 mo+) AND AFLURIA, (age 1 y+), PF, 0.5mL 10/13/2016, 09/11/2018, 02/11/2020, 01/18/2022    Influenza, FLUCELVAX, (age 10 mo+), MDCK, PF, 0.5mL 09/21/2022    Influenza, High Dose (Fluzone 65 yrs and older) 09/21/2017    Pneumococcal, PPSV23, PNEUMOVAX 21, (age 2y+), SC/IM, 0.5mL 01/10/2014    TDaP, ADACEL (age 6y-58y), BOOSTRIX (age 10y+), IM, 0.5mL 01/10/2014    Zoster Live (Zostavax) 11/03/2014       Active Problems:  Patient Active Problem List   Diagnosis Code    Bilateral carotid artery stenosis I65.23    Elevated lipoprotein(a) E78.41    Primary hypertension I10    PVD (peripheral vascular disease) ([address]) I73.9    Atherosclerotic heart disease of native coronary artery with other forms of angina pectoris ([address]) I25.118    Incisional hernia, without obstruction or gangrene K43.2    Tobacco use Z72.0    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    Atherosclerosis of artery of left lower extremity ([facility]) I70.202    TIA (transient ischemic attack) G45.9    Major depressive disorder, recurrent, mild ([facility]) F33.0    Type 2 diabetes mellitus E11.9    Remote history of stroke Z86.73    HTN (hypertension), benign I10    New onset seizure ([address]) R56.9    Infrarenal abdominal aortic aneurysm (AAA) without rupture ([facility]) I71.43    Iliac artery occlusion ([address]) I74.5    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    Severe malnutrition ([address]) E43    Acute CVA (cerebrovascular accident) ([address]) I63.9       Isolation/Infection:   Isolation            No Isolation          Patient Infection Status       Infection Onset Added Last Indicated Last Indicated By Review Planned Expiration Resolved Resolved By    None active    Resolved    COVID-19 (Rule Out) 12/20/22 Proximal;Right (Active)   Dressing Status Clean;Dry; Intact 08/07/23 0800   Dressing/Treatment Open to air 08/09/23 0940   Closure Sutures;Steri-Strips 08/09/23 0940   Drainage Amount None 08/08/23 1015   Odor None 08/09/23 0940   Iveth-incision Assessment Intact 08/08/23 0433   Number of days: 24       Incision 07/19/23 Femoral Left;Proximal (Active)   Dressing Status Clean;Dry; Intact 08/07/23 0800   Dressing/Treatment Open to air 08/09/23 0940   Closure Steri-Strips; Sutures 08/09/23 0940   Drainage Amount None 08/08/23 1015   Odor None 08/09/23 0940   Iveth-incision Assessment Intact 08/08/23 0433   Number of days: 24       Incision 07/09/21 Abdomen Anterior;Medial (Active)   Dressing Status Clean;Dry; Intact 08/12/23 0125   Number of days: 764       Incision 07/21/21 Abdomen Lower;Medial;Right;Left (Active)   Number of days: 751       Incision 03/07/22 Thigh Left; Inner (Active)   Number of days: 522        Elimination:  Continence: Bowel: {YES / NB:13751}  Bladder: {YES / VW:34501}  Urinary Catheter: {Urinary Catheter:560769532}   Colostomy/Ileostomy/Ileal Conduit: {YES / W[place]:59747}       Date of Last BM: ***    Intake/Output Summary (Last 24 hours) at 8/12/2023 1139  Last data filed at 8/12/2023 0850  Gross per 24 hour   Intake 600 ml   Output 1300 ml   Net -700 ml     I/O last 3 completed shifts:   In: 2237.1 [P.O.:1560; I.V.:677.1]  Out: 2375 [Urine:2375]    Safety Concerns:     [address] [employer] [facility] Safety Concerns:028873113}    Impairments/Disabilities:      [facility] [employer] [facility] Impairments/Disabilities:311993255}    Nutrition Therapy:  Current Nutrition Therapy:   [facility] Diet List:568940248}    Routes of Feeding: {CHP DME Other Feedings:888727510}  Liquids: {Slp liquid thickness:34036}  Daily Fluid Restriction: {CHP DME Yes amt example:164983937}  Last Modified Barium Swallow with Video (Video Swallowing Test): {Done Not Done UFNY:318230010}    Treatments at the Time of Hospital Discharge:   Respiratory Treatments: ***  Oxygen Therapy:

## 2023-08-12 NOTE — PROGRESS NOTES
Pt discharged home with wife and [address]. Pt opted not to  medications from outpatient pharmacy. States that he has 81 mg Asprin and Lipitor at home. Verbalizes understanding of discharge instructions and follow up appointments. Denies other needs.  Gloria Vasquez, RN

## 2023-08-13 ENCOUNTER — TELEPHONE (OUTPATIENT)
Dept: INTERNAL MEDICINE CLINIC | Age: 58
End: 2023-08-13

## 2023-08-14 ENCOUNTER — CARE COORDINATION (OUTPATIENT)
Dept: CASE MANAGEMENT | Age: 58
End: 2023-08-14

## 2023-08-14 DIAGNOSIS — I63.9 ACUTE CVA (CEREBROVASCULAR ACCIDENT) (HCC): Primary | ICD-10-CM

## 2023-08-14 PROCEDURE — 1111F DSCHRG MED/CURRENT MED MERGE: CPT | Performed by: NURSE PRACTITIONER

## 2023-08-16 ENCOUNTER — HOSPITAL ENCOUNTER (OUTPATIENT)
Age: 58
Discharge: HOME | End: 2023-08-16
Payer: MEDICARE

## 2023-08-16 ENCOUNTER — CARE COORDINATION (OUTPATIENT)
Dept: CARE COORDINATION | Age: 58
End: 2023-08-16

## 2023-08-16 ENCOUNTER — OFFICE VISIT (OUTPATIENT)
Dept: INTERNAL MEDICINE CLINIC | Age: 58
End: 2023-08-16

## 2023-08-16 ENCOUNTER — CARE COORDINATION (OUTPATIENT)
Dept: CASE MANAGEMENT | Age: 58
End: 2023-08-16

## 2023-08-16 VITALS
WEIGHT: 177 LBS | SYSTOLIC BLOOD PRESSURE: 126 MMHG | TEMPERATURE: 97.3 F | BODY MASS INDEX: 26.91 KG/M2 | OXYGEN SATURATION: 96 % | HEART RATE: 74 BPM | DIASTOLIC BLOOD PRESSURE: 78 MMHG | RESPIRATION RATE: 14 BRPM

## 2023-08-16 DIAGNOSIS — D50.0 BLOOD LOSS ANEMIA: ICD-10-CM

## 2023-08-16 DIAGNOSIS — I73.9 PVD (PERIPHERAL VASCULAR DISEASE) (HCC): ICD-10-CM

## 2023-08-16 DIAGNOSIS — I65.23 BILATERAL CAROTID ARTERY STENOSIS: ICD-10-CM

## 2023-08-16 DIAGNOSIS — E11.65 TYPE 2 DIABETES MELLITUS WITH HYPERGLYCEMIA, WITHOUT LONG-TERM CURRENT USE OF INSULIN (HCC): ICD-10-CM

## 2023-08-16 DIAGNOSIS — T82.858D BYPASS GRAFT STENOSIS, SUBSEQUENT ENCOUNTER: ICD-10-CM

## 2023-08-16 DIAGNOSIS — E11.65 TYPE 2 DIABETES MELLITUS WITH HYPERGLYCEMIA, WITHOUT LONG-TERM CURRENT USE OF INSULIN (HCC): Primary | ICD-10-CM

## 2023-08-16 DIAGNOSIS — I63.9 ACUTE CVA (CEREBROVASCULAR ACCIDENT) (HCC): ICD-10-CM

## 2023-08-16 DIAGNOSIS — M54.42 CHRONIC LEFT-SIDED LOW BACK PAIN WITH LEFT-SIDED SCIATICA: ICD-10-CM

## 2023-08-16 DIAGNOSIS — F33.0 MAJOR DEPRESSIVE DISORDER, RECURRENT, MILD (HCC): ICD-10-CM

## 2023-08-16 DIAGNOSIS — G45.9 TIA (TRANSIENT ISCHEMIC ATTACK): ICD-10-CM

## 2023-08-16 DIAGNOSIS — R56.9 NEW ONSET SEIZURE (HCC): ICD-10-CM

## 2023-08-16 DIAGNOSIS — G89.29 CHRONIC LEFT-SIDED LOW BACK PAIN WITH LEFT-SIDED SCIATICA: ICD-10-CM

## 2023-08-16 DIAGNOSIS — Z09 HOSPITAL DISCHARGE FOLLOW-UP: ICD-10-CM

## 2023-08-16 DIAGNOSIS — E78.41 ELEVATED LIPOPROTEIN(A): ICD-10-CM

## 2023-08-16 DIAGNOSIS — I10 PRIMARY HYPERTENSION: ICD-10-CM

## 2023-08-16 DIAGNOSIS — I25.118 ATHEROSCLEROTIC HEART DISEASE OF NATIVE CORONARY ARTERY WITH OTHER FORMS OF ANGINA PECTORIS (HCC): ICD-10-CM

## 2023-08-16 DIAGNOSIS — I70.202 ATHEROSCLEROSIS OF ARTERY OF LEFT LOWER EXTREMITY (HCC): ICD-10-CM

## 2023-08-16 DIAGNOSIS — I71.43 INFRARENAL ABDOMINAL AORTIC ANEURYSM (AAA) WITHOUT RUPTURE (HCC): ICD-10-CM

## 2023-08-16 PROBLEM — Z86.73 REMOTE HISTORY OF STROKE: Status: RESOLVED | Noted: 2023-04-25 | Resolved: 2023-08-16

## 2023-08-16 PROBLEM — T82.898A OCCLUSION OF ARTERIAL BYPASS GRAFT (HCC): Status: RESOLVED | Noted: 2023-07-19 | Resolved: 2023-08-16

## 2023-08-16 LAB
ANION GAP SERPL CALCULATED.3IONS-SCNC: 16 MMOL/L (ref 3–16)
BASOPHILS # BLD: 0 K/UL (ref 0–0.2)
BASOPHILS NFR BLD: 0.5 %
BUN SERPL-MCNC: 15 MG/DL (ref 7–20)
CALCIUM SERPL-MCNC: 9.2 MG/DL (ref 8.3–10.6)
CHLORIDE SERPL-SCNC: 103 MMOL/L (ref 99–110)
CO2 SERPL-SCNC: 20 MMOL/L (ref 21–32)
CREAT SERPL-MCNC: 0.8 MG/DL (ref 0.9–1.3)
DEPRECATED RDW RBC AUTO: 18.7 % (ref 12.4–15.4)
EOSINOPHIL # BLD: 0.1 K/UL (ref 0–0.6)
EOSINOPHIL NFR BLD: 1.9 %
GFR SERPLBLD CREATININE-BSD FMLA CKD-EPI: >60 ML/MIN/{1.73_M2}
GLUCOSE SERPL-MCNC: 85 MG/DL (ref 70–99)
HCT VFR BLD AUTO: 28.9 % (ref 40.5–52.5)
HGB BLD-MCNC: 9.4 G/DL (ref 13.5–17.5)
IRON SATN MFR SERPL: 16 % (ref 20–50)
IRON SERPL-MCNC: 44 UG/DL (ref 59–158)
LYMPHOCYTES # BLD: 1.7 K/UL (ref 1–5.1)
LYMPHOCYTES NFR BLD: 23.3 %
MCH RBC QN AUTO: 32.1 PG (ref 26–34)
MCHC RBC AUTO-ENTMCNC: 32.3 G/DL (ref 31–36)
MCV RBC AUTO: 99.3 FL (ref 80–100)
MONOCYTES # BLD: 0.6 K/UL (ref 0–1.3)
MONOCYTES NFR BLD: 8.6 %
NEUTROPHILS # BLD: 4.7 K/UL (ref 1.7–7.7)
NEUTROPHILS NFR BLD: 65.7 %
PLATELET # BLD AUTO: 339 K/UL (ref 135–450)
PMV BLD AUTO: 6.7 FL (ref 5–10.5)
POTASSIUM SERPL-SCNC: 4.3 MMOL/L (ref 3.5–5.1)
RBC # BLD AUTO: 2.91 M/UL (ref 4.2–5.9)
REASON FOR REJECTION: NORMAL
REJECTED TEST: NORMAL
SODIUM SERPL-SCNC: 139 MMOL/L (ref 136–145)
TIBC SERPL-MCNC: 270 UG/DL (ref 260–445)
WBC # BLD AUTO: 7.2 K/UL (ref 4–11)

## 2023-08-16 PROCEDURE — 85025 COMPLETE CBC W/AUTO DIFF WBC: CPT

## 2023-08-16 PROCEDURE — 83550 IRON BINDING TEST: CPT

## 2023-08-16 PROCEDURE — 80048 BASIC METABOLIC PNL TOTAL CA: CPT

## 2023-08-16 PROCEDURE — 36415 COLL VENOUS BLD VENIPUNCTURE: CPT

## 2023-08-16 PROCEDURE — 83540 ASSAY OF IRON: CPT

## 2023-08-16 PROCEDURE — 82728 ASSAY OF FERRITIN: CPT

## 2023-08-16 RX ORDER — GABAPENTIN 300 MG/1
300 CAPSULE ORAL 2 TIMES DAILY
Qty: 60 CAPSULE | Refills: 0 | Status: SHIPPED | OUTPATIENT
Start: 2023-08-16 | End: 2023-09-15

## 2023-08-16 RX ORDER — TRAZODONE HYDROCHLORIDE 50 MG/1
TABLET ORAL
Qty: 30 TABLET | Refills: 1 | Status: SHIPPED | OUTPATIENT
Start: 2023-08-16

## 2023-08-16 RX ORDER — LIDOCAINE 50 MG/G
1 PATCH TOPICAL DAILY
Qty: 10 PATCH | Refills: 0 | Status: SHIPPED | OUTPATIENT
Start: 2023-08-16 | End: 2023-08-26

## 2023-08-16 NOTE — CARE COORDINATION
Remote Alert Monitoring Note  Rpm alert to be reviewed by the provider   red alert   blood pressure reading (155/102) temperature 96.1                      Date/Time:  2023 11:07 AM  Patient Current Location: [facility] contacted caregiver by telephone. Verified patients name and  as identifiers. Background: Pt enrolled for HTN    Refer to 911 immediately if:  Patient unresponsive or unable to provide history  Change in cognition or sudden confusion  Patient unable to respond in complete sentences  Intense chest pain/tightness  Any concern for any clinical emergency  Red Alert: Provider response time of 1 hr required for any red alert requiring intervention  Yellow Alert: Provider response time of 3hr required for any escalated yellow alert       Clinical Interventions: Reviewed and followed up on alerts and treatments-Spoke with pt wife and EC, she repeated his BP while he was eating and BP is now 149/81 HR 72. She reports he is doing well. Temp noted to be low as well. Other vitals WNL     Plan/Follow Up: Will continue to review, monitor and address alerts with follow up based on severity of symptoms and risk factors.

## 2023-08-16 NOTE — PROGRESS NOTES
lower extremity ([address])    TIA (transient ischemic attack)    Major depressive disorder, recurrent, mild (HCC)    Type 2 diabetes mellitus    New onset seizure ([address])    Infrarenal abdominal aortic aneurysm (AAA) without rupture ([address])    Iliac artery occlusion (HCC)    Severe malnutrition ([address])    Acute CVA (cerebrovascular accident) ([address])       Medications listed as ordered at the time of discharge from hospital     Medication List            Accurate as of August 16, 2023  5:20 PM. If you have any questions, ask your nurse or doctor. START taking these medications      gabapentin 300 MG capsule  Commonly known as: NEURONTIN  Take 1 capsule by mouth 2 times daily for 30 days. Intended supply: 30 days  Replaces: gabapentin 600 MG tablet  Started by: MAT Workman CNP     lidocaine 5 %  Commonly known as: LIDODERM  Place 1 patch onto the skin daily for 10 days 12 hours on, 12 hours off.   Started by: MAT Workman CNP            CONTINUE taking these medications      aspirin 81 MG chewable tablet  Take 1 tablet by mouth daily     atorvastatin 80 MG tablet  Commonly known as: LIPITOR  Take 1 tablet by mouth daily At bedtime     B-D ASSURE BPM/AUTO ARM CUFF Misc  1 Device by Does not apply route 2 times daily     buPROPion 150 MG extended release tablet  Commonly known as: WELLBUTRIN SR     carvedilol 6.25 MG tablet  Commonly known as: COREG  TAKE ONE TABLET BY MOUTH TWICE A DAY WITH MEAL     clopidogrel 75 MG tablet  Commonly known as: PLAVIX     diclofenac sodium 1 % Gel  Commonly known as: VOLTAREN  Apply 4 g topically 4 times daily     ezetimibe 10 MG tablet  Commonly known as: ZETIA  Take 1 tablet by mouth daily     levETIRAcetam 500 MG tablet  Commonly known as: KEPPRA  Take 1 tablet by mouth 2 times daily     OCUVITE ADULT FORMULA PO     traZODone 50 MG tablet  Commonly known as: DESYREL  TAKE ONE TO TWO TABLETS BY MOUTH EVERY NIGHT AT BEDTIME AS NEEDED FOR SLEEP     VITAMIN B-12 PO

## 2023-08-16 NOTE — CARE COORDINATION
Unable to reach patient for RPM Welcome Call.     Reena HILLSN, RN, [facility]  Care Transition Nurse  [phone] mobile

## 2023-08-17 ENCOUNTER — CARE COORDINATION (OUTPATIENT)
Dept: CARE COORDINATION | Age: 58
End: 2023-08-17

## 2023-08-17 ENCOUNTER — CARE COORDINATION (OUTPATIENT)
Dept: CASE MANAGEMENT | Age: 58
End: 2023-08-17

## 2023-08-17 LAB — FERRITIN SERPL IA-MCNC: 286.5 NG/ML (ref 30–400)

## 2023-08-17 NOTE — CARE COORDINATION
Remote Alert Monitoring Note  Rpm alert to be reviewed by the provider   red alert   temperature reading (95.5)   Additional needs to be addressed by N/A: No          Date/Time:  8/17/2023 11:16 AM  Patient [facility]  [facility] attempted to contact patient by telephone. Background: HTN  Refer to 911 immediately if:  Patient unresponsive or unable to provide history  Change in cognition or sudden confusion  Patient unable to respond in complete sentences  Intense chest pain/tightness  Any concern for any clinical emergency  Red Alert: Provider response time of 1 hr required for any red alert requiring intervention  Yellow Alert: Provider response time of 3hr required for any escalated yellow alert  Clinical Interventions: Reviewed and followed up on alerts and treatments-   LPN left HIPAA compliant message requesting return call. Plan/Follow Up: Will continue to review, monitor and address alerts with follow up based on severity of symptoms and risk factors.

## 2023-08-17 NOTE — CARE COORDINATION
Remote Patient Monitoring Welcome Note Date/Time: 2023 12:13 PM Patient Current Location: [address] [place] [address] Verified patients name and  as identifiers. Completed and confirmed the following: Emergency Contact: Mahesh Stein [phone] [x] Patient received all RPM equipment (tablet, scale, blood pressure device and cuff, and pulse oximeter)  Cuff Size: regular (9.05\"-15.74\")  Weight Scale: regular (<330lbs) [x] Instructed patient keep box for use when returning equipment [x] Reviewed Patient Welcome Letter with patient [x] Reviewed expectations for patient and care team  Monitoring hours M-F 9-4pm  Completing monitoring by 12pm on  so that alerts can be responded to in the same day  Patient weighs self at same time every day (or after urinating and waking up)  Take blood pressure 1-2 hrs after medications  RPM team may have different phone area code (including [facility], [place], [place] or [address] [place] [address])                        [x] Instructed patient to keep scale on flat surface [x] Instructed patient to keep tablet plugged in at all times [x] Instructed how to contact IT support ([phone]) [x] Provided Remote Patient Monitoring care  information All questions answered at this time.  ---- Current Patient Metrics ---- Blood Pressure: 123/82, 79bpm Pulseox: 99%, 81bpm Temperature: 95.5 Weight: 79.3lbs Note Created at: 2023 12:21 PM ET ---- Time-Spent: 4 minutes 0 seconds    Dirk ARAUZ, RN, [facility]  Care Transition Nurse  [phone] mobile

## 2023-08-17 NOTE — CARE COORDINATION
[facility] Care Transitions Follow Up Call    Patient Current Location: [address] [place] [address] Transition Nurse contacted the patient by telephone. Verified name and  with patient as identifiers. Patient: Lyubov Francis  Patient : 1965   MRN: [id number]  Reason for Admission: CVA  Discharge Date: 23 RARS: Readmission Risk Score: 23.7      Needs to be reviewed by the provider   Additional needs identified to be addressed with provider: No  none             Method of communication with provider: none. CTN spoke with patient's spouse Agustina Cummins, who reported patient is doing well and only concern is patient is constipated and LBM 2 days ago. Patient just finished miralax today and spouse hopeful will work. Patient is passing gas and denied any abd pain, nausea, or vomiting. CTN encouraged spouse to reach out to provider if Miralax and fiber doesn't help. Agustina Cummins reported patient's weight via RPM is entered incorrectly weight is not 79 lbs, Agustina Cummins will update tomorrow. Agustina Cummins denied any other issues or concerns at this time. Addressed changes since last contact:  none  Discussed follow-up appointments. If no appointment was previously scheduled, appointment scheduling offered: Yes. Is follow up appointment scheduled within 7 days of discharge? Yes. Follow Up  Future Appointments   Date Time Provider [address]   2023  8:40 AM MAT Espinal - CNP [facility] AND ARUN SPARKS     Non-[facility] follow up appointment(s):     Care Transition Nurse reviewed medical action plan with patient and discussed any barriers to care and/or understanding of plan of care after discharge. Discussed appropriate site of care based on symptoms and resources available to patient including: PCP  Specialist  When to call 911. The patient agrees to contact the PCP office for questions related to their healthcare. Advance Care Planning:   patient declined education.      Patients top risk factors for readmission: medical

## 2023-08-18 ENCOUNTER — CARE COORDINATION (OUTPATIENT)
Dept: CARE COORDINATION | Age: 58
End: 2023-08-18

## 2023-08-18 NOTE — CARE COORDINATION
Remote Patient Monitoring Note      Date/Time:  2023 12:25 PM  Patient Current Location: [place]  LPN contacted caregiver by telephone regarding red alert received for weight increase (5# in 7 days). Verified patients name and  as identifiers. Background: Pt enrolled for HTN monitoring     Clinical Interventions:  Spoke with pt wife, she reports his scale was stuck on kilograms. She was able to reset it with help of IT but she stepped on the scale to ensure it was back into lbs and the weight of 195 was hers and not the patients, she reports his baseline is around 174lbs approximately. Will cont to monitor. Other vitals WNL. She is having problems with the thermometer as the pt cannot tolerate breathing through his nose long enough to get a good temperature reading. Thermometer does not come with RPM kit, will notify ACM       Plan/Follow Up: Will continue to review, monitor and address alerts with follow up based on severity of symptoms and risk factors.

## 2023-08-22 ENCOUNTER — CARE COORDINATION (OUTPATIENT)
Dept: CASE MANAGEMENT | Age: 58
End: 2023-08-22

## 2023-08-22 NOTE — CARE COORDINATION
Advance Care Planning:   patient declined education. Patients top risk factors for readmission: medical condition-. Interventions to address risk factors: Education of patient/family/caregiver/guardian to support self-management-. Offered patient enrollment in the Remote Patient Monitoring (RPM) program for in-home monitoring: Yes, patient already enrolled. Care Transitions Subsequent and Final Call    Subsequent and Final Calls  Do you have any ongoing symptoms?: No  Have your medications changed?: No  Do you have any questions related to your medications?: No  Do you currently have any active services?: Yes  Are you currently active with any services?: Home Health  Do you have any needs or concerns that I can assist you with?: No  Identified Barriers: None  Care Transitions Interventions  Other Interventions:             Care Transition Nurse provided contact information for future needs. Plan for follow-up call in 5-7 days based on severity of symptoms and risk factors. Plan for next call: self management-vitals apts scheduled ?     Nelson ARAUZ, RN, [facility]  Care Transition Nurse  [phone] mobile

## 2023-08-25 ENCOUNTER — CARE COORDINATION (OUTPATIENT)
Dept: CASE MANAGEMENT | Age: 58
End: 2023-08-25

## 2023-08-25 ENCOUNTER — OFFICE VISIT (OUTPATIENT)
Dept: VASCULAR SURGERY | Age: 58
End: 2023-08-25

## 2023-08-25 VITALS
WEIGHT: 174 LBS | BODY MASS INDEX: 26.37 KG/M2 | DIASTOLIC BLOOD PRESSURE: 80 MMHG | SYSTOLIC BLOOD PRESSURE: 110 MMHG | HEIGHT: 68 IN

## 2023-08-25 DIAGNOSIS — Z09 POSTOPERATIVE EXAMINATION: Primary | ICD-10-CM

## 2023-08-25 PROCEDURE — 99024 POSTOP FOLLOW-UP VISIT: CPT | Performed by: SURGERY

## 2023-08-25 NOTE — PROGRESS NOTES
Postop Progress Note    Subjective    Kaya Maddox presents to the office for postop follow up. Objective    Vitals:    08/25/23 1212   BP: 110/80     General: alert, cooperative and no distress  Incisions: healing well  Palp PT pulses  Palp RLE graft pulse. Assessment  Doing well postoperatively s/p AortoBifem. Plan  Stressed importance of walking. RLE Graft duplex 6 months.      Electronically signed by Cecilia Garcia MD on 8/25/2023 at 12:51 PM

## 2023-08-25 NOTE — CARE COORDINATION
[facility] Care Transitions Follow Up Call    Patient Current Location: [address] [place] [address] Transition Nurse contacted the patient by telephone. Verified name and  with patient as identifiers. Patient: Den Kidd  Patient : 1965   MRN: [id number]  Reason for Admission: CVA   Discharge Date: 23 RARS: Readmission Risk Score: 23.7      Needs to be reviewed by the provider   Additional needs identified to be addressed with provider: No  none             Method of communication with provider: none. CTN spoke with patient's spouse Christine Griggs who reported patient is doing pretty good today and just finished eating breakfast. Christine Griggs reported speech was out to see patient and didn't feel patient needed any further speech visits. Christine Griggs reported PT/OT have not been out and she did mention to nurse, CTN will follow up with [facility]. Vitals per RPM HRS all stable. CTN contacted [facility] and spoke with [facility] who reported she would contact [facility]. RN CM and have PT scheduled for patient and also obtain an order for OT. Addressed changes since last contact:  none  Discussed follow-up appointments. If no appointment was previously scheduled, appointment scheduling offered: Yes. Is follow up appointment scheduled within 7 days of discharge? Yes. Follow Up  Future Appointments   Date Time Provider [address]   2023 12:00 PM Debarah Kayser, MD AND VASC [facility]   2023  1:45 PM John Tello MD AND GEN & LA [facility]   2023  8:40 AM MAT Salazar - CNP [facility] AND ARUN SPARKS     Non-[facility] follow up appointment(s):     Care Transition Nurse reviewed medical action plan with patient and discussed any barriers to care and/or understanding of plan of care after discharge. Discussed appropriate site of care based on symptoms and resources available to patient including: PCP  Specialist  Home health  When to call 911.  The patient and family agrees to contact the PCP office for questions related

## 2023-08-28 ENCOUNTER — TELEPHONE (OUTPATIENT)
Dept: INTERNAL MEDICINE CLINIC | Age: 58
End: 2023-08-28

## 2023-08-28 ENCOUNTER — CARE COORDINATION (OUTPATIENT)
Dept: CARE COORDINATION | Age: 58
End: 2023-08-28

## 2023-08-28 DIAGNOSIS — I73.9 CLAUDICATION IN PERIPHERAL VASCULAR DISEASE (HCC): Primary | ICD-10-CM

## 2023-08-28 NOTE — TELEPHONE ENCOUNTER
Received call from [address] [place], [place] [address]Th [facility]. On 8/16/23 there was a specimen rejection on patient's:  urine albumin, creatinine ratio. Specimen needs to be re taken if you would like the lab work to be done.   Questions please call Viviana at [phone]

## 2023-08-28 NOTE — TELEPHONE ENCOUNTER
Patient's wife is calling today stating the patient's BP was elevated this morning. She states it was ?/100 and they decided to give him a lisinopril, which was discontinued by the hospital d/t low BP readings. This afternoon therapy came to the patient's home and the patient was feeling a bit weak, dizzy and off balance. The therapist took his BP and it was 90/5_. The patient then later started to go down to the floor    BP is 100/60 right now when the wife took it. He is still having trouble sitting up. Patient's wife was asked how much water the patient has had today and she stated, \"not very much\". Patient's wife was told to have the patient drink at least 2 large glasses of water and see if that helps alleviate the dizziness and weakness. He is to drink at least 7 or more glasses of water a day. The patient is not going to take his lisinopril tomorrow until they call the office. The patient's wife was informed to make sure patient is drinking water. She is going to take his BP tomorrow morning and give the office a call with the update.

## 2023-08-30 ENCOUNTER — CARE COORDINATION (OUTPATIENT)
Dept: CASE MANAGEMENT | Age: 58
End: 2023-08-30

## 2023-09-01 PROBLEM — R73.03 PRE-DIABETES: Status: ACTIVE | Noted: 2018-07-17

## 2023-09-01 PROBLEM — G89.29 CHRONIC MIDLINE LOW BACK PAIN WITHOUT SCIATICA: Status: ACTIVE | Noted: 2018-09-11

## 2023-09-01 PROBLEM — M54.50 CHRONIC MIDLINE LOW BACK PAIN WITHOUT SCIATICA: Status: ACTIVE | Noted: 2018-09-11

## 2023-09-01 PROBLEM — R35.0 URINARY FREQUENCY: Status: ACTIVE | Noted: 2018-07-16

## 2023-09-05 ENCOUNTER — CARE COORDINATION (OUTPATIENT)
Dept: CARE COORDINATION | Age: 58
End: 2023-09-05

## 2023-09-05 NOTE — CARE COORDINATION
Date/Time:  9/5/2023 9:35 AM  RN  attempted to reach patient by telephone regarding red alert (weight gain >5# in 7 days) in remote patient monitoring program. Left HIPPA compliant message requesting a return call. Will attempt to reach patient again. Date/Time:  9/5/2023 10:55 AM  RN  attempted to reach patient by telephone regarding red alert (weight gain >5# in 7 days) in remote patient monitoring program. Left HIPPA compliant message requesting a return call. EC has same phone number listed.

## 2023-09-06 ENCOUNTER — OFFICE VISIT (OUTPATIENT)
Dept: SURGERY | Age: 58
End: 2023-09-06
Payer: MEDICARE

## 2023-09-06 ENCOUNTER — CARE COORDINATION (OUTPATIENT)
Dept: CASE MANAGEMENT | Age: 58
End: 2023-09-06

## 2023-09-06 VITALS
RESPIRATION RATE: 96 BRPM | BODY MASS INDEX: 26.83 KG/M2 | SYSTOLIC BLOOD PRESSURE: 110 MMHG | HEIGHT: 68 IN | DIASTOLIC BLOOD PRESSURE: 70 MMHG | TEMPERATURE: 98.7 F | HEART RATE: 90 BPM | WEIGHT: 177 LBS

## 2023-09-06 DIAGNOSIS — K43.2 INCISIONAL HERNIA, WITHOUT OBSTRUCTION OR GANGRENE: Primary | ICD-10-CM

## 2023-09-06 DIAGNOSIS — R10.84 GENERALIZED ABDOMINAL PAIN: Primary | ICD-10-CM

## 2023-09-06 PROCEDURE — G8417 CALC BMI ABV UP PARAM F/U: HCPCS | Performed by: SURGERY

## 2023-09-06 PROCEDURE — 1111F DSCHRG MED/CURRENT MED MERGE: CPT | Performed by: SURGERY

## 2023-09-06 PROCEDURE — 3017F COLORECTAL CA SCREEN DOC REV: CPT | Performed by: SURGERY

## 2023-09-06 PROCEDURE — 4004F PT TOBACCO SCREEN RCVD TLK: CPT | Performed by: SURGERY

## 2023-09-06 PROCEDURE — 3074F SYST BP LT 130 MM HG: CPT | Performed by: SURGERY

## 2023-09-06 PROCEDURE — 3078F DIAST BP <80 MM HG: CPT | Performed by: SURGERY

## 2023-09-06 PROCEDURE — G8427 DOCREV CUR MEDS BY ELIG CLIN: HCPCS | Performed by: SURGERY

## 2023-09-06 PROCEDURE — 99212 OFFICE O/P EST SF 10 MIN: CPT | Performed by: SURGERY

## 2023-09-06 RX ORDER — SULFAMETHOXAZOLE AND TRIMETHOPRIM 800; 160 MG/1; MG/1
1 TABLET ORAL 2 TIMES DAILY
Qty: 10 TABLET | Refills: 0 | Status: SHIPPED | OUTPATIENT
Start: 2023-09-06 | End: 2023-09-11

## 2023-09-06 NOTE — CARE COORDINATION
(RPM) program for in-home monitoring: Yes, patient already enrolled. Care Transitions Subsequent and Final Call    Subsequent and Final Calls  Do you have any ongoing symptoms?: Yes  Onset of Patient-reported symptoms: Yesterday  Patient-reported symptoms: Other  Interventions for patient-reported symptoms: Notified PCP/Physician  Have your medications changed?: No  Do you have any questions related to your medications?: No  Do you currently have any active services?: Yes  Are you currently active with any services?: Home Health  Do you have any needs or concerns that I can assist you with?: No  Identified Barriers: None  Care Transitions Interventions  Other Interventions:             Care Transition Nurse provided contact information for future needs. Plan for follow-up call in 1-2 days based on severity of symptoms and risk factors.   Plan for next call: self management-apt abd redness  stool incontinence     Eugene ARAUZ, RN, [facility]  Care Transition Nurse  [phone] mobile

## 2023-09-06 NOTE — PROGRESS NOTES
HPI: Nursing notes reviewed. Patient with mild pain. Was lifting something heavy and since had some swelling and skin erythema around incision. No drainage. ROS:  10 point review of systems performed with pertinent positives in HPI    Phys:    Abd - soft, no obvious hernia recurrence, faint erythema    Incision - healed    Assesment: 60 yo s/p incisional hernia repair with mesh    Plan: 1. Will start abx for cellulitis while awaiting CT results   2.   Prior postop CT showed some fluid related to repair - will follow up on as well

## 2023-09-08 ENCOUNTER — CARE COORDINATION (OUTPATIENT)
Dept: CASE MANAGEMENT | Age: 58
End: 2023-09-08

## 2023-09-08 NOTE — CARE COORDINATION
Care Transitions Outreach Attempt    Attempted to reach patient for transitions of care follow up. Unable to reach patient. LVM. Patient: Ian Blanco Patient : 1965 MRN: [id number]    Last Discharge [address]       Date Complaint Diagnosis Description Type Department Provider    8/10/23 Cerebrovascular Accident Cerebrovascular accident (CVA), unspecified mechanism [facility]) ED to Hosp-Admission (Discharged) (ADMITTED) Ash Hayden MD; Liss Pritchard. .. Was this an external facility discharge? No Discharge Facility: n.a    Noted following upcoming appointments from discharge chart review:   [facility] follow up appointment(s):   Future Appointments   Date Time Provider [address]  [address]   2023  8:40 AM Bartley Duane, APRN - CNP [facility] AND [facility] follow up appointment(s):  Temo ARAUZ, RN, [facility]  Care Transition Nurse  [phone] mobile

## 2023-09-13 ENCOUNTER — CARE COORDINATION (OUTPATIENT)
Dept: CASE MANAGEMENT | Age: 58
End: 2023-09-13

## 2023-09-13 ENCOUNTER — CARE COORDINATION (OUTPATIENT)
Dept: CARE COORDINATION | Age: 58
End: 2023-09-13

## 2023-09-13 NOTE — CARE COORDINATION
Received VM from spouse reporting some issues with tablet for RPM. CTN contacted patient back and LVM with number for IT support.      Ny HILLSN, RN, [facility]  Care Transition Nurse  [phone] mobile

## 2023-09-13 NOTE — CARE COORDINATION
Received Notice of CTN referral for care coordination.  HRCM program start at this time due to open RPM session

## 2023-09-13 NOTE — CARE COORDINATION
Care Transitions Outreach Attempt    Attempted to reach patient for transitions of care follow up. Unable to reach patient. LVM. CTN handoff to Gricel Austin     Patient: Lyubov Francis Patient : 1965 MRN: [id number]    Last Discharge [address]       Date Complaint Diagnosis Description Type Department Provider    8/10/23 Cerebrovascular Accident Cerebrovascular accident (CVA), unspecified mechanism [facility]) ED to Hosp-Admission (Discharged) (ADMITTED) Tomy Cortez MD; Nicole Burnham. .. Was this an external facility discharge? No Discharge Facility: n.a    Noted following upcoming appointments from discharge chart review:   [facility] follow up appointment(s):   Future Appointments   Date Time Provider [facility] [address]   2023 10:30 AM [facility] CT VCT [facility]Z CT Hussein Rad   2023  8:40 AM MAT Espinal - CNP [facility] AND [facility] SPARKS     Non-BS follow up appointment(s):  Milton ARAUZ, RN, [facility]  Care Transition Nurse  [phone] mobile

## 2023-09-19 ENCOUNTER — TELEPHONE (OUTPATIENT)
Dept: INTERNAL MEDICINE CLINIC | Age: 58
End: 2023-09-19

## 2023-09-19 NOTE — TELEPHONE ENCOUNTER
[facility] CT department wants the order changed from CT w/out contract to:    CT Abdomen/Pelvis with contrast

## 2023-09-20 ENCOUNTER — TELEPHONE (OUTPATIENT)
Dept: SURGERY | Age: 58
End: 2023-09-20

## 2023-09-20 ENCOUNTER — CARE COORDINATION (OUTPATIENT)
Dept: CARE COORDINATION | Age: 58
End: 2023-09-20

## 2023-09-20 DIAGNOSIS — Z01.812 PRE-PROCEDURE LAB EXAM: ICD-10-CM

## 2023-09-20 DIAGNOSIS — R10.84 ABDOMINAL PAIN, GENERALIZED: Primary | ICD-10-CM

## 2023-09-20 NOTE — CARE COORDINATION
ACM attempted outreach. Left message. Contact information for call back provided. Active with RPM (CTN referral)  CVA      Plan      Enrollment still needs completed.

## 2023-09-20 NOTE — TELEPHONE ENCOUNTER
Returned call - put in new order for CT Abd/Pel w/ Oral & IV Contrast per technician (to include dx code) & STAT [facility]

## 2023-09-20 NOTE — TELEPHONE ENCOUNTER
Eleni Matute from Imaging called regarding Pt CT that was ordered and scheduled tomorrow. She says it doesn't show the diagnosis & the diagnosis needs to be on the order.  Please give her a call, thank you

## 2023-09-21 ENCOUNTER — HOSPITAL ENCOUNTER (OUTPATIENT)
Dept: CT IMAGING | Age: 58
Discharge: HOME | End: 2023-09-21
Payer: MEDICARE

## 2023-09-21 ENCOUNTER — HOSPITAL ENCOUNTER (OUTPATIENT)
Age: 58
Discharge: HOME | End: 2023-09-21
Payer: MEDICARE

## 2023-09-21 ENCOUNTER — TELEPHONE (OUTPATIENT)
Dept: INTERNAL MEDICINE CLINIC | Age: 58
End: 2023-09-21

## 2023-09-21 DIAGNOSIS — E11.65 TYPE 2 DIABETES MELLITUS WITH HYPERGLYCEMIA, WITHOUT LONG-TERM CURRENT USE OF INSULIN (HCC): ICD-10-CM

## 2023-09-21 DIAGNOSIS — R10.84 ABDOMINAL PAIN, GENERALIZED: ICD-10-CM

## 2023-09-21 DIAGNOSIS — E11.65 TYPE 2 DIABETES MELLITUS WITH HYPERGLYCEMIA, WITHOUT LONG-TERM CURRENT USE OF INSULIN (HCC): Primary | ICD-10-CM

## 2023-09-21 LAB
CREAT UR-MCNC: 93.5 MG/DL (ref 39–259)
MICROALBUMIN UR DL<=1MG/L-MCNC: <1.2 MG/DL
MICROALBUMIN/CREAT UR: NORMAL MG/G (ref 0–30)

## 2023-09-21 PROCEDURE — 82570 ASSAY OF URINE CREATININE: CPT

## 2023-09-21 PROCEDURE — 82043 UR ALBUMIN QUANTITATIVE: CPT

## 2023-09-21 PROCEDURE — 74177 CT ABD & PELVIS W/CONTRAST: CPT

## 2023-09-21 PROCEDURE — 6360000004 HC RX CONTRAST MEDICATION: Performed by: SURGERY

## 2023-09-21 RX ADMIN — IOPAMIDOL 75 ML: 755 INJECTION, SOLUTION INTRAVENOUS at 19:03

## 2023-09-21 RX ADMIN — DIATRIZOATE MEGLUMINE AND DIATRIZOATE SODIUM 12 ML: 660; 100 LIQUID ORAL; RECTAL at 19:05

## 2023-09-25 DIAGNOSIS — G89.29 CHRONIC LEFT-SIDED LOW BACK PAIN WITH LEFT-SIDED SCIATICA: ICD-10-CM

## 2023-09-25 DIAGNOSIS — M54.42 CHRONIC LEFT-SIDED LOW BACK PAIN WITH LEFT-SIDED SCIATICA: ICD-10-CM

## 2023-09-25 RX ORDER — EZETIMIBE 10 MG/1
10 TABLET ORAL DAILY
Qty: 90 TABLET | Refills: 1 | Status: SHIPPED | OUTPATIENT
Start: 2023-09-25

## 2023-09-25 RX ORDER — GABAPENTIN 300 MG/1
CAPSULE ORAL
Qty: 60 CAPSULE | Refills: 3 | Status: SHIPPED | OUTPATIENT
Start: 2023-09-25 | End: 2023-12-24

## 2023-09-25 NOTE — TELEPHONE ENCOUNTER
Refill request forEZETIMIBE 10 MG TABLET  medication.      Name of [address] [place] [address]      Last visit - 8-     Pending visit - 9-    Last refill - 6-      Medication Contract signed -   Last Feliberto hicks-         Additional Comments

## 2023-09-25 NOTE — TELEPHONE ENCOUNTER
Refill request for GABAPENTIN 300 MG CAPSULE medication.      Name of [address] [place] [address]      Last visit - 8-     Pending visit - 9-    Last refill - 8-      Medication Contract signed -   Last Adine [place] ran-         Additional Comments

## 2023-09-29 ENCOUNTER — TELEMEDICINE (OUTPATIENT)
Dept: INTERNAL MEDICINE CLINIC | Age: 58
End: 2023-09-29

## 2023-09-29 DIAGNOSIS — K43.2 INCISIONAL HERNIA, WITHOUT OBSTRUCTION OR GANGRENE: Primary | ICD-10-CM

## 2023-09-29 DIAGNOSIS — L03.90 CELLULITIS, UNSPECIFIED CELLULITIS SITE: ICD-10-CM

## 2023-09-29 RX ORDER — CLOPIDOGREL BISULFATE 75 MG/1
75 TABLET ORAL DAILY
Qty: 30 TABLET | Refills: 0 | Status: SHIPPED | OUTPATIENT
Start: 2023-09-29

## 2023-09-29 ASSESSMENT — ENCOUNTER SYMPTOMS
CONSTIPATION: 0
COUGH: 0
SORE THROAT: 0
SHORTNESS OF BREATH: 0
NAUSEA: 0
VOMITING: 0
CHEST TIGHTNESS: 0
SINUS PAIN: 0
DIARRHEA: 0

## 2023-09-29 NOTE — PROGRESS NOTES
2023    TELEHEALTH EVALUATION -- Audio/Visual (During EUPDO-79 public health emergency)    HPI:    Gilberto Blakely (:  1965) has requested an audio/video evaluation for the following concern(s):    Mr. Sita Jimenez presents for follow up after recent surgery for incision hernia repair. He states he has recovered well, however initially suffered some swelling, redness at the wound site. He was treated for cellulitis by Dr. Ghulam Maurer and reports significant improvement in symptoms. Notes decreased redness, decreased palpable swelling, less pain. Denies drainage, odor. He finished his antibiotic ~2 weeks ago. His repeat CT scan showed improvement in the cellulitis/abscess site with significant size reduction. He notes even further improvement in his abdomen since the CT scan was performed . Denies other concerns of note. States he is otherwise well. Review of Systems   Constitutional:  Negative for fever. HENT:  Negative for sinus pain and sore throat. Respiratory:  Negative for cough, chest tightness and shortness of breath. Cardiovascular:  Negative for chest pain and palpitations. Gastrointestinal:  Negative for constipation, diarrhea, nausea and vomiting. Genitourinary:  Negative for dysuria and urgency. Skin:  Positive for wound (Incisional hernia wound, now healing well). Negative for rash. Neurological:  Negative for dizziness and weakness. Prior to Visit Medications    Medication Sig Taking?  Authorizing Provider   clopidogrel (PLAVIX) 75 MG tablet Take 1 tablet by mouth daily Yes MAT Colón CNP   ezetimibe (ZETIA) 10 MG tablet TAKE ONE TABLET BY MOUTH DAILY Yes MAT Colón CNP   gabapentin (NEURONTIN) 300 MG capsule TAKE 1 CAPSULE BY MOUTH TWICE A DAY FOR 30 DAYS Yes MAT Colón CNP   traZODone (DESYREL) 50 MG tablet TAKE ONE TO TWO TABLETS BY MOUTH EVERY NIGHT AT BEDTIME AS NEEDED FOR SLEEP Yes MAT Colón CNP   diclofenac sodium

## 2023-10-02 ENCOUNTER — CARE COORDINATION (OUTPATIENT)
Dept: CARE COORDINATION | Age: 58
End: 2023-10-02

## 2023-10-02 ASSESSMENT — SOCIAL DETERMINANTS OF HEALTH (SDOH)
HOW OFTEN DO YOU GET TOGETHER WITH FRIENDS OR RELATIVES?: THREE TIMES A WEEK
HOW OFTEN DO YOU ATTEND CHURCH OR RELIGIOUS SERVICES?: 1 TO 4 TIMES PER YEAR
HOW OFTEN DO YOU ATTENT MEETINGS OF THE CLUB OR ORGANIZATION YOU BELONG TO?: NEVER
DO YOU BELONG TO ANY CLUBS OR ORGANIZATIONS SUCH AS CHURCH GROUPS UNIONS, FRATERNAL OR ATHLETIC GROUPS, OR SCHOOL GROUPS?: NO
IN A TYPICAL WEEK, HOW MANY TIMES DO YOU TALK ON THE PHONE WITH FAMILY, FRIENDS, OR NEIGHBORS?: THREE TIMES A WEEK

## 2023-10-02 NOTE — ACP (ADVANCE CARE PLANNING)
Advance Care Planning   Healthcare Decision Maker:    Primary Decision Maker: Carlos Garcia - [phone]    Click here to complete Healthcare Decision Makers including selection of the Healthcare Decision Maker Relationship (ie \"Primary\"). Today we documented desired Decision Maker(s), who is (are) NOT Legal Next of Kin. ACP documents are required for decision maker authority.  Referrall for ACP follow up made

## 2023-10-03 ENCOUNTER — CARE COORDINATION (OUTPATIENT)
Dept: CARE COORDINATION | Age: 58
End: 2023-10-03

## 2023-10-03 NOTE — CARE COORDINATION
SW received ACP referral from [facility]. SW placed call to patient and spoke to spouse Hermelindo Vazquez who understands purpose of POA/Living Will documents. She is in agreement to receive paperwork via mail. SW to follow up with her next week to ensure receipt and assist in completion.     Janene King MSW, [facility]     [phone]

## 2023-10-05 RX ORDER — TRAZODONE HYDROCHLORIDE 50 MG/1
TABLET ORAL
Qty: 30 TABLET | Refills: 1 | Status: SHIPPED | OUTPATIENT
Start: 2023-10-05

## 2023-10-05 NOTE — TELEPHONE ENCOUNTER
Refill request for TRAZODONE medication.      Name of [address] [place] [address]      Last visit - 9/29/23     Pending visit - NONE    Last refill -9/15/23      Medication Contract signed -   Last Oarrs ran-         Additional Comments

## 2023-10-10 ENCOUNTER — CARE COORDINATION (OUTPATIENT)
Dept: CARE COORDINATION | Age: 58
End: 2023-10-10

## 2023-10-10 NOTE — CARE COORDINATION
SW placed call to patient and spoke to spouse, Zack Otto, to ensure receipt of mailed AD packet. Zack Otto stated she has not yet received it. SW to follow-up later this week to touch base.      Morena HAMM, [facility]     [phone]

## 2023-10-11 ENCOUNTER — CARE COORDINATION (OUTPATIENT)
Dept: CASE MANAGEMENT | Age: 58
End: 2023-10-11

## 2023-10-11 NOTE — CARE COORDINATION
ACM: Mathis Blizzard, LPN    Spoke with [facility] attempted outreach for ACM follow up call. Left HIPPA compliant message and contact information for call back. Plan    Follow up call in 2 weeks  Request glucometer supplies   DM education and HTN to continue   AD referral made   Establish goals next call      Lab Results       None                 Goals Addressed    None         No future appointments.

## 2023-10-13 ENCOUNTER — CARE COORDINATION (OUTPATIENT)
Dept: CARE COORDINATION | Age: 58
End: 2023-10-13

## 2023-10-13 VITALS
SYSTOLIC BLOOD PRESSURE: 155 MMHG | OXYGEN SATURATION: 98 % | BODY MASS INDEX: 27.67 KG/M2 | WEIGHT: 182 LBS | HEART RATE: 100 BPM | DIASTOLIC BLOOD PRESSURE: 88 MMHG | TEMPERATURE: 100 F

## 2023-10-13 NOTE — CARE COORDINATION
SW placed follow up call to patient/spouse to inquire about receipt of mailed AD packet. Left voicemail and requested return call.      Sita AHMM, [place]     [phone]

## 2023-10-16 ENCOUNTER — TELEPHONE (OUTPATIENT)
Dept: INTERNAL MEDICINE CLINIC | Age: 58
End: 2023-10-16

## 2023-10-16 NOTE — TELEPHONE ENCOUNTER
Mike Luqueanastasia at [facility] is calling today    Blood thinner approval letter was signed by PCP but not checked yes or no. A verbal ok was given over the phone.

## 2023-10-18 ENCOUNTER — CARE COORDINATION (OUTPATIENT)
Dept: CARE COORDINATION | Age: 58
End: 2023-10-18

## 2023-10-18 NOTE — CARE COORDINATION
MAR placed follow-up call to spouse Devora Walsh who confirmed receipt of mailed AD packet. She does not have any questions at this time, but will call this worker if any arise.  Will sign off.     Ulysses Rey MSW, [facility]     [phone] Deterioration of Condition En Route

## 2023-10-19 ENCOUNTER — CARE COORDINATION (OUTPATIENT)
Dept: CARE COORDINATION | Age: 58
End: 2023-10-19

## 2023-10-19 NOTE — TELEPHONE ENCOUNTER
Melatonin fine to try. Aim for 3-5 mg per dose.      Would suggest either trying 25 mg trazodone or melatonin but not both simultaneously

## 2023-10-19 NOTE — CARE COORDINATION
Ambulatory Care Coordination Note  10/19/2023    Patient Current Location: [address] contacted the patient and family by telephone. Verified name and  with patient and family as identifiers. Provided introduction to self, and explanation of the ACM role. Method of communication with provider: chart routing. ACM: Bette Delgadillo, RN    ACM spoke with patient and his wife Hermelindo Vazquez by phone. Patient continues on RPM. BP was eleveated some on last reading. 149/90. He takes his meds around time of BP readings. I have asked him to continue to assess for elevated bp or > 140/90. She feels sometimes he may not have the cuff right where it needs to be. Educated on position and the need rest and recheck if he gets an out of range reading. She also stated he is really dragging and tired, \"lifeless\" She she states when he takes his trazodone. 50 mg hs. Last night he took none and was much more alert today. Advised not to take it if he is not having trouble sleeping or half the dose. Will advise PCP. She wanted to try Melatonin instead but cannot get this until they get paid on . Cautioned again falls and oversedation. He continues with back pain. They had not heard anything from Dr. Debbie Monae for back injections. ACM placed call to assess status. It has been approved and PCP fax with Plavix hold recommendations provided. Office stated they would contact to schedule with patient. Informed that he was still having quite a bit of back pain and could use f/u.      DM  Lab Results   Component Value Date    LABA1C 5.8 2023    LABA1C 6.2 2023    LABA1C 6.5 2023     Lab Results   Component Value Date    .8 2023    .2 2023    .7 2022         AD follow up completed    Plan    Follow up call in 1-2 weeks  Pain mgt f/u status   Assess sleep pattern-use of sleep aids   Request glucometer supplies still needed   DM education and HTN to continue                  Offered

## 2023-10-20 ENCOUNTER — CARE COORDINATION (OUTPATIENT)
Dept: CASE MANAGEMENT | Age: 58
End: 2023-10-20

## 2023-10-20 NOTE — CARE COORDINATION
Remote Alert Monitoring Note  Rpm alert to be reviewed by the provider                 Date/Time:  10/20/2023 10:53 AM  Patient Current Location: [place]  LPN contacted family by telephone. Verified patients name and  as identifiers. Background: RPM for HTN  Refer to 911 immediately if:  Patient unresponsive or unable to provide history  Change in cognition or sudden confusion  Patient unable to respond in complete sentences  Intense chest pain/tightness  Any concern for any clinical emergency  Red Alert: Provider response time of 1 hr required for any red alert requiring intervention  Yellow Alert: Provider response time of 3hr required for any escalated yellow alert    O2 Triage  Are you having any Chest Pain? no   Are you having any Shortness of Breath? no   Swelling in your hands or feet? no     Are you having any other health concerns or issues? no     HR Triage  Are you having any Chest Pain? no   Are you having any Shortness of Breath? no   Are you having any dizziness? no   Are you feeling more fatigued or tired than normal? no   Are you having any other health concerns or issues? no            Clinical Interventions: Reviewed and followed up on alerts and treatments-LPN contacted wife in regards to RPM red alert for manual BP HR entry of 170 bpm, and PO of 84%. Wife stated that the pt is \"fine\" and entered the metrics manually. Metrics are incorrect, per wife. Pt is not home at this time. Wife will update metrics when pt arrives back home. Plan/Follow Up: Will continue to review, monitor and address alerts with follow up based on severity of symptoms and risk factors.     Elias Lr LPN, [facility]  PH: [phone]       Current Patient Metrics ---- Blood Pressure: 144/73, 70bpm Pulseox: 84%, 111bpm Temperature: - Weight: 181.4lbs Note Created at: 10/20/2023 11:33 AM ET ---- Time-Spent: 12 minutes 0 seconds

## 2023-10-24 NOTE — CARE COORDINATION
Remote Patient Monitoring Note      Date/Time:  8/28/2023 1:00 PM  Patient Current Location: [place]  LPN contacted patient by telephone regarding red alert received for blood pressure readings of   173/119/103  156/106/86  169/106/90. Left HIPAA compliant VM asking pt to return call    Background: PT enrolled for HTN    Plan/Follow Up: Will continue to review, monitor and address alerts with follow up based on severity of symptoms and risk factors. 1349- 2nd outreach, no answer.  EC has the same # listed as pt, will notify ACM Psychiatric rehabilitation staff recommends that patient continue to demonstrate daily medication compliance and utilize identified coping skills to manage symptoms. Patient would benefit from engaging with [facility] team for ongoing medication management, support and psychotherapy.

## 2023-10-30 ENCOUNTER — CARE COORDINATION (OUTPATIENT)
Dept: CARE COORDINATION | Age: 58
End: 2023-10-30

## 2023-10-30 ENCOUNTER — CARE COORDINATION (OUTPATIENT)
Dept: CASE MANAGEMENT | Age: 58
End: 2023-10-30

## 2023-10-30 NOTE — CARE COORDINATION
EFREMM spoke to spouse who stated pt was doing well, but that she was not with him at this time. ANNA reminded her that pt should not be entering vitals manually. She will continue to remind pt, also.

## 2023-10-30 NOTE — CARE COORDINATION
Date/Time:  10/30/2023 12:01 PM    LPN attempted to reach patient and spouse by telephone x 2 regarding red alert in remote patient monitoring program for PO HR manual entry of 2 bpm, and yellow alert for BP of 173/94. Wife and pt advised to stop entering manually. HRS IT has rebooted the tablet and entries should be transferring via blue tooth. Manual entries not needed. Left HIPPA compliant message requesting a return call. Will advise ACM per RPM protocol.     Tammy Finnegan LPN,   PH: [phone]     Current Patient Metrics ---- Blood Pressure: 173/94, 80bpm Pulseox: 94%, 2bpm Temperature: - Weight: 185.1lbs Note Created at: 10/30/2023 12:04 PM ET ---- Time-Spent: 5 minutes 0 seconds

## 2023-11-01 ENCOUNTER — CARE COORDINATION (OUTPATIENT)
Dept: CASE MANAGEMENT | Age: 58
End: 2023-11-01

## 2023-11-01 RX ORDER — TRAZODONE HYDROCHLORIDE 50 MG/1
TABLET ORAL
Qty: 30 TABLET | Refills: 1 | Status: SHIPPED | OUTPATIENT
Start: 2023-11-01

## 2023-11-01 NOTE — TELEPHONE ENCOUNTER
Refill request for TRAZODONE medication.      Name of [address] [place] [address]      Last visit - 9/29/23     Pending visit - NONE    Last refill -10/5/23      Medication Contract signed -   Last Oarrs ran-         Additional Comments

## 2023-11-01 NOTE — CARE COORDINATION
Remote Patient Monitoring Note      Date/Time:  11/1/2023 3:44 PM  Rios Berry , I am a nurse with the remote patient monitoring team;  reaching out to you today to remind you to please take your vitals. Important: Please try to take your vitals each day before 12pm. This ensures the monitoring team will have time to connect with your providers and get back to you with any new orders or instructions.        Katerina Craver LPN, [facility]  PH: [phone]     Current Patient Metrics ---- Blood Pressure: -/-, -bpm Pulseox: -%, -bpm Temperature: - Weight: -lbs Note Created at: 11/01/2023 03:44 PM ET ---- Time-Spent: 5 minutes 0 seconds

## 2023-11-02 ENCOUNTER — CARE COORDINATION (OUTPATIENT)
Dept: CARE COORDINATION | Age: 58
End: 2023-11-02

## 2023-11-03 ENCOUNTER — CARE COORDINATION (OUTPATIENT)
Dept: CASE MANAGEMENT | Age: 58
End: 2023-11-03

## 2023-11-03 NOTE — CARE COORDINATION
Date/Time:  11/3/2023 9:21 AM    LPN attempted to reach patient and spouse  by telephone x 2  regarding red alert in remote patient monitoring program for BP of  184/111. Calling to triage and to ensure that pt has taken his Coreg prior to checking BP. Pt also entering metrics manually. HRS IT advised to reboot for transmission issues. Unable to leave a  HIPPA compliant message requesting a return call, cannot be completed. Will advise ACM per RPM protocol.     Omkar Calvo LPN, [facility]  PH: [phone]    - Current Patient Metrics ---- Blood Pressure: 184/111, 100bpm Pulseox: 96%, 100bpm Temperature: - Weight: 184.9lbs Note Created at: 11/03/2023 09:24 AM ET ---- Time-Spent: 5 minutes 0 seconds

## 2023-11-06 ENCOUNTER — CARE COORDINATION (OUTPATIENT)
Dept: CARE COORDINATION | Age: 58
End: 2023-11-06

## 2023-11-06 NOTE — CARE COORDINATION
Remote Patient Monitoring Note      Date/Time:  11/6/2023 10:00 AM  Patient Current Location: [address] [place], [place] [address]N noted red alert received for weight increase (# in 7 days). Background: Pt enrolled for HTN    Clinical Interventions:  wt alert noted, pt is within his baseline weight of 185-188lbs, other metrics WNL      Plan/Follow Up: Will continue to review, monitor and address alerts with follow up based on severity of symptoms and risk factors.

## 2023-11-07 ENCOUNTER — CARE COORDINATION (OUTPATIENT)
Dept: CARE COORDINATION | Age: 58
End: 2023-11-07

## 2023-11-07 NOTE — CARE COORDINATION
Remote Patient Monitoring Note      Date/Time:  11/7/2023 11:37 AM  Patient Current Location: [place]  LPN contacted patient by telephone regarding yellow alert received for blood pressure reading (174/66). Background: Pt enrolled for HTN monitoring    Clinical Interventions:  ACM spoke w/ pt this morning regarding BP. He had not taken his medications yet, she asked him to recheck in 2 hours, pt conversation was sent to me as the monitoring nurse. Recheck BP is still elevated at 174/66. He has no concerning sx per last phone call. Will notify PCP to see if he would like to change anything or monitor for now      Plan/Follow Up: Will continue to review, monitor and address alerts with follow up based on severity of symptoms and risk factors.

## 2023-11-07 NOTE — CARE COORDINATION
Ambulatory Care Coordination Note  2023    Patient Current Location: [place]     ACM contacted the patient by telephone. Verified name and  with patient as identifiers. Provided introduction to self, and explanation of the ACM role. Challenges to be reviewed by the provider   Additional needs identified to be addressed with provider: Yes  Elevated BP of 192/95 this am. Weight has been up 3 lbs for last 2 days. Denies shortness of breath. No increased edema. Instructed to take am meds and recheck on rpm in 2 hours. Method of communication with provider: chart routing. ACM: Marilu Mcnally RN    Follow up call completed. Recent weight increased noted. Patient and spouse denied any symptoms. She stated he seems to be doing well. Asked about diet and reminded of need for 2000 mg low sodium diet . She said he had been eating more cupcakes. Explianed that baked good also have sodium and reviewed other key items such as fast food, processed convenience food and avoiding the salt shaker all together. Patient also has a hx of DM that has been controlled. He does not currently check his sugars at home. DM diet and building a plate information shared. Stress 64 ounces fluid limit and water as best option. He does drink soda per wife. Additonal dietary support follow up needs. Bp was taken on call and elevated. See above. Asked to recheck in 2 hours. Updated RPM support of plan for recheck. Right care right time information shared. Plan    Follow up bp readings later today  Assess for dietician needs  Follow up on glucometer supplies. Offered patient enrollment in the Remote Patient Monitoring (RPM) program for in-home monitoring:  active on RPM  .    Lab Results       None            Care Coordination Interventions    Referral from Primary Care Provider: No  Suggested Interventions and Community Resources  Medi Set or Pill Pack: Declined (Comment: wife assists with mes.  23

## 2023-11-08 RX ORDER — LISINOPRIL 5 MG/1
5 TABLET ORAL DAILY
Qty: 30 TABLET | Refills: 0 | Status: SHIPPED | OUTPATIENT
Start: 2023-11-08 | End: 2023-12-06

## 2023-11-08 NOTE — TELEPHONE ENCOUNTER
Recommend adding 5 mg lisinopril if BP continues to be elevated.  Will need to continue checking his BP 2-3 times daily after starting

## 2023-11-09 ENCOUNTER — CARE COORDINATION (OUTPATIENT)
Dept: CARE COORDINATION | Age: 58
End: 2023-11-09

## 2023-11-09 NOTE — CARE COORDINATION
Ambulatory Care Coordination Note  2023    Patient Current Location: [address] contacted the spouse/partner by telephone. Verified name and  with spouse/partner as identifiers. Provided introduction to self, and explanation of the ACM role. Method of communication with provider: staff message. ACM: Cecy Alaniz, RN    ACM spoke with patient and his wife. No RPM yesterday or today. Wife says he forgot. He was also asked to start lisinopril 5 mg on 23 related to BP. He has not picked this up yet. They were at their daughter and could not get reading. I have asked them to start med and record. Wife feels the rpm is not working right. She was not clear but stated it says it happens when he does it manually. Unable to determine what she means by this. I asked her to contact tech support if the bp did not show up on display. She was also advised to power off and back on first to see that corrected the issue. Follow up call again for tomorrow needed    Plan    Bp check   Rpm status     Offered patient enrollment in the Remote Patient Monitoring (RPM) program for in-home monitoring:  enrolled already. .    Lab Results       None            Care Coordination Interventions    Referral from Primary Care Provider: No  Suggested Interventions and Community Resources  Medi Set or Pill Pack: Declined (Comment: wife assists with mes. 23 trb)  Senior Services: [employer] Work: Declined  Transportation Support: Declined  Zone Management Tools: In Process (Comment: DM and HTN)  Other Services or Interventions: active on RPM          Goals Addressed    None         No future appointments.  and   Hypertension - Encounter Level

## 2023-11-10 ENCOUNTER — CARE COORDINATION (OUTPATIENT)
Dept: CARE COORDINATION | Age: 58
End: 2023-11-10

## 2023-11-10 NOTE — CARE COORDINATION
Date/Time:  11/10/2023 10:42 AM  LPN attempted to reach patient by telephone regarding yellow alert in remote patient monitoring program. Left HIPPA compliant message requesting a return call. Will attempt to reach patient again.

## 2023-11-13 ENCOUNTER — CARE COORDINATION (OUTPATIENT)
Dept: CARE COORDINATION | Age: 58
End: 2023-11-13

## 2023-11-13 NOTE — CARE COORDINATION
ACM attempted outreach. Left message. Contact information for call back provided. RPM alert for elevated BP.  UTR

## 2023-11-13 NOTE — CARE COORDINATION
Date/Time:  11/13/2023 11:45 AM  LPN attempted to reach patient by telephone regarding red alert (/109) in remote patient monitoring program. Left HIPPA compliant message requesting a return call. Will attempt to reach patient again.  Will notify ACM

## 2023-11-14 ENCOUNTER — CARE COORDINATION (OUTPATIENT)
Dept: CARE COORDINATION | Age: 58
End: 2023-11-14

## 2023-11-14 RX ORDER — FERROUS SULFATE 325(65) MG
325 TABLET ORAL
COMMUNITY

## 2023-11-14 NOTE — CARE COORDINATION
Ambulatory Care Coordination Note  2023    Patient Current Location: [place]     ACM contacted the patient by telephone. Verified name and  with patient as identifiers. Provided introduction to self, and explanation of the ACM role. Method of communication with provider: chart routing. ACM: Lise Menon RN  ACM was able to reach patient and his wife this am . He did rpm reading. Bp wa 146/83 and wt was up at 192. Stressed compliance with checking vitals and weight daily. They are going to try an be more consistent. Discussed weight. Patient is not consisitent with low sodium diet. Wife said he salted a bologna sandwchich yesterday. Reviewed again guidelines and foods to avoid. Suggested they remove the salt shaker and they are no really reading labels. Discussed need to watch diet with diabetes. Wife stated he is not diabetic. Educated wife and patient on prediabetes and his previous a1C. She would like him to eat better. Handouts for prediabetes, low sodium diet, diabetic diet provided. She plans to call for follow up appt with PCP as I recommended he need more regular visits. Lab Results   Component Value Date    LABA1C 5.8 2023    LABA1C 6.2 2023    LABA1C 6.5 2023     Lab Results   Component Value Date    .8 2023    .2 2023    .7 2022     Plan    Follow up call next week   RPM continues   BP and prediabetes ed to continue  ACM will offer dietician support again         Lab Results       None            Care Coordination Interventions    Referral from Primary Care Provider: No  Suggested Interventions and Community Resources  Medi Set or Pill Pack: Declined (Comment: wife assists with mes. 23 trb)  Senior Services: [address] [employer] Work: Declined  Transportation Support: Declined  Zone Management Tools:  In Process (Comment: DM and HTN)  Other Services or Interventions: active on RPM          Goals Addressed                   This

## 2023-11-16 ENCOUNTER — CARE COORDINATION (OUTPATIENT)
Dept: CASE MANAGEMENT | Age: 58
End: 2023-11-16

## 2023-11-16 ENCOUNTER — CARE COORDINATION (OUTPATIENT)
Dept: CARE COORDINATION | Age: 58
End: 2023-11-16

## 2023-11-16 NOTE — CARE COORDINATION
Date/Time:  11/16/2023 10:45 AM  LPN attempted to reach patient and spouse  by telephone x 2  regarding red alert in remote patient monitoring program for BP of  105/101  and BP HR of 111. Calling to triage and to ensure that pt has taken his Coreg prior to checking BP. Pt also entering metrics manually. HRS IT has rebooted tablet on multiple occassions, and has made multiple attempts to reach pt and wife to troubleshoot.      Farzaneh Bryan LPN, [facility]  PH: [phone]    Current Patient Metrics ---- Blood Pressure: 105/101, 111bpm Pulseox: 93%, 100bpm Temperature: - Weight: 182.2lbs Note Created at: 11/16/2023 10:47 AM ET ---- Time-Spent: 5 minutes 0 seconds

## 2023-11-16 NOTE — CARE COORDINATION
ACM attempted outreach. Left message. Contact information for call back provided. RPM red alert earlier today. UTR. Patient has sherine having issues with his equipment and performing manually entries. He was directed previous to contact IT support regarding the issue. Attempting for follow up for additional bp readings.      Plan      Plan    Follow up call tomorrow again   RPM continues   BP and prediabetes ed to continue  ACM will offer dietician support again

## 2023-11-17 ENCOUNTER — CARE COORDINATION (OUTPATIENT)
Dept: CARE COORDINATION | Age: 58
End: 2023-11-17

## 2023-11-17 VITALS — OXYGEN SATURATION: 98 % | HEART RATE: 66 BPM

## 2023-11-17 NOTE — CARE COORDINATION
ANNA spoke with patients's wife this am. She again had not taken spouses bp. I have asked them to do this. She had him get on scale and nothing again happened. She tells me that she is not turning this device off and on. While on call she notified tech support again that it is not reading. She was advised that would look at it , apply some updates and try to resolve. Suyapa Frank tell me that another family  I have explained to the her that I would like to get some consistent accurate readings over the next 2 weeks. It has been hard to identify a pattern with his bp as he has not been consistently measured. I have explained to her the purpose of this equipment is for them to get into the pattern of assessing bp and weight and taking meds on a daily routine and then continue this on their own with their own equipment. Patient's wife called back and told me /79 wet 188.2, HR 66, and 98% sat on his equipment but not on the screens for IPAD. She received a return call from [facility] and then the call disconnected . No additional call from [facility] received. I have provided her [facility] tech support number  [phone] for follow up. Plan      Follow up call later today.

## 2023-11-20 ENCOUNTER — CARE COORDINATION (OUTPATIENT)
Dept: CARE COORDINATION | Age: 58
End: 2023-11-20

## 2023-11-20 NOTE — CARE COORDINATION
Date/Time:  11/20/2023 3:58 PM  LPN attempted to reach patient by telephone regarding yellow alert (no metrics x4 days) in remote patient monitoring program. Left HIPPA compliant message requesting a return call. Will attempt to reach patient again.

## 2023-11-21 ENCOUNTER — CARE COORDINATION (OUTPATIENT)
Dept: CARE COORDINATION | Age: 58
End: 2023-11-21

## 2023-11-21 NOTE — CARE COORDINATION
2nd outreach, no answer, left VM. EC number is the same as pt number.  Will notify ACM pt entered KEILA of     Update- 2971- pt rechecked vitals, now 135/91 and no longer a red alert

## 2023-11-21 NOTE — CARE COORDINATION
Date/Time:  11/21/2023 11:30 AM  LPN attempted to reach patient by telephone regarding red alert in remote patient monitoring program. Left HIPPA compliant message requesting a return call. Will attempt to reach patient again.

## 2023-11-27 ENCOUNTER — CARE COORDINATION (OUTPATIENT)
Dept: CARE COORDINATION | Age: 58
End: 2023-11-27

## 2023-11-27 NOTE — CARE COORDINATION
Date/Time:  11/27/2023 10:52 AM  LPN attempted to reach patient by telephone regarding red alert (BP reading 163/110) in remote patient monitoring program. Left HIPPA compliant message requesting a return call. Will attempt to reach patient again.

## 2023-11-27 NOTE — CARE COORDINATION
Ambulatory Care Coordination Note  2023    Patient Current Location: [address] [place] [address] contacted the spouse/partner by telephone. Verified name and  with spouse/partner as identifiers. Provided introduction to self, and explanation of the ACM role. Method of communication with provider: chart routing. ACM: Griselda Lesser, RN  ACM received RPM alert. I have contact his wife. Bp was 163/110. She stated patient was sleeping. She first told me that she rechecked it and it was better. Then told me that she would have him recheck when she would not recall values. Stress need for compliance and rechecks for elevated BP. She was asked to have patient recheck again in rpm as soon as possible. Plan    Follow up check for rpm   Consider d/c of rpm as patient has not been compliant with use of equipment . Offered patient enrollment in the Remote Patient Monitoring (RPM) program for in-home monitoring:  active  . Lab Results       None            Care Coordination Interventions    Referral from Primary Care Provider: No  Suggested Interventions and Community Resources  Medi Set or Pill Pack: Declined (Comment: wife assists with mes. 23 trb)  Senior Services: [address] [email or web address] [facility] [employer] Work: Declined  Transportation Support: Declined  Zone Management Tools: In Process (Comment: DM and HTN)  Other Services or Interventions: active on RPM          Goals Addressed    None         No future appointments.

## 2023-11-28 ENCOUNTER — CARE COORDINATION (OUTPATIENT)
Dept: CARE COORDINATION | Age: 58
End: 2023-11-28

## 2023-11-28 ENCOUNTER — CARE COORDINATION (OUTPATIENT)
Dept: CASE MANAGEMENT | Age: 58
End: 2023-11-28

## 2023-11-28 NOTE — CARE COORDINATION
Date/Time:  11/28/2023 10:02 AM    LPN attempted to reach family by telephone regarding red alert in remote patient monitoring program for BP of 204/116 . Writer was able to reach spouse. Spouse is not with pt and does not know if pt is having any symptoms. Spouse is not certain if pt has taken his BP meds prior to checking BP. Spouse will attempt to reach pt and advise pt to take his BP  meds and then recheck BP.     Amol Bolanos LPN, [facility]  PH: [phone]       Current Patient Metrics ---- Blood Pressure: 204/116, 69bpm Pulseox: 98%, 64bpm Temperature: - Weight: 184.4lbs Note Created at: 11/28/2023 10:07 AM ET ---- Time-Spent: 5 hours 0 seconds

## 2023-11-28 NOTE — CARE COORDINATION
Ambulatory Care Coordination Note  2023    Patient Current Location: [address] contacted the spouse/partner by telephone. Verified name and  with spouse/partner as identifiers. Provided introduction to self, and explanation of the ACM role. Method of communication with provider: chart routing. ACM: Rolanda Garcia, RN      ACM spoke with patients wife today. RPM alert for elevated bp. She was not home again to recheck at this time. I have stressed to her my concerns that his am bp readings are so elevated prior to him taking his meds. They do come down after am meds are taken. See readings. She denies him missing meds. They are in medi box and she finds no missed doses. He  has some weight gain, but no swelling for increased shortness of breath noted. She thinks he is not putting the cuff on correctly possibly. I have asked her to assist him for best assessment. Explained to her that he may need additional follow up with provider if his am readings are this high. She agrees to assist him more. No chest pain or any other changes in symptoms noted. Offered patient enrollment in the Remote Patient Monitoring (RPM) program for in-home monitoring:  active . Plan    Wife to reassess the readings when she gets home. Stated she has spoke with him and he feels fine. Lab Results       None            Care Coordination Interventions    Referral from Primary Care Provider: No  Suggested Interventions and Community Resources  Medi Set or Pill Pack: Declined (Comment: wife assists with mes. 23 trb)  Senior Services: [address] [employer] Work: Declined  Transportation Support: Declined  Zone Management Tools: In Process (Comment: DM and HTN)  Other Services or Interventions: active on RPM          Goals Addressed    None         No future appointments.  and   Hypertension - Encounter Level

## 2023-11-28 NOTE — CARE COORDINATION
Repeat bp was 1318/48 today. Wife is going to try and assist with am readings the rest of the week for more accurracy. Congenital Omphalocele    Feeding by G-tube  Placed at [age] year of age and removed in 2013.  Status Post Omphalocele Repair  Repair started 2/18/10 when intestines were put inside abdomen.  Repair completed by 3/1/10.  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 Feeding by G-tube  Placed at [age] year of age and removed in 2013.  Status Post Omphalocele Repair  Repair started 2/18/10 when intestines were put inside abdomen.  Repair completed by 3/1/10.  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

## 2023-12-01 ENCOUNTER — CARE COORDINATION (OUTPATIENT)
Dept: CARE COORDINATION | Age: 58
End: 2023-12-01

## 2023-12-01 NOTE — CARE COORDINATION
Date/Time:  12/1/2023 10:23 AM  LPN attempted to reach patient by telephone regarding red alert (BP 60/44) in remote patient monitoring program. Left HIPPA compliant message requesting a return call. Will attempt to reach patient again.

## 2023-12-04 ENCOUNTER — CARE COORDINATION (OUTPATIENT)
Dept: CARE COORDINATION | Age: 58
End: 2023-12-04

## 2023-12-04 RX ORDER — TRAZODONE HYDROCHLORIDE 50 MG/1
TABLET ORAL
Qty: 30 TABLET | Refills: 1 | Status: SHIPPED | OUTPATIENT
Start: 2023-12-04

## 2023-12-04 NOTE — CARE COORDINATION
Ambulatory Care Coordination Note  2023    Patient Current Location: [address] contacted the spouse/partner by telephone. Verified name and  with spouse/partner as identifiers. Provided introduction to self, and explanation of the ACM role. Method of communication with provider: chart routing. ACM: Marie Dyson, RN    ACM spoke with wife Riri Garsia for follow up. Abnormal weight readings in chart . She informed me she weighed herself today and the someone else completed a weight. Informed her that this is for patient use and reading flow into his chart and place incorrect readings when used by others. Stated she would not use this scale/equipment and ask others not to do so as well. Bp overall is better than last week. I have asked them to consistently measure this week and if values continue ACM will plan to complete RPM next week. Plan    Follow up call in 1 week   DM and HTN support ongoing     Offered patient enrollment in the Remote Patient Monitoring (RPM) program for in-home monitoring: Yes, patient already enrolled. Lab Results       None            Care Coordination Interventions    Referral from Primary Care Provider: No  Suggested Interventions and Community Resources  Medi Set or Pill Pack: Declined (Comment: wife assists with mes. 23 trb)  Senior Services: [address] [employer] Work: Declined  Transportation Support: Declined  Zone Management Tools: In Process (Comment: DM and HTN)  Other Services or Interventions: active on RPM          Goals Addressed    None         No future appointments.  and   Hypertension - Encounter Level

## 2023-12-04 NOTE — TELEPHONE ENCOUNTER
Refill request for  medication.      TRAZODONE 50 MG     Name of [address] [address], [facility] visit - 9/29/23     Pending visit - NONE     Last refill -11/01/23          Additional Comments

## 2023-12-06 RX ORDER — LISINOPRIL 5 MG/1
5 TABLET ORAL DAILY
Qty: 30 TABLET | Refills: 0 | Status: SHIPPED | OUTPATIENT
Start: 2023-12-06

## 2023-12-06 NOTE — TELEPHONE ENCOUNTER
Refill request for LISINOPRIL medication.      Name of [address] [place] [address]      Last visit - 9/29/23     Pending visit - NONE    Last refill -11/8/23      Medication Contract signed -   Last Oarrs ran-         Additional Comments

## 2023-12-08 ENCOUNTER — CARE COORDINATION (OUTPATIENT)
Dept: CARE COORDINATION | Age: 58
End: 2023-12-08

## 2023-12-08 NOTE — CARE COORDINATION
Remote Patient Monitoring Note      Date/Time:  12/8/2023 9:23 AM  Patient Current Location: [address] [place] [address]  LPN noted red alert received for weight increase (5# in 7 days). Background: PT enrolled for HTN monitoring    Clinical Interventions: Wt alert noted, pt is within his normal baseline. Other metrics WNL      Plan/Follow Up: Will continue to review, monitor and address alerts with follow up based on severity of symptoms and risk factors.

## 2023-12-14 ENCOUNTER — CARE COORDINATION (OUTPATIENT)
Dept: CARE COORDINATION | Age: 58
End: 2023-12-14

## 2023-12-14 NOTE — CARE COORDINATION
Remote Alert Monitoring Note  Rpm alert to be reviewed by the provider   yellow alert   blood pressure reading (171/96)                       Date/Time:  2023 10:10 AM  Patient Current Location: [facility] contacted caregiver by telephone. Verified patients name and  as identifiers. Background: Pt enrolled for HTN    Refer to 911 immediately if:  Patient unresponsive or unable to provide history  Change in cognition or sudden confusion  Patient unable to respond in complete sentences  Intense chest pain/tightness  Any concern for any clinical emergency  Red Alert: Provider response time of 1 hr required for any red alert requiring intervention  Yellow Alert: Provider response time of 3hr required for any escalated yellow alert    BP Triage  Are you having any Chest Pain? no   Are you having any Shortness of Breath? no   Do you have a headache or have any vision changes? no   Are you having any numbness or tingling? no   Are you having any other health concerns or issues? no        Clinical Interventions: Reviewed and followed up on alerts and treatments-Spoke with pt wife, she reports the pt is doing well, he took his BP too soon after taking medications. She will have him recheck. She reports they are both doing fine, no concerns to report. Will monitor for repeat reading      Plan/Follow Up: Will continue to review, monitor and address alerts with follow up based on severity of symptoms and risk factors.

## 2023-12-15 ENCOUNTER — CARE COORDINATION (OUTPATIENT)
Dept: CARE COORDINATION | Age: 58
End: 2023-12-15

## 2023-12-15 NOTE — CARE COORDINATION
ACM attempted outreach. Left message. Contact information for call back provided. Plan      Follow up call next   Plan to complete RPM soon   HTN education to complete next call.

## 2023-12-28 ENCOUNTER — CARE COORDINATION (OUTPATIENT)
Dept: CASE MANAGEMENT | Age: 58
End: 2023-12-28

## 2023-12-28 NOTE — CARE COORDINATION
Remote Alert Monitoring Note  Rpm alert to be reviewed by the provider                   Date/Time:  2023 9:38 AM  Patient Current Location: [place]  LPN contacted family by telephone. Verified patients name and  as identifiers. Background: RPM for HTN  Refer to 911 immediately if:  Patient unresponsive or unable to provide history  Change in cognition or sudden confusion  Patient unable to respond in complete sentences  Intense chest pain/tightness  Any concern for any clinical emergency  Red Alert: Provider response time of 1 hr required for any red alert requiring intervention  Yellow Alert: Provider response time of 3hr required for any escalated yellow alert       HR Triage  Are you having any Chest Pain? no   Are you having any Shortness of Breath? no   Are you having any dizziness? no   Are you feeling more fatigued or tired than normal? no   Are you having any other health concerns or issues? no               Clinical Interventions: Reviewed and followed up on alerts and treatments-LPN contacted wife in regards to RPM red alert for manual BP of 197/110. Wife stated that the pt is \"fine\" and entered the metrics himself. Pt is asymptomatic, per wife, and does not know how to  use the equipment properly. Metrics are incorrect, per wife. Wife is not home at this time. Wife will update metrics when she arrives back home. Plan/Follow Up: Will continue to review, monitor and address alerts with follow up based on severity of symptoms and risk factors.       Edmond Veras LPN, [facility]  PH: [phone]       - Current Patient Metrics ---- Blood Pressure: 197/110, 73bpm Pulseox: -%, -bpm Temperature: - Weight: 187.8lbs Note Created at: 2023 09:41 AM ET ---- Time-Spent: 10 minutes 0 seconds

## 2024-01-02 ENCOUNTER — CARE COORDINATION (OUTPATIENT)
Dept: CARE COORDINATION | Age: 59
End: 2024-01-02

## 2024-01-02 NOTE — CARE COORDINATION
ACM attempted outreach. Left message. Contact information for call back provided.  RPM use is still not always accurate due to user error at times. 12/28 reading again related to user error, documented.     Plan    Follow up call in 1 week   Plan to complete rpm is stable still

## 2024-01-04 RX ORDER — LISINOPRIL 5 MG/1
5 TABLET ORAL DAILY
Qty: 30 TABLET | Refills: 0 | Status: SHIPPED | OUTPATIENT
Start: 2024-01-04

## 2024-01-04 NOTE — TELEPHONE ENCOUNTER
Refill request for  lisinopril and trazodone  medication.     Name of [facility]       Last visit -  12/20/2023     Pending visit -  none     Last refill - 12/4/2023      Medication Contract signed -      Last Oarrs ran-          Additional Comments

## 2024-01-05 ENCOUNTER — CARE COORDINATION (OUTPATIENT)
Dept: CARE COORDINATION | Age: 59
End: 2024-01-05

## 2024-01-05 NOTE — CARE COORDINATION
Remote Alert Monitoring Note  Rpm alert to be reviewed by the provider   red alert   weight (increase 6.6 lbs x 5 days)   Additional needs to be addressed by N/A: No                    Date/Time:  2024 9:18 AM  Patient Current Location: Home: [address]  [place] [address]  LPN contacted patient by telephone. Verified patients name and  as identifiers.  Background: HTN  Refer to 911 immediately if:  Patient unresponsive or unable to provide history  Change in cognition or sudden confusion  Patient unable to respond in complete sentences  Intense chest pain/tightness  Any concern for any clinical emergency  Red Alert: Provider response time of 1 hr required for any red alert requiring intervention  Yellow Alert: Provider response time of 3hr required for any escalated yellow alert    Weight Scale Triage  Was your weight obtained upon rising/waking today? no   Was your weight obtained after voiding and/or use of the bathroom today? no   Did you weigh yourself in the same amount of clothing today, compared to how you typically do? no   Was the scale bumped or moved prior to today's weight? no   Is your scale on a flat/hard surface? yes   Did you obtain your weight with shoes on? no   If yes, is this something you normally do during your daily weights? NA   Were you standing up straight on the scale today? Pt unsure   Were you leaning on anything while obtaining your weight today? no      Clinical Interventions: Reviewed and followed up on alerts and treatments-LPN spoke with Pt and spouse. Pt reports taking weight after waking, showering, dressing, and breakfast.  LPN discussed with Pt and souse: WT EDUCATION: Education of patient to support self-management: Weigh self upon waking and after first void, wearing the same amount of clothing each day. Pt v/u. Pt admits to some SOB just before bed last evening, denies SOB today. SpO2 98%. Pt states that he has L foot swelling since he had bypass surgery and is at

## 2024-01-08 DIAGNOSIS — I25.118 ATHEROSCLEROTIC HEART DISEASE OF NATIVE CORONARY ARTERY WITH OTHER FORMS OF ANGINA PECTORIS (HCC): ICD-10-CM

## 2024-01-08 RX ORDER — ATORVASTATIN CALCIUM 80 MG/1
80 TABLET, FILM COATED ORAL DAILY
Qty: 90 TABLET | Refills: 3 | Status: SHIPPED | OUTPATIENT
Start: 2024-01-08

## 2024-01-08 RX ORDER — CARVEDILOL 6.25 MG/1
6.25 TABLET ORAL 2 TIMES DAILY WITH MEALS
Qty: 180 TABLET | Refills: 3 | Status: SHIPPED | OUTPATIENT
Start: 2024-01-08

## 2024-01-08 NOTE — TELEPHONE ENCOUNTER
Refill request for  medication.   ATORVASTATIN 80 MG   CARVEDILOL 6.25 MG       Name of [facility]acy- [facility]      Last visit - 9/29/23     Pending visit - NONE     Last refill -8/12/23; 6/26/23            Additional Comments

## 2024-01-09 ENCOUNTER — CARE COORDINATION (OUTPATIENT)
Dept: PRIMARY CARE CLINIC | Age: 59
End: 2024-01-09

## 2024-01-09 ENCOUNTER — CARE COORDINATION (OUTPATIENT)
Dept: CARE COORDINATION | Age: 59
End: 2024-01-09

## 2024-01-09 ENCOUNTER — CARE COORDINATION (OUTPATIENT)
Dept: CASE MANAGEMENT | Age: 59
End: 2024-01-09

## 2024-01-09 DIAGNOSIS — I10 PRIMARY HYPERTENSION: Primary | ICD-10-CM

## 2024-01-09 NOTE — CARE COORDINATION
Remote Alert Monitoring Note  Rpm alert to be reviewed by the provider                 Date/Time:  2024 9:07 AM  Patient Current Location: [place]  LPN contacted family by telephone. Verified patients name and  as identifiers.  Background: RPM for HTN  Refer to 911 immediately if:  Patient unresponsive or unable to provide history  Change in cognition or sudden confusion  Patient unable to respond in complete sentences  Intense chest pain/tightness  Any concern for any clinical emergency  Red Alert: Provider response time of 1 hr required for any red alert requiring intervention  Yellow Alert: Provider response time of 3hr required for any escalated yellow alert        BP Triage  Are you having any Chest Pain? no   Are you having any Shortness of Breath? no   Do you have a headache or have any vision changes? no   Are you having any numbness or tingling? no   Are you having any other health concerns or issues? no             Clinical Interventions: Reviewed and followed up on alerts and treatments-LPN contacted wife in regards to RPM red alert for manual BP of 152/102.  Wife stated that the pt entered the metrics himself. Pt is asymptomatic, per wife, and does not know how to use the equipment properly.  Metrics are incorrect, per wife. Wife is not home at this time. Wife will update metrics when she arrives back home.      Plan/Follow Up: Will continue to review, monitor and address alerts with follow up based on severity of symptoms and risk factors.       Shayy Suh LPN, [facility]  PH: [phone]     - Current Patient Metrics ---- Blood Pressure: 152/102, 83bpm Pulseox: 98%, 91bpm Temperature: - Weight: 185.8lbs Note Created at: 2024 09:08 AM ET ---- Time-Spent: 10 minutes 0 seconds

## 2024-01-09 NOTE — PROGRESS NOTES
Remote Patient Order Discontinued    Received request from Katiana Shaikh RN  to discontinue order for remote patient monitoring of HTN and order completed.

## 2024-01-09 NOTE — CARE COORDINATION
RPM Kit Return    [facility] placed call to patient to arrange RPM kit  through [employer].     Reviewed with patient how to pack equipment in original packing.     Verified patient's availability to schedule [employer]  time.     [employer]  time requested. Anticipated  date range 2-5 business days.

## 2024-01-09 NOTE — CARE COORDINATION
Ambulatory Care Coordination Note  2024    Patient Current Location:  [place]     ACM contacted the spouse/partner by telephone. Verified name and  with spouse/partner as identifiers. Provided introduction to self, and explanation of the ACM role.     Challenges to be reviewed by the provider   Additional needs identified to be addressed with provider: Yes  Patient has been more forgetful at home and sometimes irritable. Wife is concerned about his memory decline and requested inperson follow up appt.                Method of communication with provider: chart routing.    ACM: Katiana Shaikh RN    ACM spoke with patient for outreach call. Spoke with wife. She was not home at this time but babysitting. Lorne had elevated BP read again and manually entered his numbers.He is not doing well at using the equipment at requested. He often takes the BP too soon after am meds and enters incorrect data manually. ACM plans to complete RPM. Overall, readings have improved when done correctly. His was has a BP cuff and scale. She can log them and report. She plans to try and purchase a new pulse ox next time her benefit card is allowed. Oxygens sats have been well for some time. She states she worries about leaving him alone. We discussed an life alert. She is going to contact her insurance to see if she has an benefits in their plan. ACM to mail list of medical alert providers.   She also voiced concern about his memory. No issues with bowel or bladder. He does still drive short distances . No getting lost reported or accidents. He has does seem to be irritable.     Reviewed with her s/s of strokes and HTN guidelines to watch and report with elevated bp. Confusion, severe HA, N/V, or unsteady or weak gaits. History of past strokes     Plan    Requested follow up PCP appt from [employer] line resource sent   Bp, weight check next call   RPM to complete today.        Offered patient enrollment in the Remote Patient Monitoring

## 2024-01-10 ENCOUNTER — OFFICE VISIT (OUTPATIENT)
Dept: INTERNAL MEDICINE CLINIC | Age: 59
End: 2024-01-10
Payer: MEDICARE

## 2024-01-10 VITALS
BODY MASS INDEX: 29.52 KG/M2 | DIASTOLIC BLOOD PRESSURE: 64 MMHG | TEMPERATURE: 97.5 F | HEIGHT: 68 IN | WEIGHT: 194.8 LBS | OXYGEN SATURATION: 97 % | SYSTOLIC BLOOD PRESSURE: 120 MMHG | HEART RATE: 67 BPM

## 2024-01-10 DIAGNOSIS — I25.118 ATHEROSCLEROTIC HEART DISEASE OF NATIVE CORONARY ARTERY WITH OTHER FORMS OF ANGINA PECTORIS (HCC): ICD-10-CM

## 2024-01-10 DIAGNOSIS — H91.92 HEARING LOSS OF LEFT EAR, UNSPECIFIED HEARING LOSS TYPE: ICD-10-CM

## 2024-01-10 DIAGNOSIS — R53.83 FATIGUE, UNSPECIFIED TYPE: Primary | ICD-10-CM

## 2024-01-10 DIAGNOSIS — E11.51 TYPE 2 DIABETES MELLITUS WITH DIABETIC PERIPHERAL ANGIOPATHY WITHOUT GANGRENE, UNSPECIFIED WHETHER LONG TERM INSULIN USE (HCC): ICD-10-CM

## 2024-01-10 DIAGNOSIS — F33.0 MAJOR DEPRESSIVE DISORDER, RECURRENT, MILD (HCC): ICD-10-CM

## 2024-01-10 DIAGNOSIS — R56.9 NEW ONSET SEIZURE (HCC): ICD-10-CM

## 2024-01-10 DIAGNOSIS — I74.5 ILIAC ARTERY OCCLUSION (HCC): ICD-10-CM

## 2024-01-10 PROBLEM — E43 SEVERE MALNUTRITION (HCC): Status: RESOLVED | Noted: 2023-07-24 | Resolved: 2024-01-10

## 2024-01-10 PROBLEM — E11.65 TYPE 2 DIABETES MELLITUS WITH HYPERGLYCEMIA, WITHOUT LONG-TERM CURRENT USE OF INSULIN (HCC): Status: ACTIVE | Noted: 2023-03-31

## 2024-01-10 PROCEDURE — 3074F SYST BP LT 130 MM HG: CPT | Performed by: NURSE PRACTITIONER

## 2024-01-10 PROCEDURE — 99214 OFFICE O/P EST MOD 30 MIN: CPT | Performed by: NURSE PRACTITIONER

## 2024-01-10 PROCEDURE — 3078F DIAST BP <80 MM HG: CPT | Performed by: NURSE PRACTITIONER

## 2024-01-10 RX ORDER — TRAZODONE HYDROCHLORIDE 50 MG/1
TABLET ORAL
Qty: 30 TABLET | Refills: 2 | Status: SHIPPED | OUTPATIENT
Start: 2024-01-10 | End: 2024-01-10

## 2024-01-10 ASSESSMENT — PATIENT HEALTH QUESTIONNAIRE - PHQ9
4. FEELING TIRED OR HAVING LITTLE ENERGY: 0
7. TROUBLE CONCENTRATING ON THINGS, SUCH AS READING THE NEWSPAPER OR WATCHING TELEVISION: 2
SUM OF ALL RESPONSES TO PHQ QUESTIONS 1-9: 4
9. THOUGHTS THAT YOU WOULD BE BETTER OFF DEAD, OR OF HURTING YOURSELF: 0
SUM OF ALL RESPONSES TO PHQ QUESTIONS 1-9: 4
10. IF YOU CHECKED OFF ANY PROBLEMS, HOW DIFFICULT HAVE THESE PROBLEMS MADE IT FOR YOU TO DO YOUR WORK, TAKE CARE OF THINGS AT HOME, OR GET ALONG WITH OTHER PEOPLE: 0
SUM OF ALL RESPONSES TO PHQ QUESTIONS 1-9: 4
5. POOR APPETITE OR OVEREATING: 0
8. MOVING OR SPEAKING SO SLOWLY THAT OTHER PEOPLE COULD HAVE NOTICED. OR THE OPPOSITE, BEING SO FIGETY OR RESTLESS THAT YOU HAVE BEEN MOVING AROUND A LOT MORE THAN USUAL: 0
3. TROUBLE FALLING OR STAYING ASLEEP: 0
SUM OF ALL RESPONSES TO PHQ9 QUESTIONS 1 & 2: 2
1. LITTLE INTEREST OR PLEASURE IN DOING THINGS: 2
SUM OF ALL RESPONSES TO PHQ QUESTIONS 1-9: 4
6. FEELING BAD ABOUT YOURSELF - OR THAT YOU ARE A FAILURE OR HAVE LET YOURSELF OR YOUR FAMILY DOWN: 0
2. FEELING DOWN, DEPRESSED OR HOPELESS: 0

## 2024-01-10 ASSESSMENT — ENCOUNTER SYMPTOMS
SHORTNESS OF BREATH: 0
VOMITING: 0
NAUSEA: 0
CHEST TIGHTNESS: 0
SINUS PAIN: 0
CONSTIPATION: 0
DIARRHEA: 0
SORE THROAT: 0
COUGH: 0

## 2024-01-10 NOTE — TELEPHONE ENCOUNTER
Refill request for  trazadone  medication.     Name of [facility]acy-  [facility]       Last visit -  9/29/23     Pending visit -  no     Last refill - 12/4/23      Medication Contract signed -    Last Oarrs ran-          Additional Comments

## 2024-01-10 NOTE — PROGRESS NOTES
[facility]  [facility]  [address] [facility], [place] [address]  Tel:[phone]    Julio Galeas is a 58 y.o. male who presents today for his medical conditions/complaints as noted below.  Julio Galeas is c/o of Other (Memory issues, irritable )      Chief Complaint   Patient presents with    Other     Memory issues, irritable        HPI:     Julio Galeas is a 58 y.o. male who presents with concerns of memory problems and fatigue. He is accompanied by spouse. Primary caregiver is patient and spouse. Patient lives with their spouse. The family and the patient identify problems with forgetfulness, unwillingness to complete household tasks/chores, generalized fogginess. Family and patient report problems with agitation. Family and patient are concerned about forgetfulness with driving, however, they are not concerned about medication errors, wandering, cooking, and inability to maintain adequate nutrition. Medication administration: family monitors medication usage. He states he sleeps well in the evening however often will nap in the afternoon as well. His energy is stated to be generally low overall.     Patient's medications, allergies, past medical, surgical, social and family histories were reviewed and updated as appropriate.        Past Medical History:   Diagnosis Date    Abdominal pain 07/20/2021    Abnormal stress test     Acute CVA (cerebrovascular accident) ([facility]) 05/30/2020    Bowel incontinence     CAD (coronary artery disease)     Cerebral infarction due to unspecified occlusion or stenosis of unspecified carotid artery ([facility]) 03/19/2015    Chest pain 03/11/2021    History of cerebral infarction 05/24/2022    Hyperlipidemia     Hypertension     Incisional hernia without obstruction or gangrene 07/09/2021    Incisional hernia, without obstruction or gangrene     Ischemic foot 06/22/2020    Malignant neoplasm of colon with rectum ([facility])

## 2024-01-15 RX ORDER — CLOPIDOGREL BISULFATE 75 MG/1
75 TABLET ORAL DAILY
Qty: 30 TABLET | Refills: 0 | Status: SHIPPED | OUTPATIENT
Start: 2024-01-15

## 2024-01-15 NOTE — TELEPHONE ENCOUNTER
Refill request for clopidogrel (PLAVIX) 75 MG tablet medication.     Name of [facility]- [facility]      Last visit - 1/10/24     Pending visit - None    Last refill -9/29/23      Medication Contract signed - PDMP Monitoring:    Last PDMP Roderick as Reviewed:  Review User Review Instant Review Result   SKYLAR VILLAGOMEZ 6/24/2023  4:20 PM Reviewed PDMP [1]     [unfilled]  Urine Drug Screenings (1 yr)    No resulted procedures found.       Medication Contract and Consent for Opioid Use Documents Filed        No documents found                   Last Oarrs ran-         Additional Comments

## 2024-01-18 ENCOUNTER — CARE COORDINATION (OUTPATIENT)
Dept: CARE COORDINATION | Age: 59
End: 2024-01-18

## 2024-01-18 NOTE — CARE COORDINATION
Ambulatory Care Coordination Note  2024    Patient Current Location:  [place]     ACM contacted the family by telephone. Verified name and  with family as identifiers. Provided introduction to self, and explanation of the ACM role.       Method of communication with provider: none.    ACM: Katiana Shaikh RN    Follow up call placed with wife Danica. She report she has been checking bp and weight for Lorne. They are reported as stable. She did not have them to give me on the call as she was out of home at daughters. She continues to track. OV with PCP completed. She is aware of need for lab draws and plans to follow up. Lorne is still tired. Explained labs would be part of that assessment to identify possible cause.    Plan    Follow up naomi in 2 weeks  Bp and weight check again   Consider completing calls at that time     Offered patient enrollment in the Remote Patient Monitoring (RPM) program for in-home monitoring:  just completed  .    Lab Results       None            Care Coordination Interventions    Referral from Primary Care Provider: No  Suggested Interventions and Community Resources  Medi Set or Pill Pack: Declined (Comment: wife assists with mes. 23 trb)  Senior Services: Declined  Social Work: Declined  Transportation Support: Declined  Zone Management Tools: In Process (Comment: DM and HTN)  Other Services or Interventions: active on RPM          Goals Addressed    None         No future appointments. and   Hypertension - Encounter Level    Symptom course: improving

## 2024-01-25 ENCOUNTER — HOSPITAL ENCOUNTER (OUTPATIENT)
Age: 59
Discharge: HOME | End: 2024-01-25
Payer: MEDICARE

## 2024-01-25 DIAGNOSIS — R53.83 FATIGUE, UNSPECIFIED TYPE: ICD-10-CM

## 2024-01-25 DIAGNOSIS — E11.51 TYPE 2 DIABETES MELLITUS WITH DIABETIC PERIPHERAL ANGIOPATHY WITHOUT GANGRENE, UNSPECIFIED WHETHER LONG TERM INSULIN USE (HCC): ICD-10-CM

## 2024-01-25 LAB
BASOPHILS # BLD: 0.1 K/UL (ref 0–0.2)
BASOPHILS NFR BLD: 1 %
DEPRECATED RDW RBC AUTO: 17.3 % (ref 12.4–15.4)
EOSINOPHIL # BLD: 0.1 K/UL (ref 0–0.6)
EOSINOPHIL NFR BLD: 1.1 %
FERRITIN SERPL IA-MCNC: 105.8 NG/ML (ref 30–400)
FOLATE SERPL-MCNC: 19.52 NG/ML (ref 4.78–24.2)
HCT VFR BLD AUTO: 38.6 % (ref 40.5–52.5)
HGB BLD-MCNC: 13 G/DL (ref 13.5–17.5)
IRON SATN MFR SERPL: 36 % (ref 20–50)
IRON SERPL-MCNC: 105 UG/DL (ref 59–158)
LYMPHOCYTES # BLD: 1.9 K/UL (ref 1–5.1)
LYMPHOCYTES NFR BLD: 25.6 %
MCH RBC QN AUTO: 30.9 PG (ref 26–34)
MCHC RBC AUTO-ENTMCNC: 33.7 G/DL (ref 31–36)
MCV RBC AUTO: 91.6 FL (ref 80–100)
MONOCYTES # BLD: 0.4 K/UL (ref 0–1.3)
MONOCYTES NFR BLD: 5.9 %
NEUTROPHILS # BLD: 5 K/UL (ref 1.7–7.7)
NEUTROPHILS NFR BLD: 66.4 %
PLATELET # BLD AUTO: 236 K/UL (ref 135–450)
PMV BLD AUTO: 6.8 FL (ref 5–10.5)
RBC # BLD AUTO: 4.21 M/UL (ref 4.2–5.9)
TIBC SERPL-MCNC: 291 UG/DL (ref 260–445)
TSH SERPL DL<=0.005 MIU/L-ACNC: 0.9 UIU/ML (ref 0.27–4.2)
VIT B12 SERPL-MCNC: 762 PG/ML (ref 211–911)
WBC # BLD AUTO: 7.5 K/UL (ref 4–11)

## 2024-01-25 PROCEDURE — 36415 COLL VENOUS BLD VENIPUNCTURE: CPT

## 2024-01-25 PROCEDURE — 82607 VITAMIN B-12: CPT

## 2024-01-25 PROCEDURE — 84270 ASSAY OF SEX HORMONE GLOBUL: CPT

## 2024-01-25 PROCEDURE — 84403 ASSAY OF TOTAL TESTOSTERONE: CPT

## 2024-01-25 PROCEDURE — 83540 ASSAY OF IRON: CPT

## 2024-01-25 PROCEDURE — 83550 IRON BINDING TEST: CPT

## 2024-01-25 PROCEDURE — 85025 COMPLETE CBC W/AUTO DIFF WBC: CPT

## 2024-01-25 PROCEDURE — 82728 ASSAY OF FERRITIN: CPT

## 2024-01-25 PROCEDURE — 84443 ASSAY THYROID STIM HORMONE: CPT

## 2024-01-25 PROCEDURE — 83036 HEMOGLOBIN GLYCOSYLATED A1C: CPT

## 2024-01-25 PROCEDURE — 82746 ASSAY OF FOLIC ACID SERUM: CPT

## 2024-01-26 ENCOUNTER — OFFICE VISIT (OUTPATIENT)
Dept: PULMONOLOGY | Age: 59
End: 2024-01-26

## 2024-01-26 VITALS
WEIGHT: 196 LBS | TEMPERATURE: 98.3 F | HEIGHT: 68 IN | OXYGEN SATURATION: 97 % | RESPIRATION RATE: 16 BRPM | BODY MASS INDEX: 29.7 KG/M2 | SYSTOLIC BLOOD PRESSURE: 151 MMHG | DIASTOLIC BLOOD PRESSURE: 81 MMHG | HEART RATE: 61 BPM

## 2024-01-26 DIAGNOSIS — F17.200 SMOKER: ICD-10-CM

## 2024-01-26 DIAGNOSIS — Z87.891 PERSONAL HISTORY OF TOBACCO USE: ICD-10-CM

## 2024-01-26 DIAGNOSIS — G47.30 OBSERVED SLEEP APNEA: ICD-10-CM

## 2024-01-26 DIAGNOSIS — J43.2 CENTRILOBULAR EMPHYSEMA (HCC): ICD-10-CM

## 2024-01-26 DIAGNOSIS — E66.3 OVERWEIGHT (BMI 25.0-29.9): ICD-10-CM

## 2024-01-26 DIAGNOSIS — J41.0 SIMPLE CHRONIC BRONCHITIS (HCC): Primary | ICD-10-CM

## 2024-01-26 LAB
EST. AVERAGE GLUCOSE BLD GHB EST-MCNC: 137 MG/DL
HBA1C MFR BLD: 6.4 %

## 2024-01-26 RX ORDER — ALBUTEROL SULFATE 90 UG/1
2 AEROSOL, METERED RESPIRATORY (INHALATION) 4 TIMES DAILY PRN
Qty: 54 G | Refills: 1 | Status: SHIPPED | OUTPATIENT
Start: 2024-01-26

## 2024-01-26 ASSESSMENT — SLEEP AND FATIGUE QUESTIONNAIRES
HOW LIKELY ARE YOU TO NOD OFF OR FALL ASLEEP IN A CAR, WHILE STOPPED FOR A FEW MINUTES IN TRAFFIC: 0
HOW LIKELY ARE YOU TO NOD OFF OR FALL ASLEEP WHILE LYING DOWN TO REST IN THE AFTERNOON WHEN CIRCUMSTANCES PERMIT: 3
HOW LIKELY ARE YOU TO NOD OFF OR FALL ASLEEP WHILE WATCHING TV: 3
ESS TOTAL SCORE: 9
HOW LIKELY ARE YOU TO NOD OFF OR FALL ASLEEP WHILE SITTING AND TALKING TO SOMEONE: 0
HOW LIKELY ARE YOU TO NOD OFF OR FALL ASLEEP WHILE SITTING AND READING: 0
NECK CIRCUMFERENCE (INCHES): 16.5
HOW LIKELY ARE YOU TO NOD OFF OR FALL ASLEEP WHEN YOU ARE A PASSENGER IN A CAR FOR AN HOUR WITHOUT A BREAK: 0
HOW LIKELY ARE YOU TO NOD OFF OR FALL ASLEEP WHILE SITTING QUIETLY AFTER LUNCH WITHOUT ALCOHOL: 3

## 2024-01-26 NOTE — PROGRESS NOTES
Chief Complaint/Referring Provider:  Patient is being seen at the request of MAT Keenan  for a consultation for sleep apnea     Presenting HPI: Patient is a 58-year-old male who has been referred to the office for a pulm evaluation for sleep apnea  Patient apparently stops breathing at nighttime, patient also snores at nighttime, patient has no energy in the daytime and is tired all the time as per patient's family, patient has some rhinorrhea, patient does not have any significant sore throat or difficulty in swallowing, no coughing or choking when eating, no odynophagia or dysphagia, patient does have some little bit of cough along with mucoid phlegm, patient also has some shortness of breath especially with exertion, patient's excess tolerance is limited to 2 blocks after which he has shortness of breath, patient does not have any increasing wheezing, patient does not have any abdominal pain nausea vomiting, no increasing reflux symptoms, no increasing leg edema, patient has gained about 20 pounds in the last 1 year or so, patient continues to smoke but is trying to quit, patient has significant atherosclerotic arterial disease and has had multiple bypasses in the past, patient also has had 4 strokes in the past, patient does not have any change in the ambulatory sick contacts, patient does not have any pets, patient used to be a  and was not exposed to any chemicals or fumes, patient also has had any abdominal surgery for mesh repair, patient does not take any medications for his shortness of breath on regular basis, no other pertinent review of system of concern    Past Medical History:   Diagnosis Date    Abdominal pain 07/20/2021    Abnormal stress test     Acute CVA (cerebrovascular accident) ([facility]) 05/30/2020    Bowel incontinence     CAD (coronary artery disease)     Centrilobular emphysema ([facility]) 1/26/2024    Cerebral infarction due to unspecified occlusion or stenosis of

## 2024-01-26 NOTE — PROGRESS NOTES
[facility] Communication:  The following orders are received by verbal communication from Nikhil Ferguson MD    Orders include:    HST  PFT  CT  Follow up after

## 2024-01-26 NOTE — PATIENT INSTRUCTIONS
follow-up, he or she will help you understand what to do next.  After a lung cancer screening, you can go back to your usual activities right away.  A lung cancer screening test can't tell if you have lung cancer. If your results are positive, your doctor can't tell whether an abnormal finding is a harmless nodule, cancer, or something else without doing more tests.  What can you do to help prevent lung cancer?  Some lung cancers can't be prevented. But if you smoke, quitting smoking is the best step you can take to prevent lung cancer. If you want to quit, your doctor can recommend medicines or other ways to help.  Follow-up care is a key part of your treatment and safety. Be sure to make and go to all appointments, and call your doctor if you are having problems. It's also a good idea to know your test results and keep a list of the medicines you take.  Where can you learn more?  Go to https://www.RealPage.net/patientEd and enter Q940 to learn more about \"Learning About Lung Cancer Screening.\"  Current as of: February 28, 2023               Content Version: 13.9  © 1829-5239 [employer].   Care instructions adapted under license by [employer]. If you have questions about a medical condition or this instruction, always ask your healthcare professional. [employer] disclaims any warranty or liability for your use of this information.  Remember to bring a list of pulmonary medications and any CPAP or BiPAP machines to your next appointment with the office.     Please keep all of your future appointments scheduled by [facility] office. Out of respect for other patients and providers, you may be asked to reschedule your appointment if you arrive later than your scheduled appointment time. Appointments cancelled less than 24hrs in advance will be considered a no show. Patients with three missed appointments within 1 year or four missed appointments within 2

## 2024-01-27 LAB
SHBG SERPL-SCNC: 35 NMOL/L (ref 11–80)
TESTOST FREE SERPL-MCNC: 47.7 PG/ML (ref 47–244)
TESTOST SERPL-MCNC: 255 NG/DL (ref 220–1000)

## 2024-01-29 ENCOUNTER — TELEPHONE (OUTPATIENT)
Dept: PULMONOLOGY | Age: 59
End: 2024-01-29

## 2024-01-29 DIAGNOSIS — G47.30 OBSERVED SLEEP APNEA: Primary | ICD-10-CM

## 2024-01-29 NOTE — TELEPHONE ENCOUNTER
Patient has and order for HST but he would prefer to come in to the sleep center overnight for the sleep study. Can he get an order for a PSG?

## 2024-01-30 ENCOUNTER — PREP FOR PROCEDURE (OUTPATIENT)
Dept: PULMONOLOGY | Age: 59
End: 2024-01-30

## 2024-02-05 RX ORDER — LISINOPRIL 5 MG/1
5 TABLET ORAL DAILY
Qty: 30 TABLET | Refills: 2 | Status: SHIPPED | OUTPATIENT
Start: 2024-02-05

## 2024-02-05 NOTE — TELEPHONE ENCOUNTER
Refill request for LISINOPRIL 5 MG TABLET medication.     Name of Pharmacy- [facility]      Last visit - 1-     Pending visit - 3-7-2024    Last refill - 1-      Medication Contract signed -   Last Oarrs ran-         Additional Comments

## 2024-02-07 ENCOUNTER — TELEPHONE (OUTPATIENT)
Dept: VASCULAR SURGERY | Age: 59
End: 2024-02-07

## 2024-02-07 ENCOUNTER — TELEPHONE (OUTPATIENT)
Dept: PULMONOLOGY | Age: 59
End: 2024-02-07

## 2024-02-07 DIAGNOSIS — G47.30 OBSERVED SLEEP APNEA: Primary | ICD-10-CM

## 2024-02-07 NOTE — TELEPHONE ENCOUNTER
Patient's wife calls and states \"I don't believe in those things\". She is wanting her  to have an in-lab study done because she worries the at-home isn't accurate. Concerned he will take the equipment off in the middle of the night and that he will have trouble breathing and not be able to wear it.   Please advise.

## 2024-02-07 NOTE — TELEPHONE ENCOUNTER
Call patient and talk to his wife, to let her know that Dr will see if this is appropriate and also was make her aware, that his Ins may not approve test or may need to have a higher co pay, she verbalize understanding.      Please sign pend order if appropriate.

## 2024-02-07 NOTE — TELEPHONE ENCOUNTER
Called patients spouse, Danica and scheduled patient for his 6 month arterial duplex scan to look at his bypass graft. Scheduled at [facility] for 2/20/24 at 1:00pm/arrival of 12;30pm. [place]

## 2024-02-09 NOTE — TELEPHONE ENCOUNTER
Spoke with patient and they will be scheduling an in-lab at the beginning of the month because that's when they will be able to afford the copay. Gave the number for the sleep center.

## 2024-02-09 NOTE — TELEPHONE ENCOUNTER
Patient is calling insurance and will call back to see if they can do an inlab study. Regardless of answer, they can be informed that an In-Lab order is in and they just need to call the sleep lab. Sleep lab has called twice and they have not called back to schedule anything.

## 2024-02-12 ENCOUNTER — CARE COORDINATION (OUTPATIENT)
Dept: CARE COORDINATION | Age: 59
End: 2024-02-12

## 2024-02-12 NOTE — CARE COORDINATION
follow up appts and with symptom changes. Working to set u p PCP appt. Falls safety discussed and life alert options. Copd and DM zones introduced 2/12/24 trb        Nutrition Plan   No change     I will work to decrease my sugary snacks and review dietary guidelines sent to me.     Barriers: lack of motivation, lack of support, and lack of education  Plan for overcoming my barriers: education and support   Confidence: 7/10  Anticipated Goal Completion Date: 3/24    Diabetic diet discussed  2/12/24 trb               Future Appointments   Date Time Provider Department Center   2/13/2024  8:00 AM SCHEDULE, [place] PFT [place] PFT [facility]   2/13/2024  9:30 AM A CT VCT [facility] CT [facility]   2/20/2024  1:00 PM [facility] VASCULAR, VASCULAR LAB ROOM 1 [facility] VASC [place] [facility]   2/27/2024  8:30 PM SCHEDULE, [facility] SLEEP ROOM 1 [facility] SLEEP [facility]   3/7/2024  2:40 PM Nikhil Ferguson MD AND PULMOE LUND   ,   Diabetes Assessment    Medic Alert ID: No  Meal Planning: Avoidance of concentrated sweets   How often do you test your blood sugar?: No Testing (Comment: Pre diabetes. not checking at home)   Do you have barriers with adherence to non-pharmacologic self-management interventions? (Nutrition/Exercise/Self-Monitoring): No   Have you ever had to go to the ED for symptoms of low blood sugar?: No       Do you have hyperglycemia symptoms?: No   Do you have hypoglycemia symptoms?: No   Blood Sugar Monitoring Regimen: Not Testing        ,   COPD Assessment    Does the patient understand envrionmental exposure?: Yes  Is the patient able to verbalize Rescue vs. Long Acting medications?: No  Does the patient have a nebulizer?: No     No patient-reported symptoms         Symptoms:     Symptom course: stable  Breathlessness: exertion  Change in chronic cough?: No/At Baseline  Change in sputum?: No/At Baseline  Self Monitoring - SaO2: No (Comment: plans to try and purchase one)  Have you had a recent diagnosis of pneumonia either

## 2024-02-13 ENCOUNTER — HOSPITAL ENCOUNTER (OUTPATIENT)
Dept: CT IMAGING | Age: 59
Discharge: HOME | End: 2024-02-13
Payer: MEDICARE

## 2024-02-13 ENCOUNTER — HOSPITAL ENCOUNTER (OUTPATIENT)
Dept: PULMONOLOGY | Age: 59
Discharge: HOME | End: 2024-02-13
Payer: MEDICARE

## 2024-02-13 VITALS — OXYGEN SATURATION: 99 %

## 2024-02-13 DIAGNOSIS — Z87.891 PERSONAL HISTORY OF TOBACCO USE: ICD-10-CM

## 2024-02-13 DIAGNOSIS — J41.0 SIMPLE CHRONIC BRONCHITIS (HCC): ICD-10-CM

## 2024-02-13 LAB
DLCO %PRED: 78 %
DLCO PRED: NORMAL
DLCO/VA %PRED: NORMAL
DLCO/VA PRED: NORMAL
DLCO/VA: NORMAL
DLCO: NORMAL
EXPIRATORY TIME-POST: NORMAL
EXPIRATORY TIME: NORMAL
FEF 25-75 %CHNG: NORMAL
FEF 25-75 POST %PRED: NORMAL
FEF 25-75% %PRED-PRE: NORMAL
FEF 25-75% PRED: NORMAL
FEF 25-75-POST: NORMAL
FEF 25-75-PRE: NORMAL
FEV1 %PRED-POST: 50 %
FEV1 %PRED-PRE: 37 %
FEV1 PRED: NORMAL
FEV1-POST: NORMAL
FEV1-PRE: NORMAL
FEV1/FVC %PRED-POST: 63 %
FEV1/FVC %PRED-PRE: 52 %
FEV1/FVC PRED: NORMAL
FEV1/FVC-POST: NORMAL
FEV1/FVC-PRE: NORMAL
FVC %PRED-POST: 79 L
FVC %PRED-PRE: 71 %
FVC PRED: NORMAL
FVC-POST: NORMAL
FVC-PRE: NORMAL
GAW %PRED: NORMAL
GAW PRED: NORMAL
GAW: NORMAL
IC PRE %PRED: NORMAL
IC PRED: NORMAL
IC: NORMAL
MEP: NORMAL
MIP: NORMAL
MVV %PRED-PRE: NORMAL
MVV PRED: NORMAL
MVV-PRE: NORMAL
PEF %PRED-POST: NORMAL
PEF PRED: NORMAL
PEF%CHNG: NORMAL
PEF-POST: NORMAL
PEF-PRE: NORMAL
RAW %PRED: NORMAL
RAW PRED: NORMAL
RAW: NORMAL
RV PRE %PRED: NORMAL
RV PRED: NORMAL
RV: NORMAL
SVC %PRED: NORMAL
SVC PRED: NORMAL
SVC: NORMAL
TLC PRE %PRED: 74 %
TLC PRED: NORMAL
TLC: NORMAL
VA %PRED: NORMAL
VA PRED: NORMAL
VA: NORMAL
VTG %PRED: NORMAL
VTG PRED: NORMAL
VTG: NORMAL

## 2024-02-13 PROCEDURE — 6370000000 HC RX 637 (ALT 250 FOR IP): Performed by: INTERNAL MEDICINE

## 2024-02-13 PROCEDURE — 94726 PLETHYSMOGRAPHY LUNG VOLUMES: CPT

## 2024-02-13 PROCEDURE — 94760 N-INVAS EAR/PLS OXIMETRY 1: CPT

## 2024-02-13 PROCEDURE — 94060 EVALUATION OF WHEEZING: CPT

## 2024-02-13 PROCEDURE — 71271 CT THORAX LUNG CANCER SCR C-: CPT

## 2024-02-13 PROCEDURE — 94729 DIFFUSING CAPACITY: CPT

## 2024-02-13 RX ORDER — ALBUTEROL SULFATE 90 UG/1
4 AEROSOL, METERED RESPIRATORY (INHALATION) ONCE
Status: COMPLETED | OUTPATIENT
Start: 2024-02-13 | End: 2024-02-13

## 2024-02-13 RX ADMIN — Medication 4 PUFF: at 08:22

## 2024-02-13 ASSESSMENT — PULMONARY FUNCTION TESTS
FEV1_PERCENT_PREDICTED_PRE: 37
FEV1/FVC_PERCENT_PREDICTED_PRE: 52
FEV1_PERCENT_PREDICTED_POST: 50
FVC_PERCENT_PREDICTED_POST: 79
FEV1/FVC_PERCENT_PREDICTED_POST: 63
FVC_PERCENT_PREDICTED_PRE: 71

## 2024-02-14 NOTE — PROCEDURES
[address] [address]                               PULMONARY FUNCTION    PATIENT NAME: ERON DRAKE                  :        1965  MED REC NO:   [id number]                          ROOM:  ACCOUNT NO:   [id number]                           ADMIT DATE: 2024  PROVIDER:     Nikhil Ferguson MD    DATE OF PROCEDURE:  2024    PFT INTERVENTION    The patient is 58 years old who underwent a PFT for simple chronic  bronchitis.  The patient's spirometry shows the patient to have FVC of  71%, FEV1 to be 37%, FEV1 to FVC ratio was 52%, FEF 25%-75% was 29%.   The patient had significant postbronchodilator improvement on the study.  Lung volume shows total lung capacity was mildly reduced at 74%.  The  patient has air trapping.  The patient also has decrease in ERV.  The  patient's diffusion capacity when adjusted for volume was normal.   Flow-volume was suggestive of obstructive pattern.  On the basis of this  PFT, the patient has severe obstructive airway disease with reactive  airway disease.  Along with that, the patient has some changes in the  PFT parameters because of body habitus with minimal restrictive lung  disease.  Please correlate clinically.        NIKHIL FERGUSON MD    D: 2024 16:18:01       T: 2024 16:20:21     [id number]  Job#: [id number]     Doc#: [id number]    CC:

## 2024-02-15 NOTE — PLAN OF CARE
Increase level of function to baseline.
Lov 12/12/23  Lrf 180 3 4/14/23   
Problem: Falls - Risk of:  Goal: Will remain free from falls  Description: Will remain free from falls  6/27/2020 0118 by Master Atkinson RN  Note: Pt will remain free of falls this shift. Bedside table and call light within reach, bed alarm in place. Pt instructed to call out when in need of assistance, verbalized understanding. Will continue to monitor.
Problem: Pain:  Goal: Pain level will decrease  Description: Pain level will decrease  6/23/2020 1205 by Wagner Augustine RN  Outcome: Ongoing  6/22/2020 2330 by Zachary Callaway RN  Outcome: Ongoing  Goal: Control of acute pain  Description: Control of acute pain  6/23/2020 1205 by Wagner Augustine RN  Outcome: Ongoing  Pain controlled with prn pain meds. Will continue to monitor. 6/22/2020 2330 by Zachary Callaway RN  Outcome: Ongoing  Goal: Control of chronic pain  Description: Control of chronic pain  6/22/2020 2330 by Zachary Callaway RN  Outcome: Ongoing     Problem: Falls - Risk of:  Goal: Will remain free from falls  Description: Will remain free from falls  Outcome: Ongoing  Pt remains free of falls. Bed in lowest position with call light and bedside table within reach.    Goal: Absence of physical injury  Description: Absence of physical injury  Outcome: Ongoing
Problem: Pain:  Goal: Pain level will decrease  Description: Pain level will decrease  6/23/2020 2356 by Alice Douglas RN  Outcome: Ongoing     Problem: Pain:  Goal: Control of acute pain  Description: Control of acute pain  6/23/2020 2356 by Alice Douglas RN  Outcome: Ongoing     Problem: Falls - Risk of:  Goal: Will remain free from falls  Description: Will remain free from falls  6/23/2020 2356 by Alice Douglas RN  Outcome: Ongoing
Problem: Pain:  Goal: Pain level will decrease  Description: Pain level will decrease  Outcome: Ongoing     Problem: Falls - Risk of:  Goal: Will remain free from falls  Description: Will remain free from falls  Outcome: Ongoing  Goal: Absence of physical injury  Description: Absence of physical injury  Outcome: Ongoing
1151 by Mitesh Griggs, RN  Outcome: Ongoing

## 2024-02-20 ENCOUNTER — HOSPITAL ENCOUNTER (OUTPATIENT)
Dept: VASCULAR LAB | Age: 59
Discharge: HOME | End: 2024-02-20
Payer: MEDICARE

## 2024-02-20 DIAGNOSIS — I73.9 CLAUDICATION IN PERIPHERAL VASCULAR DISEASE (HCC): ICD-10-CM

## 2024-02-20 PROCEDURE — 93926 LOWER EXTREMITY STUDY: CPT

## 2024-02-22 ENCOUNTER — TELEPHONE (OUTPATIENT)
Dept: VASCULAR SURGERY | Age: 59
End: 2024-02-22

## 2024-02-22 DIAGNOSIS — I73.9 PERIPHERAL VASCULAR DISEASE, UNSPECIFIED (HCC): Primary | ICD-10-CM

## 2024-02-22 RX ORDER — CLOPIDOGREL BISULFATE 75 MG/1
75 TABLET ORAL DAILY
Qty: 30 TABLET | Refills: 2 | Status: SHIPPED | OUTPATIENT
Start: 2024-02-22

## 2024-02-22 NOTE — TELEPHONE ENCOUNTER
Called patient and spoke with spouse regarding test results per Dr Gates. Looks good and repeat in 6 months. Pamlr

## 2024-02-22 NOTE — TELEPHONE ENCOUNTER
Refill request for PLAVIX medication.     Name of [facility]acy- [facility]      Last visit - 1/10/24     Pending visit - NONE    Last refill -1/15/24      Medication Contract signed -   Last Oarrs ran-         Additional Comments

## 2024-02-27 ENCOUNTER — HOSPITAL ENCOUNTER (OUTPATIENT)
Dept: SLEEP CENTER | Age: 59
Discharge: HOME | End: 2024-02-29
Payer: MEDICARE

## 2024-02-27 DIAGNOSIS — G47.30 OBSERVED SLEEP APNEA: ICD-10-CM

## 2024-02-27 PROCEDURE — 95810 POLYSOM 6/> YRS 4/> PARAM: CPT

## 2024-03-04 ENCOUNTER — PROCEDURE VISIT (OUTPATIENT)
Dept: AUDIOLOGY | Age: 59
End: 2024-03-04
Payer: MEDICARE

## 2024-03-04 ENCOUNTER — CARE COORDINATION (OUTPATIENT)
Dept: CARE COORDINATION | Age: 59
End: 2024-03-04

## 2024-03-04 DIAGNOSIS — H90.3 SENSORINEURAL HEARING LOSS, BILATERAL: Primary | ICD-10-CM

## 2024-03-04 PROCEDURE — 92567 TYMPANOMETRY: CPT | Performed by: AUDIOLOGIST

## 2024-03-04 PROCEDURE — 92557 COMPREHENSIVE HEARING TEST: CPT | Performed by: AUDIOLOGIST

## 2024-03-04 NOTE — PROGRESS NOTES
Julio Galeas   1965, 58 y.o. male   [id number]       Referring Provider: Justice Cavazos APRN - CNP   Referral Type: In an order in Epic    Reason for Visit: Evaluation of the cause of disorders of hearing, tinnitus, or balance.    ADULT AUDIOLOGIC EVALUATION      Julio Galeas is a 58 y.o. male seen today, 3/4/2024 , for an initial audiologic evaluation.  Patient was alone.    AUDIOLOGIC AND OTHER PERTINENT MEDICAL HISTORY:      Julio Galeas noted history of occupational noise exposure.  Patient reports perceived gradual decline in hearing left worse than right.  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  Medical history is reportedly significant for multiple strokes.     Julio Galeas denied otalgia, aural fullness, tinnitus, dizziness, history of head trauma, history of ear surgery, and family history of hearing loss.    Date: 3/4/2024     IMPRESSIONS:      Normal middle ear pressure and compliance, bilaterally.  Abnormal hearing sensitivity, bilaterally, which can affect every day listening needs.  Word understanding was excellent presented at elevated sensation levels, bilaterally.  Hearing aids are recommended at this time.  Follow medical recommendations of Justice Cavazos APRN - CNP .     ASSESSMENT AND FINDINGS:     Otoscopy revealed: Clear ear canals bilaterally    RIGHT EAR:  Hearing Sensitivity: Normal hearing sensitivity to a severe sensorineural hearing loss from 250-8000 Hz  Speech Recognition Threshold: 25 dB HL  Word Recognition:Excellent (96%), based on NU-6 25-word list at 85 dBHL with 55 dBHL of masking using recorded speech stimuli.    Tympanometry: Normal peak pressure and compliance, Type A tympanogram, consistent with normal middle ear function.      LEFT EAR:  Hearing Sensitivity: Normal hearing sensitivity to a profound sensorineural hearing loss from 250-8000 Hz  Speech Recognition Threshold: 20 dB HL  Word

## 2024-03-04 NOTE — CARE COORDINATION
ACM attempted outreach. Left message. Contact information for call back provided.     (Attempting to reach to assess for completing CC program)   BP and BS check and weight

## 2024-03-07 ENCOUNTER — CARE COORDINATION (OUTPATIENT)
Dept: CARE COORDINATION | Age: 59
End: 2024-03-07

## 2024-03-07 ENCOUNTER — OFFICE VISIT (OUTPATIENT)
Dept: PULMONOLOGY | Age: 59
End: 2024-03-07
Payer: MEDICARE

## 2024-03-07 VITALS
WEIGHT: 200 LBS | TEMPERATURE: 97.6 F | SYSTOLIC BLOOD PRESSURE: 178 MMHG | BODY MASS INDEX: 30.31 KG/M2 | HEART RATE: 61 BPM | OXYGEN SATURATION: 91 % | RESPIRATION RATE: 16 BRPM | DIASTOLIC BLOOD PRESSURE: 79 MMHG | HEIGHT: 68 IN

## 2024-03-07 DIAGNOSIS — E66.9 OBESITY (BMI 30.0-34.9): ICD-10-CM

## 2024-03-07 DIAGNOSIS — I25.10 CORONARY ARTERY CALCIFICATION SEEN ON CT SCAN: ICD-10-CM

## 2024-03-07 DIAGNOSIS — J43.2 CENTRILOBULAR EMPHYSEMA (HCC): Primary | ICD-10-CM

## 2024-03-07 DIAGNOSIS — Z72.0 TOBACCO USE: ICD-10-CM

## 2024-03-07 DIAGNOSIS — G47.33 OSA (OBSTRUCTIVE SLEEP APNEA): ICD-10-CM

## 2024-03-07 DIAGNOSIS — J41.0 SIMPLE CHRONIC BRONCHITIS (HCC): ICD-10-CM

## 2024-03-07 PROBLEM — E66.811 OBESITY (BMI 30.0-34.9): Status: ACTIVE | Noted: 2024-03-07

## 2024-03-07 PROCEDURE — 3077F SYST BP >= 140 MM HG: CPT | Performed by: INTERNAL MEDICINE

## 2024-03-07 PROCEDURE — 99214 OFFICE O/P EST MOD 30 MIN: CPT | Performed by: INTERNAL MEDICINE

## 2024-03-07 PROCEDURE — 3078F DIAST BP <80 MM HG: CPT | Performed by: INTERNAL MEDICINE

## 2024-03-07 NOTE — PROGRESS NOTES
Chief Complaint/Referring Provider:  Patient has come to the office for a pulm e follow-up to discuss his clinical status and test results    Is a 58-year-old male who has come back to the office for pulm evaluation, patient states that overall he is stable, patient have congestion but not significant, patient does not have any increased cough or expectoration or shortness of breath or wheezing, patient states that shortness is not limited, patient is able to do ADLs, patient does not have any significant fever or chills, patient denies any abdominal issues of concern, no increasing leg edema, no other pertinent review of system of concern     Previous HPI: Patient is a 58-year-old male who has been referred to the office for a pulm evaluation for sleep apnea  Patient apparently stops breathing at nighttime, patient also snores at nighttime, patient has no energy in the daytime and is tired all the time as per patient's family, patient has some rhinorrhea, patient does not have any significant sore throat or difficulty in swallowing, no coughing or choking when eating, no odynophagia or dysphagia, patient does have some little bit of cough along with mucoid phlegm, patient also has some shortness of breath especially with exertion, patient's excess tolerance is limited to 2 blocks after which he has shortness of breath, patient does not have any increasing wheezing, patient does not have any abdominal pain nausea vomiting, no increasing reflux symptoms, no increasing leg edema, patient has gained about 20 pounds in the last 1 year or so, patient continues to smoke but is trying to quit, patient has significant atherosclerotic arterial disease and has had multiple bypasses in the past, patient also has had 4 strokes in the past, patient does not have any change in the ambulatory sick contacts, patient does not have any pets, patient used to be a  and was not exposed to any chemicals or fumes,

## 2024-03-07 NOTE — CARE COORDINATION
Ambulatory Care Coordination Note  3/7/2024    Patient Current Location:  [place]     ACM contacted the spouse/partner by telephone. Verified name and  with spouse/partner as identifiers. Provided introduction to self, and explanation of the ACM role.       Method of communication with provider: chart routing.    ACM: Katiana Shaikh RN    Follow up call completed. Spoke with spouse. She was not with him. She was unable to provide BP readings. Stated the last one she recalls was 147/ something. Stress need for daily check. Elevated on last provider visit. She is agreeable to log this next week for me.     DM. Does not check sugars at home   Lab Results   Component Value Date    LABA1C 6.4 2024    LABA1C 5.8 2023    LABA1C 6.2 2023     Lab Results   Component Value Date    .0 2024    .8 2023    .2 2023     He has a pulmonary follow up today planned    Stress monitoring and reporting elevated bp 150 or > or any copd exacerbations noted.     Plan    Plan to complete calls soon >6  months on CC program   Bp logging needs. Inquire on weights also next call      Offered patient enrollment in the Remote Patient Monitoring (RPM) program for in-home monitoring:  completed recently.  .    Lab Results       None            Care Coordination Interventions    Referral from Primary Care Provider: No  Suggested Interventions and Community Resources  Fall Risk Prevention: Declined  Home Health Services: Declined  Meals on Wheels: Declined  Medi Set or Pill Pack: Declined (Comment: wife assists with mes. 23 trb)  Registered Dietician: Declined  Senior Services: Declined  Social Work: Declined  Transportation Support: Declined  Zone Management Tools: In Process (Comment: DM and HTN, copd added )  Other Services or Interventions: active on RPM          Goals Addressed                   This Visit's Progress       Care Coordination     Activity Plan   No change     I will

## 2024-03-07 NOTE — PROGRESS NOTES
[facility] Communication:  The following orders are received by verbal communication from Nikhil Ferguson MD    Orders include:    6 month   Order faxed to pulmonary rehab

## 2024-03-07 NOTE — PATIENT INSTRUCTIONS
Remember to bring a list of pulmonary medications and any CPAP or BiPAP machines to your next appointment with the office.     Please keep all of your future appointments scheduled by [facility] office. Out of respect for other patients and providers, you may be asked to reschedule your appointment if you arrive later than your scheduled appointment time. Appointments cancelled less than 24hrs in advance will be considered a no show. Patients with three missed appointments within 1 year or four missed appointments within 2 years can be dismissed from the practice.     Please be aware that our physicians are required to work in the Intensive Care Unit at [facility].  Your appointment may need to be rescheduled if they are designated to work during your appointment time.      You may receive a survey regarding the care you received during your visit.  Your input is valuable to us.  We encourage you to complete and return your survey.  We hope you will choose us in the future for your healthcare needs.     Pt instructed of all future appointment dates & times, including radiology, labs, procedures & referrals. If procedures were scheduled preparation instructions provided. Instructions on future appointments with [facility] were given.

## 2024-03-12 NOTE — TELEPHONE ENCOUNTER
Refill request for lisinopril / clopidogrel  medication.      [facility]      Last visit - 2-4-2022     Pending visit - none    Last refill -2-4-2022      Medication Contract signed -   Rogers hicks-         Additional Comments our sinuses.

## 2024-03-13 ENCOUNTER — CARE COORDINATION (OUTPATIENT)
Dept: CARE COORDINATION | Age: 59
End: 2024-03-13

## 2024-03-13 NOTE — CARE COORDINATION
Per [facility]- Can you call wife and see if she has been logging and checking BP. A weight would be good also.      Call to Danica/Wife-   She was watching grandchild and was not at home but provided the following info.    Weight 199lbs at last check  BP has been around 147/73 and another 147/50 or 60. Readings haven't been real high. He has been doing good. Still forgets things due to stoke especially when he is tired. Blood work has been good.    Was Dx with emphysema by Pulm/Dr Ferguson recently.  Starting Pul rehab for emphysema soon.   She did not have details since she was not at home.  Thinks rehab will be at [place]. He will be participating for around 6 weeks.    (3/7 Dr Ferguson provider notes confirm Dx and rehab referral in chart as well)    She said [facility] could call tomorrow anytime to get more details if needed. Tomorrow would be a better day to chat since she would be home.    Let patient know I would route conversation to [facility].     Routed to [facility]

## 2024-03-15 ENCOUNTER — CARE COORDINATION (OUTPATIENT)
Dept: CARE COORDINATION | Age: 59
End: 2024-03-15

## 2024-03-15 NOTE — CARE COORDINATION
ACM attempted outreach. No answer. Follow up to discuss new dx emphysema, pulmonary rehab status .     Plan    Follow up call next week   Copd education (new dx   Continue to assess for d/c needs

## 2024-03-25 ENCOUNTER — CARE COORDINATION (OUTPATIENT)
Dept: CARE COORDINATION | Age: 59
End: 2024-03-25

## 2024-03-28 ENCOUNTER — CARE COORDINATION (OUTPATIENT)
Dept: CARE COORDINATION | Age: 59
End: 2024-03-28

## 2024-03-28 NOTE — CARE COORDINATION
Ambulatory Care Coordination Note  3/28/2024    Patient Current Location:  [place]     ACM contacted the spouse/partner by telephone. Verified name and  with spouse/partner as identifiers. Provided introduction to self, and explanation of the ACM role.       Method of communication with provider: chart routing.    ACM: Katiana Shaikh, RN    Call completed with wife Danica. Lorne continues to do better. He is trying to make positive diet changes. He and wife are working together on this. Still not smoking for 1 month now. BP has been stable per wife and he has been checking. Discussed them getting a pulse ox to measure O2 sat at home. Normal parameters discussed. Copd zone teaching completed and reviewed worsening s/s and follow up tx plan for symptom changes. He is checking weight daily.  ACM plans to complete CC program at this time.         Lab Results       None            Care Coordination Interventions    Referral from Primary Care Provider: No  Suggested Interventions and Community Resources  Fall Risk Prevention: Declined  Home Health Services: Declined  Meals on Wheels: Declined  Medi Set or Pill Pack: Declined (Comment: wife assists with mes. 23 trb)  Registered Dietician: Declined  Senior Services: Declined  Social Work: Declined  Transportation Support: Declined  Zone Management Tools: In Process (Comment: DM and HTN, copd added )  Other Services or Interventions: active on RPM          Goals Addressed    None         Future Appointments   Date Time Provider Department Center   2024  8:40 AM Nikhil Ferguson MD [facility]

## 2024-04-01 RX ORDER — EZETIMIBE 10 MG/1
10 TABLET ORAL DAILY
Qty: 90 TABLET | Refills: 3 | Status: SHIPPED | OUTPATIENT
Start: 2024-04-01

## 2024-04-01 NOTE — TELEPHONE ENCOUNTER
Refill request for  medication.     EZETIMIBE 10 MG     [facility]      Last visit - 1/10/2024     Pending visit - NONE     Last refill -9/25/2023              Additional Comments

## 2024-05-01 RX ORDER — LEVETIRACETAM 500 MG/1
500 TABLET ORAL 2 TIMES DAILY
Qty: 60 TABLET | Refills: 3 | Status: SHIPPED | OUTPATIENT
Start: 2024-05-01

## 2024-05-01 NOTE — TELEPHONE ENCOUNTER
Refill request for  medication.     LEVETIRACETAM 500 MG    [facility]      Last visit - 1/10/2024     Pending visit - NONE    Last refill -12/18/2023              Additional Comments

## 2024-05-06 RX ORDER — LISINOPRIL 5 MG/1
5 TABLET ORAL DAILY
Qty: 30 TABLET | Refills: 2 | Status: SHIPPED | OUTPATIENT
Start: 2024-05-06

## 2024-05-06 NOTE — TELEPHONE ENCOUNTER
Refill request for Lisinoprill 5 mg  medication.     [facility]      Last visit - 1/10/24     Pending visit - 9/17/24    Last refill -2/5/24      Medication Contract signed -   Last Oarrs ran-         Additional Comments

## 2024-05-24 RX ORDER — CLOPIDOGREL BISULFATE 75 MG/1
75 TABLET ORAL DAILY
Qty: 30 TABLET | Refills: 2 | Status: SHIPPED | OUTPATIENT
Start: 2024-05-24

## 2024-05-24 NOTE — TELEPHONE ENCOUNTER
Refill request for  medication.     CLOPIDOGREL 75 MG     [facility]      Last visit - 1/10/2024     Pending visit - NONE    Last refill -2/22/2024              Additional Comments

## 2024-06-11 ENCOUNTER — TELEMEDICINE (OUTPATIENT)
Dept: INTERNAL MEDICINE CLINIC | Age: 59
End: 2024-06-11

## 2024-06-11 DIAGNOSIS — E11.51 TYPE 2 DIABETES MELLITUS WITH DIABETIC PERIPHERAL ANGIOPATHY WITHOUT GANGRENE, UNSPECIFIED WHETHER LONG TERM INSULIN USE (HCC): ICD-10-CM

## 2024-06-11 DIAGNOSIS — F33.0 MAJOR DEPRESSIVE DISORDER, RECURRENT, MILD (HCC): ICD-10-CM

## 2024-06-11 DIAGNOSIS — E11.65 TYPE 2 DIABETES MELLITUS WITH HYPERGLYCEMIA, WITHOUT LONG-TERM CURRENT USE OF INSULIN (HCC): ICD-10-CM

## 2024-06-11 DIAGNOSIS — M54.42 CHRONIC LEFT-SIDED LOW BACK PAIN WITH LEFT-SIDED SCIATICA: ICD-10-CM

## 2024-06-11 DIAGNOSIS — I70.202 ATHEROSCLEROSIS OF ARTERY OF LEFT LOWER EXTREMITY (HCC): ICD-10-CM

## 2024-06-11 DIAGNOSIS — T82.858D BYPASS GRAFT STENOSIS, SUBSEQUENT ENCOUNTER: ICD-10-CM

## 2024-06-11 DIAGNOSIS — Z86.73 HISTORY OF STROKE: ICD-10-CM

## 2024-06-11 DIAGNOSIS — I25.118 ATHEROSCLEROTIC HEART DISEASE OF NATIVE CORONARY ARTERY WITH OTHER FORMS OF ANGINA PECTORIS (HCC): ICD-10-CM

## 2024-06-11 DIAGNOSIS — I74.5 ILIAC ARTERY OCCLUSION (HCC): ICD-10-CM

## 2024-06-11 DIAGNOSIS — G25.81 RLS (RESTLESS LEGS SYNDROME): Primary | ICD-10-CM

## 2024-06-11 DIAGNOSIS — G89.29 CHRONIC LEFT-SIDED LOW BACK PAIN WITH LEFT-SIDED SCIATICA: ICD-10-CM

## 2024-06-11 DIAGNOSIS — G25.81 RESTLESS LEGS SYNDROME (RLS): ICD-10-CM

## 2024-06-11 DIAGNOSIS — I71.43 INFRARENAL ABDOMINAL AORTIC ANEURYSM (AAA) WITHOUT RUPTURE (HCC): ICD-10-CM

## 2024-06-11 DIAGNOSIS — M51.36 DDD (DEGENERATIVE DISC DISEASE), LUMBAR: ICD-10-CM

## 2024-06-11 PROBLEM — Z87.898 HISTORY OF SEIZURE: Status: ACTIVE | Noted: 2023-04-25

## 2024-06-11 RX ORDER — GABAPENTIN 300 MG/1
CAPSULE ORAL
Qty: 60 CAPSULE | Refills: 1 | Status: SHIPPED | OUTPATIENT
Start: 2024-06-11 | End: 2024-07-11

## 2024-06-11 ASSESSMENT — ENCOUNTER SYMPTOMS
COUGH: 0
CONSTIPATION: 0
NAUSEA: 0
BACK PAIN: 1
SHORTNESS OF BREATH: 0
DIARRHEA: 0
VOMITING: 0
SINUS PAIN: 0
CHEST TIGHTNESS: 0
SORE THROAT: 0

## 2024-06-11 NOTE — PROGRESS NOTES
Monitoring  Discontinued         PHYSICAL EXAMINATION:  [ INSTRUCTIONS:  \"[x]\" Indicates a positive item  \"[]\" Indicates a negative item  -- DELETE ALL ITEMS NOT EXAMINED]  Vital Signs: (As obtained by patient/caregiver or practitioner observation)    Blood pressure-  Heart rate-    Respiratory rate-    Temperature-  Pulse oximetry-     Constitutional: [x] Appears well-developed and well-nourished [x] No apparent distress      [] Abnormal-   Mental status  [x] Alert and awake  [x] Oriented to person/place/time [x]Able to follow commands      Eyes:  EOM    [x]  Normal  [] Abnormal-  Sclera  [x]  Normal  [] Abnormal -         Discharge [x]  None visible  [] Abnormal -    HENT:   [x] Normocephalic, atraumatic.  [] Abnormal   [x] Mouth/Throat: Mucous membranes are moist.     External Ears [x] Normal  [] Abnormal-     Neck: [x] No visualized mass     Pulmonary/Chest: [x] Respiratory effort normal.  [x] No visualized signs of difficulty breathing or respiratory distress        [] Abnormal-      Musculoskeletal:   [x] Normal gait with no signs of ataxia         [x] Normal range of motion of neck        [] Abnormal-       Neurological:        [x] No Facial Asymmetry (Cranial nerve 7 motor function) (limited exam to video visit)          [] No gaze palsy        [] Abnormal-         Skin:        [x] No significant exanthematous lesions or discoloration noted on facial skin         [] Abnormal-            Psychiatric:       [x] Normal Affect [x] No Hallucinations        [] Abnormal-     Other pertinent observable physical exam findings-       ASSESSMENT/PLAN:    1. RLS (restless legs syndrome)    2. Chronic left-sided low back pain with left-sided sciatica    3. History of stroke    4. Atherosclerosis of artery of left lower extremity (HCC)    5. Atherosclerotic heart disease of native coronary artery with other forms of angina pectoris (HCC)    6. Infrarenal abdominal aortic aneurysm (AAA) without rupture ([facility])    7. Bypass

## 2024-06-12 ENCOUNTER — TELEPHONE (OUTPATIENT)
Dept: VASCULAR SURGERY | Age: 59
End: 2024-06-12

## 2024-06-12 DIAGNOSIS — I65.23 BILATERAL CAROTID ARTERY STENOSIS: Primary | ICD-10-CM

## 2024-06-12 NOTE — TELEPHONE ENCOUNTER
Called patient as says coming in for tiredness. She felt that is how he felt before before the last carotid blockage and had to get a stent. So I scheduled him for carotid duplex scan at [facility] for tomorrow 6/13/24 at 2:00pm and arrive at 1:30pm. Aristeo

## 2024-06-13 ENCOUNTER — HOSPITAL ENCOUNTER (OUTPATIENT)
Dept: VASCULAR LAB | Age: 59
Discharge: HOME | End: 2024-06-15
Attending: SURGERY
Payer: MEDICARE

## 2024-06-13 ENCOUNTER — TELEPHONE (OUTPATIENT)
Dept: VASCULAR SURGERY | Age: 59
End: 2024-06-13

## 2024-06-13 DIAGNOSIS — I65.23 BILATERAL CAROTID ARTERY STENOSIS: ICD-10-CM

## 2024-06-13 PROCEDURE — 93880 EXTRACRANIAL BILAT STUDY: CPT

## 2024-06-14 ENCOUNTER — TELEPHONE (OUTPATIENT)
Dept: VASCULAR SURGERY | Age: 59
End: 2024-06-14

## 2024-06-14 LAB
VAS LEFT ARM BP: 120 MMHG
VAS LEFT CCA DIST PSV: 0 CM/S
VAS LEFT CCA MID PSV: 0 CM/S
VAS LEFT CCA PROX PSV: 0 CM/S
VAS LEFT ECA PSV: 0 CM/S
VAS LEFT ICA DIST PSV: 0 CM/S
VAS LEFT ICA MID PSV: 0 CM/S
VAS LEFT ICA PROX PSV: 0 CM/S
VAS LEFT ICA/CCA PSV: 0
VAS LEFT SUBCLAVIAN PROX PSV: 211 CM/S
VAS LEFT VERTEBRAL EDV: 26.5 CM/S
VAS LEFT VERTEBRAL PSV: 44.7 CM/S
VAS RIGHT ARM BP: 120 MMHG
VAS RIGHT ARTERIAL PROX ANASTOMOSIS EDV: 44.2 CM/S
VAS RIGHT ARTERIAL PROX ANASTOMOSIS PSV: 101 CM/S
VAS RIGHT CCA DIST EDV: 37.3 CM/S
VAS RIGHT CCA DIST PSV: 79.5 CM/S
VAS RIGHT CCA MID EDV: 36 CM/S
VAS RIGHT CCA MID PSV: 77.7 CM/S
VAS RIGHT CCA PROX EDV: 33.5 CM/S
VAS RIGHT CCA PROX PSV: 74.6 CM/S
VAS RIGHT DIST OUTFLOW EDV: 19.6 CM/S
VAS RIGHT DIST OUTFLOW PSV: 62.7 CM/S
VAS RIGHT ECA PSV: 0 CM/S
VAS RIGHT GRAFT 1: NORMAL
VAS RIGHT ICA DIST EDV: 39.2 CM/S
VAS RIGHT ICA DIST PSV: 91.7 CM/S
VAS RIGHT ICA MID EDV: 19.6 CM/S
VAS RIGHT ICA MID PSV: 62.7 CM/S
VAS RIGHT ICA PROX EDV: 18.2 CM/S
VAS RIGHT ICA PROX PSV: 58.9 CM/S
VAS RIGHT ICA/CCA PSV: 1.18
VAS RIGHT INFLOW ARTERY EDV: 38.3 CM/S
VAS RIGHT INFLOW ARTERY PSV: 95.5 CM/S
VAS RIGHT MID OUTFLOW EDV: 18.2 CM/S
VAS RIGHT MID OUTFLOW PSV: 58.9 CM/S
VAS RIGHT OUTFLOW VESSEL EDV: 32.1 CM/S
VAS RIGHT OUTFLOW VESSEL PSV: 80 CM/S
VAS RIGHT PROX OUTFLOW EDV: 44.2 CM/S
VAS RIGHT PROX OUTFLOW PSV: 92.5 CM/S
VAS RIGHT SUBCLAVIAN PROX PSV: 213 CM/S
VAS RIGHT VENOUS DIST ANASTOMOSIS EDV: 22 CM/S
VAS RIGHT VENOUS DIST ANASTOMOSIS PSV: 65.1 CM/S
VAS RIGHT VERTEBRAL EDV: 24.3 CM/S
VAS RIGHT VERTEBRAL PSV: 72.1 CM/S

## 2024-06-14 NOTE — TELEPHONE ENCOUNTER
Called patient and left message as carotid scan is normal stent is wide open and can repeat in year. Pamlr

## 2024-06-14 NOTE — TELEPHONE ENCOUNTER
Called patient and left message regarding results per Dr Gates. Carotid duplex is normal . Not reason for tiredness.pambetty

## 2024-06-23 ENCOUNTER — TELEPHONE (OUTPATIENT)
Dept: INTERNAL MEDICINE CLINIC | Age: 59
End: 2024-06-23

## 2024-06-23 RX ORDER — ONDANSETRON 4 MG/1
4 TABLET, FILM COATED ORAL EVERY 8 HOURS PRN
Qty: 10 TABLET | Refills: 0 | Status: SHIPPED | OUTPATIENT
Start: 2024-06-23

## 2024-07-08 ENCOUNTER — HOSPITAL ENCOUNTER (OUTPATIENT)
Dept: CARDIAC REHAB | Age: 59
Setting detail: THERAPIES SERIES
Discharge: HOME | End: 2024-07-08

## 2024-08-01 RX ORDER — LISINOPRIL 5 MG/1
5 TABLET ORAL DAILY
Qty: 30 TABLET | Refills: 5 | Status: SHIPPED | OUTPATIENT
Start: 2024-08-01

## 2024-08-01 NOTE — TELEPHONE ENCOUNTER
Refill request for LISINOPRIL medication.     Name of [facility]- [facility]      Last visit - 6/11/24     Pending visit - 9/9/24    Last refill -7/2/24      Medication Contract signed -   Last Oarrs ran-         Additional Comments

## 2024-08-19 DIAGNOSIS — M54.42 CHRONIC LEFT-SIDED LOW BACK PAIN WITH LEFT-SIDED SCIATICA: ICD-10-CM

## 2024-08-19 DIAGNOSIS — G89.29 CHRONIC LEFT-SIDED LOW BACK PAIN WITH LEFT-SIDED SCIATICA: ICD-10-CM

## 2024-08-19 RX ORDER — GABAPENTIN 300 MG/1
CAPSULE ORAL
Qty: 60 CAPSULE | Refills: 1 | Status: SHIPPED | OUTPATIENT
Start: 2024-08-19 | End: 2024-09-19

## 2024-08-19 NOTE — TELEPHONE ENCOUNTER
Refill request for Gabapentin ( Neurontin 300 mg  medication.     [facility]      Last visit - 6/11/24     Pending visit - 9/9/24    Last refill -6/11/24      Medication Contract signed -   Last Oarrs ran-         Additional Comments

## 2024-08-23 RX ORDER — CLOPIDOGREL BISULFATE 75 MG/1
75 TABLET ORAL DAILY
Qty: 90 TABLET | Refills: 3 | Status: SHIPPED | OUTPATIENT
Start: 2024-08-23

## 2024-08-23 NOTE — TELEPHONE ENCOUNTER
Refill request for  medication.     CLOPIDOGREL 75 MG     Name of [facility]- [facility]      Last visit - 1/10/2024     Pending visit - 9/9/2024    Last refill -5/24/20242              Additional Comments

## 2024-09-03 RX ORDER — LEVETIRACETAM 500 MG/1
500 TABLET ORAL 2 TIMES DAILY
Qty: 60 TABLET | Refills: 3 | Status: SHIPPED | OUTPATIENT
Start: 2024-09-03

## 2024-09-03 NOTE — TELEPHONE ENCOUNTER
Refill request for levETIRAcetam 500 MG TABLET medication.     Name of [facility]- [facility]      Last visit - 6-     Pending visit - 9-9-2024    Last refill - 8-1-2024        Medication Contract signed -   Last Oarrs ran-         Additional Comments

## 2024-09-09 ENCOUNTER — OFFICE VISIT (OUTPATIENT)
Dept: INTERNAL MEDICINE CLINIC | Age: 59
End: 2024-09-09

## 2024-09-09 VITALS
HEART RATE: 53 BPM | DIASTOLIC BLOOD PRESSURE: 67 MMHG | BODY MASS INDEX: 29.98 KG/M2 | WEIGHT: 191 LBS | OXYGEN SATURATION: 96 % | TEMPERATURE: 97.4 F | RESPIRATION RATE: 14 BRPM | HEIGHT: 67 IN | SYSTOLIC BLOOD PRESSURE: 105 MMHG

## 2024-09-09 DIAGNOSIS — E78.41 ELEVATED LIPOPROTEIN(A): ICD-10-CM

## 2024-09-09 DIAGNOSIS — I25.118 ATHEROSCLEROTIC HEART DISEASE OF NATIVE CORONARY ARTERY WITH OTHER FORMS OF ANGINA PECTORIS (HCC): ICD-10-CM

## 2024-09-09 DIAGNOSIS — J44.9 CHRONIC OBSTRUCTIVE PULMONARY DISEASE, UNSPECIFIED COPD TYPE (HCC): Primary | ICD-10-CM

## 2024-09-09 DIAGNOSIS — F33.0 MAJOR DEPRESSIVE DISORDER, RECURRENT, MILD (HCC): ICD-10-CM

## 2024-09-09 DIAGNOSIS — Z12.5 ENCOUNTER FOR SCREENING FOR MALIGNANT NEOPLASM OF PROSTATE: ICD-10-CM

## 2024-09-09 DIAGNOSIS — Z00.00 WELL ADULT EXAM: ICD-10-CM

## 2024-09-09 DIAGNOSIS — I73.9 PVD (PERIPHERAL VASCULAR DISEASE) (HCC): ICD-10-CM

## 2024-09-09 DIAGNOSIS — T82.858D BYPASS GRAFT STENOSIS, SUBSEQUENT ENCOUNTER: ICD-10-CM

## 2024-09-09 DIAGNOSIS — I70.202 ATHEROSCLEROSIS OF ARTERY OF LEFT LOWER EXTREMITY (HCC): ICD-10-CM

## 2024-09-09 DIAGNOSIS — G47.33 OSA (OBSTRUCTIVE SLEEP APNEA): ICD-10-CM

## 2024-09-09 DIAGNOSIS — J43.2 CENTRILOBULAR EMPHYSEMA (HCC): ICD-10-CM

## 2024-09-09 DIAGNOSIS — G40.89 OTHER SEIZURES (HCC): ICD-10-CM

## 2024-09-09 DIAGNOSIS — J41.0 SIMPLE CHRONIC BRONCHITIS (HCC): ICD-10-CM

## 2024-09-09 DIAGNOSIS — E11.51 TYPE 2 DIABETES MELLITUS WITH DIABETIC PERIPHERAL ANGIOPATHY WITHOUT GANGRENE, UNSPECIFIED WHETHER LONG TERM INSULIN USE (HCC): ICD-10-CM

## 2024-09-09 DIAGNOSIS — I65.23 BILATERAL CAROTID ARTERY STENOSIS: ICD-10-CM

## 2024-09-09 PROBLEM — Z86.73 HISTORY OF CVA (CEREBROVASCULAR ACCIDENT): Status: RESOLVED | Noted: 2023-08-10 | Resolved: 2024-09-09

## 2024-09-09 PROBLEM — Z86.73 HISTORY OF STROKE: Status: RESOLVED | Noted: 2024-06-11 | Resolved: 2024-09-09

## 2024-09-09 PROBLEM — Z87.898 HISTORY OF SEIZURE: Status: RESOLVED | Noted: 2023-04-25 | Resolved: 2024-09-09

## 2024-09-09 PROBLEM — E66.3 OVERWEIGHT: Status: ACTIVE | Noted: 2024-03-07

## 2024-09-09 PROBLEM — I74.5 ILIAC ARTERY OCCLUSION (HCC): Status: RESOLVED | Noted: 2023-07-19 | Resolved: 2024-09-09

## 2024-09-09 PROBLEM — G45.9 TIA (TRANSIENT ISCHEMIC ATTACK): Status: RESOLVED | Noted: 2022-12-20 | Resolved: 2024-09-09

## 2024-09-09 RX ORDER — FLUTICASONE PROPIONATE 50 MCG
2 SPRAY, SUSPENSION (ML) NASAL DAILY
Qty: 16 G | Refills: 1 | Status: SHIPPED | OUTPATIENT
Start: 2024-09-09

## 2024-09-09 RX ORDER — BUDESONIDE AND FORMOTEROL FUMARATE DIHYDRATE 80; 4.5 UG/1; UG/1
2 AEROSOL RESPIRATORY (INHALATION)
Qty: 1 EACH | Refills: 2 | Status: SHIPPED | OUTPATIENT
Start: 2024-09-09

## 2024-09-09 SDOH — ECONOMIC STABILITY: FOOD INSECURITY: WITHIN THE PAST 12 MONTHS, THE FOOD YOU BOUGHT JUST DIDN'T LAST AND YOU DIDN'T HAVE MONEY TO GET MORE.: NEVER TRUE

## 2024-09-09 SDOH — ECONOMIC STABILITY: FOOD INSECURITY: WITHIN THE PAST 12 MONTHS, YOU WORRIED THAT YOUR FOOD WOULD RUN OUT BEFORE YOU GOT MONEY TO BUY MORE.: NEVER TRUE

## 2024-09-09 SDOH — ECONOMIC STABILITY: INCOME INSECURITY: HOW HARD IS IT FOR YOU TO PAY FOR THE VERY BASICS LIKE FOOD, HOUSING, MEDICAL CARE, AND HEATING?: NOT HARD AT ALL

## 2024-09-09 ASSESSMENT — ENCOUNTER SYMPTOMS
SINUS PAIN: 0
VOMITING: 0
CHEST TIGHTNESS: 0
SHORTNESS OF BREATH: 1
SORE THROAT: 0
CONSTIPATION: 0
NAUSEA: 0
DIARRHEA: 0
COUGH: 0

## 2024-09-16 ENCOUNTER — TELEPHONE (OUTPATIENT)
Dept: PULMONOLOGY | Age: 59
End: 2024-09-16

## 2024-09-17 ENCOUNTER — OFFICE VISIT (OUTPATIENT)
Dept: INTERNAL MEDICINE CLINIC | Age: 59
End: 2024-09-17

## 2024-09-17 ENCOUNTER — HOSPITAL ENCOUNTER (OUTPATIENT)
Age: 59
Discharge: HOME | End: 2024-09-17
Payer: MEDICARE

## 2024-09-17 VITALS
TEMPERATURE: 97.2 F | HEIGHT: 67 IN | BODY MASS INDEX: 29.98 KG/M2 | HEART RATE: 71 BPM | WEIGHT: 191 LBS | OXYGEN SATURATION: 96 % | SYSTOLIC BLOOD PRESSURE: 106 MMHG | DIASTOLIC BLOOD PRESSURE: 70 MMHG

## 2024-09-17 DIAGNOSIS — E11.51 TYPE 2 DIABETES MELLITUS WITH DIABETIC PERIPHERAL ANGIOPATHY WITHOUT GANGRENE, UNSPECIFIED WHETHER LONG TERM INSULIN USE (HCC): ICD-10-CM

## 2024-09-17 DIAGNOSIS — I25.118 ATHEROSCLEROTIC HEART DISEASE OF NATIVE CORONARY ARTERY WITH OTHER FORMS OF ANGINA PECTORIS (HCC): ICD-10-CM

## 2024-09-17 DIAGNOSIS — M19.90 ARTHRITIS: ICD-10-CM

## 2024-09-17 DIAGNOSIS — G89.29 CHRONIC LEFT-SIDED LOW BACK PAIN WITH LEFT-SIDED SCIATICA: ICD-10-CM

## 2024-09-17 DIAGNOSIS — T82.858D BYPASS GRAFT STENOSIS, SUBSEQUENT ENCOUNTER: ICD-10-CM

## 2024-09-17 DIAGNOSIS — Z12.5 ENCOUNTER FOR SCREENING FOR MALIGNANT NEOPLASM OF PROSTATE: ICD-10-CM

## 2024-09-17 DIAGNOSIS — Z00.00 WELL ADULT EXAM: ICD-10-CM

## 2024-09-17 DIAGNOSIS — F01.50 VASCULAR DEMENTIA WITHOUT BEHAVIORAL DISTURBANCE, PSYCHOTIC DISTURBANCE, MOOD DISTURBANCE, OR ANXIETY, UNSPECIFIED DEMENTIA SEVERITY (HCC): ICD-10-CM

## 2024-09-17 DIAGNOSIS — M54.42 CHRONIC LEFT-SIDED LOW BACK PAIN WITH LEFT-SIDED SCIATICA: ICD-10-CM

## 2024-09-17 DIAGNOSIS — Z00.00 MEDICARE ANNUAL WELLNESS VISIT, SUBSEQUENT: ICD-10-CM

## 2024-09-17 DIAGNOSIS — I70.202 ATHEROSCLEROSIS OF ARTERY OF LEFT LOWER EXTREMITY (HCC): ICD-10-CM

## 2024-09-17 DIAGNOSIS — M51.36 DDD (DEGENERATIVE DISC DISEASE), LUMBAR: ICD-10-CM

## 2024-09-17 DIAGNOSIS — Z91.141 PATIENT NONADHERENT WITH MEDICATION REGIMEN DUE TO FINANCIAL HARDSHIP: Primary | Chronic | ICD-10-CM

## 2024-09-17 LAB
ALBUMIN SERPL-MCNC: 4.1 G/DL (ref 3.4–5)
ALBUMIN/GLOB SERPL: 1.6 {RATIO} (ref 1.1–2.2)
ALP SERPL-CCNC: 140 U/L (ref 40–129)
ALT SERPL-CCNC: 14 U/L (ref 10–40)
ANION GAP SERPL CALCULATED.3IONS-SCNC: 10 MMOL/L (ref 3–16)
AST SERPL-CCNC: 19 U/L (ref 15–37)
BASOPHILS # BLD: 0 K/UL (ref 0–0.2)
BASOPHILS NFR BLD: 0.4 %
BILIRUB SERPL-MCNC: 0.3 MG/DL (ref 0–1)
BUN SERPL-MCNC: 18 MG/DL (ref 7–20)
CALCIUM SERPL-MCNC: 9.5 MG/DL (ref 8.3–10.6)
CHLORIDE SERPL-SCNC: 104 MMOL/L (ref 99–110)
CHOLEST SERPL-MCNC: 110 MG/DL (ref 0–199)
CO2 SERPL-SCNC: 24 MMOL/L (ref 21–32)
CREAT SERPL-MCNC: 1 MG/DL (ref 0.8–1.3)
DEPRECATED RDW RBC AUTO: 14.8 % (ref 12.4–15.4)
EOSINOPHIL # BLD: 0.1 K/UL (ref 0–0.6)
EOSINOPHIL NFR BLD: 1.5 %
GFR SERPLBLD CREATININE-BSD FMLA CKD-EPI: 86 ML/MIN/{1.73_M2}
GLUCOSE SERPL-MCNC: 79 MG/DL (ref 70–99)
HCT VFR BLD AUTO: 38.8 % (ref 40.5–52.5)
HDLC SERPL-MCNC: 45 MG/DL (ref 40–60)
HGB BLD-MCNC: 13.1 G/DL (ref 13.5–17.5)
LDLC SERPL CALC-MCNC: 20 MG/DL
LYMPHOCYTES # BLD: 2.3 K/UL (ref 1–5.1)
LYMPHOCYTES NFR BLD: 26.4 %
MCH RBC QN AUTO: 33.2 PG (ref 26–34)
MCHC RBC AUTO-ENTMCNC: 33.9 G/DL (ref 31–36)
MCV RBC AUTO: 97.9 FL (ref 80–100)
MONOCYTES # BLD: 0.7 K/UL (ref 0–1.3)
MONOCYTES NFR BLD: 8.4 %
NEUTROPHILS # BLD: 5.6 K/UL (ref 1.7–7.7)
NEUTROPHILS NFR BLD: 63.3 %
PLATELET # BLD AUTO: 232 K/UL (ref 135–450)
PMV BLD AUTO: 6.9 FL (ref 5–10.5)
POTASSIUM SERPL-SCNC: 4.3 MMOL/L (ref 3.5–5.1)
PROT SERPL-MCNC: 6.6 G/DL (ref 6.4–8.2)
PSA SERPL DL<=0.01 NG/ML-MCNC: 1.63 NG/ML (ref 0–4)
RBC # BLD AUTO: 3.96 M/UL (ref 4.2–5.9)
SODIUM SERPL-SCNC: 138 MMOL/L (ref 136–145)
TRIGL SERPL-MCNC: 227 MG/DL (ref 0–150)
TSH SERPL DL<=0.005 MIU/L-ACNC: 1.21 UIU/ML (ref 0.27–4.2)
VLDLC SERPL CALC-MCNC: 45 MG/DL
WBC # BLD AUTO: 8.8 K/UL (ref 4–11)

## 2024-09-17 PROCEDURE — 36415 COLL VENOUS BLD VENIPUNCTURE: CPT

## 2024-09-17 PROCEDURE — 80053 COMPREHEN METABOLIC PANEL: CPT

## 2024-09-17 PROCEDURE — 84443 ASSAY THYROID STIM HORMONE: CPT

## 2024-09-17 PROCEDURE — 84153 ASSAY OF PSA TOTAL: CPT

## 2024-09-17 PROCEDURE — 83036 HEMOGLOBIN GLYCOSYLATED A1C: CPT

## 2024-09-17 PROCEDURE — 80061 LIPID PANEL: CPT

## 2024-09-17 PROCEDURE — G0103 PSA SCREENING: HCPCS

## 2024-09-17 PROCEDURE — 85025 COMPLETE CBC W/AUTO DIFF WBC: CPT

## 2024-09-17 RX ORDER — GABAPENTIN 300 MG/1
CAPSULE ORAL
Qty: 90 CAPSULE | Refills: 1
Start: 2024-09-17 | End: 2024-10-18

## 2024-09-17 ASSESSMENT — PATIENT HEALTH QUESTIONNAIRE - PHQ9
2. FEELING DOWN, DEPRESSED OR HOPELESS: NOT AT ALL
SUM OF ALL RESPONSES TO PHQ QUESTIONS 1-9: 9
5. POOR APPETITE OR OVEREATING: NOT AT ALL
3. TROUBLE FALLING OR STAYING ASLEEP: NEARLY EVERY DAY
4. FEELING TIRED OR HAVING LITTLE ENERGY: NEARLY EVERY DAY
8. MOVING OR SPEAKING SO SLOWLY THAT OTHER PEOPLE COULD HAVE NOTICED. OR THE OPPOSITE, BEING SO FIGETY OR RESTLESS THAT YOU HAVE BEEN MOVING AROUND A LOT MORE THAN USUAL: NOT AT ALL
SUM OF ALL RESPONSES TO PHQ9 QUESTIONS 1 & 2: 0
7. TROUBLE CONCENTRATING ON THINGS, SUCH AS READING THE NEWSPAPER OR WATCHING TELEVISION: NEARLY EVERY DAY
10. IF YOU CHECKED OFF ANY PROBLEMS, HOW DIFFICULT HAVE THESE PROBLEMS MADE IT FOR YOU TO DO YOUR WORK, TAKE CARE OF THINGS AT HOME, OR GET ALONG WITH OTHER PEOPLE: SOMEWHAT DIFFICULT
SUM OF ALL RESPONSES TO PHQ QUESTIONS 1-9: 9
1. LITTLE INTEREST OR PLEASURE IN DOING THINGS: NOT AT ALL
SUM OF ALL RESPONSES TO PHQ QUESTIONS 1-9: 9
SUM OF ALL RESPONSES TO PHQ QUESTIONS 1-9: 9
6. FEELING BAD ABOUT YOURSELF - OR THAT YOU ARE A FAILURE OR HAVE LET YOURSELF OR YOUR FAMILY DOWN: NOT AT ALL
9. THOUGHTS THAT YOU WOULD BE BETTER OFF DEAD, OR OF HURTING YOURSELF: NOT AT ALL

## 2024-09-18 ENCOUNTER — HOSPITAL ENCOUNTER (EMERGENCY)
Age: 59
Discharge: HOME | End: 2024-09-18
Attending: EMERGENCY MEDICINE
Payer: MEDICARE

## 2024-09-18 VITALS
SYSTOLIC BLOOD PRESSURE: 175 MMHG | DIASTOLIC BLOOD PRESSURE: 85 MMHG | BODY MASS INDEX: 29.98 KG/M2 | TEMPERATURE: 98.9 F | WEIGHT: 191 LBS | HEART RATE: 60 BPM | HEIGHT: 67 IN | OXYGEN SATURATION: 97 % | RESPIRATION RATE: 18 BRPM

## 2024-09-18 DIAGNOSIS — M54.50 CHRONIC BILATERAL LOW BACK PAIN WITHOUT SCIATICA: Primary | ICD-10-CM

## 2024-09-18 DIAGNOSIS — G89.29 CHRONIC BILATERAL LOW BACK PAIN WITHOUT SCIATICA: Primary | ICD-10-CM

## 2024-09-18 LAB
EST. AVERAGE GLUCOSE BLD GHB EST-MCNC: 125.5 MG/DL
HBA1C MFR BLD: 6 %

## 2024-09-18 PROCEDURE — 99284 EMERGENCY DEPT VISIT MOD MDM: CPT

## 2024-09-18 PROCEDURE — 6370000000 HC RX 637 (ALT 250 FOR IP): Performed by: EMERGENCY MEDICINE

## 2024-09-18 PROCEDURE — 96372 THER/PROPH/DIAG INJ SC/IM: CPT

## 2024-09-18 PROCEDURE — 6360000002 HC RX W HCPCS: Performed by: EMERGENCY MEDICINE

## 2024-09-18 RX ORDER — LIDOCAINE 4 G/G
1 PATCH TOPICAL ONCE
Status: DISCONTINUED | OUTPATIENT
Start: 2024-09-18 | End: 2024-09-19 | Stop reason: HOSPADM

## 2024-09-18 RX ORDER — KETOROLAC TROMETHAMINE 30 MG/ML
30 INJECTION, SOLUTION INTRAMUSCULAR; INTRAVENOUS ONCE
Status: COMPLETED | OUTPATIENT
Start: 2024-09-18 | End: 2024-09-18

## 2024-09-18 RX ORDER — ACETAMINOPHEN 500 MG
1000 TABLET ORAL
Status: COMPLETED | OUTPATIENT
Start: 2024-09-18 | End: 2024-09-18

## 2024-09-18 RX ADMIN — ACETAMINOPHEN 1000 MG: 500 TABLET ORAL at 21:11

## 2024-09-18 RX ADMIN — KETOROLAC TROMETHAMINE 30 MG: 30 INJECTION, SOLUTION INTRAMUSCULAR at 21:12

## 2024-09-18 ASSESSMENT — PAIN SCALES - GENERAL
PAINLEVEL_OUTOF10: 9
PAINLEVEL_OUTOF10: 4

## 2024-09-18 ASSESSMENT — PAIN - FUNCTIONAL ASSESSMENT: PAIN_FUNCTIONAL_ASSESSMENT: 0-10

## 2024-09-28 ENCOUNTER — PATIENT MESSAGE (OUTPATIENT)
Dept: INTERNAL MEDICINE CLINIC | Age: 59
End: 2024-09-28

## 2024-09-28 DIAGNOSIS — M54.42 CHRONIC LEFT-SIDED LOW BACK PAIN WITH LEFT-SIDED SCIATICA: ICD-10-CM

## 2024-09-28 DIAGNOSIS — G89.29 CHRONIC LEFT-SIDED LOW BACK PAIN WITH LEFT-SIDED SCIATICA: ICD-10-CM

## 2024-09-30 RX ORDER — GABAPENTIN 300 MG/1
CAPSULE ORAL
Qty: 90 CAPSULE | Refills: 1 | Status: SHIPPED | OUTPATIENT
Start: 2024-09-30 | End: 2024-10-31

## 2024-10-25 NOTE — CARE COORDINATION
Tere Reynaga, APRN - CNP  [facility] [address] [place] [address] Staff 18 minutes ago (1:07 PM)       Please call to assess how his BP is currently. Has he noticed elevated BPs on multiple occasions? Tried to call pt again, no answer, left VM asking him to recheck.  His BP readings have been somewhat elevated, the last week of readings are listed below for review There are no Wet Read(s) to document.

## 2024-12-16 ENCOUNTER — HOSPITAL ENCOUNTER (OUTPATIENT)
Dept: VASCULAR LAB | Age: 59
Discharge: HOME | End: 2024-12-18
Attending: SURGERY
Payer: MEDICARE

## 2024-12-16 DIAGNOSIS — I73.9 PERIPHERAL VASCULAR DISEASE, UNSPECIFIED (HCC): ICD-10-CM

## 2024-12-16 LAB
VAS LEFT ABI: 1.07
VAS LEFT ARM BP: 152 MMHG
VAS LEFT DORSALIS PEDIS BP: 154 MMHG
VAS LEFT PTA BP: 162 MMHG
VAS RIGHT ABI: 1.07
VAS RIGHT ARM BP: 142 MMHG
VAS RIGHT ATA DIST PSV: 15.8 CM/S
VAS RIGHT ATA MID PSV: 28.5 CM/S
VAS RIGHT CFA DIST PSV: 118 CM/S
VAS RIGHT CFA PROX PSV: 63.8 CM/S
VAS RIGHT DORSALIS PEDIS BP: 162 MMHG
VAS RIGHT PERONEAL BP: 174 MMHG
VAS RIGHT PERONEAL DIST PSV: 118 CM/S
VAS RIGHT PERONEAL MID PSV: 146 CM/S
VAS RIGHT PTA DIST PSV: 0 CM/S
VAS RIGHT PTA MID PSV: 73.7 CM/S

## 2024-12-16 PROCEDURE — 93926 LOWER EXTREMITY STUDY: CPT

## 2024-12-16 PROCEDURE — 93926 LOWER EXTREMITY STUDY: CPT | Performed by: SURGERY

## 2024-12-18 ENCOUNTER — TELEPHONE (OUTPATIENT)
Dept: VASCULAR SURGERY | Age: 59
End: 2024-12-18

## 2024-12-18 DIAGNOSIS — I73.9 PVD (PERIPHERAL VASCULAR DISEASE) (HCC): Primary | ICD-10-CM

## 2024-12-29 DIAGNOSIS — G89.29 CHRONIC LEFT-SIDED LOW BACK PAIN WITH LEFT-SIDED SCIATICA: ICD-10-CM

## 2024-12-29 DIAGNOSIS — M54.42 CHRONIC LEFT-SIDED LOW BACK PAIN WITH LEFT-SIDED SCIATICA: ICD-10-CM

## 2024-12-30 RX ORDER — GABAPENTIN 300 MG/1
CAPSULE ORAL
Qty: 90 CAPSULE | Refills: 1 | Status: SHIPPED | OUTPATIENT
Start: 2024-12-30 | End: 2025-02-28

## 2024-12-30 NOTE — TELEPHONE ENCOUNTER
Refill request for Gabapentin medication.     [facility]      Last visit - 09/17/2024     Pending visit - 01/08/2025    Last refill -09/30/2024

## 2025-01-07 RX ORDER — LEVETIRACETAM 500 MG/1
500 TABLET ORAL 2 TIMES DAILY
Qty: 60 TABLET | Refills: 3 | Status: SHIPPED | OUTPATIENT
Start: 2025-01-07

## 2025-01-07 NOTE — TELEPHONE ENCOUNTER
Refill request for Keppra medication.     Name of [facility]      Last visit - 09/17/2024     Pending visit - 01/17/2025    Last refill -09/03/2024

## 2025-01-24 ENCOUNTER — TELEMEDICINE (OUTPATIENT)
Dept: INTERNAL MEDICINE CLINIC | Age: 60
End: 2025-01-24

## 2025-01-24 ENCOUNTER — TELEPHONE (OUTPATIENT)
Dept: INTERNAL MEDICINE CLINIC | Age: 60
End: 2025-01-24

## 2025-01-24 DIAGNOSIS — G40.89 OTHER SEIZURES (HCC): ICD-10-CM

## 2025-01-24 DIAGNOSIS — I70.202 ATHEROSCLEROSIS OF ARTERY OF LEFT LOWER EXTREMITY (HCC): ICD-10-CM

## 2025-01-24 DIAGNOSIS — M79.674 PAIN IN RIGHT TOE(S): Primary | ICD-10-CM

## 2025-01-24 DIAGNOSIS — F01.50 VASCULAR DEMENTIA WITHOUT BEHAVIORAL DISTURBANCE, PSYCHOTIC DISTURBANCE, MOOD DISTURBANCE, OR ANXIETY, UNSPECIFIED DEMENTIA SEVERITY (HCC): ICD-10-CM

## 2025-01-24 DIAGNOSIS — F33.0 MAJOR DEPRESSIVE DISORDER, RECURRENT, MILD (HCC): ICD-10-CM

## 2025-01-24 DIAGNOSIS — E78.41 ELEVATED LIPOPROTEIN(A): ICD-10-CM

## 2025-01-24 DIAGNOSIS — T82.858D BYPASS GRAFT STENOSIS, SUBSEQUENT ENCOUNTER: ICD-10-CM

## 2025-01-24 DIAGNOSIS — J41.0 SIMPLE CHRONIC BRONCHITIS (HCC): ICD-10-CM

## 2025-01-24 DIAGNOSIS — Z72.0 TOBACCO USE: ICD-10-CM

## 2025-01-24 DIAGNOSIS — I73.9 PVD (PERIPHERAL VASCULAR DISEASE) (HCC): ICD-10-CM

## 2025-01-24 DIAGNOSIS — I25.118 ATHEROSCLEROTIC HEART DISEASE OF NATIVE CORONARY ARTERY WITH OTHER FORMS OF ANGINA PECTORIS (HCC): ICD-10-CM

## 2025-01-24 DIAGNOSIS — E11.51 TYPE 2 DIABETES MELLITUS WITH DIABETIC PERIPHERAL ANGIOPATHY WITHOUT GANGRENE, UNSPECIFIED WHETHER LONG TERM INSULIN USE (HCC): ICD-10-CM

## 2025-01-24 DIAGNOSIS — J43.2 CENTRILOBULAR EMPHYSEMA (HCC): ICD-10-CM

## 2025-01-24 DIAGNOSIS — F51.01 PRIMARY INSOMNIA: ICD-10-CM

## 2025-01-24 RX ORDER — BUPROPION HYDROCHLORIDE 150 MG/1
150 TABLET ORAL EVERY MORNING
Qty: 30 TABLET | Refills: 3 | Status: SHIPPED | OUTPATIENT
Start: 2025-01-24

## 2025-01-24 RX ORDER — ATORVASTATIN CALCIUM 80 MG/1
80 TABLET, FILM COATED ORAL DAILY
Qty: 90 TABLET | Refills: 3 | Status: SHIPPED | OUTPATIENT
Start: 2025-01-24

## 2025-01-24 SDOH — ECONOMIC STABILITY: FOOD INSECURITY: WITHIN THE PAST 12 MONTHS, THE FOOD YOU BOUGHT JUST DIDN'T LAST AND YOU DIDN'T HAVE MONEY TO GET MORE.: NEVER TRUE

## 2025-01-24 SDOH — ECONOMIC STABILITY: FOOD INSECURITY: WITHIN THE PAST 12 MONTHS, YOU WORRIED THAT YOUR FOOD WOULD RUN OUT BEFORE YOU GOT MONEY TO BUY MORE.: NEVER TRUE

## 2025-01-24 ASSESSMENT — ENCOUNTER SYMPTOMS
CHEST TIGHTNESS: 0
SHORTNESS OF BREATH: 0
DIARRHEA: 0
SORE THROAT: 0
CONSTIPATION: 0
NAUSEA: 0
SINUS PAIN: 0
COUGH: 0
VOMITING: 0

## 2025-01-24 NOTE — PROGRESS NOTES
[x] No significant exanthematous lesions or discoloration noted on facial skin         [] Abnormal-            Psychiatric:       [x] Normal Affect [x] No Hallucinations        [] Abnormal-     Other pertinent observable physical exam findings-           The patient (or guardian, if applicable) and other individuals in attendance with the patient were advised that Artificial Intelligence will be utilized during this visit to record, process the conversation to generate a clinical note and to support improvement of the AI technology. The patient (or guardian, if applicable) and other individuals in attendance at the appointment consented to the use of AI, including the recording.      An electronic signature was used to authenticate this note.    --Justice Cavazos, MAT - CNP

## 2025-01-24 NOTE — PATIENT INSTRUCTIONS
[place]    Phone: [phone]   Website: [employer]  Phone: 1-622.564.2746   Website: Xiaoi Robert.WikiYou/oh    [facility] Community Plan   Phone: 1-722.783.2986   Website: Localy[email or web address].WikiYou/oh    [employer]  Phone: [phone]   Website: Your Last Chance/[place]/plans/    [place] Medicaid   Phone: [phone]   Website: [place]   Phone: [phone]   Website: [employer]/    [place] [employer]  Phone: [phone]   Website: [facility]/medicaid/[place]          Traditional Medicare   Does not pay for transportation for non-emergency medical appointments   Medicare Advantage Plans  Some plans may provide transportation.  Members should contact the Member Services or Transportation number on the back of their insurance card for more information or to schedule transportation.      All [employer] Services are Private Pay  Schedule 3 business days in advance     Provider Phone   [facility] [phone]   [facility] [phone]   [facility] [phone]   [facility] [phone]   [facility] [phone]   [facility] [phone]   [facility] [phone]   [facility] [phone]   [facility] [phone]   [facility] [phone]   [facility] [phone]

## 2025-01-24 NOTE — TELEPHONE ENCOUNTER
Called patient, Left voicemail to call office back to schedule 4 months (around 5/24/2025) for Multiple Chronic Condition/FDC follow up (40 min).-1st call

## 2025-01-28 NOTE — TELEPHONE ENCOUNTER
Called patient, Left voicemail to call office back to schedule 4 months (around 5/24/2025) for Multiple Chronic Condition/retirement follow up (40 min).-2nd call

## 2025-01-29 DIAGNOSIS — I25.118 ATHEROSCLEROTIC HEART DISEASE OF NATIVE CORONARY ARTERY WITH OTHER FORMS OF ANGINA PECTORIS (HCC): ICD-10-CM

## 2025-01-29 RX ORDER — LISINOPRIL 5 MG/1
5 TABLET ORAL DAILY
Qty: 90 TABLET | Refills: 3 | Status: SHIPPED | OUTPATIENT
Start: 2025-01-29

## 2025-01-29 RX ORDER — CARVEDILOL 6.25 MG/1
TABLET ORAL
Qty: 180 TABLET | Refills: 3 | Status: SHIPPED | OUTPATIENT
Start: 2025-01-29

## 2025-01-29 NOTE — TELEPHONE ENCOUNTER
Refill request for CAREVEDILOL, LISINOPRIL medication.     Name of [facility]- [facility]      Last visit - 1/24/25     Pending visit - NONE    Last refill -1/8/24, 8/1/24      Medication Contract signed -   Last Oarrs ran-         Additional Comments

## 2025-01-30 ENCOUNTER — TELEPHONE (OUTPATIENT)
Dept: TELEMETRY | Age: 60
End: 2025-01-30

## 2025-01-30 NOTE — TELEPHONE ENCOUNTER
Called patient, Left voicemail to call office back to schedule 4 months (around 5/24/2025) for Multiple Chronic Condition/residential follow up (40 min).-3rd call

## 2025-01-30 NOTE — TELEPHONE ENCOUNTER
Patient due for annual CT Lung Screening. Reminder letter mailed.    Scan already ordered. Central Scheduling number provided.    Jammie Sheth RN

## 2025-02-10 ENCOUNTER — TELEPHONE (OUTPATIENT)
Dept: VASCULAR SURGERY | Age: 60
End: 2025-02-10

## 2025-02-10 DIAGNOSIS — I73.9 PVD (PERIPHERAL VASCULAR DISEASE) (HCC): Primary | ICD-10-CM

## 2025-02-10 NOTE — TELEPHONE ENCOUNTER
Called patients spouse, Danica, regarding setting up patient for an right lower extremity arterial duplex scan at [facility] for Thursday February 13, at 9:00am and arrive at 8:30am. Pending those results can see in office on Friday. Aristeo

## 2025-03-01 DIAGNOSIS — G89.29 CHRONIC LEFT-SIDED LOW BACK PAIN WITH LEFT-SIDED SCIATICA: ICD-10-CM

## 2025-03-01 DIAGNOSIS — M54.42 CHRONIC LEFT-SIDED LOW BACK PAIN WITH LEFT-SIDED SCIATICA: ICD-10-CM

## 2025-03-03 RX ORDER — GABAPENTIN 300 MG/1
CAPSULE ORAL
Qty: 90 CAPSULE | Refills: 2 | Status: SHIPPED | OUTPATIENT
Start: 2025-03-03 | End: 2025-06-01

## 2025-03-03 NOTE — TELEPHONE ENCOUNTER
Refill request for GABAPENTIN 300 MG CAPSULE medication.     Name of [facility]- [facility]      Last visit - 1-     Pending visit - N/A    Last refill - 1-      Medication Contract signed - 6-  Last Oarrs ran- 6-        Additional Comments

## 2025-03-10 ENCOUNTER — TELEPHONE (OUTPATIENT)
Dept: VASCULAR SURGERY | Age: 60
End: 2025-03-10

## 2025-03-10 NOTE — TELEPHONE ENCOUNTER
Called patients spouse regarding date and time of arterial duplex that is rescheduled for March 21st at 8:00am and arrive at 7:45am balaji

## 2025-03-28 ENCOUNTER — TELEPHONE (OUTPATIENT)
Dept: TELEMETRY | Age: 60
End: 2025-03-28

## 2025-03-28 NOTE — TELEPHONE ENCOUNTER
Pt due for Annual CT Lung Screening, reminder letter mailed, Central Scheduling phone number provided.    Scan has already been ordered.    Jammie Sheth RN

## 2025-03-31 RX ORDER — EZETIMIBE 10 MG/1
10 TABLET ORAL DAILY
Qty: 90 TABLET | Refills: 3 | Status: SHIPPED | OUTPATIENT
Start: 2025-03-31

## 2025-03-31 NOTE — TELEPHONE ENCOUNTER
Refill request for Zetia medication.     [facility]      Last visit - 01/24/2025     Pending visit - no follow up on file     Last refill -04/01/2024

## 2025-05-15 RX ORDER — LEVETIRACETAM 500 MG/1
500 TABLET ORAL 2 TIMES DAILY
Qty: 60 TABLET | Refills: 3 | Status: SHIPPED | OUTPATIENT
Start: 2025-05-15

## 2025-05-15 NOTE — TELEPHONE ENCOUNTER
Refill request for KEPPRA medication.     Name of [facility]- [facility]      Last visit - 1/24/25     Pending visit - NONE    Last refill -4/8/25      Medication Contract signed -   Last Oarrs ran-         Additional Comments

## 2025-05-17 ENCOUNTER — HOSPITAL ENCOUNTER (EMERGENCY)
Age: 60
Discharge: HOME | End: 2025-05-17
Payer: MEDICARE

## 2025-05-17 ENCOUNTER — APPOINTMENT (OUTPATIENT)
Dept: CT IMAGING | Age: 60
End: 2025-05-17
Payer: MEDICARE

## 2025-05-17 VITALS
DIASTOLIC BLOOD PRESSURE: 75 MMHG | TEMPERATURE: 98.1 F | HEART RATE: 60 BPM | OXYGEN SATURATION: 95 % | SYSTOLIC BLOOD PRESSURE: 140 MMHG | BODY MASS INDEX: 32.62 KG/M2 | RESPIRATION RATE: 16 BRPM | WEIGHT: 203 LBS | HEIGHT: 66 IN

## 2025-05-17 DIAGNOSIS — M54.16 LUMBAR RADICULOPATHY: Primary | ICD-10-CM

## 2025-05-17 PROCEDURE — 72131 CT LUMBAR SPINE W/O DYE: CPT

## 2025-05-17 PROCEDURE — 6360000002 HC RX W HCPCS

## 2025-05-17 PROCEDURE — 96372 THER/PROPH/DIAG INJ SC/IM: CPT

## 2025-05-17 PROCEDURE — 99284 EMERGENCY DEPT VISIT MOD MDM: CPT

## 2025-05-17 RX ORDER — ORPHENADRINE CITRATE 30 MG/ML
60 INJECTION INTRAMUSCULAR; INTRAVENOUS ONCE
Status: COMPLETED | OUTPATIENT
Start: 2025-05-17 | End: 2025-05-17

## 2025-05-17 RX ORDER — DEXAMETHASONE SODIUM PHOSPHATE 10 MG/ML
10 INJECTION, SOLUTION INTRA-ARTICULAR; INTRALESIONAL; INTRAMUSCULAR; INTRAVENOUS; SOFT TISSUE ONCE
Status: COMPLETED | OUTPATIENT
Start: 2025-05-17 | End: 2025-05-17

## 2025-05-17 RX ORDER — METHOCARBAMOL 500 MG/1
1000 TABLET, FILM COATED ORAL 4 TIMES DAILY
Qty: 56 TABLET | Refills: 0 | Status: SHIPPED | OUTPATIENT
Start: 2025-05-17 | End: 2025-05-24

## 2025-05-17 RX ORDER — NAPROXEN 500 MG/1
500 TABLET ORAL 2 TIMES DAILY PRN
Qty: 60 TABLET | Refills: 0 | Status: SHIPPED | OUTPATIENT
Start: 2025-05-17

## 2025-05-17 RX ORDER — KETOROLAC TROMETHAMINE 30 MG/ML
30 INJECTION, SOLUTION INTRAMUSCULAR; INTRAVENOUS ONCE
Status: COMPLETED | OUTPATIENT
Start: 2025-05-17 | End: 2025-05-17

## 2025-05-17 RX ORDER — PREDNISONE 50 MG/1
50 TABLET ORAL DAILY
Qty: 5 TABLET | Refills: 0 | Status: SHIPPED | OUTPATIENT
Start: 2025-05-17 | End: 2025-05-22

## 2025-05-17 RX ADMIN — DEXAMETHASONE SODIUM PHOSPHATE 10 MG: 10 INJECTION INTRAMUSCULAR; INTRAVENOUS at 19:25

## 2025-05-17 RX ADMIN — ORPHENADRINE CITRATE 60 MG: 60 INJECTION INTRAMUSCULAR; INTRAVENOUS at 19:29

## 2025-05-17 RX ADMIN — KETOROLAC TROMETHAMINE 30 MG: 30 INJECTION, SOLUTION INTRAMUSCULAR at 19:27

## 2025-05-17 ASSESSMENT — LIFESTYLE VARIABLES
HOW OFTEN DO YOU HAVE A DRINK CONTAINING ALCOHOL: NEVER
HOW MANY STANDARD DRINKS CONTAINING ALCOHOL DO YOU HAVE ON A TYPICAL DAY: PATIENT DOES NOT DRINK

## 2025-05-17 ASSESSMENT — PAIN DESCRIPTION - LOCATION
LOCATION: BACK
LOCATION: BACK

## 2025-05-17 ASSESSMENT — PAIN DESCRIPTION - DESCRIPTORS: DESCRIPTORS: ACHING

## 2025-05-17 ASSESSMENT — PAIN DESCRIPTION - ORIENTATION: ORIENTATION: LOWER

## 2025-05-17 ASSESSMENT — PAIN - FUNCTIONAL ASSESSMENT: PAIN_FUNCTIONAL_ASSESSMENT: 0-10

## 2025-05-17 ASSESSMENT — PAIN SCALES - GENERAL
PAINLEVEL_OUTOF10: 7
PAINLEVEL_OUTOF10: 10

## 2025-05-17 NOTE — ED PROVIDER NOTES
[facility] EMERGENCY DEPARTMENT  EMERGENCY DEPARTMENT ENCOUNTER        Pt Name: Julio Galeas  MRN: [id number]  Birthdate 1965  Date of evaluation: 5/17/2025  Provider: LV Thomas Jr  PCP: Justice Cavazos APRN - CNP  Note Started: 7:10 PM EDT 5/17/25      LAURA. I have evaluated this patient.        CHIEF COMPLAINT       Chief Complaint   Patient presents with    Back Pain     PT STATES LOWER BACK STATED LATELY.        HISTORY OF PRESENT ILLNESS: 1 or more Elements     History from : Patient    Limitations to history : None    Julio Galeas is a 59 y.o. male who presents with reports of lower back pain radiating down his left leg.  States this has been going on for several days however, seemly has significantly worse today.  He is not experiencing any bowel or bladder incontinence, saddle paresthesias.  He has had some back problems similar to this in the past however, has never had any surgeries.  He did try taking his gabapentin as well as some Tylenol however, has not been helpful.  No other complaints.  Review of systems otherwise negative.    Nursing Notes were all reviewed and agreed with or any disagreements were addressed in the HPI.      SURGICAL HISTORY     Past Surgical History:   Procedure Laterality Date    ABDOMEN SURGERY      colon polyps    CARDIAC SURGERY  2022    coronary stent    CAROTID ENDARTERECTOMY Bilateral     5 years ago    CT DRAINAGE VISCERAL PERCUTANEOUS  07/21/2021    CT VISCERAL PERCUTANEOUS DRAIN 7/21/2021 Uzair Mendoza MD [facility] CT SCAN    FEMORAL BYPASS Right 03/07/2022    REVISION RIGHT LOWER EXTREMITY BYPASS WITH PERICARDIAL PATCH performed by Wai Gates MD at [facility] OR    FEMORAL-TIBIAL BYPASS GRAFT Right 06/24/2020    RIGHT FEMORAL TO PERONEAL  BYPASS GRAFT performed by Wai Gates MD at [facility] OR    HERNIA REPAIR N/A 07/09/2021    ROBOTIC INCISIONAL HERNIA REPAIR WITH MESH, eTAPP BLOCK performed by Julio Anand MD at [facility] OR     lumbar back pain radiating into the left leg however, it was significantly worse today when he woke up.  No traumatic injuries.  No bowel or bladder incontinence.  No saddle paresthesias.  No prior history of surgery on the back.    On my evaluation he is got tenderness with palpation of the sacroiliac joint on the left side with associated tenderness with the sciatic notch.  He has no step-off deformities or masses over the lumbar spine.  Will obtain CT imaging given his history and provide Toradol Norflex and Decadron.    After reevaluation patient had moderate improvement in his symptoms.  He was still feeling some symptoms however, did state that he was more comfortable at this time.  Clinically appeared more comfortable and improved.  CT imaging was obtained which showed some mild areas of stenosis and degeneration.  No fractures or emergent pathology.  Patient will be started on a prednisone burst, anti-inflammatory and muscle relaxant for home use.  Recommend that the patient follow-up with his primary care for reevaluation and continued management.  Patient otherwise stable for discharge.      I am the Primary Clinician of Record.    FINAL IMPRESSION      1. Lumbar radiculopathy          DISPOSITION/PLAN     DISPOSITION Decision to Discharge    PATIENT REFERRED TO:  Justice Cavazos, APRN - CNP  [address] [address]  [phone]    Schedule an appointment as soon as possible for a visit in 1 week        DISCHARGE MEDICATIONS:  Discharge Medication List as of 5/17/2025  9:35 PM        START taking these medications    Details   methocarbamol (ROBAXIN) 500 MG tablet Take 2 tablets by mouth 4 times daily for 7 days, Disp-56 tablet, R-0Normal      predniSONE (DELTASONE) 50 MG tablet Take 1 tablet by mouth daily for 5 days, Disp-5 tablet, R-0Normal      naproxen (NAPROSYN) 500 MG tablet Take 1 tablet by mouth 2 times daily as needed for Pain, Disp-60 tablet, R-0Normal

## 2025-05-22 ENCOUNTER — TELEPHONE (OUTPATIENT)
Dept: PULMONOLOGY | Age: 60
End: 2025-05-22

## 2025-05-22 ENCOUNTER — TELEMEDICINE (OUTPATIENT)
Age: 60
End: 2025-05-22
Payer: MEDICARE

## 2025-05-22 DIAGNOSIS — G47.33 OSA (OBSTRUCTIVE SLEEP APNEA): Primary | ICD-10-CM

## 2025-05-22 DIAGNOSIS — G47.19 EXCESSIVE DAYTIME SLEEPINESS: ICD-10-CM

## 2025-05-22 PROCEDURE — 99214 OFFICE O/P EST MOD 30 MIN: CPT

## 2025-05-22 ASSESSMENT — SLEEP AND FATIGUE QUESTIONNAIRES
HOW LIKELY ARE YOU TO NOD OFF OR FALL ASLEEP IN A CAR, WHILE STOPPED FOR A FEW MINUTES IN TRAFFIC: WOULD NEVER DOZE
HOW LIKELY ARE YOU TO NOD OFF OR FALL ASLEEP WHILE SITTING AND TALKING TO SOMEONE: WOULD NEVER DOZE
HOW LIKELY ARE YOU TO NOD OFF OR FALL ASLEEP WHILE SITTING QUIETLY AFTER LUNCH WITHOUT ALCOHOL: HIGH CHANCE OF DOZING
HOW LIKELY ARE YOU TO NOD OFF OR FALL ASLEEP WHEN YOU ARE A PASSENGER IN A CAR FOR AN HOUR WITHOUT A BREAK: HIGH CHANCE OF DOZING
HOW LIKELY ARE YOU TO NOD OFF OR FALL ASLEEP WHILE WATCHING TV: HIGH CHANCE OF DOZING
HOW LIKELY ARE YOU TO NOD OFF OR FALL ASLEEP WHILE LYING DOWN TO REST IN THE AFTERNOON WHEN CIRCUMSTANCES PERMIT: HIGH CHANCE OF DOZING
HOW LIKELY ARE YOU TO NOD OFF OR FALL ASLEEP WHILE SITTING INACTIVE IN A PUBLIC PLACE: MODERATE CHANCE OF DOZING
ESS TOTAL SCORE: 17
HOW LIKELY ARE YOU TO NOD OFF OR FALL ASLEEP WHILE SITTING AND READING: HIGH CHANCE OF DOZING

## 2025-05-22 NOTE — TELEPHONE ENCOUNTER
Patient called with message left for patient to call back to office to schedule follow up 31-90 day with Olga and also left phone number to scheduling for pt to arrange LCS per pcp.

## 2025-05-22 NOTE — PROGRESS NOTES
Julio Galeas was evaluated through a synchronous (real-time) audio-video encounter. The patient (or guardian if applicable) is aware that this is a billable service, which includes applicable co-pays. This Virtual Visit was conducted with patient's (and/or legal guardian's) consent. Patient identification was verified, and a caregiver was present when appropriate. The patient was located at home in [facility], in a state where the provider was licensed to provide care. Provider was located at facility: [address] [place] [address], [address], [place] [address].   --Olga Patterson PA-C on 5/22/2025 at 4:30 PM  An electronic signature was used to authenticate this note.     SLEEP MEDICINE PROGRESS NOTE  CC: Witnessed apneas   Referring Provider: Prior Dr. Ferguson.     CMT Presenting HPI 5/22/25:  58 yo male with vascular dementia previously diagnosed with ANN in Feb 2024 by PSG ordered by Dr. Ferguson who he followed with for COPD.  Pt & spouse report they were told the sleep apnea was insignificant and nothing to worry about.  However, they are interested in seeking sleep evaluation again d/t ongoing excessive daytime sleepiness and witnessed apneas.  Averages 5-7 hrs sleep/night, which is the same amount of time he spends in bed at night.  However, he also naps 1-2x/day for 1 hour each.  To restroom 1x/night.  Has jerking legs in the evenings and was started on Gabapentin for this which has significantly improved these movements per his spouse.  Spouse helps to facilitate significant portion of visit today and provides collateral history, as pt reports she knows him better than himself.  No car wrecks/near wrecks because of the sleepiness or nodding off while driving.  ESS is: 17.  Pt is interested in moving forward with CPAP treatment and understands realistic expectations.  He primarily breathes through his mouth all day and understands a FFM is likely needed.  He has no particular concerns regarding PAP. His spouse

## 2025-05-27 ENCOUNTER — TELEPHONE (OUTPATIENT)
Dept: INTERNAL MEDICINE CLINIC | Age: 60
End: 2025-05-27

## 2025-05-27 NOTE — TELEPHONE ENCOUNTER
Spoke with pt spouse, scheduled follow up for 7/23/25.  Also gave the scheduling number and transferred to central scheduling.

## 2025-05-27 NOTE — TELEPHONE ENCOUNTER
Central scheduling called and stated that the code on the patients CT Lung screening needs to be changed in order for there insurance to cover it.    New code is Z87.891

## 2025-05-31 DIAGNOSIS — G89.29 CHRONIC LEFT-SIDED LOW BACK PAIN WITH LEFT-SIDED SCIATICA: ICD-10-CM

## 2025-05-31 DIAGNOSIS — Z72.0 TOBACCO USE: ICD-10-CM

## 2025-05-31 DIAGNOSIS — M54.42 CHRONIC LEFT-SIDED LOW BACK PAIN WITH LEFT-SIDED SCIATICA: ICD-10-CM

## 2025-06-02 NOTE — TELEPHONE ENCOUNTER
Refill request for gabapentin (NEURONTIN) 300 MG capsule,  buPROPion (WELLBUTRIN XL) 150 MG extended release tablet medication.     [facility]- [id number] - [place], [place] - [address] [facility]      Last visit - 1/24/25     Pending visit - N/A    Last refill -3/3/25

## 2025-06-03 RX ORDER — BUPROPION HYDROCHLORIDE 150 MG/1
150 TABLET ORAL EVERY MORNING
Qty: 30 TABLET | Refills: 3 | Status: SHIPPED | OUTPATIENT
Start: 2025-06-03

## 2025-06-03 RX ORDER — GABAPENTIN 300 MG/1
CAPSULE ORAL
Qty: 90 CAPSULE | Refills: 2 | Status: SHIPPED | OUTPATIENT
Start: 2025-06-03 | End: 2025-09-01

## 2025-06-30 NOTE — DISCHARGE SUMMARY
Individual Psychotherapy (PhD/LCSW)    6/25/2025    Site:  [facility]         Therapeutic Intervention: Met with patient and partner.  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 She lives with her mother and does not pay rent or household bills, only managing her personal debts. Client describes ongoing communication issues with her mother, who has given her an ultimatum to either pay rent or start cleaning up the house. Client attempts to explain her mental health challenges that interfere with her ability to clean, but her mother is skeptical of these explanations. Client indicates that her mental health condition affects her ability to complete certain tasks, particularly cleaning, which has become a source of conflict with her mother and impacts her daily functioning at home. Client attended the session with her long-term boyfriend, suggesting a stable personal relationship outside of her family conflicts. Client shows some reluctance to consider her mother's perspective on the household situation, indicating potential difficulties with perspective-taking and conflict resolution.                  PLAN:  - Client to continue in 1:1 psychotherapy sessions  - Client to follow up with Javier BRAVO for psych med mgt  - Encouraged client to consider mother's perspective on household responsibilities.   - Client to review personal budget to assess feasibility of hiring cleaning service if difficult due to mental health challenges.   -  Physician Discharge Summary     Patient ID:  Syeda Antunez  [id number]  89 y.o.  1965    Admit date: 3/7/2022    Discharge date and time: 3/8/2022 12:50 PM     Admitting Physician: Joie Brunner MD     Discharge Physician: same    Admission Diagnoses: [facility] [I73.9]  Bypass graft stenosis, subsequent encounter [T82.858D]    Discharge Diagnoses: same    Admission Condition: good    Discharged Condition: good    Indication for Admission: Admitted to undergo bypass graft revision with patch angioplasty. Stenosis was previously treated with angioplasty and has now recurred. Hospital Course: Admitted, taken to OR where patch angioplasty of the stenosis was performed. Post-op course routine and unremarkable. Disposition: home    In process/preliminary results:  Outstanding Order Results     No orders found for last 30 day(s). Patient Instructions:   Discharge Medication List as of 3/8/2022 12:03 PM      CONTINUE these medications which have CHANGED    Details   oxyCODONE (ROXICODONE) 5 MG immediate release tablet Take 1 tablet by mouth every 6 hours as needed for Pain for up to 7 days. , Disp-28 tablet, R-0Normal         CONTINUE these medications which have NOT CHANGED    Details   Evolocumab 140 MG/ML SOAJ Inject 1 Dose into the skin every 14 days, Disp-2 pen, R-2Normal      clopidogrel (PLAVIX) 75 MG tablet TAKE ONE TABLET BY MOUTH DAILY, Disp-30 tablet, R-0Normal      lisinopril (PRINIVIL;ZESTRIL) 20 MG tablet TAKE ONE TABLET BY MOUTH DAILY, Disp-30 tablet, R-0Normal      nitroGLYCERIN (NITROSTAT) 0.4 MG SL tablet up to max of 3 total doses.  If no relief after 1 dose, call 911., Disp-25 tablet, R-0Normal      carvedilol (COREG) 6.25 MG tablet TAKE ONE TABLET BY MOUTH TWICE A DAY WITH MEALS, Disp-60 tablet, R-0Normal      gabapentin (NEURONTIN) 600 MG tablet TAKE ONE TABLET BY MOUTH THREE TIMES A DAY, Disp-90 tablet, R-2Normal      Blood Pressure Client to work on seeing situations from others' perspectives and perspective-taking skills.      [facility] Peq C-Ssrs Self-Report-Since Last Contact    Question 6/25/2025  2:44 PM CDT - Filed by Patient   Have you wished you were dead or wished you could go to sleep and not wake up? No   Have you actually had any thoughts of killing yourself? No   Have you done anything, started to do anything, or prepared to do anything to end your life? No       Mental Status Exam:  General Appearance:  unremarkable, age appropriate   Speech: normal tone, normal rate, normal pitch, normal volume      Level of Cooperation: cooperative, resistant      Thought Processes: normal and logical   Mood: steady      Thought Content: normal, no suicidality, no homicidality, delusions, or paranoia   Affect: congruent and appropriate   Orientation: Oriented x3   Memory: recent >  intact   Attention Span & Concentration: intact   Fund of General Knowledge: intact and appropriate to age and level of education   Abstract Reasoning: interpretation of similarities was abstract   Judgment & Insight: fair, intact     Language intact     Verbal deficits: None    Patient's response to intervention:  The patient's response to intervention is accepting.    Progress toward goals and other mental status changes:  The patient's progress toward goals is fair .    Diagnosis:     ICD-10-CM ICD-9-CM   1. Mood disorder  F39 296.90       Portions of this note were generated by [employer] for dictation purposes only.      Monitoring (B-D ASSURE BPM/AUTO ARM CUFF) MISC 2 TIMES DAILY Starting Thu 9/23/2021, Disp-1 each, R-0, Normal      atorvastatin (LIPITOR) 80 MG tablet TAKE ONE TABLET BY MOUTH DAILY, Disp-30 tablet, R-3Normal      aspirin 81 MG chewable tablet Take 1 tablet by mouth daily, Disp-30 tablet, R-3Normal           Activity: activity as tolerated  Diet: regular diet  Wound Care: keep wound clean and dry    Follow-up with Dr Morales Llamas in 2 weeks. Signed:   Margo Lombard, MD  3/8/2022  5:39 PM

## 2025-07-02 ENCOUNTER — TELEPHONE (OUTPATIENT)
Dept: PULMONOLOGY | Age: 60
End: 2025-07-02

## 2025-07-02 NOTE — TELEPHONE ENCOUNTER
SW wife.  Candi and we have been trying to reach patient about getting setup on his CPAP.  Wife said they have not had the money and that is why they haven't pursued it yet.     She said hopefully soon she will be able to call and get him setup.  She will call office to reschedule once that is done.

## 2025-07-25 ENCOUNTER — CARE COORDINATION (OUTPATIENT)
Dept: CARE COORDINATION | Age: 60
End: 2025-07-25

## 2025-07-25 NOTE — CARE COORDINATION
Ambulatory Care Coordination Note     7/25/2025 4:00 PM     Patient outreach attempt by this ACM today to offer care management services. ACM was unable to reach the patient by telephone today;   left voice message requesting a return phone call to this ACM.     ACM: Sona Morrell RN     Care Summary Note:     PCP/Specialist follow up:       Follow Up:   Plan for next ACM outreach in approximately 3 weeks to complete:  - outreach attempt to offer care management services.

## 2025-08-04 ENCOUNTER — CARE COORDINATION (OUTPATIENT)
Dept: CARE COORDINATION | Age: 60
End: 2025-08-04

## 2025-08-05 ENCOUNTER — CARE COORDINATION (OUTPATIENT)
Dept: CARE COORDINATION | Age: 60
End: 2025-08-05

## 2025-08-05 DIAGNOSIS — M54.50 CHRONIC MIDLINE LOW BACK PAIN WITHOUT SCIATICA: Primary | ICD-10-CM

## 2025-08-05 DIAGNOSIS — D22.9 ATYPICAL MOLE: ICD-10-CM

## 2025-08-05 DIAGNOSIS — G89.29 CHRONIC MIDLINE LOW BACK PAIN WITHOUT SCIATICA: Primary | ICD-10-CM

## 2025-08-12 ENCOUNTER — CARE COORDINATION (OUTPATIENT)
Dept: CARE COORDINATION | Age: 60
End: 2025-08-12

## 2025-08-12 RX ORDER — LEVETIRACETAM 500 MG/1
500 TABLET ORAL 2 TIMES DAILY
Qty: 180 TABLET | Refills: 1 | Status: SHIPPED | OUTPATIENT
Start: 2025-08-12

## 2025-08-13 ENCOUNTER — HOSPITAL ENCOUNTER (OUTPATIENT)
Age: 60
Setting detail: OBSERVATION
Discharge: HOME | End: 2025-08-14
Attending: EMERGENCY MEDICINE | Admitting: HOSPITALIST
Payer: MEDICARE

## 2025-08-13 ENCOUNTER — APPOINTMENT (OUTPATIENT)
Dept: GENERAL RADIOLOGY | Age: 60
End: 2025-08-13
Payer: MEDICARE

## 2025-08-13 DIAGNOSIS — R07.9 CHEST PAIN, UNSPECIFIED TYPE: Primary | ICD-10-CM

## 2025-08-13 LAB
ALBUMIN SERPL-MCNC: 4.2 G/DL (ref 3.4–5)
ALBUMIN/GLOB SERPL: 1.8 {RATIO} (ref 1.1–2.2)
ALP SERPL-CCNC: 136 U/L (ref 40–129)
ALT SERPL-CCNC: 16 U/L (ref 10–40)
ANION GAP SERPL CALCULATED.3IONS-SCNC: 11 MMOL/L (ref 3–16)
AST SERPL-CCNC: 16 U/L (ref 15–37)
BASOPHILS # BLD: 0.1 K/UL (ref 0–0.2)
BASOPHILS NFR BLD: 0.7 %
BILIRUB SERPL-MCNC: <0.2 MG/DL (ref 0–1)
BUN SERPL-MCNC: 20 MG/DL (ref 7–20)
CALCIUM SERPL-MCNC: 9.8 MG/DL (ref 8.3–10.6)
CHLORIDE SERPL-SCNC: 106 MMOL/L (ref 99–110)
CO2 SERPL-SCNC: 25 MMOL/L (ref 21–32)
CREAT SERPL-MCNC: 1.3 MG/DL (ref 0.8–1.3)
DEPRECATED RDW RBC AUTO: 14.6 % (ref 12.4–15.4)
EOSINOPHIL # BLD: 0.3 K/UL (ref 0–0.6)
EOSINOPHIL NFR BLD: 3.7 %
GFR SERPLBLD CREATININE-BSD FMLA CKD-EPI: 62 ML/MIN/{1.73_M2}
GLUCOSE SERPL-MCNC: 234 MG/DL (ref 70–99)
HCT VFR BLD AUTO: 38.9 % (ref 40.5–52.5)
HGB BLD-MCNC: 13.2 G/DL (ref 13.5–17.5)
LYMPHOCYTES # BLD: 1.9 K/UL (ref 1–5.1)
LYMPHOCYTES NFR BLD: 23.2 %
MCH RBC QN AUTO: 33 PG (ref 26–34)
MCHC RBC AUTO-ENTMCNC: 33.9 G/DL (ref 31–36)
MCV RBC AUTO: 97.3 FL (ref 80–100)
MONOCYTES # BLD: 0.5 K/UL (ref 0–1.3)
MONOCYTES NFR BLD: 6.1 %
NEUTROPHILS # BLD: 5.5 K/UL (ref 1.7–7.7)
NEUTROPHILS NFR BLD: 66.3 %
PLATELET # BLD AUTO: 204 K/UL (ref 135–450)
PMV BLD AUTO: 6.4 FL (ref 5–10.5)
POTASSIUM SERPL-SCNC: 3.6 MMOL/L (ref 3.5–5.1)
PROT SERPL-MCNC: 6.5 G/DL (ref 6.4–8.2)
RBC # BLD AUTO: 3.99 M/UL (ref 4.2–5.9)
SODIUM SERPL-SCNC: 142 MMOL/L (ref 136–145)
TROPONIN, HIGH SENSITIVITY: <6 NG/L (ref 0–22)
WBC # BLD AUTO: 8.2 K/UL (ref 4–11)

## 2025-08-13 PROCEDURE — 93005 ELECTROCARDIOGRAM TRACING: CPT | Performed by: EMERGENCY MEDICINE

## 2025-08-13 PROCEDURE — 99285 EMERGENCY DEPT VISIT HI MDM: CPT

## 2025-08-13 PROCEDURE — 71045 X-RAY EXAM CHEST 1 VIEW: CPT

## 2025-08-13 PROCEDURE — 80053 COMPREHEN METABOLIC PANEL: CPT

## 2025-08-13 PROCEDURE — 84484 ASSAY OF TROPONIN QUANT: CPT

## 2025-08-13 PROCEDURE — 85025 COMPLETE CBC W/AUTO DIFF WBC: CPT

## 2025-08-13 ASSESSMENT — PAIN SCALES - GENERAL: PAINLEVEL_OUTOF10: 7

## 2025-08-13 ASSESSMENT — PAIN DESCRIPTION - PAIN TYPE: TYPE: ACUTE PAIN

## 2025-08-13 ASSESSMENT — PAIN - FUNCTIONAL ASSESSMENT: PAIN_FUNCTIONAL_ASSESSMENT: 0-10

## 2025-08-13 ASSESSMENT — PAIN DESCRIPTION - DESCRIPTORS: DESCRIPTORS: ACHING

## 2025-08-13 ASSESSMENT — PAIN DESCRIPTION - ORIENTATION: ORIENTATION: LEFT

## 2025-08-13 ASSESSMENT — PAIN DESCRIPTION - LOCATION: LOCATION: CHEST

## 2025-08-14 ENCOUNTER — HOSPITAL ENCOUNTER (OUTPATIENT)
Age: 60
Discharge: HOME | End: 2025-08-14

## 2025-08-14 ENCOUNTER — HOSPITAL ENCOUNTER (OUTPATIENT)
Age: 60
Setting detail: OBSERVATION
Discharge: HOME | End: 2025-08-16
Payer: MEDICARE

## 2025-08-14 ENCOUNTER — APPOINTMENT (OUTPATIENT)
Dept: NUCLEAR MEDICINE | Age: 60
End: 2025-08-14
Payer: MEDICARE

## 2025-08-14 ENCOUNTER — APPOINTMENT (OUTPATIENT)
Age: 60
End: 2025-08-14
Payer: MEDICARE

## 2025-08-14 VITALS
HEIGHT: 66 IN | BODY MASS INDEX: 31.5 KG/M2 | RESPIRATION RATE: 20 BRPM | OXYGEN SATURATION: 91 % | SYSTOLIC BLOOD PRESSURE: 108 MMHG | WEIGHT: 196 LBS | DIASTOLIC BLOOD PRESSURE: 69 MMHG | TEMPERATURE: 97.5 F | HEART RATE: 63 BPM

## 2025-08-14 PROBLEM — R07.9 CHEST PAIN: Status: ACTIVE | Noted: 2025-08-14

## 2025-08-14 LAB
ANION GAP SERPL CALCULATED.3IONS-SCNC: 11 MMOL/L (ref 3–16)
BUN SERPL-MCNC: 21 MG/DL (ref 7–20)
CALCIUM SERPL-MCNC: 9.5 MG/DL (ref 8.3–10.6)
CHLORIDE SERPL-SCNC: 107 MMOL/L (ref 99–110)
CHOLEST SERPL-MCNC: 109 MG/DL (ref 0–199)
CO2 SERPL-SCNC: 25 MMOL/L (ref 21–32)
CREAT SERPL-MCNC: 1.3 MG/DL (ref 0.8–1.3)
DEPRECATED RDW RBC AUTO: 14.3 % (ref 12.4–15.4)
ECHO AO ASC DIAM: 3.3 CM
ECHO AO ASCENDING AORTA INDEX: 1.67 CM/M2
ECHO AO ROOT DIAM: 3.6 CM
ECHO AO ROOT INDEX: 1.82 CM/M2
ECHO AV AREA PEAK VELOCITY: 2.4 CM2
ECHO AV AREA VTI: 2.4 CM2
ECHO AV AREA/BSA PEAK VELOCITY: 1.2 CM2/M2
ECHO AV AREA/BSA VTI: 1.2 CM2/M2
ECHO AV MEAN GRADIENT: 4 MMHG
ECHO AV MEAN VELOCITY: 1 M/S
ECHO AV PEAK GRADIENT: 8 MMHG
ECHO AV PEAK VELOCITY: 1.4 M/S
ECHO AV VELOCITY RATIO: 0.71
ECHO AV VTI: 29.1 CM
ECHO BSA: 2.03 M2
ECHO BSA: 2.03 M2
ECHO EST RA PRESSURE: 8 MMHG
ECHO LA AREA 2C: 22.9 CM2
ECHO LA AREA 4C: 22.2 CM2
ECHO LA DIAMETER INDEX: 1.77 CM/M2
ECHO LA DIAMETER: 3.5 CM
ECHO LA MAJOR AXIS: 5.7 CM
ECHO LA MINOR AXIS: 5.2 CM
ECHO LA TO AORTIC ROOT RATIO: 0.97
ECHO LA VOL BP: 78 ML (ref 18–58)
ECHO LA VOL MOD A2C: 82 ML (ref 18–58)
ECHO LA VOL MOD A4C: 69 ML (ref 18–58)
ECHO LA VOL/BSA BIPLANE: 39 ML/M2 (ref 16–34)
ECHO LA VOLUME INDEX MOD A2C: 41 ML/M2 (ref 16–34)
ECHO LA VOLUME INDEX MOD A4C: 35 ML/M2 (ref 16–34)
ECHO LV E' LATERAL VELOCITY: 8.05 CM/S
ECHO LV E' SEPTAL VELOCITY: 7.18 CM/S
ECHO LV EDV A2C: 91 ML
ECHO LV EDV A4C: 142 ML
ECHO LV EDV INDEX A4C: 72 ML/M2
ECHO LV EDV NDEX A2C: 46 ML/M2
ECHO LV EF PHYSICIAN: 53 %
ECHO LV EJECTION FRACTION A2C: 45 %
ECHO LV EJECTION FRACTION A4C: 53 %
ECHO LV EJECTION FRACTION BIPLANE: 51 % (ref 55–100)
ECHO LV ESV A2C: 50 ML
ECHO LV ESV A4C: 67 ML
ECHO LV ESV INDEX A2C: 25 ML/M2
ECHO LV ESV INDEX A4C: 34 ML/M2
ECHO LV FRACTIONAL SHORTENING: 26 % (ref 28–44)
ECHO LV INTERNAL DIMENSION DIASTOLE INDEX: 2.88 CM/M2
ECHO LV INTERNAL DIMENSION DIASTOLIC: 5.7 CM (ref 4.2–5.9)
ECHO LV INTERNAL DIMENSION SYSTOLIC INDEX: 2.12 CM/M2
ECHO LV INTERNAL DIMENSION SYSTOLIC: 4.2 CM
ECHO LV ISOVOLUMETRIC RELAXATION TIME (IVRT): 90 MS
ECHO LV IVSD: 1 CM (ref 0.6–1)
ECHO LV MASS 2D: 197.5 G (ref 88–224)
ECHO LV MASS INDEX 2D: 99.8 G/M2 (ref 49–115)
ECHO LV POSTERIOR WALL DIASTOLIC: 0.8 CM (ref 0.6–1)
ECHO LV RELATIVE WALL THICKNESS RATIO: 0.28
ECHO LVOT AREA: 3.5 CM2
ECHO LVOT AV VTI INDEX: 0.69
ECHO LVOT DIAM: 2.1 CM
ECHO LVOT MEAN GRADIENT: 2 MMHG
ECHO LVOT PEAK GRADIENT: 4 MMHG
ECHO LVOT PEAK VELOCITY: 1 M/S
ECHO LVOT STROKE VOLUME INDEX: 35.3 ML/M2
ECHO LVOT SV: 69.9 ML
ECHO LVOT VTI: 20.2 CM
ECHO MV A VELOCITY: 0.66 M/S
ECHO MV AREA VTI: 2.9 CM2
ECHO MV E DECELERATION TIME (DT): 238 MS
ECHO MV E VELOCITY: 0.76 M/S
ECHO MV E/A RATIO: 1.15
ECHO MV E/E' LATERAL: 9.44
ECHO MV E/E' RATIO (AVERAGED): 10.01
ECHO MV E/E' SEPTAL: 10.58
ECHO MV LVOT VTI INDEX: 1.18
ECHO MV MAX VELOCITY: 0.9 M/S
ECHO MV MEAN GRADIENT: 1 MMHG
ECHO MV MEAN VELOCITY: 0.6 M/S
ECHO MV PEAK GRADIENT: 3 MMHG
ECHO MV VTI: 23.8 CM
ECHO PULMONARY ARTERY END DIASTOLIC PRESSURE: 5 MMHG
ECHO PV MAX VELOCITY: 0.9 M/S
ECHO PV MEAN GRADIENT: 2 MMHG
ECHO PV MEAN VELOCITY: 0.6 M/S
ECHO PV PEAK GRADIENT: 4 MMHG
ECHO PV REGURGITANT END DIASTOLIC VELOCITY: 1.1 M/S
ECHO PV VTI: 18.9 CM
ECHO RA AREA 4C: 14.9 CM2
ECHO RA END SYSTOLIC VOLUME APICAL 4 CHAMBER INDEX BSA: 18 ML/M2
ECHO RA VOLUME: 36 ML
ECHO RV BASAL DIMENSION: 3.8 CM
ECHO RV FREE WALL PEAK S': 9.3 CM/S
ECHO RV LONGITUDINAL DIMENSION: 7.1 CM
ECHO RV MID DIMENSION: 3.7 CM
ECHO RV TAPSE: 3.3 CM (ref 1.7–?)
EKG ATRIAL RATE: 69 BPM
EKG ATRIAL RATE: 84 BPM
EKG DIAGNOSIS: NORMAL
EKG DIAGNOSIS: NORMAL
EKG P AXIS: 57 DEGREES
EKG P AXIS: 66 DEGREES
EKG P-R INTERVAL: 162 MS
EKG P-R INTERVAL: 166 MS
EKG Q-T INTERVAL: 366 MS
EKG Q-T INTERVAL: 410 MS
EKG QRS DURATION: 94 MS
EKG QRS DURATION: 98 MS
EKG QTC CALCULATION (BAZETT): 432 MS
EKG QTC CALCULATION (BAZETT): 439 MS
EKG R AXIS: 38 DEGREES
EKG R AXIS: 61 DEGREES
EKG T AXIS: 66 DEGREES
EKG T AXIS: 73 DEGREES
EKG VENTRICULAR RATE: 69 BPM
EKG VENTRICULAR RATE: 84 BPM
EST. AVERAGE GLUCOSE BLD GHB EST-MCNC: 137 MG/DL
GFR SERPLBLD CREATININE-BSD FMLA CKD-EPI: 62 ML/MIN/{1.73_M2}
GLUCOSE BLD-MCNC: 112 MG/DL (ref 70–99)
GLUCOSE BLD-MCNC: 114 MG/DL (ref 70–99)
GLUCOSE BLD-MCNC: 182 MG/DL (ref 70–99)
GLUCOSE SERPL-MCNC: 95 MG/DL (ref 70–99)
HBA1C MFR BLD: 6.4 %
HCT VFR BLD AUTO: 38.9 % (ref 40.5–52.5)
HDLC SERPL-MCNC: 46 MG/DL (ref 40–60)
HGB BLD-MCNC: 13.2 G/DL (ref 13.5–17.5)
LDLC SERPL CALC-MCNC: 41 MG/DL
MCH RBC QN AUTO: 32.9 PG (ref 26–34)
MCHC RBC AUTO-ENTMCNC: 33.9 G/DL (ref 31–36)
MCV RBC AUTO: 97.3 FL (ref 80–100)
NUC STRESS EJECTION FRACTION: 61 %
NUC STRESS LV EDV: 92 ML (ref 67–155)
NUC STRESS LV ESV: 36 ML (ref 22–58)
NUC STRESS LV MASS: 132 G
PERFORMED ON: ABNORMAL
PLATELET # BLD AUTO: 206 K/UL (ref 135–450)
PMV BLD AUTO: 6.8 FL (ref 5–10.5)
POTASSIUM SERPL-SCNC: 4 MMOL/L (ref 3.5–5.1)
RBC # BLD AUTO: 4 M/UL (ref 4.2–5.9)
SODIUM SERPL-SCNC: 143 MMOL/L (ref 136–145)
STRESS BASELINE DIAS BP: 73 MMHG
STRESS BASELINE HR: 55 BPM
STRESS BASELINE SYS BP: 111 MMHG
STRESS ESTIMATED WORKLOAD: 1 METS
STRESS PEAK DIAS BP: 80 MMHG
STRESS PEAK SYS BP: 151 MMHG
STRESS PERCENT HR ACHIEVED: 60 %
STRESS POST PEAK HR: 96 BPM
STRESS RATE PRESSURE PRODUCT: NORMAL BPM*MMHG
STRESS TARGET HR: 159 BPM
TRIGL SERPL-MCNC: 112 MG/DL (ref 0–150)
TROPONIN, HIGH SENSITIVITY: 7 NG/L (ref 0–22)
TROPONIN, HIGH SENSITIVITY: <6 NG/L (ref 0–22)
VLDLC SERPL CALC-MCNC: 22 MG/DL
WBC # BLD AUTO: 7 K/UL (ref 4–11)

## 2025-08-14 PROCEDURE — 93005 ELECTROCARDIOGRAM TRACING: CPT

## 2025-08-14 PROCEDURE — 80048 BASIC METABOLIC PNL TOTAL CA: CPT

## 2025-08-14 PROCEDURE — A9502 TC99M TETROFOSMIN: HCPCS | Performed by: NURSE PRACTITIONER

## 2025-08-14 PROCEDURE — 2500000003 HC RX 250 WO HCPCS: Performed by: NURSE PRACTITIONER

## 2025-08-14 PROCEDURE — 3430000000 HC RX DIAGNOSTIC RADIOPHARMACEUTICAL: Performed by: NURSE PRACTITIONER

## 2025-08-14 PROCEDURE — 80061 LIPID PANEL: CPT

## 2025-08-14 PROCEDURE — G0378 HOSPITAL OBSERVATION PER HR: HCPCS

## 2025-08-14 PROCEDURE — 85027 COMPLETE CBC AUTOMATED: CPT

## 2025-08-14 PROCEDURE — 96376 TX/PRO/DX INJ SAME DRUG ADON: CPT

## 2025-08-14 PROCEDURE — 6360000002 HC RX W HCPCS: Performed by: NURSE PRACTITIONER

## 2025-08-14 PROCEDURE — 99223 1ST HOSP IP/OBS HIGH 75: CPT | Performed by: INTERNAL MEDICINE

## 2025-08-14 PROCEDURE — 6370000000 HC RX 637 (ALT 250 FOR IP): Performed by: NURSE PRACTITIONER

## 2025-08-14 PROCEDURE — 93010 ELECTROCARDIOGRAM REPORT: CPT | Performed by: INTERNAL MEDICINE

## 2025-08-14 PROCEDURE — 94640 AIRWAY INHALATION TREATMENT: CPT

## 2025-08-14 PROCEDURE — 6360000002 HC RX W HCPCS: Performed by: EMERGENCY MEDICINE

## 2025-08-14 PROCEDURE — 6370000000 HC RX 637 (ALT 250 FOR IP): Performed by: EMERGENCY MEDICINE

## 2025-08-14 PROCEDURE — 84484 ASSAY OF TROPONIN QUANT: CPT

## 2025-08-14 PROCEDURE — 6360000002 HC RX W HCPCS

## 2025-08-14 PROCEDURE — 96375 TX/PRO/DX INJ NEW DRUG ADDON: CPT

## 2025-08-14 PROCEDURE — 93017 CV STRESS TEST TRACING ONLY: CPT

## 2025-08-14 PROCEDURE — 83036 HEMOGLOBIN GLYCOSYLATED A1C: CPT

## 2025-08-14 PROCEDURE — 93306 TTE W/DOPPLER COMPLETE: CPT

## 2025-08-14 PROCEDURE — 78452 HT MUSCLE IMAGE SPECT MULT: CPT

## 2025-08-14 PROCEDURE — 96374 THER/PROPH/DIAG INJ IV PUSH: CPT

## 2025-08-14 PROCEDURE — 36415 COLL VENOUS BLD VENIPUNCTURE: CPT

## 2025-08-14 RX ORDER — SODIUM CHLORIDE 9 MG/ML
INJECTION, SOLUTION INTRAVENOUS PRN
Status: DISCONTINUED | OUTPATIENT
Start: 2025-08-14 | End: 2025-08-14 | Stop reason: HOSPADM

## 2025-08-14 RX ORDER — FERROUS SULFATE 325(65) MG
325 TABLET ORAL
Status: DISCONTINUED | OUTPATIENT
Start: 2025-08-14 | End: 2025-08-14 | Stop reason: HOSPADM

## 2025-08-14 RX ORDER — ONDANSETRON 2 MG/ML
4 INJECTION INTRAMUSCULAR; INTRAVENOUS ONCE
Status: COMPLETED | OUTPATIENT
Start: 2025-08-14 | End: 2025-08-14

## 2025-08-14 RX ORDER — MORPHINE SULFATE 4 MG/ML
4 INJECTION, SOLUTION INTRAMUSCULAR; INTRAVENOUS ONCE
Refills: 0 | Status: COMPLETED | OUTPATIENT
Start: 2025-08-14 | End: 2025-08-14

## 2025-08-14 RX ORDER — POLYETHYLENE GLYCOL 3350 17 G/17G
17 POWDER, FOR SOLUTION ORAL DAILY PRN
Status: DISCONTINUED | OUTPATIENT
Start: 2025-08-14 | End: 2025-08-14 | Stop reason: HOSPADM

## 2025-08-14 RX ORDER — EZETIMIBE 10 MG/1
10 TABLET ORAL DAILY
Status: DISCONTINUED | OUTPATIENT
Start: 2025-08-14 | End: 2025-08-14 | Stop reason: HOSPADM

## 2025-08-14 RX ORDER — ENOXAPARIN SODIUM 100 MG/ML
40 INJECTION SUBCUTANEOUS DAILY
Status: DISCONTINUED | OUTPATIENT
Start: 2025-08-14 | End: 2025-08-14 | Stop reason: HOSPADM

## 2025-08-14 RX ORDER — ONDANSETRON 4 MG/1
4 TABLET, ORALLY DISINTEGRATING ORAL EVERY 8 HOURS PRN
Status: DISCONTINUED | OUTPATIENT
Start: 2025-08-14 | End: 2025-08-14 | Stop reason: HOSPADM

## 2025-08-14 RX ORDER — SODIUM CHLORIDE 0.9 % (FLUSH) 0.9 %
5-40 SYRINGE (ML) INJECTION PRN
Status: DISCONTINUED | OUTPATIENT
Start: 2025-08-14 | End: 2025-08-14 | Stop reason: HOSPADM

## 2025-08-14 RX ORDER — MORPHINE SULFATE 2 MG/ML
1 INJECTION, SOLUTION INTRAMUSCULAR; INTRAVENOUS EVERY 4 HOURS PRN
Status: DISCONTINUED | OUTPATIENT
Start: 2025-08-14 | End: 2025-08-14 | Stop reason: HOSPADM

## 2025-08-14 RX ORDER — INSULIN LISPRO 100 [IU]/ML
0-8 INJECTION, SOLUTION INTRAVENOUS; SUBCUTANEOUS
Status: DISCONTINUED | OUTPATIENT
Start: 2025-08-14 | End: 2025-08-14 | Stop reason: HOSPADM

## 2025-08-14 RX ORDER — ASPIRIN 325 MG
325 TABLET ORAL ONCE
Status: COMPLETED | OUTPATIENT
Start: 2025-08-14 | End: 2025-08-14

## 2025-08-14 RX ORDER — REGADENOSON 0.08 MG/ML
0.4 INJECTION, SOLUTION INTRAVENOUS
Status: COMPLETED | OUTPATIENT
Start: 2025-08-14 | End: 2025-08-14

## 2025-08-14 RX ORDER — NITROGLYCERIN 0.4 MG/1
0.4 TABLET SUBLINGUAL EVERY 5 MIN PRN
Status: DISCONTINUED | OUTPATIENT
Start: 2025-08-14 | End: 2025-08-14 | Stop reason: HOSPADM

## 2025-08-14 RX ORDER — ASPIRIN 81 MG/1
81 TABLET, CHEWABLE ORAL DAILY
Status: DISCONTINUED | OUTPATIENT
Start: 2025-08-15 | End: 2025-08-14 | Stop reason: HOSPADM

## 2025-08-14 RX ORDER — ALBUTEROL SULFATE 90 UG/1
2 INHALANT RESPIRATORY (INHALATION) 4 TIMES DAILY PRN
Status: DISCONTINUED | OUTPATIENT
Start: 2025-08-14 | End: 2025-08-14 | Stop reason: HOSPADM

## 2025-08-14 RX ORDER — CARVEDILOL 6.25 MG/1
6.25 TABLET ORAL 2 TIMES DAILY WITH MEALS
Status: DISCONTINUED | OUTPATIENT
Start: 2025-08-14 | End: 2025-08-14 | Stop reason: HOSPADM

## 2025-08-14 RX ORDER — BUDESONIDE AND FORMOTEROL FUMARATE DIHYDRATE 80; 4.5 UG/1; UG/1
2 AEROSOL RESPIRATORY (INHALATION)
Status: DISCONTINUED | OUTPATIENT
Start: 2025-08-14 | End: 2025-08-14 | Stop reason: HOSPADM

## 2025-08-14 RX ORDER — LISINOPRIL 5 MG/1
5 TABLET ORAL DAILY
Status: DISCONTINUED | OUTPATIENT
Start: 2025-08-14 | End: 2025-08-14 | Stop reason: HOSPADM

## 2025-08-14 RX ORDER — LEVETIRACETAM 500 MG/1
500 TABLET ORAL 2 TIMES DAILY
Status: DISCONTINUED | OUTPATIENT
Start: 2025-08-14 | End: 2025-08-14 | Stop reason: HOSPADM

## 2025-08-14 RX ORDER — ACETAMINOPHEN 650 MG/1
650 SUPPOSITORY RECTAL EVERY 6 HOURS PRN
Status: DISCONTINUED | OUTPATIENT
Start: 2025-08-14 | End: 2025-08-14 | Stop reason: HOSPADM

## 2025-08-14 RX ORDER — ATORVASTATIN CALCIUM 80 MG/1
80 TABLET, FILM COATED ORAL DAILY
Status: DISCONTINUED | OUTPATIENT
Start: 2025-08-14 | End: 2025-08-14 | Stop reason: HOSPADM

## 2025-08-14 RX ORDER — ONDANSETRON 2 MG/ML
4 INJECTION INTRAMUSCULAR; INTRAVENOUS EVERY 6 HOURS PRN
Status: DISCONTINUED | OUTPATIENT
Start: 2025-08-14 | End: 2025-08-14 | Stop reason: HOSPADM

## 2025-08-14 RX ORDER — GABAPENTIN 300 MG/1
300 CAPSULE ORAL 2 TIMES DAILY
Status: DISCONTINUED | OUTPATIENT
Start: 2025-08-14 | End: 2025-08-14 | Stop reason: HOSPADM

## 2025-08-14 RX ORDER — NITROGLYCERIN 0.4 MG/1
0.4 TABLET SUBLINGUAL EVERY 5 MIN PRN
Qty: 25 TABLET | Refills: 3 | Status: SHIPPED | OUTPATIENT
Start: 2025-08-14

## 2025-08-14 RX ORDER — ACETAMINOPHEN 325 MG/1
650 TABLET ORAL EVERY 6 HOURS PRN
Status: DISCONTINUED | OUTPATIENT
Start: 2025-08-14 | End: 2025-08-14 | Stop reason: HOSPADM

## 2025-08-14 RX ORDER — CLOPIDOGREL BISULFATE 75 MG/1
75 TABLET ORAL DAILY
Status: DISCONTINUED | OUTPATIENT
Start: 2025-08-14 | End: 2025-08-14 | Stop reason: HOSPADM

## 2025-08-14 RX ORDER — SODIUM CHLORIDE 0.9 % (FLUSH) 0.9 %
5-40 SYRINGE (ML) INJECTION EVERY 12 HOURS SCHEDULED
Status: DISCONTINUED | OUTPATIENT
Start: 2025-08-14 | End: 2025-08-14 | Stop reason: HOSPADM

## 2025-08-14 RX ORDER — ASPIRIN 81 MG/1
81 TABLET, CHEWABLE ORAL DAILY
Status: DISCONTINUED | OUTPATIENT
Start: 2025-08-15 | End: 2025-08-14

## 2025-08-14 RX ADMIN — REGADENOSON 0.4 MG: 0.08 INJECTION, SOLUTION INTRAVENOUS at 14:24

## 2025-08-14 RX ADMIN — CLOPIDOGREL BISULFATE 75 MG: 75 TABLET, FILM COATED ORAL at 10:13

## 2025-08-14 RX ADMIN — NITROGLYCERIN 0.4 MG: 0.4 TABLET SUBLINGUAL at 08:04

## 2025-08-14 RX ADMIN — TETROFOSMIN 31 MILLICURIE: 1.38 INJECTION, POWDER, LYOPHILIZED, FOR SOLUTION INTRAVENOUS at 14:24

## 2025-08-14 RX ADMIN — INSULIN LISPRO 2 UNITS: 100 INJECTION, SOLUTION INTRAVENOUS; SUBCUTANEOUS at 02:19

## 2025-08-14 RX ADMIN — SODIUM CHLORIDE, PRESERVATIVE FREE 10 ML: 5 INJECTION INTRAVENOUS at 10:14

## 2025-08-14 RX ADMIN — ONDANSETRON 4 MG: 2 INJECTION, SOLUTION INTRAMUSCULAR; INTRAVENOUS at 00:25

## 2025-08-14 RX ADMIN — CARVEDILOL 6.25 MG: 6.25 TABLET, FILM COATED ORAL at 10:13

## 2025-08-14 RX ADMIN — Medication 2 PUFF: at 08:28

## 2025-08-14 RX ADMIN — ATORVASTATIN CALCIUM 80 MG: 80 TABLET, FILM COATED ORAL at 10:13

## 2025-08-14 RX ADMIN — MORPHINE SULFATE 4 MG: 4 INJECTION, SOLUTION INTRAMUSCULAR; INTRAVENOUS at 00:25

## 2025-08-14 RX ADMIN — MORPHINE SULFATE 1 MG: 2 INJECTION, SOLUTION INTRAMUSCULAR; INTRAVENOUS at 09:00

## 2025-08-14 RX ADMIN — LISINOPRIL 5 MG: 5 TABLET ORAL at 10:13

## 2025-08-14 RX ADMIN — TETROFOSMIN 11.4 MILLICURIE: 1.38 INJECTION, POWDER, LYOPHILIZED, FOR SOLUTION INTRAVENOUS at 12:59

## 2025-08-14 RX ADMIN — EZETIMIBE 10 MG: 10 TABLET ORAL at 10:13

## 2025-08-14 RX ADMIN — GABAPENTIN 300 MG: 300 CAPSULE ORAL at 10:13

## 2025-08-14 RX ADMIN — ASPIRIN 325 MG: 325 TABLET ORAL at 00:25

## 2025-08-14 RX ADMIN — NITROGLYCERIN 0.4 MG: 0.4 TABLET SUBLINGUAL at 08:19

## 2025-08-14 RX ADMIN — LEVETIRACETAM 500 MG: 500 TABLET, FILM COATED ORAL at 10:13

## 2025-08-14 ASSESSMENT — PAIN DESCRIPTION - DESCRIPTORS
DESCRIPTORS: ACHING

## 2025-08-14 ASSESSMENT — PAIN DESCRIPTION - LOCATION
LOCATION: CHEST;BACK
LOCATION: CHEST

## 2025-08-14 ASSESSMENT — PAIN - FUNCTIONAL ASSESSMENT
PAIN_FUNCTIONAL_ASSESSMENT: 0-10
PAIN_FUNCTIONAL_ASSESSMENT: ACTIVITIES ARE NOT PREVENTED

## 2025-08-14 ASSESSMENT — PAIN SCALES - GENERAL
PAINLEVEL_OUTOF10: 0
PAINLEVEL_OUTOF10: 6
PAINLEVEL_OUTOF10: 5
PAINLEVEL_OUTOF10: 7
PAINLEVEL_OUTOF10: 0
PAINLEVEL_OUTOF10: 7
PAINLEVEL_OUTOF10: 0
PAINLEVEL_OUTOF10: 4
PAINLEVEL_OUTOF10: 5

## 2025-08-14 ASSESSMENT — PAIN DESCRIPTION - ORIENTATION
ORIENTATION: MID

## 2025-08-14 ASSESSMENT — PAIN DESCRIPTION - PAIN TYPE
TYPE: ACUTE PAIN
TYPE: ACUTE PAIN

## 2025-08-15 ENCOUNTER — CARE COORDINATION (OUTPATIENT)
Dept: CARE COORDINATION | Age: 60
End: 2025-08-15

## 2025-08-18 ENCOUNTER — CARE COORDINATION (OUTPATIENT)
Dept: CARE COORDINATION | Age: 60
End: 2025-08-18

## 2025-08-20 ENCOUNTER — OFFICE VISIT (OUTPATIENT)
Dept: INTERNAL MEDICINE CLINIC | Age: 60
End: 2025-08-20

## 2025-08-20 VITALS
HEART RATE: 66 BPM | OXYGEN SATURATION: 97 % | WEIGHT: 205 LBS | SYSTOLIC BLOOD PRESSURE: 108 MMHG | HEIGHT: 66 IN | BODY MASS INDEX: 32.95 KG/M2 | DIASTOLIC BLOOD PRESSURE: 72 MMHG | TEMPERATURE: 97.2 F

## 2025-08-20 DIAGNOSIS — L84 CORN OF TOE: ICD-10-CM

## 2025-08-20 DIAGNOSIS — J41.0 SIMPLE CHRONIC BRONCHITIS (HCC): ICD-10-CM

## 2025-08-20 DIAGNOSIS — Z87.891 PERSONAL HISTORY OF TOBACCO USE, PRESENTING HAZARDS TO HEALTH: ICD-10-CM

## 2025-08-20 DIAGNOSIS — Z09 HOSPITAL DISCHARGE FOLLOW-UP: ICD-10-CM

## 2025-08-20 DIAGNOSIS — Z87.891 PERSONAL HISTORY OF TOBACCO USE: ICD-10-CM

## 2025-08-20 DIAGNOSIS — F01.50 VASCULAR DEMENTIA, UNSPECIFIED DEMENTIA SEVERITY, UNSPECIFIED WHETHER BEHAVIORAL, PSYCHOTIC, OR MOOD DISTURBANCE OR ANXIETY (HCC): ICD-10-CM

## 2025-08-20 DIAGNOSIS — I25.118 CORONARY ARTERY DISEASE OF NATIVE ARTERY OF NATIVE HEART WITH STABLE ANGINA PECTORIS: ICD-10-CM

## 2025-08-20 DIAGNOSIS — J43.2 CENTRILOBULAR EMPHYSEMA (HCC): ICD-10-CM

## 2025-08-20 DIAGNOSIS — E11.51 TYPE 2 DIABETES MELLITUS WITH DIABETIC PERIPHERAL ANGIOPATHY WITHOUT GANGRENE, UNSPECIFIED WHETHER LONG TERM INSULIN USE (HCC): ICD-10-CM

## 2025-08-20 DIAGNOSIS — I10 PRIMARY HYPERTENSION: Primary | ICD-10-CM

## 2025-08-20 DIAGNOSIS — T82.858D BYPASS GRAFT STENOSIS, SUBSEQUENT ENCOUNTER: ICD-10-CM

## 2025-08-20 DIAGNOSIS — E78.41 ELEVATED LIPOPROTEIN(A): ICD-10-CM

## 2025-08-20 DIAGNOSIS — R07.89 OTHER CHEST PAIN: ICD-10-CM

## 2025-08-20 DIAGNOSIS — Z72.0 TOBACCO USE: ICD-10-CM

## 2025-08-20 RX ORDER — TAMSULOSIN HYDROCHLORIDE 0.4 MG/1
0.4 CAPSULE ORAL DAILY
Qty: 30 CAPSULE | Refills: 2 | Status: SHIPPED | OUTPATIENT
Start: 2025-08-20

## 2025-08-20 RX ORDER — SODIUM CHLORIDE 9 MG/ML
INJECTION, SOLUTION INTRAVENOUS PRN
OUTPATIENT
Start: 2025-08-20

## 2025-08-20 RX ORDER — NITROGLYCERIN 0.4 MG/1
0.4 TABLET SUBLINGUAL
OUTPATIENT
Start: 2025-08-20

## 2025-08-20 RX ORDER — METOPROLOL TARTRATE 50 MG
TABLET ORAL
Qty: 3 TABLET | Refills: 0 | Status: SHIPPED | OUTPATIENT
Start: 2025-08-20

## 2025-08-20 RX ORDER — SODIUM CHLORIDE 0.9 % (FLUSH) 0.9 %
5-40 SYRINGE (ML) INJECTION EVERY 12 HOURS SCHEDULED
OUTPATIENT
Start: 2025-08-20

## 2025-08-20 RX ORDER — LOPERAMIDE HYDROCHLORIDE 2 MG/1
2 CAPSULE ORAL 2 TIMES DAILY PRN
Qty: 30 CAPSULE | Refills: 0 | Status: SHIPPED | OUTPATIENT
Start: 2025-08-20 | End: 2025-09-03

## 2025-08-20 RX ORDER — SODIUM CHLORIDE 0.9 % (FLUSH) 0.9 %
5-40 SYRINGE (ML) INJECTION PRN
OUTPATIENT
Start: 2025-08-20

## 2025-08-20 RX ORDER — METOPROLOL TARTRATE 1 MG/ML
5 INJECTION, SOLUTION INTRAVENOUS EVERY 5 MIN PRN
OUTPATIENT
Start: 2025-08-20

## 2025-08-20 RX ORDER — NITROGLYCERIN 0.4 MG/1
0.8 TABLET SUBLINGUAL
OUTPATIENT
Start: 2025-08-20

## 2025-08-20 RX ORDER — BUPROPION HYDROCHLORIDE 150 MG/1
150 TABLET ORAL 2 TIMES DAILY
Qty: 60 TABLET | Refills: 0 | Status: SHIPPED | OUTPATIENT
Start: 2025-08-20

## 2025-08-25 ENCOUNTER — CARE COORDINATION (OUTPATIENT)
Dept: CARE COORDINATION | Age: 60
End: 2025-08-25

## 2025-08-27 RX ORDER — CLOPIDOGREL BISULFATE 75 MG/1
75 TABLET ORAL DAILY
Qty: 90 TABLET | Refills: 3 | Status: SHIPPED | OUTPATIENT
Start: 2025-08-27

## 2025-08-31 DIAGNOSIS — G89.29 CHRONIC LEFT-SIDED LOW BACK PAIN WITH LEFT-SIDED SCIATICA: ICD-10-CM

## 2025-08-31 DIAGNOSIS — M54.42 CHRONIC LEFT-SIDED LOW BACK PAIN WITH LEFT-SIDED SCIATICA: ICD-10-CM

## 2025-09-02 RX ORDER — GABAPENTIN 300 MG/1
CAPSULE ORAL
Qty: 90 CAPSULE | Refills: 2 | Status: SHIPPED | OUTPATIENT
Start: 2025-09-02 | End: 2025-12-02

## (undated) DEVICE — SUTURE PROL SZ 3-0 L48IN NONABSORBABLE BLU SH L26MM 1/2 CIR 8534H

## (undated) DEVICE — GAUZE,SPONGE,4"X4",16PLY,XRAY,STRL,LF: Brand: MEDLINE

## (undated) DEVICE — DISH PETRI ST LF

## (undated) DEVICE — SUTURE VCRL + SZ 3-0 L27IN ABSRB UD L26MM SH 1/2 CIR VCP416H

## (undated) DEVICE — SCISSORS SURG DIA8MM MPLR CRV ENDOWRIST

## (undated) DEVICE — FOGARTY - HYDRAGRIP SURGICAL - CLAMP INSERTS: Brand: FOGARTY SOFTJAW

## (undated) DEVICE — SUTURE PROL SZ 2-0 L42IN NONABSORBABLE BLU CT-1 L36MM 3/8 D9693

## (undated) DEVICE — SUTURE STRATAFIX SPRL SZ 2 0 L9IN ABSRB VLT FS L26MM 3 8 CIR SXPD2B420

## (undated) DEVICE — RETAINER VISCERA GLASSMAN FISH DISP ST

## (undated) DEVICE — SUTURE VCRL + SZ 2-0 L27IN ABSRB CLR CT-1 1/2 CIR TAPERCUT VCP259H

## (undated) DEVICE — Device

## (undated) DEVICE — GOWN,REINFORCED,POLY,AURORA,XLARGE,STRL: Brand: MEDLINE

## (undated) DEVICE — SUTURE NONABSORBABLE MONOFILAMENT 7-0 BV-1 1X24 IN PROLENE 8702H

## (undated) DEVICE — GLOVE SURG SZ 8 L11.77IN FNGR THK9.8MIL STRW LTX POLYMER

## (undated) DEVICE — ARM DRAPE

## (undated) DEVICE — BANDAGE,GAUZE,4.5"X4.1YD,STERILE,LF: Brand: MEDLINE

## (undated) DEVICE — COLUMN DRAPE

## (undated) DEVICE — DEVICE CTRL FLOWSWITCH 24 PER BX

## (undated) DEVICE — SUTURE STRATAFIX SYMMETRIC PDS + SZ 0 L18IN ABSRB VLT CT 2 SXPP1A407

## (undated) DEVICE — CANNULA SEAL

## (undated) DEVICE — SUTURE MCRYL SZ 4-0 L18IN ABSRB UD L19MM PS-2 3/8 CIR PRIM Y496G

## (undated) DEVICE — DRESSING FOAM W4XL12IN AG SIL ADH ANTIMIC POSTOP OPTIFOAM

## (undated) DEVICE — SYRINGE ANGIO 12ML COR CTRL ROT ADPT SLD PLUNG CLR BRL M

## (undated) DEVICE — TRAY,ERASE CAUTI,URN MTR,SILI,16FR10ML: Brand: MEDLINE

## (undated) DEVICE — SUTURE ETHBND EXCEL SZ 0 L18IN NONABSORBABLE GRN L36MM CT-1 CX21D

## (undated) DEVICE — APPLIER LIG CLP M L11IN TI STR RNG HNDL FOR 20 CLP DISP

## (undated) DEVICE — TUBING PRSS MON L12IN PVC RIG NONEXPANDING M TO FEM CONN

## (undated) DEVICE — SYRINGE IRRIG 60ML SFT PLIABLE BLB EZ TO GRP 1 HND USE W/

## (undated) DEVICE — TIP COVER ACCESSORY

## (undated) DEVICE — SUTURE VCRL + SZ 2-0 L36IN ABSRB UD L36MM CT-1 1/2 CIR VCP945H

## (undated) DEVICE — 3M™ IOBAN™ 2 ANTIMICROBIAL INCISE DRAPE 6651EZ: Brand: IOBAN™ 2

## (undated) DEVICE — BLADELESS OBTURATOR: Brand: WECK VISTA

## (undated) DEVICE — SUTURE NONABSORBABLE MONOFILAMENT 6-0 C-1 1X30 IN PROLENE 8706H

## (undated) DEVICE — FOGARTY ARTERIAL EMBOLECTOMY CATHETER 2F 60CM: Brand: FOGARTY

## (undated) DEVICE — CAUTERY HIGH TEMP FINE TIP (10/BX): Brand: BOVIE

## (undated) DEVICE — TTL1LYR 16FR10ML 100%SIL TMPST TR: Brand: MEDLINE

## (undated) DEVICE — SPONGE LAP W18XL18IN WHT COT 4 PLY FLD STRUNG RADPQ DISP ST 2 PER PACK

## (undated) DEVICE — TAPE UMBILICAL W1/16XL30IN COTTON ROUND NONRADIOPAQUE

## (undated) DEVICE — DRESSING FOAM W4XL10IN AG SIL ADH ANTIMIC POSTOP OPTIFOAM

## (undated) DEVICE — CHECK-FLO HEMOSTASIS ASSEMBLY: Brand: CHECK-FLO

## (undated) DEVICE — CHLORAPREP 26ML ORANGE

## (undated) DEVICE — PMI DISPOSABLE PUNCTURE CLOSURE DEVICE / SUTURE GRASPER: Brand: PMI

## (undated) DEVICE — TOWEL,OR,DSP,ST,BLUE,STD,6/PK,12PK/CS: Brand: MEDLINE

## (undated) DEVICE — SUTURE PROL SZ 5-0 L36IN NONABSORBABLE BLU L17MM RB-1 1/2 8556H

## (undated) DEVICE — SOLUTION IRRIG 1000ML 0.9% SOD CHL USP POUR PLAS BTL

## (undated) DEVICE — SOLUTION IRRIG 2000ML STRL H2O UROMATIC PLAS CONT USP

## (undated) DEVICE — SUTURE VCRL + SZ 2-0 L27IN ABSRB WHT SH 1/2 CIR TAPERCUT VCP417H

## (undated) DEVICE — GOWN SIRUS NONREIN XL W/TWL: Brand: MEDLINE INDUSTRIES, INC.

## (undated) DEVICE — SNAP KOVER: Brand: UNBRANDED

## (undated) DEVICE — LINER SUCTION

## (undated) DEVICE — BLOOD TRANSFUSION FILTER: Brand: HAEMONETICS

## (undated) DEVICE — AGENT HEMSTAT W4XL8IN OXIDIZED REGENERATED CELOS ABSRB

## (undated) DEVICE — MEGA SUTURECUT ND: Brand: ENDOWRIST

## (undated) DEVICE — ELECTRODE BLDE L4IN NONINSULATED EDGE

## (undated) DEVICE — GOWN SIRUS NONREIN LG W/TWL: Brand: MEDLINE INDUSTRIES, INC.

## (undated) DEVICE — COVER,TABLE,HEAVY DUTY,50"X90",STRL: Brand: MEDLINE

## (undated) DEVICE — ZIMMER® STERILE DISPOSABLE TOURNIQUET CUFF WITH PLC, DUAL PORT, SINGLE BLADDER, 24 IN. (61 CM)

## (undated) DEVICE — DRAPE,LAP,CHOLE,W/TROUGHS,STERILE: Brand: MEDLINE

## (undated) DEVICE — SYSTEM SKIN CLSR 22CM DERMBND PRINEO

## (undated) DEVICE — SNAP KAP: Brand: UNBRANDED

## (undated) DEVICE — LOOP VES L406MM DIA1MM MAXI BLU SIL RADPQ DISP

## (undated) DEVICE — BANDAGE COMPR W4INXL15FT BGE E SGL LAYERED CLP CLSR

## (undated) DEVICE — STAPLER EXT SKIN 35 WIDE S STL STPL SQUEEZE HNDL VISISTAT

## (undated) DEVICE — SUTURE ETHBND EXCEL SZ 2 L30IN NONABSORBABLE GRN L40MM V-37 MX69G

## (undated) DEVICE — BANDAGE COMPR W3INXL15FT BGE E SGL LAYERED CLP CLSR

## (undated) DEVICE — SMALL (GREEN) FOR GRAFTS UP TO 8 MM, 20 1/2" / 52 CM (1/PKG): Brand: SCANLAN® VASCULAR TUNNELER SHEATHS AND BULLET TIPS

## (undated) DEVICE — COVER US PRB W12XL244CM SURGICAL INTRAOPERATIVE PLAS TAPR L

## (undated) DEVICE — SUTURE VCRL + SZ 3-0 L18IN ABSRB UD SH 1/2 CIR TAPERCUT NDL VCP864D

## (undated) DEVICE — PLEDGET SURG W0.375XL0.062IN THK1.5MM WHT SFT PTFE RECT

## (undated) DEVICE — SOLUTION IRRIG 2000ML 0.9% SOD CHL USP UROMATIC PLAS CONT

## (undated) DEVICE — SUTURE PROL SZ 5-0 L36IN NONABSORBABLE BLU L13MM C-1 3/8 8720H

## (undated) DEVICE — [HIGH FLOW INSUFFLATOR,  DO NOT USE IF PACKAGE IS DAMAGED,  KEEP DRY,  KEEP AWAY FROM SUNLIGHT,  PROTECT FROM HEAT AND RADIOACTIVE SOURCES.]: Brand: PNEUMOSURE

## (undated) DEVICE — INTENDED TO BE USED TO OCCLUDE, RETRACT AND IDENTIFY ARTERIES, VEINS, TENDONS AND NERVES IN SURGICAL PROCEDURES: Brand: STERION®  VESSEL LOOP

## (undated) DEVICE — SUTURE ABSORBABLE BRAIDED 2-0 CT-1 27 IN UD VICRYL J259H

## (undated) DEVICE — SUTURE MCRYL + SZ 4-0 L18IN ABSRB UD L19MM PS-2 3/8 CIR MCP496G

## (undated) DEVICE — SOLUTION IV IRRIG POUR BRL 0.9% SODIUM CHL 2F7124

## (undated) DEVICE — REDUCER: Brand: ENDOWRIST

## (undated) DEVICE — GLOVE,SURG,SENSICARE SLT,LF,PF,7: Brand: MEDLINE

## (undated) DEVICE — APPLIER CLP L9.375IN APER 2.1MM CLS L3.8MM 20 SM TI CLP

## (undated) DEVICE — PERFUSION SET 120 MM

## (undated) DEVICE — SUTURE NONABSORBABLE MONOFILAMENT 6-0 BV-1 1X30 IN PROLENE 8709H

## (undated) DEVICE — PREVENA PEEL & PLACE SYSTEM KIT- 13 CM: Brand: PREVENA™ PEEL & PLACE™

## (undated) DEVICE — FOGARTY OCCLUSION CATHETER 4F 40CM: Brand: FOGARTY

## (undated) DEVICE — SUTURE ABSORBABLE MONOFILAMENT 0 CTX 60 IN VIO PDS + PDP990G

## (undated) DEVICE — BANDAGE COMPR W6XL12FT SGL LAYERED NO CLSR EXSANGUATION

## (undated) DEVICE — STRAP POS W5XL72IN FOAM KNEE AND BODY HK LOOP CLSR DISP

## (undated) DEVICE — SOLUTION IV 1000ML 0.9% SOD CHL

## (undated) DEVICE — BAG BLD TRNSF 1 CPLR IV ST 600ML TERUFLEX

## (undated) DEVICE — SHEET,DRAPE,53X77,STERILE: Brand: MEDLINE

## (undated) DEVICE — SUTURE VCRL + SZ 0 L27IN ABSRB VLT L26MM UR-6 5/8 CIR VCP603H

## (undated) DEVICE — LIQUIBAND RAPID ADHESIVE 36/CS 0.8ML: Brand: MEDLINE

## (undated) DEVICE — SUTURE PERMAHAND SZ 3-0 L30IN NONABSORBABLE BLK SILK BRAID A304H

## (undated) DEVICE — Z INACTIVE NO ACTIVE SUPPLIER APPLICATOR MEDICATED 26 CC TINT HI-LITE ORNG STRL CHLORAPREP

## (undated) DEVICE — TISSUE RETRIEVAL SYSTEM: Brand: INZII RETRIEVAL SYSTEM

## (undated) DEVICE — FENESTRATED BIPOLAR FORCEPS: Brand: ENDOWRIST

## (undated) DEVICE — NEEDLE SPNL 22GA L3.5IN BLK HUB S STL REG WALL FIT STYL W/

## (undated) DEVICE — SEAL

## (undated) DEVICE — AUTOTRANSFUSION BOWL SET 125 CC XTRA